# Patient Record
Sex: FEMALE | Race: WHITE | URBAN - NONMETROPOLITAN AREA
[De-identification: names, ages, dates, MRNs, and addresses within clinical notes are randomized per-mention and may not be internally consistent; named-entity substitution may affect disease eponyms.]

---

## 2017-05-03 ENCOUNTER — OFFICE VISIT (OUTPATIENT)
Dept: FAMILY MEDICINE CLINIC | Age: 32
End: 2017-05-03

## 2017-05-03 VITALS
HEART RATE: 117 BPM | HEIGHT: 65 IN | DIASTOLIC BLOOD PRESSURE: 98 MMHG | OXYGEN SATURATION: 99 % | SYSTOLIC BLOOD PRESSURE: 128 MMHG | WEIGHT: 191 LBS | BODY MASS INDEX: 31.82 KG/M2

## 2017-05-03 DIAGNOSIS — F41.8 DEPRESSION WITH ANXIETY: ICD-10-CM

## 2017-05-03 DIAGNOSIS — G43.809 OTHER TYPE OF MIGRAINE: Primary | ICD-10-CM

## 2017-05-03 DIAGNOSIS — H69.83 EUSTACHIAN TUBE DYSFUNCTION, BILATERAL: ICD-10-CM

## 2017-05-03 PROCEDURE — 99214 OFFICE O/P EST MOD 30 MIN: CPT | Performed by: NURSE PRACTITIONER

## 2017-05-03 PROCEDURE — 36415 COLL VENOUS BLD VENIPUNCTURE: CPT | Performed by: NURSE PRACTITIONER

## 2017-05-03 RX ORDER — FLUTICASONE PROPIONATE 50 MCG
2 SPRAY, SUSPENSION (ML) NASAL DAILY
Qty: 1 BOTTLE | Refills: 3 | Status: SHIPPED | OUTPATIENT
Start: 2017-05-03 | End: 2017-10-26 | Stop reason: ALTCHOICE

## 2017-05-03 RX ORDER — PROMETHAZINE HYDROCHLORIDE 25 MG/1
TABLET ORAL
Qty: 20 TABLET | Refills: 0 | Status: SHIPPED | OUTPATIENT
Start: 2017-05-03 | End: 2017-08-10 | Stop reason: SDUPTHER

## 2017-05-03 RX ORDER — DEXTROAMPHETAMINE SACCHARATE, AMPHETAMINE ASPARTATE, DEXTROAMPHETAMINE SULFATE AND AMPHETAMINE SULFATE 7.5; 7.5; 7.5; 7.5 MG/1; MG/1; MG/1; MG/1
30 TABLET ORAL DAILY
COMMUNITY

## 2017-05-03 ASSESSMENT — PATIENT HEALTH QUESTIONNAIRE - PHQ9
2. FEELING DOWN, DEPRESSED OR HOPELESS: 0
1. LITTLE INTEREST OR PLEASURE IN DOING THINGS: 0
SUM OF ALL RESPONSES TO PHQ9 QUESTIONS 1 & 2: 0
SUM OF ALL RESPONSES TO PHQ QUESTIONS 1-9: 0

## 2017-05-03 ASSESSMENT — ENCOUNTER SYMPTOMS
EYES NEGATIVE: 1
RESPIRATORY NEGATIVE: 1
GASTROINTESTINAL NEGATIVE: 1

## 2017-05-04 LAB
A/G RATIO: 2 (ref 1.1–2.2)
ALBUMIN SERPL-MCNC: 4.9 G/DL (ref 3.4–5)
ALP BLD-CCNC: 96 U/L (ref 40–129)
ALT SERPL-CCNC: 22 U/L (ref 10–40)
ANION GAP SERPL CALCULATED.3IONS-SCNC: 21 MMOL/L (ref 3–16)
AST SERPL-CCNC: 18 U/L (ref 15–37)
BASOPHILS ABSOLUTE: 0 K/UL (ref 0–0.2)
BASOPHILS RELATIVE PERCENT: 0.4 %
BILIRUB SERPL-MCNC: 0.5 MG/DL (ref 0–1)
BUN BLDV-MCNC: 5 MG/DL (ref 7–20)
CALCIUM SERPL-MCNC: 10 MG/DL (ref 8.3–10.6)
CHLORIDE BLD-SCNC: 99 MMOL/L (ref 99–110)
CO2: 22 MMOL/L (ref 21–32)
CREAT SERPL-MCNC: 0.8 MG/DL (ref 0.6–1.1)
EOSINOPHILS ABSOLUTE: 0.1 K/UL (ref 0–0.6)
EOSINOPHILS RELATIVE PERCENT: 1.1 %
GFR AFRICAN AMERICAN: >60
GFR NON-AFRICAN AMERICAN: >60
GLOBULIN: 2.5 G/DL
GLUCOSE BLD-MCNC: 99 MG/DL (ref 70–99)
HCT VFR BLD CALC: 46.9 % (ref 36–48)
HEMOGLOBIN: 15.3 G/DL (ref 12–16)
LYMPHOCYTES ABSOLUTE: 2 K/UL (ref 1–5.1)
LYMPHOCYTES RELATIVE PERCENT: 22.4 %
MCH RBC QN AUTO: 31.7 PG (ref 26–34)
MCHC RBC AUTO-ENTMCNC: 32.6 G/DL (ref 31–36)
MCV RBC AUTO: 97.1 FL (ref 80–100)
MONOCYTES ABSOLUTE: 0.7 K/UL (ref 0–1.3)
MONOCYTES RELATIVE PERCENT: 8 %
NEUTROPHILS ABSOLUTE: 6.1 K/UL (ref 1.7–7.7)
NEUTROPHILS RELATIVE PERCENT: 68.1 %
PDW BLD-RTO: 14.5 % (ref 12.4–15.4)
PLATELET # BLD: 354 K/UL (ref 135–450)
PMV BLD AUTO: 9.4 FL (ref 5–10.5)
POTASSIUM SERPL-SCNC: 4 MMOL/L (ref 3.5–5.1)
RBC # BLD: 4.83 M/UL (ref 4–5.2)
SODIUM BLD-SCNC: 142 MMOL/L (ref 136–145)
TOTAL PROTEIN: 7.4 G/DL (ref 6.4–8.2)
TSH SERPL DL<=0.05 MIU/L-ACNC: 1.76 UIU/ML (ref 0.27–4.2)
WBC # BLD: 9 K/UL (ref 4–11)

## 2017-05-22 ENCOUNTER — TELEPHONE (OUTPATIENT)
Dept: FAMILY MEDICINE CLINIC | Age: 32
End: 2017-05-22

## 2017-05-22 RX ORDER — FLUCONAZOLE 150 MG/1
150 TABLET ORAL ONCE
Qty: 1 TABLET | Refills: 0 | Status: SHIPPED | OUTPATIENT
Start: 2017-05-22 | End: 2017-05-22

## 2017-08-10 ENCOUNTER — TELEPHONE (OUTPATIENT)
Dept: FAMILY MEDICINE CLINIC | Age: 32
End: 2017-08-10

## 2017-08-10 RX ORDER — METHYLPREDNISOLONE 4 MG/1
TABLET ORAL
Qty: 1 KIT | Refills: 0 | Status: SHIPPED | OUTPATIENT
Start: 2017-08-10 | End: 2017-10-26 | Stop reason: ALTCHOICE

## 2017-08-10 RX ORDER — PROMETHAZINE HYDROCHLORIDE 25 MG/1
TABLET ORAL
Qty: 20 TABLET | Refills: 0 | Status: SHIPPED | OUTPATIENT
Start: 2017-08-10 | End: 2017-11-21 | Stop reason: SDUPTHER

## 2017-10-26 ENCOUNTER — OFFICE VISIT (OUTPATIENT)
Dept: FAMILY MEDICINE CLINIC | Age: 32
End: 2017-10-26

## 2017-10-26 VITALS
HEIGHT: 65 IN | DIASTOLIC BLOOD PRESSURE: 72 MMHG | WEIGHT: 206 LBS | OXYGEN SATURATION: 98 % | SYSTOLIC BLOOD PRESSURE: 124 MMHG | BODY MASS INDEX: 34.32 KG/M2 | HEART RATE: 125 BPM

## 2017-10-26 DIAGNOSIS — H69.83 DYSFUNCTION OF BOTH EUSTACHIAN TUBES: ICD-10-CM

## 2017-10-26 DIAGNOSIS — L30.9 DERMATITIS: ICD-10-CM

## 2017-10-26 DIAGNOSIS — M79.601 RIGHT ARM PAIN: Primary | ICD-10-CM

## 2017-10-26 PROCEDURE — 99213 OFFICE O/P EST LOW 20 MIN: CPT | Performed by: NURSE PRACTITIONER

## 2017-10-26 RX ORDER — RISPERIDONE 0.25 MG/1
TABLET, FILM COATED ORAL
Refills: 4 | COMMUNITY
Start: 2017-10-16 | End: 2017-10-26

## 2017-10-26 RX ORDER — FLUTICASONE PROPIONATE 50 MCG
2 SPRAY, SUSPENSION (ML) NASAL DAILY
Qty: 1 BOTTLE | Refills: 11 | Status: SHIPPED | OUTPATIENT
Start: 2017-10-26 | End: 2020-10-14

## 2017-10-26 RX ORDER — IVERMECTIN 3 MG/1
4 TABLET ORAL DAILY
Qty: 4 TABLET | Refills: 1 | Status: SHIPPED | OUTPATIENT
Start: 2017-10-26 | End: 2020-10-14

## 2017-10-26 ASSESSMENT — ENCOUNTER SYMPTOMS
COUGH: 0
RHINORRHEA: 1
NAIL CHANGES: 0
SORE THROAT: 0
GASTROINTESTINAL NEGATIVE: 1
EYE PAIN: 0
EYES NEGATIVE: 1
VOMITING: 0
SHORTNESS OF BREATH: 0
DIARRHEA: 0
RESPIRATORY NEGATIVE: 1
ABDOMINAL PAIN: 0

## 2017-10-26 NOTE — PROGRESS NOTES
known incident. There was no injury mechanism. Pain location: Right humerus but only notices it when she sleeps on it at night. She does not have any pain or discomfort with movement of her arm during the day. The quality of the pain is described as aching and stabbing. The pain does not radiate. The pain is moderate. The pain has been fluctuating since the incident. Pertinent negatives include no chest pain, muscle weakness, numbness or tingling. Exacerbated by: Sleeping on the right arm. She has tried ice for the symptoms. The treatment provided no relief. Review of Systems   Constitutional: Negative. Negative for fatigue and fever. HENT: Positive for ear pain and rhinorrhea. Negative for congestion, ear discharge, hearing loss and sore throat. Eyes: Negative. Negative for pain. Respiratory: Negative. Negative for cough and shortness of breath. Cardiovascular: Negative. Negative for chest pain. Gastrointestinal: Negative. Negative for abdominal pain, anorexia, diarrhea and vomiting. Genitourinary: Negative. Musculoskeletal: Negative. Negative for joint pain and neck pain. Skin: Positive for rash. Negative for nail changes. Neurological: Negative. Negative for tingling, numbness and headaches. Endo/Heme/Allergies: Negative. Psychiatric/Behavioral: Negative. All other systems reviewed and are negative.        Past Medical History:   Diagnosis Date    Anxiety     AR (allergic rhinitis)     Depression     Dysmenorrhea     Endometriosis     IBS (irritable bowel syndrome)     Migraine     OCD (obsessive compulsive disorder)     Proctalgia fugax     S/P LEEP of cervix 2000    Tobacco abuse     Urinary incontinence      Family History   Problem Relation Age of Onset    Anxiety Disorder      Depression      Cancer Mother      cervical    Cancer Father      leukemia     Past Surgical History:   Procedure Laterality Date    HYSTERECTOMY  7/10/12    Total Encounters:   10/26/17 206 lb (93.4 kg)   05/03/17 191 lb (86.6 kg)   12/10/15 184 lb 6.4 oz (83.6 kg)       Lab Review   No visits with results within 2 Month(s) from this visit. Latest known visit with results is:   Office Visit on 05/03/2017   Component Date Value    TSH 05/04/2017 1.76     WBC 05/04/2017 9.0     RBC 05/04/2017 4.83     Hemoglobin 05/04/2017 15.3     Hematocrit 05/04/2017 46.9     MCV 05/04/2017 97.1     MCH 05/04/2017 31.7     MCHC 05/04/2017 32.6     RDW 05/04/2017 14.5     Platelets 30/30/3520 354     MPV 05/04/2017 9.4     Neutrophils % 05/04/2017 68.1     Lymphocytes % 05/04/2017 22.4     Monocytes % 05/04/2017 8.0     Eosinophils % 05/04/2017 1.1     Basophils % 05/04/2017 0.4     Neutrophils # 05/04/2017 6.1     Lymphocytes # 05/04/2017 2.0     Monocytes # 05/04/2017 0.7     Eosinophils # 05/04/2017 0.1     Basophils # 05/04/2017 0.0     Sodium 05/04/2017 142     Potassium 05/04/2017 4.0     Chloride 05/04/2017 99     CO2 05/04/2017 22     Anion Gap 05/04/2017 21*    Glucose 05/04/2017 99     BUN 05/04/2017 5*    CREATININE 05/04/2017 0.8     GFR Non- 05/04/2017 >60     GFR  05/04/2017 >60     Calcium 05/04/2017 10.0     Total Protein 05/04/2017 7.4     Alb 05/04/2017 4.9     Albumin/Globulin Ratio 05/04/2017 2.0     Total Bilirubin 05/04/2017 0.5     Alkaline Phosphatase 05/04/2017 96     ALT 05/04/2017 22     AST 05/04/2017 18     Globulin 05/04/2017 2.5        No results found for this visit on 10/26/17. Assessment:       1. Right arm pain    2. Dysfunction of both eustachian tubes    3. Dermatitis               Plan:       Jeff Gonzalez was seen today for otalgia and rash. Diagnoses and all orders for this visit:    Right arm pain  Unknown injury and only having discomfort when she sleeps on the area at night. -     XR HUMERUS RIGHT (MIN 2 VIEWS); Future  Patient is convinced that she has a tumor in her arm.  If

## 2017-11-21 ENCOUNTER — TELEPHONE (OUTPATIENT)
Dept: FAMILY MEDICINE CLINIC | Age: 32
End: 2017-11-21

## 2017-11-21 DIAGNOSIS — B37.31 VAGINAL YEAST INFECTION: Primary | ICD-10-CM

## 2017-11-21 RX ORDER — FLUCONAZOLE 150 MG/1
150 TABLET ORAL ONCE
Qty: 1 TABLET | Refills: 0 | Status: SHIPPED | OUTPATIENT
Start: 2017-11-21 | End: 2017-11-21

## 2017-11-21 NOTE — TELEPHONE ENCOUNTER
Pt called asking if Diflucan could be called in for her today for a yeast infection. Pt said that the only symptom that she having is the itching that started yesterday. Pt has tried some otc cream and it does not seem to be helping. Pt uses [facility] in [place]. Pt said that if she does not answer the phone a detailed message can be left.

## 2017-11-22 RX ORDER — PROMETHAZINE HYDROCHLORIDE 25 MG/1
TABLET ORAL
Qty: 20 TABLET | Refills: 0 | Status: SHIPPED | OUTPATIENT
Start: 2017-11-22 | End: 2018-04-16 | Stop reason: SDUPTHER

## 2018-01-18 ENCOUNTER — TELEPHONE (OUTPATIENT)
Dept: GASTROENTEROLOGY | Age: 33
End: 2018-01-18

## 2018-01-18 PROBLEM — K50.119 CROHN'S COLITIS, UNSPECIFIED COMPLICATION (HCC): Status: ACTIVE | Noted: 2018-01-18

## 2018-01-22 PROBLEM — K58.9 IBS (IRRITABLE BOWEL SYNDROME): Status: ACTIVE | Noted: 2018-01-22

## 2018-01-22 PROBLEM — Z71.3 NUTRITIONAL COUNSELING: Status: ACTIVE | Noted: 2018-01-22

## 2018-02-12 ENCOUNTER — TELEPHONE (OUTPATIENT)
Dept: GASTROENTEROLOGY | Age: 33
End: 2018-02-12

## 2018-02-12 NOTE — TELEPHONE ENCOUNTER
Pt called to schedule ED F/U; Pt wishes to transfer from [facility] to here and stay under his care. Pt also needs intermittent FMLA paperwork filled out for Crohn's disease. Pt scheduled appt with Dr. Eddie Fairbanks here in our office 1/22/18 at 10:00 AM. Pt notified that she could discuss FMLA paperwork at that appt with Dr. Eddie Fairbanks. Verbal understanding expressed.

## 2018-02-22 ENCOUNTER — HOSPITAL ENCOUNTER (OUTPATIENT)
Dept: GENERAL RADIOLOGY | Age: 33
Discharge: HOME | End: 2018-02-22
Attending: INTERNAL MEDICINE | Admitting: INTERNAL MEDICINE

## 2018-02-22 ENCOUNTER — OFFICE VISIT (OUTPATIENT)
Dept: GASTROENTEROLOGY | Age: 33
End: 2018-02-22

## 2018-02-22 ENCOUNTER — TELEPHONE (OUTPATIENT)
Dept: GASTROENTEROLOGY | Age: 33
End: 2018-02-22

## 2018-02-22 VITALS
HEIGHT: 65 IN | HEART RATE: 117 BPM | WEIGHT: 260.4 LBS | OXYGEN SATURATION: 98 % | SYSTOLIC BLOOD PRESSURE: 132 MMHG | DIASTOLIC BLOOD PRESSURE: 68 MMHG | BODY MASS INDEX: 43.39 KG/M2

## 2018-02-22 DIAGNOSIS — K50.119 CROHN'S DISEASE OF LARGE INTESTINE WITH COMPLICATION (HCC): Primary | ICD-10-CM

## 2018-02-22 LAB
ALBUMIN SERPL-MCNC: 4.5 GM/DL (ref 3.4–5)
ALBUMIN SERPL-MCNC: 4.5 GM/DL (ref 3.4–5)
ALP BLD-CCNC: 53 IU/L (ref 40–129)
ALP BLD-CCNC: 53 IU/L (ref 40–129)
ALT SERPL-CCNC: 18 U/L (ref 10–40)
ALT SERPL-CCNC: 18 U/L (ref 10–40)
ANION GAP SERPL CALCULATED.3IONS-SCNC: 11 MMOL/L (ref 4–16)
AST SERPL-CCNC: 16 IU/L (ref 15–37)
AST SERPL-CCNC: 16 IU/L (ref 15–37)
BASOPHILS ABSOLUTE: 0 K/CU MM
BASOPHILS RELATIVE PERCENT: 0.4 % (ref 0–1)
BILIRUB SERPL-MCNC: 0.3 MG/DL (ref 0–1)
BILIRUB SERPL-MCNC: 0.3 MG/DL (ref 0–1)
BILIRUBIN DIRECT: 0.2 MG/DL (ref 0–0.3)
BILIRUBIN, INDIRECT: 0.1 MG/DL (ref 0–0.7)
BUN BLDV-MCNC: 15 MG/DL (ref 6–23)
CALCIUM SERPL-MCNC: 9.2 MG/DL (ref 8.3–10.6)
CHLORIDE BLD-SCNC: 102 MMOL/L (ref 99–110)
CO2: 27 MMOL/L (ref 21–32)
CREAT SERPL-MCNC: 0.6 MG/DL (ref 0.6–1.1)
DIFFERENTIAL TYPE: ABNORMAL
EOSINOPHILS ABSOLUTE: 0.1 K/CU MM
EOSINOPHILS RELATIVE PERCENT: 0.9 % (ref 0–3)
FERRITIN: 40 NG/ML (ref 15–150)
GFR AFRICAN AMERICAN: >60 ML/MIN/1.73M2
GFR NON-AFRICAN AMERICAN: >60 ML/MIN/1.73M2
GLUCOSE FASTING: 78 MG/DL (ref 70–99)
HBV SURFACE AB TITR SER: <3.5 {TITER}
HCT VFR BLD CALC: 38.3 % (ref 37–47)
HEMOGLOBIN: 12.5 GM/DL (ref 12.5–16)
HEPATITIS B SURFACE ANTIGEN: NON REACTIVE
HEPATITIS C ANTIBODY: NON REACTIVE
HIGH SENSITIVE C-REACTIVE PROTEIN: 3.7 MG/L
IGA: 433 MG/DL (ref 69–382)
IGG,SERUM: 1324 MG/DL (ref 723–1685)
IGM,SERUM: 101 MG/DL (ref 62–277)
IMMATURE NEUTROPHIL %: 0.4 % (ref 0–0.43)
IRON: 85 UG/DL (ref 37–145)
LYMPHOCYTES ABSOLUTE: 2.5 K/CU MM
LYMPHOCYTES RELATIVE PERCENT: 32.5 % (ref 24–44)
MCH RBC QN AUTO: 29.7 PG (ref 27–31)
MCHC RBC AUTO-ENTMCNC: 32.6 % (ref 32–36)
MCV RBC AUTO: 91 FL (ref 78–100)
MONOCYTES ABSOLUTE: 0.5 K/CU MM
MONOCYTES RELATIVE PERCENT: 7 % (ref 0–4)
NUCLEATED RBC %: 0 %
PCT TRANSFERRIN: 22 % (ref 10–44)
PDW BLD-RTO: 12.1 % (ref 11.7–14.9)
PLATELET # BLD: 272 K/CU MM (ref 140–440)
PMV BLD AUTO: 11.9 FL (ref 7.5–11.1)
POTASSIUM SERPL-SCNC: 4.1 MMOL/L (ref 3.5–5.1)
RBC # BLD: 4.21 M/CU MM (ref 4.2–5.4)
SEGMENTED NEUTROPHILS ABSOLUTE COUNT: 4.6 K/CU MM
SEGMENTED NEUTROPHILS RELATIVE PERCENT: 58.8 % (ref 36–66)
SODIUM BLD-SCNC: 140 MMOL/L (ref 135–145)
TOTAL IMMATURE NEUTOROPHIL: 0.03 K/CU MM
TOTAL IRON BINDING CAPACITY: 395 UG/DL (ref 250–450)
TOTAL NUCLEATED RBC: 0 K/CU MM
TOTAL PROTEIN: 7.9 GM/DL (ref 6.4–8.2)
TOTAL PROTEIN: 7.9 GM/DL (ref 6.4–8.2)
UNSATURATED IRON BINDING CAPACITY: 310 UG/DL (ref 110–370)
VITAMIN B-12: 314.4 PG/ML (ref 211–911)
VITAMIN D 25-HYDROXY: 16.25 NG/ML
WBC # BLD: 7.8 K/CU MM (ref 4–10.5)

## 2018-02-22 PROCEDURE — 99215 OFFICE O/P EST HI 40 MIN: CPT | Performed by: INTERNAL MEDICINE

## 2018-02-22 RX ORDER — INFLIXIMAB 100 MG/10ML
INJECTION, POWDER, LYOPHILIZED, FOR SOLUTION INTRAVENOUS
Qty: 24 EACH | Refills: 5 | Status: ON HOLD | OUTPATIENT
Start: 2018-02-22 | End: 2019-02-07

## 2018-02-22 RX ORDER — ALBUTEROL SULFATE 90 UG/1
2 AEROSOL, METERED RESPIRATORY (INHALATION)
COMMUNITY
End: 2018-03-06 | Stop reason: SDUPTHER

## 2018-02-22 NOTE — LETTER
[facility] [address] [facility]    Your procedure with Dr. Aleisha Cole has been scheduled at the     [facility] located at     [address] [place] [address], [facility].                        3/6/18                                           /      12:00pm_______________            Procedure Date                Arrival Time (Arrive 1 hour early)       1:00pm_____________  Procedure Time                               If an emergency arises and you are unable to keep your procedure appointment, you must call [facility] at [phone] and our office at [phone] within 24 hours to let us know. Not giving 24 hour notice or not showing for your procedure appointment may result in immediate dismissal from Dr. Piper Prom practice. COLONOSCOPY  MIRALAX AND DULCOLAX SPLIT BOWEL PREP    To prepare for this procedure, you will need to clean out your bowels or large intestines. Review all the information you are given as soon as you can so that you are prepared and know what to expect during and after your procedure. You may need to make changes to your diet or medications. Begin this bowel prep 1 day prior to your scheduled procedure. You will need an adult to drive you home after the procedure. Your  will need to check in with you at the facility so the staff are aware of who they are and needs to stay at the facility during the entire procedure. If you  does leave during the procedure, he or she needs to give the staff a phone number where they can be reached and stay within 30 minutes of the facility. If you are taking a cab, bus, or medical transportation service home after the procedure, an adult, other than the , needs to ride with you for your safety.  You should have an adult with you to help you and check on you after the procedure at home for at least 6 hours after ? Start to drink the prep medicine mixture. Drink one, 8-ounce cup of the mixture every 10 to 15 minutes until you finish half the mixture. It is better to drink each cup quickly rather than taking small sips  ? Drink half the mixture this evening. Place the other half of the mixture in the refrigerator. You will need to drink the rest of the mixture in the morning. ? Continue to drink other clear liquids through the evening  Morning of the procedure    ? Six hours before your procedure, drink the rest of the Miralax mixture as before. You may need to set your alarm to get up to finish the prep medication if you have an early appointment time  ? Drink two 8-ounce cups of clear liquids after you finish the prep medication  ? You can drink clear liquids up to 4 hours prior to the procedure  ? Take your medicines for blood pressure, heart issues, seizures or pain with a sip of water up to 2 hours before the procedure      If you are vomiting up your prep medicine, have not had a bowel movement, or your bowels are not clear, please call our office at [phone]. If you call after normal business hours, please leave us a detailed voicemail so we can call you as soon as we get back into the office.

## 2018-02-22 NOTE — TELEPHONE ENCOUNTER
I called scheduling and spoke to Matthias Romo. She faxed me the order form the mediport insertion. I have filled this out and placed it on Dr. Eloy Davila desk to sign so this can be faxed to same day surgery at [phone]. Dr. Saeed Vinson will need to order CBC, PT/PTT/INR, and Saline Lock for this procedure. This has been scheduled for 3/12/18 at 12:00 PM; patient needs to arrive at 10:00AM. NPO 6 hrs prior; [address] [place] [address] will call and give the patient instructions and do pre-registration.

## 2018-02-22 NOTE — TELEPHONE ENCOUNTER
Pt also has FMLA paperwork to be filled out for intermittent FMLA. Pt also ordered Breath Hydrogen Test - please keep track of status of this with [employer].

## 2018-02-22 NOTE — PROGRESS NOTES
Reason for Visit: She had concerns including Crohn's Disease (ED F/U for Crohn's disease). HPI:  A 77-year-old obese  female patient who has a past medical history of Crohn disease that involved terminal ileum and colon status post ileocecectomy and had become complicated by multiple episodes of Clostridium difficile infection and IBS had come to follow up her Crohn disease. She is on Remicade at 10 mg/kg every 6 weeks. The patient recently had been admitted to hospital because of Clostridium difficile infection and bacteremia. Right now the patient has already finished the course of Dificid for her C. Diff infection. Right now the patient's stool pattern had returned to her baseline. She moved her bowels anywhere between 6-8 times per day. Sometimes she moved her bowels twice per day. Stools are semi-formed and semi-loose. The patient had been taking cholestyramine as well. At baseline, patient moved bowels anywhere between 6-8 times per day. The patient also had baseline lower abdominal cramps intermittently. The severity of the abdominal cramps was around a 3 to 4/10. The cramps had not gotten worse. Today the patient denied abdominal pain or abdominal cramps. The patient denied melena. The patient had mild nausea without vomiting. The patient denied heartburn, regurgitation. The patient denied dysphagia, odynophagia. The patient denied abdominal bloating, abdominal gas sensations, abdominal girth increase. The patient denied musculoskeletal or joint pain, mouth ulcers, blurred vision, rashes or jaundice. The patient had undergone upper endoscopy on September 28, 2017, which showed a portal hypertensive gastropathy with normal esophagus and normal duodenum. The patient also had undergone a colonoscopy on May 15, 2017, which showed a normal anastomosis, normal colon and a normal new terminal ileum. The patient also had multiple CAT scans.  The most recent CAT scan on July 24, 2017, with IV and by mouth every 6 hours as needed for Pain .  ondansetron (ZOFRAN ODT) 4 MG disintegrating tablet Take 1 tablet by mouth every 8 hours as needed for Nausea or Vomiting 30 tablet 0    cholestyramine (QUESTRAN) 4 G packet Take 1 packet by mouth 3 times daily (with meals)      Carboxymeth-Glycerin-Polysorb (REFRESH OPTIVE ADVANCED) 0.5-1-0.5 % SOLN Place 1 drop into both eyes 3 times daily      Carboxymethylcellul-Glycerin (REFRESH OPTIVE) 1-0.9 % GEL Place 1 drop into both eyes nightly      Diphenoxylate-Atropine (LOMOTIL PO) Take 2.5 mg by mouth as needed       rizatriptan (MAXALT) 10 MG tablet Take 10 mg by mouth as needed for Migraine May repeat in 2 hours if needed      Cyanocobalamin (VITAMIN B-12 IJ) Inject as directed every 30 days 12/22/16 Pt states she take this on Saturdays, is due this month      InFLIXimab (REMICADE IV) Infuse intravenously Indications: States hasn't had remicadem IV infusion X 9 weeks 12/22/16 States last tx was 11/18/16 due on the 12/28/16 Every Six Weeks At [address] [facility]      albuterol sulfate HFA (PROVENTIL HFA) 108 (90 BASE) MCG/ACT inhaler Inhale 2 puffs into the lungs every 4 hours as needed for Wheezing or Shortness of Breath With spacer (and mask if indicated). Thanks. 1 Inhaler 1     No current facility-administered medications for this visit. Allergies: Allergies   Allergen Reactions    Morphine      \"Triggers My Migraines\"    Tramadol      \"Triggers My Migraines\"       Social History:    Social History     Social History    Marital status: Single     Spouse name: N/A    Number of children: 1    Years of education: N/A     Occupational History    Not on file.      Social History Main Topics    Smoking status: Never Smoker    Smokeless tobacco: Never Used    Alcohol use No    Drug use: No    Sexual activity: Yes     Partners: Male     Other Topics Concern    Not on file     Social History Narrative    Lives with gallops appreciated. Abdomen: Soft, non-tender, no rebound or guarding or peritoneal features, no masses, no hepatosplenomegaly. Extremities: Warm and dry, no clubbing, cyanosis, edema. Neuro: CN II-XII were intact grossly. Rectum: There was no fistular, fissure, external hemorrhoid, tenderness, abscess, erythema or discharge    Labs:  CBC  @LASTLABOSUSHORT(WBC,WBCCOUNT,WBCFETAL,HGB,HCT,PLATELET,MCV)@     Glucose   Date Value Ref Range Status   01/20/2018 105 (H) 70 - 99 MG/DL Final     CO2   Date Value Ref Range Status   01/20/2018 27 21 - 32 MMOL/L Final     BUN   Date Value Ref Range Status   01/20/2018 10 6 - 23 MG/DL Final     Lab Results   Component Value Date    ALT 19 01/19/2018    AST 19 01/19/2018     Lab Results   Component Value Date    AMYLASE 40 10/22/2013     Lab Results   Component Value Date    LIPASE 24 01/17/2018     Lab Results   Component Value Date    ESR 18 11/23/2013     No components found for: CREACTIVEPR  No results found for: BEAU No components found for: ANTISMA, ANTIMITO  No results found for: CEA  No components found for: Lucilla Kyle, OCCULTBLOODS, OCCBLD1, OCCBLD2, OCCBLD3, OCCULTBLOOD  Lab Results   Component Value Date    IRON 96 05/25/2012    FERRITIN 76 05/25/2012     No results found for: HAV    Assessment     Assessment and Plan:    Assessment and Plan;     A 76-year-old obese  female patient who had past medical history of Crohn disease that involved terminal ileum and colon status post ileocecectomy, multiple Clostridium difficile infections and IBS, had come to the office to follow up her Crohn disease. Currently, the patient has been on Remicade 10mg/kg every 6 weeks the patient had 6 bowel movements per day, stools loose and watery. The patient had 2 days of hematochezia. After that, the patient no longer had hematochezia. Prior to that, the patient did not have hematochezia either.  The patient also had chronic low abdominal cramps which were 4/10 antigen negative. Monitoring yearly CXR,  . Check Remicade level and Abs   5. IBD Diagnosis orders: Laboratory, Radiology and Procedures Laboratory    I had ordered blood tests   9. Procedures    colonoscopy  6. Nutrition- overweight. Patient does not require liquid food supplements. Nutrition evaluation not needed   7. Behavioral. Patient will require evaluation for depression   8. Social - Patient will not require work excuse   15. Primary care - Patient will: Influenza and Pneumococcal. Patient does follow-up with PCP for: PAP smear; Dermatology- total body exam ; GYN for: PAP smear;   9. Research- No   10. Patient education- We have discussed extensively about the infectious, carcinogenic and mutagenetic complications associated with medications needed to treat her the Inflammatory Bowel Disease. We have emphasized the need to comply with the monitoring regimen and report new symptoms in due time. In addition we have discussed the risk and benefits of the procedures and tests and offered medically appropriate alternatives. The patient has been provided with contact information for our IBD center personnel. In case of emergent admission the patient or her family will alert our [address]. 11. N/V had resolved. 12: Left eye pain raised a concern of episcleritis or iritis, which might be related to Crohn disease. She has followed with an ophthalmologist and is on Remicade which is supposed to work for both eye manifestations of Crohn disease and Crohn disease. RTC in : 6 months               Viktor Null MD PhD [facility]  [address] [facility] [address]  0ffice: [phone]  Fax: [phone]

## 2018-02-23 ENCOUNTER — TELEPHONE (OUTPATIENT)
Dept: GASTROENTEROLOGY | Age: 33
End: 2018-02-23

## 2018-02-23 DIAGNOSIS — K50.119 CROHN'S DISEASE OF LARGE INTESTINE WITH COMPLICATION (HCC): Primary | ICD-10-CM

## 2018-02-24 LAB
HAV AB SERPL IA-ACNC: NEGATIVE
HEPATITIS B CORE TOTAL ANTIBODY: NEGATIVE
HISTOPLASMA ANTIGEN URINE INTERP: NOT DETECTED
HISTOPLASMA ANTIGEN URINE: NOT DETECTED
TRANSGLUTAMINASE IGA: 1

## 2018-02-26 ENCOUNTER — TELEPHONE (OUTPATIENT)
Dept: GASTROENTEROLOGY | Age: 33
End: 2018-02-26

## 2018-02-26 DIAGNOSIS — K50.119 CROHN'S DISEASE OF LARGE INTESTINE WITH COMPLICATION (HCC): Primary | ICD-10-CM

## 2018-02-26 LAB
NIL (NEGATIVE) SPOT CONTROL: 0
PANEL A SPOT COUNT: 0
PANEL B SPOT COUNT: 0
POSITIVE CONTROL SPOT COUNT: >20
TB CELL IMMUNE MEASURE: NEGATIVE

## 2018-02-26 RX ORDER — SODIUM CHLORIDE, SODIUM LACTATE, POTASSIUM CHLORIDE, CALCIUM CHLORIDE 600; 310; 30; 20 MG/100ML; MG/100ML; MG/100ML; MG/100ML
250 INJECTION, SOLUTION INTRAVENOUS CONTINUOUS
Qty: 1000 ML | Refills: 1 | Status: SHIPPED | OUTPATIENT
Start: 2018-02-26 | End: 2018-03-06 | Stop reason: CLARIF

## 2018-02-26 NOTE — TELEPHONE ENCOUNTER
Received notice that [employer] received our order for the hydrogen breath test.

## 2018-02-26 NOTE — TELEPHONE ENCOUNTER
Patient called because Say called her to let her know that [facility] Bae[facility] sent rx for IV fluid and they do not provide that. I advised patient that the order is for her c-scope but it was not supposed to be sent to [place]. I advised patient to disregard. Patient expressed understanding and had no further questions.

## 2018-02-27 NOTE — TELEPHONE ENCOUNTER
[facility] does not allow infusions orders from outside providers. Will check on home infusion option next.

## 2018-02-27 NOTE — TELEPHONE ENCOUNTER
Please check (1) home infusion option (2) call Remicade rep to see whether they can give your home infusion info (3) call [address] [facility] to see whether they have outpatient infusion center     [facility]

## 2018-02-28 ENCOUNTER — TELEPHONE (OUTPATIENT)
Dept: GASTROENTEROLOGY | Age: 33
End: 2018-02-28

## 2018-03-01 NOTE — TELEPHONE ENCOUNTER
Called [facility] and was transferred to [facility] Phone Number: [phone]

## 2018-03-01 NOTE — TELEPHONE ENCOUNTER
Taylor Aden called back and stated [facility] might be the best option to try.      [facility] - [phone]

## 2018-03-01 NOTE — TELEPHONE ENCOUNTER
Direct line for Eriberto Fragoso with [facility] is (order fax: [phone]) phone number: [phone]  Fax demographics, insurance card, and Remicade order. Eriberto Fragoso from [facility]  will call us back after he checks coverage with the patient's insurance.

## 2018-03-02 ENCOUNTER — TELEPHONE (OUTPATIENT)
Dept: GASTROENTEROLOGY | Age: 33
End: 2018-03-02

## 2018-03-05 ENCOUNTER — TELEPHONE (OUTPATIENT)
Dept: GASTROENTEROLOGY | Age: 33
End: 2018-03-05

## 2018-03-05 NOTE — TELEPHONE ENCOUNTER
Spoke with Mor Lopez Ragland 3/5/2018@ 8:67EU. No Pre  Certification Required for CPT Codes 56747 and 83461.

## 2018-03-05 NOTE — ANESTHESIA PRE-OP
Department of Anesthesiology  Preprocedure Note       Name:  Jean Marie Rivera   Age:  27 y.o.  :  1985                                          MRN:  [id number]         Date:  3/5/2018      Surgeon:    Procedure:    Medications prior to admission:   Prior to Admission medications    Medication Sig Start Date End Date Taking? Authorizing Provider   lactated ringers infusion Infuse 250 mL/hr intravenously continuous 18   Odalis Rocha MD   albuterol sulfate  (90 Base) MCG/ACT inhaler Inhale 2 puffs into the lungs    Historical Provider, MD   inFLIXimab (REMICADE) 100 MG injection 10 mg/kg IV  every 6 weeks 18   Odalis Rocha MD   omeprazole (PRILOSEC) 40 MG delayed release capsule Take 40 mg by mouth 2 times daily    Historical Provider, MD   vitamin D (ERGOCALCIFEROL) 85743 units capsule Take 1 capsule by mouth twice a week 18   Rommel Choi MD   HYDROcodone-acetaminophen [facility]) 5-325 MG per tablet Take 1 tablet by mouth every 6 hours as needed for Pain . Historical Provider, MD   albuterol sulfate HFA (PROVENTIL HFA) 108 (90 BASE) MCG/ACT inhaler Inhale 2 puffs into the lungs every 4 hours as needed for Wheezing or Shortness of Breath With spacer (and mask if indicated). Thanks.  16  Jeremias Casas DO   ondansetron (ZOFRAN ODT) 4 MG disintegrating tablet Take 1 tablet by mouth every 8 hours as needed for Nausea or Vomiting 16   Lisa Quezada MD   cholestyramine [facility]) 4 G packet Take 1 packet by mouth 3 times daily (with meals)    Historical Provider, MD   Carboxymeth-Glycerin-Polysorb (REFRESH OPTIVE ADVANCED) 0.5-1-0.5 % SOLN Place 1 drop into both eyes 3 times daily    Historical Provider, MD Vasquesmethylcellul-Glycerin (REFRESH OPTIVE) 1-0.9 % GEL Place 1 drop into both eyes nightly    Historical Provider, MD   Diphenoxylate-Atropine (LOMOTIL PO) Take 2.5 mg by mouth as needed     Historical Provider, MD   rizatriptan (MAXALT) 10 MG tablet APPENDECTOMY  2011    Done During Colon Resection For Crohn's  Disease    BREAST SURGERY Left 2000    \"Infected Lymph Node Removed\"    CHOLECYSTECTOMY  09/20/2016    COLECTOMY  8-11    Crohn's Disease , Dr. Bishnu Callahan, Appendectomy Also Done    COLONOSCOPY  09/06/2016    Polyps Removed In Past    ENDOSCOPY, COLON, DIAGNOSTIC  Last Done 2014    LAPAROSCOPY  2013    Dr Alayna Hernandez, Removed Adhesions    OTHER SURGICAL HISTORY  06/2011    Mediport Insertion Left Chest Area/revision done 6/2016    WISDOM TOOTH EXTRACTION      All [place] Teeth Extracted In Past       Social History:    Social History   Substance Use Topics    Smoking status: Never Smoker    Smokeless tobacco: Never Used    Alcohol use No                                Counseling given: Not Answered      Vital Signs (Current): There were no vitals filed for this visit. BP Readings from Last 3 Encounters:   02/22/18 132/68   01/22/18 124/62   09/19/17 114/75       NPO Status:                                                                                 BMI:   Wt Readings from Last 3 Encounters:   02/22/18 260 lb 6.4 oz (118.1 kg)   01/22/18 250 lb (113.4 kg)   09/19/17 255 lb (115.7 kg)     There is no height or weight on file to calculate BMI. Anesthesia Evaluation  Patient summary reviewed  Airway:         Dental:          Pulmonary:   (+) asthma:                            Cardiovascular:             Beta Blocker:  Not on Beta Blocker         Neuro/Psych:   (+) headaches: migraine headaches, psychiatric history:            GI/Hepatic/Renal:   (+) GERD:, liver disease:, bowel prep, morbid obesity          Endo/Other:    (+) blood dyscrasia: anemia:., .                 Abdominal:           Vascular:                                        Anesthesia Plan      MAC     ASA 2     ( Pre Anesthesia Assessment complete.  Chart reviewed on 3/5/2018)                            Katie Wilson CRNA   3/5/2018

## 2018-03-06 ENCOUNTER — HOSPITAL ENCOUNTER (OUTPATIENT)
Dept: SURGERY | Age: 33
Discharge: HOME | End: 2018-03-06
Attending: INTERNAL MEDICINE | Admitting: INTERNAL MEDICINE

## 2018-03-06 VITALS
BODY MASS INDEX: 42.85 KG/M2 | RESPIRATION RATE: 16 BRPM | OXYGEN SATURATION: 100 % | TEMPERATURE: 97.3 F | SYSTOLIC BLOOD PRESSURE: 119 MMHG | HEIGHT: 65 IN | WEIGHT: 257.2 LBS | HEART RATE: 78 BPM | DIASTOLIC BLOOD PRESSURE: 71 MMHG

## 2018-03-06 LAB — PREGNANCY TEST URINE, POC: NEGATIVE

## 2018-03-06 PROCEDURE — 45380 COLONOSCOPY AND BIOPSY: CPT | Performed by: INTERNAL MEDICINE

## 2018-03-06 RX ORDER — SODIUM CHLORIDE 9 MG/ML
INJECTION, SOLUTION INTRAVENOUS CONTINUOUS
Status: DISCONTINUED | OUTPATIENT
Start: 2018-03-06 | End: 2018-03-07 | Stop reason: HOSPADM

## 2018-03-06 RX ORDER — IPRATROPIUM BROMIDE AND ALBUTEROL SULFATE 2.5; .5 MG/3ML; MG/3ML
1 SOLUTION RESPIRATORY (INHALATION)
Status: DISCONTINUED | OUTPATIENT
Start: 2018-03-06 | End: 2018-03-07 | Stop reason: HOSPADM

## 2018-03-06 RX ADMIN — IPRATROPIUM BROMIDE AND ALBUTEROL SULFATE 1 AMPULE: 2.5; .5 SOLUTION RESPIRATORY (INHALATION) at 12:35

## 2018-03-06 RX ADMIN — SODIUM CHLORIDE: 9 INJECTION, SOLUTION INTRAVENOUS at 12:51

## 2018-03-06 ASSESSMENT — PAIN SCALES - GENERAL
PAINLEVEL_OUTOF10: 0
PAINLEVEL_OUTOF10: 0

## 2018-03-06 ASSESSMENT — ENCOUNTER SYMPTOMS: SHORTNESS OF BREATH: 1

## 2018-03-06 ASSESSMENT — PAIN - FUNCTIONAL ASSESSMENT: PAIN_FUNCTIONAL_ASSESSMENT: 0-10

## 2018-03-06 NOTE — PROGRESS NOTES
BRIEF COLONOSCOPY REPORT:    Impression:    1) normal Ti, anastomosis, colon, biopsied,   2) chromoendoscopy was performed    Suggest:   1)repeat it in 1 year     The complete operative report (including photos) is available in the following locations:   1) soft chart now   2) report will also be scanned and can then be found by going to \"chart review\" then \"notes\" then \"op report\" or by going to \"chart review\" then \"media\" then \"op report\". For review of photos, may need to go to page 2.

## 2018-03-06 NOTE — PROGRESS NOTES
26 vss, family at bedside, call light in reach, pt drowsy  1350 more alert. Head of bed up. Soda provided  1402 dr Malathi Wright provided update at bedside  1410 pt dressing  [id number] [phone] discharge instructions reviewed.  Verbalized understanding  1416 ambulated to bathroom  [phone] [id number] discharged to car

## 2018-03-06 NOTE — ANESTHESIA POST-OP
Anesthesia Post-op Note    Patient: Lenard Friedman  MRN: [id number]  YOB: 1985  Date of evaluation: 3/6/2018  Time:  1:41 PM     Procedure(s) Performed: colonoscopy Bx's    Last Vitals: /79   Pulse 92   Temp 98.3 °F (36.8 °C) (Temporal)   Resp 18   Ht 5' 5\" (1.651 m)   Wt 257 lb 3.2 oz (116.7 kg)   SpO2 96%   BMI 42.80 kg/m²     Ezekiel Phase I:  10    Ezekiel Phase II:  10    Anesthesia Post Evaluation    Final anesthesia type: general and TIVA  Patient location during evaluation: procedure area  Patient participation: complete - patient participated  Level of consciousness: awake and alert  Pain score: 0  Airway patency: patent  Nausea & Vomiting: no nausea and no vomiting  Complications: no  Cardiovascular status: hemodynamically stable  Respiratory status: acceptable, nonlabored ventilation, room air and spontaneous ventilation  Hydration status: euvolemic        Pamela Sexton CRNA  1:41 PM

## 2018-03-07 ENCOUNTER — TELEPHONE (OUTPATIENT)
Dept: GASTROENTEROLOGY | Age: 33
End: 2018-03-07

## 2018-03-07 NOTE — TELEPHONE ENCOUNTER
Called [facility] and spoke to [employer]. They have submitted the pre-cert for the home infusions and are awaiting a response. They will notify us when they receive a response.

## 2018-03-08 ENCOUNTER — TELEPHONE (OUTPATIENT)
Dept: GASTROENTEROLOGY | Age: 33
End: 2018-03-08

## 2018-03-19 NOTE — TELEPHONE ENCOUNTER
Heike from [facility] called and LM on VM after hours stating they received authorization for home infusions and that they will be reaching out to the patient to get her scheduled. Heike can be reached at [phone] for further details or questions.

## 2018-03-20 ENCOUNTER — TELEPHONE (OUTPATIENT)
Dept: GASTROENTEROLOGY | Age: 33
End: 2018-03-20

## 2018-04-16 ENCOUNTER — TELEPHONE (OUTPATIENT)
Dept: FAMILY MEDICINE CLINIC | Age: 33
End: 2018-04-16

## 2018-04-16 RX ORDER — PROMETHAZINE HYDROCHLORIDE 25 MG/1
TABLET ORAL
Qty: 20 TABLET | Refills: 0 | Status: SHIPPED | OUTPATIENT
Start: 2018-04-16 | End: 2020-10-14

## 2018-04-16 RX ORDER — FLUCONAZOLE 150 MG/1
150 TABLET ORAL ONCE
Qty: 1 TABLET | Refills: 0 | Status: SHIPPED | OUTPATIENT
Start: 2018-04-16 | End: 2018-04-16

## 2018-04-19 PROBLEM — T80.212A PORT OR RESERVOIR INFECTION: Status: ACTIVE | Noted: 2018-04-19

## 2018-04-20 PROBLEM — A04.72 C. DIFFICILE COLITIS: Status: ACTIVE | Noted: 2018-04-20

## 2018-04-20 PROBLEM — K59.1 FUNCTIONAL DIARRHEA: Status: ACTIVE | Noted: 2018-04-20

## 2018-04-20 PROBLEM — R11.2 NON-INTRACTABLE VOMITING WITH NAUSEA: Status: ACTIVE | Noted: 2018-04-20

## 2018-04-20 PROBLEM — K50.819 CROHN'S DISEASE OF SMALL AND LARGE INTESTINES WITH COMPLICATION (HCC): Status: ACTIVE | Noted: 2018-04-20

## 2018-04-20 PROBLEM — R22.1 NECK SWELLING: Status: ACTIVE | Noted: 2018-04-20

## 2018-04-20 PROBLEM — D72.829 LEUKOCYTOSIS: Status: ACTIVE | Noted: 2018-04-20

## 2018-04-26 ENCOUNTER — TELEPHONE (OUTPATIENT)
Dept: GASTROENTEROLOGY | Age: 33
End: 2018-04-26

## 2018-04-27 ENCOUNTER — TELEPHONE (OUTPATIENT)
Dept: GASTROENTEROLOGY | Age: 33
End: 2018-04-27

## 2018-05-03 ENCOUNTER — OFFICE VISIT (OUTPATIENT)
Dept: SURGERY | Age: 33
End: 2018-05-03

## 2018-05-03 VITALS
SYSTOLIC BLOOD PRESSURE: 118 MMHG | WEIGHT: 259.92 LBS | BODY MASS INDEX: 43.31 KG/M2 | HEIGHT: 65 IN | DIASTOLIC BLOOD PRESSURE: 78 MMHG

## 2018-05-03 DIAGNOSIS — T80.219D INFECTION OF VENOUS ACCESS PORT, SUBSEQUENT ENCOUNTER: Primary | ICD-10-CM

## 2018-05-03 PROCEDURE — 99024 POSTOP FOLLOW-UP VISIT: CPT | Performed by: PHYSICIAN ASSISTANT

## 2018-05-08 ENCOUNTER — TELEPHONE (OUTPATIENT)
Dept: GASTROENTEROLOGY | Age: 33
End: 2018-05-08

## 2018-05-08 DIAGNOSIS — K50.119 CROHN'S DISEASE OF LARGE INTESTINE WITH COMPLICATION (HCC): Primary | ICD-10-CM

## 2018-05-08 RX ORDER — PROMETHAZINE HYDROCHLORIDE 25 MG/1
25 TABLET ORAL EVERY 8 HOURS PRN
Qty: 30 TABLET | Refills: 0 | Status: SHIPPED | OUTPATIENT
Start: 2018-05-08 | End: 2018-05-13

## 2018-05-14 ENCOUNTER — TELEPHONE (OUTPATIENT)
Dept: GASTROENTEROLOGY | Age: 33
End: 2018-05-14

## 2018-05-18 ENCOUNTER — TELEPHONE (OUTPATIENT)
Dept: GASTROENTEROLOGY | Age: 33
End: 2018-05-18

## 2018-07-17 ENCOUNTER — TELEPHONE (OUTPATIENT)
Dept: FAMILY MEDICINE CLINIC | Age: 33
End: 2018-07-17

## 2018-07-17 RX ORDER — RIZATRIPTAN BENZOATE 10 MG/1
10 TABLET ORAL
Qty: 9 TABLET | Refills: 0 | Status: SHIPPED | OUTPATIENT
Start: 2018-07-17 | End: 2020-10-14

## 2018-07-17 NOTE — TELEPHONE ENCOUNTER
Patient has not been seen in over 1 year for chronic conditions. Patient needs non acute appointment set up  Once appointment is set up may send in prescription for maxalt 10 mg at headache onset. May repeat in 2 hours if needed.  Not to exceed 2 tabs in 24 hours #9 NRF

## 2018-07-17 NOTE — TELEPHONE ENCOUNTER
Patient would like to have something called in for her migraines, she cannot take Imatrex so she needs something else. She was taking phenergan but it isn't helping any longer. She uses [facility] [place].

## 2018-08-20 ENCOUNTER — TELEPHONE (OUTPATIENT)
Dept: GASTROENTEROLOGY | Age: 33
End: 2018-08-20

## 2018-08-20 NOTE — TELEPHONE ENCOUNTER
Patient called to reschedule her appt that she missed with  this morning. Appt has been rescheduled to first available, 10/1/18 at 900am. Patient expressed understanding. Patient just found out last week that she is pregnant. She is scheduled for Remicade infusion on Saturday 8/25/18. Dr. Kimmie Cheung is it okay for patient to have infusion while pregnant?

## 2018-08-20 NOTE — TELEPHONE ENCOUNTER
Patient notified that it is okay to continue with infusions. Patient expressed understanding and had no further questions.

## 2018-10-01 ENCOUNTER — OFFICE VISIT (OUTPATIENT)
Dept: GASTROENTEROLOGY | Age: 33
End: 2018-10-01
Payer: COMMERCIAL

## 2018-10-01 VITALS
DIASTOLIC BLOOD PRESSURE: 74 MMHG | OXYGEN SATURATION: 98 % | SYSTOLIC BLOOD PRESSURE: 108 MMHG | BODY MASS INDEX: 45.45 KG/M2 | WEIGHT: 272.8 LBS | HEART RATE: 103 BPM | HEIGHT: 65 IN

## 2018-10-01 DIAGNOSIS — K50.119 CROHN'S DISEASE OF LARGE INTESTINE WITH COMPLICATION (HCC): Primary | ICD-10-CM

## 2018-10-01 PROCEDURE — 99214 OFFICE O/P EST MOD 30 MIN: CPT | Performed by: INTERNAL MEDICINE

## 2018-10-01 RX ORDER — PNV,CALCIUM 72/IRON/FOLIC ACID 27 MG-1 MG
TABLET ORAL
Refills: 11 | COMMUNITY
Start: 2018-09-19 | End: 2019-08-02

## 2018-10-01 RX ORDER — PROMETHAZINE HYDROCHLORIDE 25 MG/1
TABLET ORAL
Refills: 2 | Status: ON HOLD | COMMUNITY
Start: 2018-09-19 | End: 2019-04-04 | Stop reason: HOSPADM

## 2018-10-01 NOTE — PROGRESS NOTES
Reason for Visit: She had concerns including Crohn's Disease (6 mo F/U). HPI: A 26year old obese  female patient Who has a past medical history of Crohn disease that involved the terminal ileum and colon status post ileocecectomy, Acid reflux, IBS, and [place], fatty liver, pancreatitis, Recurrent C. Diff colitis, had come to my office to follow-up her Crohn disease. Her Crohn disease has been in clinical remission and the endoscopy remission on Remicade 10 mg/kg every 6 weeks. She moved her bowels once per day. Stools are formed. She denied abdominal pain and abdominal cramps. She had Mild nauseate which had been well controlled by Phenegan. She denied emesis. She denied acid reflux. She did have a normal bone density test last year. Her vitamin D level has been lower, for which she has been on Vit D supplements. Currently, she was in her 12 week patency. She had been following with her regular ObGyn doctor and a high risk ObGyn doctor. The patient denied heartburn, regurgitation,  dysphagia, odynophagia, vomiting, abdominal bloating, abdominal gassy sensations, abdominal distention, abdominal girth increase, hematochezia, hematemesis, hematemesis, melena, weight loss, weight gain, rashes, jaundice, mental status change, itchiness on the skin, muscle skeleton joint pain, mouth ulcers, and blurred vision.            PMH:    Past Medical History:   Diagnosis Date    Acid reflux     Anemia     Anxiety     Asthma     NO PULMONOLOGIST AT THIS TIME    Crohn's disease [facility]) Dx 2010    Remicade Infusions Every Six Weeks, Sees Dr. Livia Gonzalez In hospitals    Depression     Fall 2012    \"Passed Out And Lio Stout On My Javon Kate"    Fatty liver     Hx of chest pain     per old chart pt in ER 8/2014 with c/o chest pain near site of chest port    Hyperlipidemia     Lower back pain     \"Sometimes\"    Migraine Last Migraine 4-11-16    Multiple pulmonary nodules 6/2013 Review of Systems:  Constitutional: No weight loss, no fevers. Eyes: No problems with vision. ENT: No nose or sinus problems, no oral problems, no throat problems or hoarseness. Cardiovascular: No chest pain, no leg pain with walking, no palpitations, no ankle swelling. Respiratory: No shortness of breath, no persistent cough, no wheezing. Endocrine: No increased thirst, no increased urination. Gastrointestinal: No heartburn, no dysphagia, no abdominal pain, no loss of appetite, no nausea or vomiting, no diarrhea, no constipation, no melena, no hematochezia, no sceleral icterus or jaundice. Skin: No rashes. Musculoskeletal: No trouble walking or standing, no joint pain, no muscle pain. Allergy/Immune System: No allergies, no frequent infections. Neurological: No memory difficulties, no temporary blindness, no difficulty speaking, no headaches, no numbness. Psychiatric: No depression, no suicidal ideation, no auditory hallucinations. Hematological/Lymphatic: No lymphadenopathy, no frequent nose bleeds, no easy bruising. Genitourinary: No penile/vaginal discharge, no pain with urination, no trouble starting urinary stream, no hematuria. Physical Examination  Vital Signs: /74 (Site: Left Upper Arm, Position: Sitting, Cuff Size: Large Adult)   Pulse 103   Ht 5' 5\" (1.651 m)   Wt 272 lb 12.8 oz (123.7 kg)   LMP 07/10/2018 (Exact Date)   SpO2 98%   Breastfeeding? No   BMI 45.40 kg/m²  Body mass index is 45.4 kg/m². General: The patient is a 26 y.o. female in No acute distress. EYE: EOMI, Gross visual field was normal. Pupils reactive, The conjunctive was normal, with no erythema. ENT: no lymphadenopathy, oropharynx is without erythema, edema, or exudates, and moist mucus membranes. The nasal mucosa, septum and turbinates were normal without inflammation or edema.   Neck: There was no mass on palpitation, tracheal position was in the middle of the neck and there was no enlarged Patient education- We have discussed extensively about the infectious, carcinogenic and mutagenetic complications associated with medications needed to treat her the Inflammatory Bowel Disease. We have emphasized the need to comply with the monitoring regimen and report new symptoms in due time. In addition we have discussed the risk and benefits of the procedures and tests and offered medically appropriate alternatives. The patient has been provided with contact information for our IBD center personnel. In case of emergent admission the patient or her family will alert our [address]. 11. N/V had resolved. 12: Left eye pain raised a concern of episcleritis or iritis, which might be related to Crohn disease. She has followed with an ophthalmologist and is on Remicade which is supposed to work for both eye manifestations of Crohn disease and Crohn disease. RTC in : 6 months             Bryan Morris MD PhD [facility]  [address], [address] [facility] [address]  0ffice: [phone]  Fax: [phone]

## 2018-11-21 ENCOUNTER — HOSPITAL ENCOUNTER (EMERGENCY)
Age: 33
Discharge: HOME | End: 2018-11-21
Payer: COMMERCIAL

## 2018-11-21 VITALS
HEIGHT: 65 IN | WEIGHT: 270 LBS | SYSTOLIC BLOOD PRESSURE: 121 MMHG | RESPIRATION RATE: 17 BRPM | TEMPERATURE: 98.7 F | DIASTOLIC BLOOD PRESSURE: 81 MMHG | BODY MASS INDEX: 44.98 KG/M2 | OXYGEN SATURATION: 99 % | HEART RATE: 107 BPM

## 2018-11-21 DIAGNOSIS — M79.644 PAIN OF RIGHT THUMB: Primary | ICD-10-CM

## 2018-11-21 PROCEDURE — 99283 EMERGENCY DEPT VISIT LOW MDM: CPT

## 2018-11-21 ASSESSMENT — PAIN DESCRIPTION - ORIENTATION: ORIENTATION: RIGHT

## 2018-11-21 ASSESSMENT — PAIN SCALES - GENERAL: PAINLEVEL_OUTOF10: 2

## 2018-11-21 ASSESSMENT — PAIN DESCRIPTION - LOCATION: LOCATION: HAND

## 2018-11-21 ASSESSMENT — PAIN DESCRIPTION - PAIN TYPE: TYPE: ACUTE PAIN

## 2018-11-21 NOTE — ED PROVIDER NOTES
of breath on exertion     Teeth missing     Upper And Lower    Wears glasses      Past Surgical History:   Procedure Laterality Date    ADENOIDECTOMY  1995    APPENDECTOMY  2011    Done During Colon Resection For Crohn's  Disease    BREAST SURGERY Left 2000    \"Infected Lymph Node Removed\"    CHOLECYSTECTOMY  09/20/2016    COLECTOMY  8-11    Crohn's Disease , Dr. Jt Raya, Appendectomy Also Done    COLONOSCOPY  09/06/2016    Polyps Removed In Past    COLONOSCOPY  05/2017    Hospitalized for c-diff    COLONOSCOPY  03/06/2018    normal, multiple biopsy, repeat 1 year    ENDOSCOPY, COLON, DIAGNOSTIC  Last Done 2014    LAPAROSCOPY  2013    Dr Radha Bush, Removed Adhesions    OTHER SURGICAL HISTORY  06/2011    Mediport Insertion Left Chest Area/revision done 6/2016- \"removed at [facility] 11/2017 after I got an infection    TUNNELED VENOUS PORT PLACEMENT Right 03/12/2018    smart port- [facility]-Dr Au Notice    WISDOM TOOTH EXTRACTION      All Four [place] Teeth Extracted In Past       CURRENT MEDICATIONS    Current Outpatient Rx   Medication Sig Dispense Refill    promethazine (PHENERGAN) 25 MG tablet TK 1 T PO Q 4 TO 6 H PRN  2    Prenatal Vit-Fe Fumarate-FA (PREPLUS) 27-1 MG TABS TK 1 T PO QD  11    ondansetron (ZOFRAN ODT) 4 MG disintegrating tablet Take 1 tablet by mouth every 6 hours 10 tablet 0    dicyclomine (BENTYL) 10 MG capsule Take 1 capsule by mouth 3 times daily As needed for abdominal pain 15 capsule 3    inFLIXimab (REMICADE) 100 MG injection 10 mg/kg IV  every 6 weeks 24 each 5    omeprazole (PRILOSEC) 40 MG delayed release capsule Take 40 mg by mouth 2 times daily      vitamin D (ERGOCALCIFEROL) 39220 units capsule Take 1 capsule by mouth twice a week (Patient taking differently: Take 50,000 Units by mouth twice a week Take two tabs) 14 capsule 0    HYDROcodone-acetaminophen (NORCO) 5-325 MG per tablet Take 1 tablet by mouth every 6 hours as needed for Pain .       albuterol sulfate HFA (PROVENTIL HFA) Quervain's as well. Recommend these symptomatic treatment and follow-up with PCP, return to ED with any new or worsening symptoms. Patient comfortable with this workup and plan. Clinical  IMPRESSION    1. Pain of right thumb          Provided a Velcro wrist splint. For comfort. Pain medication provided. Followup with orthopedist-information provided today. Diagnosis and plan discussed in detail with patient who understands and agrees. Patient agrees to return emergency department if symptoms worsen or any new symptoms develop. Comment: Please note this report has been produced using speech recognition software and may contain errors related to that system including errors in grammar, punctuation, and spelling, as well as words and phrases that may be inappropriate. If there are any questions or concerns please feel free to contact the dictating provider for clarification.         Kelly Gray PA-C  11/21/18 [id number]

## 2018-11-26 ENCOUNTER — TELEPHONE (OUTPATIENT)
Dept: GASTROENTEROLOGY | Age: 33
End: 2018-11-26

## 2018-11-30 ENCOUNTER — TELEPHONE (OUTPATIENT)
Dept: GASTROENTEROLOGY | Age: 33
End: 2018-11-30

## 2018-11-30 NOTE — TELEPHONE ENCOUNTER
Dr. Clifton Trujillo called and we spoke about this paperwork. He is asking that we fill it out based on chart notes and he will sign it. He is at the hospital currently and is unable to get to the office before we close to sign the paperwork. The earliest he can sign this is next week. I called the patient and she stated she requested an extension from the HR dept so next week will be fine. Call this patient when paperwork is ready for .

## 2018-12-06 ENCOUNTER — TELEPHONE (OUTPATIENT)
Dept: GASTROENTEROLOGY | Age: 33
End: 2018-12-06

## 2018-12-19 ENCOUNTER — HOSPITAL ENCOUNTER (OUTPATIENT)
Age: 33
Discharge: HOME | End: 2018-12-19
Attending: OBSTETRICS & GYNECOLOGY | Admitting: OBSTETRICS & GYNECOLOGY
Payer: COMMERCIAL

## 2018-12-19 ENCOUNTER — APPOINTMENT (OUTPATIENT)
Dept: GENERAL RADIOLOGY | Age: 33
End: 2018-12-19
Payer: COMMERCIAL

## 2018-12-19 VITALS
RESPIRATION RATE: 17 BRPM | HEIGHT: 65 IN | BODY MASS INDEX: 45.48 KG/M2 | WEIGHT: 273 LBS | OXYGEN SATURATION: 98 % | DIASTOLIC BLOOD PRESSURE: 76 MMHG | TEMPERATURE: 97.6 F | SYSTOLIC BLOOD PRESSURE: 134 MMHG | HEART RATE: 98 BPM

## 2018-12-19 PROBLEM — O26.90 PREGNANCY RELATED CONDITION: Status: ACTIVE | Noted: 2018-12-19

## 2018-12-19 LAB
AMPHETAMINES: NEGATIVE
BACTERIA: ABNORMAL /HPF
BARBITURATE SCREEN URINE: NEGATIVE
BENZODIAZEPINE SCREEN, URINE: NEGATIVE
BILIRUBIN URINE: NEGATIVE MG/DL
BLOOD, URINE: ABNORMAL
CANNABINOID SCREEN URINE: NEGATIVE
CLARITY: ABNORMAL
COCAINE METABOLITE: NEGATIVE
COLOR: YELLOW
GLUCOSE, URINE: NEGATIVE MG/DL
HCT VFR BLD CALC: 31.8 % (ref 37–47)
HEMOGLOBIN: 10.1 GM/DL (ref 12.5–16)
KETONES, URINE: ABNORMAL MG/DL
LEUKOCYTE ESTERASE, URINE: ABNORMAL
MCH RBC QN AUTO: 29.4 PG (ref 27–31)
MCHC RBC AUTO-ENTMCNC: 31.8 % (ref 32–36)
MCV RBC AUTO: 92.4 FL (ref 78–100)
MUCUS: ABNORMAL HPF
NITRITE URINE, QUANTITATIVE: NEGATIVE
OPIATES, URINE: NEGATIVE
OXYCODONE: NEGATIVE
PDW BLD-RTO: 12.3 % (ref 11.7–14.9)
PH, URINE: 5 (ref 5–8)
PHENCYCLIDINE, URINE: NEGATIVE
PLATELET # BLD: 219 K/CU MM (ref 140–440)
PMV BLD AUTO: 11.4 FL (ref 7.5–11.1)
PROTEIN UA: 30 MG/DL
RBC # BLD: 3.44 M/CU MM (ref 4.2–5.4)
RBC URINE: 10 /HPF (ref 0–6)
SPECIFIC GRAVITY UA: 1.03 (ref 1–1.03)
SQUAMOUS EPITHELIAL: 42 /HPF
TRICHOMONAS: ABNORMAL /HPF
URIC ACID CRYSTALS: ABNORMAL /HPF
UROBILINOGEN, URINE: NORMAL MG/DL (ref 0.2–1)
WBC # BLD: 14 K/CU MM (ref 4–10.5)
WBC UA: 4 /HPF (ref 0–5)

## 2018-12-19 PROCEDURE — 71046 X-RAY EXAM CHEST 2 VIEWS: CPT

## 2018-12-19 PROCEDURE — 80307 DRUG TEST PRSMV CHEM ANLYZR: CPT

## 2018-12-19 PROCEDURE — 85027 COMPLETE CBC AUTOMATED: CPT

## 2018-12-19 PROCEDURE — 99211 OFF/OP EST MAY X REQ PHY/QHP: CPT

## 2018-12-19 PROCEDURE — 81001 URINALYSIS AUTO W/SCOPE: CPT

## 2018-12-19 ASSESSMENT — PAIN DESCRIPTION - FREQUENCY
FREQUENCY: CONTINUOUS
FREQUENCY: INTERMITTENT

## 2018-12-19 ASSESSMENT — PAIN DESCRIPTION - DESCRIPTORS
DESCRIPTORS: ACHING
DESCRIPTORS: SHARP

## 2018-12-19 ASSESSMENT — PAIN DESCRIPTION - ONSET: ONSET: GRADUAL

## 2018-12-19 ASSESSMENT — PAIN DESCRIPTION - ORIENTATION: ORIENTATION: LOWER

## 2018-12-19 ASSESSMENT — PAIN DESCRIPTION - LOCATION
LOCATION: ABDOMEN
LOCATION: BACK

## 2018-12-19 ASSESSMENT — PAIN DESCRIPTION - PAIN TYPE
TYPE: ACUTE PAIN
TYPE: ACUTE PAIN

## 2018-12-19 ASSESSMENT — PAIN DESCRIPTION - PROGRESSION
CLINICAL_PROGRESSION: NOT CHANGED
CLINICAL_PROGRESSION: NOT CHANGED

## 2018-12-19 ASSESSMENT — PAIN SCALES - GENERAL: PAINLEVEL_OUTOF10: 3

## 2018-12-19 NOTE — PROGRESS NOTES
Pt arrives by squad with c/o SOB, HA, dizziness, swelling, and sharp abd pain. Pt taken to LT02, instruced to change into gown, provide CCUA, FHM and TOCO applied and VS taken. Pt reports positive fetal movement, denies any bleeding or leaking of fluid, and abd is soft and nontender to palpation. States the swelling and abd pain began last night, and HA and SOB began about 1300, 2 hours prior to arrival.  Dr. Dyana Scheuermann on unit and updated on pts arrival, at bedside to assess pt. Pt also states that in middle of night, her heartburn was so bad that it awakened her and she threw up and had a coughing fit for an hour. Orders received, POC reviewed, all questions answered, will continue to monitor.

## 2018-12-19 NOTE — PROGRESS NOTES
Department of Obstetrics and Gynecology  Labor and Delivery  TRIAGE NOTE      SUBJECTIVE:    Chief Complaint   Patient presents with    Shortness of Breath     since 1300 12/19    Swelling     since 12/18    Headache     since 1300 12/19    Abdominal Pain     sharp pain in lower abd coming and going     Patient arrives by squad from work with complaints of SOB at work today. She had sharp lower abdominal pain last night. It was crampy, and severe heartburn. In the night she awoke with vomit in her mouth and was coughing uncontrollably for one hour. Her perception was that she had breathed vomitus into her lungs. Today she began to have a severe headache and SOB at work. OBJECTIVE    Vitals:  /71   Pulse 103   Temp 97.8 °F (36.6 °C) (Oral)   Resp 22   Ht 5' 5\" (1.651 m)   Wt 273 lb (123.8 kg)   LMP 07/10/2018 (Exact Date)   SpO2 98%   BMI 45.43 kg/m²     CONSTITUTIONAL:  awake, alert, cooperative, no apparent distress, and appears stated age  ABDOMEN:  No scars, normal bowel sounds, soft, gravid, non-tender, no masses palpated, no hepatosplenomegally      Cervix:  Is not checked as she is having no contraction on the monitor and denies crampy abdominal pain now. LUNGS: clear to ausculation bilaterally    Fetal heart rate:        Baseline FHR :    140 bpm                  Contraction frequency:  0 minutes    DATA:  CBC:   Lab Results   Component Value Date    WBC 8.0 08/01/2018    RBC 4.42 08/01/2018    HGB 13.3 08/01/2018    HCT 39.8 08/01/2018    MCV 90.0 08/01/2018    RDW 12.2 08/01/2018     08/01/2018     U/A:  No components found for: Jazmyn Janki, USPGRAV, UPH, UPROTEIN, UGLUCOSE, UKETONE, UBILI, UBLOOD, UNITRITE, UUROBIL, [place], USQEPI, [place], [facility], [place], [facility], [place]    ASSESSMENT:   Will check CBC, UA and CXR shielded. Will monitor and reassess when these are back.         PLAN:

## 2018-12-20 NOTE — FLOWSHEET NOTE
Telephone update to . Informed of pt's vital signs and chest x-ray results. Order received to discharge home.

## 2019-01-15 ENCOUNTER — TELEPHONE (OUTPATIENT)
Dept: GASTROENTEROLOGY | Age: 34
End: 2019-01-15

## 2019-01-15 DIAGNOSIS — K50.119 CROHN'S DISEASE OF LARGE INTESTINE WITH COMPLICATION (HCC): Primary | ICD-10-CM

## 2019-01-23 ENCOUNTER — HOSPITAL ENCOUNTER (OUTPATIENT)
Age: 34
End: 2019-01-23
Attending: OBSTETRICS & GYNECOLOGY | Admitting: OBSTETRICS & GYNECOLOGY
Payer: COMMERCIAL

## 2019-01-23 ENCOUNTER — HOSPITAL ENCOUNTER (OUTPATIENT)
Age: 34
Discharge: HOME | End: 2019-01-23
Attending: OBSTETRICS & GYNECOLOGY | Admitting: OBSTETRICS & GYNECOLOGY
Payer: COMMERCIAL

## 2019-01-23 VITALS
WEIGHT: 278 LBS | BODY MASS INDEX: 46.32 KG/M2 | HEART RATE: 112 BPM | HEIGHT: 65 IN | SYSTOLIC BLOOD PRESSURE: 128 MMHG | DIASTOLIC BLOOD PRESSURE: 60 MMHG | RESPIRATION RATE: 18 BRPM | TEMPERATURE: 97.2 F

## 2019-01-23 DIAGNOSIS — K44.9 HIATAL HERNIA: Primary | ICD-10-CM

## 2019-01-23 PROBLEM — Z32.02 PREGNANCY EXAMINATION OR TEST, NEGATIVE RESULT: Status: ACTIVE | Noted: 2019-01-23

## 2019-01-23 LAB
ALBUMIN SERPL-MCNC: 3.4 GM/DL (ref 3.4–5)
ALP BLD-CCNC: 57 IU/L (ref 40–128)
ALT SERPL-CCNC: 5 U/L (ref 10–40)
ANION GAP SERPL CALCULATED.3IONS-SCNC: 14 MMOL/L (ref 4–16)
AST SERPL-CCNC: 11 IU/L (ref 15–37)
BACTERIA: ABNORMAL /HPF
BANDED NEUTROPHILS ABSOLUTE COUNT: 0.62 K/CU MM
BANDED NEUTROPHILS RELATIVE PERCENT: 4 % (ref 5–11)
BASOPHILS ABSOLUTE: 0.2 K/CU MM
BASOPHILS RELATIVE PERCENT: 1 % (ref 0–1)
BILIRUB SERPL-MCNC: 0.2 MG/DL (ref 0–1)
BILIRUBIN URINE: NEGATIVE MG/DL
BLOOD, URINE: ABNORMAL
BUN BLDV-MCNC: 9 MG/DL (ref 6–23)
CALCIUM SERPL-MCNC: 8.6 MG/DL (ref 8.3–10.6)
CHLORIDE BLD-SCNC: 100 MMOL/L (ref 99–110)
CLARITY: ABNORMAL
CO2: 22 MMOL/L (ref 21–32)
COLOR: YELLOW
CREAT SERPL-MCNC: 0.6 MG/DL (ref 0.6–1.1)
DIFFERENTIAL TYPE: ABNORMAL
GFR AFRICAN AMERICAN: >60 ML/MIN/1.73M2
GFR NON-AFRICAN AMERICAN: >60 ML/MIN/1.73M2
GLUCOSE BLD-MCNC: 73 MG/DL (ref 70–99)
GLUCOSE, URINE: NEGATIVE MG/DL
HCT VFR BLD CALC: 30.9 % (ref 37–47)
HEMOGLOBIN: 9.4 GM/DL (ref 12.5–16)
KETONES, URINE: NEGATIVE MG/DL
LEUKOCYTE ESTERASE, URINE: ABNORMAL
LYMPHOCYTES ABSOLUTE: 1.9 K/CU MM
LYMPHOCYTES RELATIVE PERCENT: 12 % (ref 24–44)
MACROCYTES: ABNORMAL
MCH RBC QN AUTO: 27.7 PG (ref 27–31)
MCHC RBC AUTO-ENTMCNC: 30.4 % (ref 32–36)
MCV RBC AUTO: 91.2 FL (ref 78–100)
METAMYELOCYTES ABSOLUTE COUNT: 0.31 K/CU MM
METAMYELOCYTES PERCENT: 2 %
MONOCYTES ABSOLUTE: 0.3 K/CU MM
MONOCYTES RELATIVE PERCENT: 2 % (ref 0–4)
MUCUS: ABNORMAL HPF
NITRITE URINE, QUANTITATIVE: NEGATIVE
PDW BLD-RTO: 12.3 % (ref 11.7–14.9)
PH, URINE: 5 (ref 5–8)
PLATELET # BLD: 223 K/CU MM (ref 140–440)
PMV BLD AUTO: 11.3 FL (ref 7.5–11.1)
POTASSIUM SERPL-SCNC: 4.3 MMOL/L (ref 3.5–5.1)
PROTEIN UA: 30 MG/DL
RBC # BLD: 3.39 M/CU MM (ref 4.2–5.4)
RBC URINE: 6 /HPF (ref 0–6)
SEGMENTED NEUTROPHILS ABSOLUTE COUNT: 12.3 K/CU MM
SEGMENTED NEUTROPHILS RELATIVE PERCENT: 79 % (ref 36–66)
SODIUM BLD-SCNC: 136 MMOL/L (ref 135–145)
SPECIFIC GRAVITY UA: 1.02 (ref 1–1.03)
SQUAMOUS EPITHELIAL: 71 /HPF
TOTAL PROTEIN: 6.6 GM/DL (ref 6.4–8.2)
TRICHOMONAS: ABNORMAL /HPF
UROBILINOGEN, URINE: NORMAL MG/DL (ref 0.2–1)
WBC # BLD: 15.6 K/CU MM (ref 4–10.5)
WBC UA: 7 /HPF (ref 0–5)

## 2019-01-23 PROCEDURE — 36415 COLL VENOUS BLD VENIPUNCTURE: CPT

## 2019-01-23 PROCEDURE — 85027 COMPLETE CBC AUTOMATED: CPT

## 2019-01-23 PROCEDURE — 85007 BL SMEAR W/DIFF WBC COUNT: CPT

## 2019-01-23 PROCEDURE — 99211 OFF/OP EST MAY X REQ PHY/QHP: CPT

## 2019-01-23 PROCEDURE — 81001 URINALYSIS AUTO W/SCOPE: CPT

## 2019-01-23 PROCEDURE — 80053 COMPREHEN METABOLIC PANEL: CPT

## 2019-01-23 RX ORDER — HYDROCODONE BITARTRATE AND ACETAMINOPHEN 5; 325 MG/1; MG/1
1 TABLET ORAL EVERY 6 HOURS PRN
Qty: 10 TABLET | Refills: 0 | Status: SHIPPED | OUTPATIENT
Start: 2019-01-23 | End: 2019-01-26

## 2019-01-24 ENCOUNTER — TELEPHONE (OUTPATIENT)
Dept: GASTROENTEROLOGY | Age: 34
End: 2019-01-24

## 2019-01-24 DIAGNOSIS — K50.119 CROHN'S DISEASE OF LARGE INTESTINE WITH COMPLICATION (HCC): Primary | ICD-10-CM

## 2019-01-24 RX ORDER — OMEPRAZOLE 40 MG/1
40 CAPSULE, DELAYED RELEASE ORAL 2 TIMES DAILY
Qty: 90 CAPSULE | Refills: 3 | Status: ON HOLD | OUTPATIENT
Start: 2019-01-24 | End: 2019-04-04 | Stop reason: HOSPADM

## 2019-01-28 ENCOUNTER — TELEPHONE (OUTPATIENT)
Dept: GASTROENTEROLOGY | Age: 34
End: 2019-01-28

## 2019-01-28 ENCOUNTER — HOSPITAL ENCOUNTER (OUTPATIENT)
Age: 34
Discharge: HOME | End: 2019-01-28
Payer: COMMERCIAL

## 2019-01-28 DIAGNOSIS — K50.119 CROHN'S DISEASE OF LARGE INTESTINE WITH COMPLICATION (HCC): Primary | ICD-10-CM

## 2019-01-28 DIAGNOSIS — K50.119 CROHN'S DISEASE OF LARGE INTESTINE WITH COMPLICATION (HCC): ICD-10-CM

## 2019-01-28 LAB
ALBUMIN SERPL-MCNC: 3.2 GM/DL (ref 3.4–5)
ALBUMIN SERPL-MCNC: 3.2 GM/DL (ref 3.4–5)
ALP BLD-CCNC: 63 IU/L (ref 40–128)
ALP BLD-CCNC: 63 IU/L (ref 40–129)
ALT SERPL-CCNC: 6 U/L (ref 10–40)
ALT SERPL-CCNC: 6 U/L (ref 10–40)
AMYLASE: 55 U/L (ref 25–115)
ANION GAP SERPL CALCULATED.3IONS-SCNC: 13 MMOL/L (ref 4–16)
AST SERPL-CCNC: 13 IU/L (ref 15–37)
AST SERPL-CCNC: 13 IU/L (ref 15–37)
BILIRUB SERPL-MCNC: 0.2 MG/DL (ref 0–1)
BILIRUB SERPL-MCNC: 0.2 MG/DL (ref 0–1)
BILIRUBIN DIRECT: 0.2 MG/DL (ref 0–0.3)
BILIRUBIN, INDIRECT: 0 MG/DL (ref 0–0.7)
BUN BLDV-MCNC: 8 MG/DL (ref 6–23)
CALCIUM SERPL-MCNC: 8.8 MG/DL (ref 8.3–10.6)
CHLORIDE BLD-SCNC: 104 MMOL/L (ref 99–110)
CO2: 21 MMOL/L (ref 21–32)
CREAT SERPL-MCNC: 0.6 MG/DL (ref 0.6–1.1)
GFR AFRICAN AMERICAN: >60 ML/MIN/1.73M2
GFR NON-AFRICAN AMERICAN: >60 ML/MIN/1.73M2
GLUCOSE BLD-MCNC: 99 MG/DL (ref 70–99)
HCT VFR BLD CALC: 28.5 % (ref 37–47)
HEMOGLOBIN: 9.1 GM/DL (ref 12.5–16)
HIGH SENSITIVE C-REACTIVE PROTEIN: 4.1 MG/L
LIPASE: 24 IU/L (ref 13–60)
MCH RBC QN AUTO: 28.6 PG (ref 27–31)
MCHC RBC AUTO-ENTMCNC: 31.9 % (ref 32–36)
MCV RBC AUTO: 89.6 FL (ref 78–100)
PDW BLD-RTO: 12.5 % (ref 11.7–14.9)
PLATELET # BLD: 225 K/CU MM (ref 140–440)
PMV BLD AUTO: 11.4 FL (ref 7.5–11.1)
POTASSIUM SERPL-SCNC: 4.2 MMOL/L (ref 3.5–5.1)
RBC # BLD: 3.18 M/CU MM (ref 4.2–5.4)
SODIUM BLD-SCNC: 138 MMOL/L (ref 135–145)
TOTAL PROTEIN: 6.3 GM/DL (ref 6.4–8.2)
TOTAL PROTEIN: 6.3 GM/DL (ref 6.4–8.2)
WBC # BLD: 12.6 K/CU MM (ref 4–10.5)

## 2019-01-28 PROCEDURE — 82150 ASSAY OF AMYLASE: CPT

## 2019-01-28 PROCEDURE — 36415 COLL VENOUS BLD VENIPUNCTURE: CPT

## 2019-01-28 PROCEDURE — 85027 COMPLETE CBC AUTOMATED: CPT

## 2019-01-28 PROCEDURE — 80053 COMPREHEN METABOLIC PANEL: CPT

## 2019-01-28 PROCEDURE — 82248 BILIRUBIN DIRECT: CPT

## 2019-01-28 PROCEDURE — 86141 C-REACTIVE PROTEIN HS: CPT

## 2019-01-28 PROCEDURE — 83690 ASSAY OF LIPASE: CPT

## 2019-01-29 ENCOUNTER — OFFICE VISIT (OUTPATIENT)
Dept: GASTROENTEROLOGY | Age: 34
End: 2019-01-29
Payer: COMMERCIAL

## 2019-01-29 ENCOUNTER — HOSPITAL ENCOUNTER (OUTPATIENT)
Dept: MRI IMAGING | Age: 34
Discharge: HOME | End: 2019-01-29
Payer: COMMERCIAL

## 2019-01-29 VITALS
DIASTOLIC BLOOD PRESSURE: 72 MMHG | BODY MASS INDEX: 48.82 KG/M2 | WEIGHT: 293 LBS | HEIGHT: 65 IN | OXYGEN SATURATION: 97 % | SYSTOLIC BLOOD PRESSURE: 120 MMHG | HEART RATE: 111 BPM

## 2019-01-29 DIAGNOSIS — R10.9 ABDOMINAL PAIN AFFECTING PREGNANCY: ICD-10-CM

## 2019-01-29 DIAGNOSIS — O26.899 ABDOMINAL PAIN AFFECTING PREGNANCY: Primary | ICD-10-CM

## 2019-01-29 DIAGNOSIS — K76.0 FATTY LIVER: ICD-10-CM

## 2019-01-29 DIAGNOSIS — O26.899 ABDOMINAL PAIN AFFECTING PREGNANCY: ICD-10-CM

## 2019-01-29 DIAGNOSIS — R10.9 ABDOMINAL PAIN AFFECTING PREGNANCY: Primary | ICD-10-CM

## 2019-01-29 PROCEDURE — G8417 CALC BMI ABV UP PARAM F/U: HCPCS | Performed by: INTERNAL MEDICINE

## 2019-01-29 PROCEDURE — 74181 MRI ABDOMEN W/O CONTRAST: CPT

## 2019-01-29 PROCEDURE — 1036F TOBACCO NON-USER: CPT | Performed by: INTERNAL MEDICINE

## 2019-01-29 PROCEDURE — G8427 DOCREV CUR MEDS BY ELIG CLIN: HCPCS | Performed by: INTERNAL MEDICINE

## 2019-01-29 PROCEDURE — G8484 FLU IMMUNIZE NO ADMIN: HCPCS | Performed by: INTERNAL MEDICINE

## 2019-01-29 PROCEDURE — 99214 OFFICE O/P EST MOD 30 MIN: CPT | Performed by: INTERNAL MEDICINE

## 2019-01-30 ENCOUNTER — TELEPHONE (OUTPATIENT)
Dept: GASTROENTEROLOGY | Age: 34
End: 2019-01-30

## 2019-01-31 ENCOUNTER — TELEPHONE (OUTPATIENT)
Dept: GASTROENTEROLOGY | Age: 34
End: 2019-01-31

## 2019-02-07 ENCOUNTER — HOSPITAL ENCOUNTER (INPATIENT)
Age: 34
LOS: 1 days | Discharge: HOME | DRG: 833 | End: 2019-02-08
Attending: OBSTETRICS & GYNECOLOGY | Admitting: OBSTETRICS & GYNECOLOGY
Payer: COMMERCIAL

## 2019-02-07 PROBLEM — O47.03 PRETERM UTERINE CONTRACTIONS IN THIRD TRIMESTER, ANTEPARTUM: Status: ACTIVE | Noted: 2019-02-07

## 2019-02-07 PROBLEM — O26.90 PREGNANCY RELATED CONDITION: Status: RESOLVED | Noted: 2018-12-19 | Resolved: 2019-02-07

## 2019-02-07 LAB
AMPHETAMINES: NEGATIVE
BACTERIA: ABNORMAL /HPF
BARBITURATE SCREEN URINE: NEGATIVE
BENZODIAZEPINE SCREEN, URINE: NEGATIVE
BILIRUBIN URINE: NEGATIVE MG/DL
BLOOD, URINE: ABNORMAL
CANNABINOID SCREEN URINE: NEGATIVE
CLARITY: ABNORMAL
COCAINE METABOLITE: NEGATIVE
COLOR: YELLOW
FETAL FIBRONECTIN: NEGATIVE
GLUCOSE, URINE: NEGATIVE MG/DL
HCT VFR BLD CALC: 30.6 % (ref 37–47)
HEMOGLOBIN: 9.4 GM/DL (ref 12.5–16)
KETONES, URINE: ABNORMAL MG/DL
LEUKOCYTE ESTERASE, URINE: ABNORMAL
MCH RBC QN AUTO: 27.6 PG (ref 27–31)
MCHC RBC AUTO-ENTMCNC: 30.7 % (ref 32–36)
MCV RBC AUTO: 90 FL (ref 78–100)
MUCUS: ABNORMAL HPF
NITRITE URINE, QUANTITATIVE: NEGATIVE
OPIATES, URINE: NEGATIVE
OXYCODONE: NEGATIVE
PDW BLD-RTO: 13.8 % (ref 11.7–14.9)
PH, URINE: 5 (ref 5–8)
PHENCYCLIDINE, URINE: NEGATIVE
PLATELET # BLD: 195 K/CU MM (ref 140–440)
PMV BLD AUTO: 12.2 FL (ref 7.5–11.1)
PROTEIN UA: NEGATIVE MG/DL
RBC # BLD: 3.4 M/CU MM (ref 4.2–5.4)
RBC URINE: 3 /HPF (ref 0–6)
SPECIFIC GRAVITY UA: 1.02 (ref 1–1.03)
SQUAMOUS EPITHELIAL: 31 /HPF
TRICHOMONAS: ABNORMAL /HPF
UROBILINOGEN, URINE: NORMAL MG/DL (ref 0.2–1)
WBC # BLD: 17.3 K/CU MM (ref 4–10.5)
WBC UA: 2 /HPF (ref 0–5)

## 2019-02-07 PROCEDURE — 82731 ASSAY OF FETAL FIBRONECTIN: CPT

## 2019-02-07 PROCEDURE — 87086 URINE CULTURE/COLONY COUNT: CPT

## 2019-02-07 PROCEDURE — 80307 DRUG TEST PRSMV CHEM ANLYZR: CPT

## 2019-02-07 PROCEDURE — 86900 BLOOD TYPING SEROLOGIC ABO: CPT

## 2019-02-07 PROCEDURE — 2580000003 HC RX 258: Performed by: OBSTETRICS & GYNECOLOGY

## 2019-02-07 PROCEDURE — 85027 COMPLETE CBC AUTOMATED: CPT

## 2019-02-07 PROCEDURE — 86901 BLOOD TYPING SEROLOGIC RH(D): CPT

## 2019-02-07 PROCEDURE — 1220000000 HC SEMI PRIVATE OB R&B

## 2019-02-07 PROCEDURE — 81001 URINALYSIS AUTO W/SCOPE: CPT

## 2019-02-07 PROCEDURE — 86850 RBC ANTIBODY SCREEN: CPT

## 2019-02-07 PROCEDURE — 6360000002 HC RX W HCPCS

## 2019-02-07 PROCEDURE — 6360000002 HC RX W HCPCS: Performed by: OBSTETRICS & GYNECOLOGY

## 2019-02-07 PROCEDURE — 6370000000 HC RX 637 (ALT 250 FOR IP): Performed by: OBSTETRICS & GYNECOLOGY

## 2019-02-07 PROCEDURE — 36415 COLL VENOUS BLD VENIPUNCTURE: CPT

## 2019-02-07 RX ORDER — TERBUTALINE SULFATE 1 MG/ML
0.25 INJECTION, SOLUTION SUBCUTANEOUS ONCE
Status: COMPLETED | OUTPATIENT
Start: 2019-02-07 | End: 2019-02-07

## 2019-02-07 RX ORDER — ONDANSETRON 2 MG/ML
4 INJECTION INTRAMUSCULAR; INTRAVENOUS EVERY 6 HOURS PRN
Status: DISCONTINUED | OUTPATIENT
Start: 2019-02-07 | End: 2019-02-08 | Stop reason: HOSPADM

## 2019-02-07 RX ORDER — BETAMETHASONE SODIUM PHOSPHATE AND BETAMETHASONE ACETATE 3; 3 MG/ML; MG/ML
12 INJECTION, SUSPENSION INTRA-ARTICULAR; INTRALESIONAL; INTRAMUSCULAR; SOFT TISSUE ONCE
Status: COMPLETED | OUTPATIENT
Start: 2019-02-07 | End: 2019-02-07

## 2019-02-07 RX ORDER — TERBUTALINE SULFATE 1 MG/ML
INJECTION, SOLUTION SUBCUTANEOUS
Status: COMPLETED
Start: 2019-02-07 | End: 2019-02-07

## 2019-02-07 RX ORDER — ACETAMINOPHEN 325 MG/1
650 TABLET ORAL EVERY 4 HOURS PRN
Status: DISCONTINUED | OUTPATIENT
Start: 2019-02-07 | End: 2019-02-08 | Stop reason: HOSPADM

## 2019-02-07 RX ORDER — BETAMETHASONE SODIUM PHOSPHATE AND BETAMETHASONE ACETATE 3; 3 MG/ML; MG/ML
12 INJECTION, SUSPENSION INTRA-ARTICULAR; INTRALESIONAL; INTRAMUSCULAR; SOFT TISSUE ONCE
Status: COMPLETED | OUTPATIENT
Start: 2019-02-08 | End: 2019-02-08

## 2019-02-07 RX ORDER — MAGNESIUM SULFATE 4 G/50ML
4 INJECTION INTRAVENOUS ONCE
Status: DISCONTINUED | OUTPATIENT
Start: 2019-02-07 | End: 2019-02-07

## 2019-02-07 RX ORDER — FERROUS SULFATE 325(65) MG
325 TABLET ORAL 2 TIMES DAILY
COMMUNITY
End: 2020-09-28

## 2019-02-07 RX ORDER — SODIUM CHLORIDE 0.9 % (FLUSH) 0.9 %
10 SYRINGE (ML) INJECTION EVERY 12 HOURS SCHEDULED
Status: DISCONTINUED | OUTPATIENT
Start: 2019-02-07 | End: 2019-02-08 | Stop reason: HOSPADM

## 2019-02-07 RX ORDER — SODIUM CHLORIDE 0.9 % (FLUSH) 0.9 %
10 SYRINGE (ML) INJECTION PRN
Status: DISCONTINUED | OUTPATIENT
Start: 2019-02-07 | End: 2019-02-08 | Stop reason: HOSPADM

## 2019-02-07 RX ORDER — DOCUSATE SODIUM 100 MG/1
100 CAPSULE, LIQUID FILLED ORAL 2 TIMES DAILY
Status: DISCONTINUED | OUTPATIENT
Start: 2019-02-07 | End: 2019-02-08 | Stop reason: HOSPADM

## 2019-02-07 RX ORDER — SODIUM CHLORIDE, SODIUM LACTATE, POTASSIUM CHLORIDE, CALCIUM CHLORIDE 600; 310; 30; 20 MG/100ML; MG/100ML; MG/100ML; MG/100ML
INJECTION, SOLUTION INTRAVENOUS CONTINUOUS
Status: DISCONTINUED | OUTPATIENT
Start: 2019-02-07 | End: 2019-02-08 | Stop reason: HOSPADM

## 2019-02-07 RX ORDER — MAGNESIUM SULFATE 4 G/50ML
4 INJECTION INTRAVENOUS ONCE
Status: COMPLETED | OUTPATIENT
Start: 2019-02-07 | End: 2019-02-07

## 2019-02-07 RX ADMIN — DOCUSATE SODIUM 100 MG: 100 CAPSULE, LIQUID FILLED ORAL at 20:57

## 2019-02-07 RX ADMIN — AMPICILLIN SODIUM 2 G: 2 INJECTION, POWDER, FOR SOLUTION INTRAMUSCULAR; INTRAVENOUS at 17:15

## 2019-02-07 RX ADMIN — MAGNESIUM SULFATE HEPTAHYDRATE 1 G/HR: 40 INJECTION, SOLUTION INTRAVENOUS at 18:00

## 2019-02-07 RX ADMIN — TERBUTALINE SULFATE 0.25 MG: 1 INJECTION SUBCUTANEOUS at 12:54

## 2019-02-07 RX ADMIN — TERBUTALINE SULFATE 0.25 MG: 1 INJECTION, SOLUTION SUBCUTANEOUS at 12:54

## 2019-02-07 RX ADMIN — AMPICILLIN SODIUM 1 G: 1 INJECTION, POWDER, FOR SOLUTION INTRAMUSCULAR; INTRAVENOUS at 20:56

## 2019-02-07 RX ADMIN — SODIUM CHLORIDE, POTASSIUM CHLORIDE, SODIUM LACTATE AND CALCIUM CHLORIDE: 600; 310; 30; 20 INJECTION, SOLUTION INTRAVENOUS at 17:15

## 2019-02-07 RX ADMIN — MAGNESIUM SULFATE HEPTAHYDRATE 4 G: 80 INJECTION, SOLUTION INTRAVENOUS at 17:21

## 2019-02-07 RX ADMIN — BETAMETHASONE ACETATE AND BETAMETHASONE SODIUM PHOSPHATE 12 MG: 3; 3 INJECTION, SUSPENSION INTRA-ARTICULAR; INTRALESIONAL; INTRAMUSCULAR; SOFT TISSUE at 13:16

## 2019-02-07 ASSESSMENT — PAIN - FUNCTIONAL ASSESSMENT
PAIN_FUNCTIONAL_ASSESSMENT: ACTIVITIES ARE NOT PREVENTED
PAIN_FUNCTIONAL_ASSESSMENT: 0-10

## 2019-02-07 ASSESSMENT — PAIN DESCRIPTION - DESCRIPTORS
DESCRIPTORS: CRAMPING
DESCRIPTORS: CRAMPING;PRESSURE
DESCRIPTORS: PRESSURE;CRAMPING
DESCRIPTORS: CRAMPING;PRESSURE

## 2019-02-07 ASSESSMENT — PAIN SCALES - GENERAL
PAINLEVEL_OUTOF10: 0
PAINLEVEL_OUTOF10: 0

## 2019-02-07 NOTE — PROGRESS NOTES
Patient states she feels fetal movement. Denies bleeding. States she had a golf ball size amount of fluid in underwear last night but no more fluid since then. Denies abdominal tenderness. States that the pain and pressure started yesterday and has increasingly gotten worse. She talked to the office nurse and they informed her to come into the hospital to be evaluated.

## 2019-02-07 NOTE — FLOWSHEET NOTE
This 26 Y.) [id number] [facility] 4/16/19 came to L & D via ambulatory for services of Dr. Geremias Syed. CC: pressure in pelvis and lower abdomen for a few weeks gradually getting worse. Much worse since 0030. Has history of Crohn's disease, also has history of blood clots in neck from her medication port in April and was told she had MRSA. Infection control notified. States the MRSA was only on port tip and not in her blood so she is OK for no isolation. Urine obtained and sent. Oriented to room. Call light in reach. EFM applied.

## 2019-02-07 NOTE — PLAN OF CARE
Problem: Life Cycle:  Goal: Risk for  labor will decrease  Risk for  labor will decrease  Outcome: Ongoing

## 2019-02-07 NOTE — H&P
Department of Obstetrics and Gynecology   Obstetrics History and Physical        CHIEF COMPLAINT:   Chief Complaint   Patient presents with    Laboring     pelvic pain and pressure for 2 weeks, worse in the last few days. Much worse since last night at [address]:      The patient is a 26 y.o. female at 27w4d.   OB History      Para Term  AB Living    3 1 1   1 1    SAB TAB Ectopic Molar Multiple Live Births    1 0       1      Patient presents with a chief complaint as above and is being admitted for  contractions    Estimated Due Date: Estimated Date of Delivery: 19    PRENATAL CARE:    Complicated by: chron's disease on Remicaid, asthma, depression     PAST OB HISTORY  OB History      Para Term  AB Living    3 1 1   1 1    SAB TAB Ectopic Molar Multiple Live Births    1 0       1          Past Medical History:        Diagnosis Date    Acid reflux     Anemia     Anxiety     Asthma     NO PULMONOLOGIST AT THIS TIME    Blood clot due to device, implant, or graft 2018    had blood clots in neck due to med port    Crohn's disease ([address] Dx 2010    Remicade Infusions Every Six Weeks, Sees Dr. Rissa Porras At [address] [place] [address] Dr    Depression     Fall     \"Passed Out And [place] On My Tailbone\"    Fatty liver     Hyperlipidemia     Lower back pain     \"Sometimes\"    MRSA (methicillin resistant staph aureus) culture positive 2018    Multiple pulmonary nodules 2013    Subcentimeter; needs repeat imaging in 3-6 months    Pancreatitis     Shortness of breath on exertion     Teeth missing     Upper And Lower    Wears glasses      Past Surgical History:        Procedure Laterality Date    ABDOMEN SURGERY      bowel resection     ADENOIDECTOMY  1995    APPENDECTOMY      Done During Colon Resection For Crohn's  Disease    BREAST SURGERY Left     \"Infected Lymph Node Removed\"    CHOLECYSTECTOMY  2016  COLECTOMY  8-11    Crohn's Disease , Dr. Shea Cabello, Appendectomy Also Done    COLONOSCOPY  09/06/2016    Polyps Removed In Past    COLONOSCOPY  05/2017    Hospitalized for c-diff    COLONOSCOPY  03/06/2018    normal, multiple biopsy, repeat 1 year    ENDOSCOPY, COLON, DIAGNOSTIC  Last Done 2014    LAPAROSCOPY  2013    Dr Scottie Gastelum, Removed Adhesions    OTHER SURGICAL HISTORY  06/2011    Mediport Insertion Left Chest Area/revision done 6/2016- \"removed at [facility] 11/2017 after I got an infection    TUNNELED VENOUS PORT PLACEMENT Right 03/12/2018    smart port- [facility]-Dr Eldridge Drivers    WISDOM TOOTH EXTRACTION      All Four [place] Teeth Extracted In Past     Allergies:  Morphine and Tramadol  Social History:    Social History     Social History    Marital status: Single     Spouse name: N/A    Number of children: 1    Years of education: N/A     Occupational History    Not on file.      Social History Main Topics    Smoking status: Never Smoker    Smokeless tobacco: Never Used    Alcohol use No    Drug use: No    Sexual activity: Yes     Partners: Male     Other Topics Concern    Not on file     Social History Narrative    Lives with parents, has a daughter     Family History:   Family History   Problem Relation Age of Onset    Migraines Mother     Heart Disease Father     Diabetes Father     High Cholesterol Father     Depression Maternal Aunt     Depression Maternal Uncle     Depression Paternal Aunt     Coronary Art Dis Paternal Aunt     Depression Paternal Uncle     Coronary Art Dis Paternal Uncle     Depression Maternal Grandmother     Depression Maternal Grandfather     Depression Paternal Grandmother     Coronary Art Dis Paternal Grandmother     Depression Paternal Grandfather     Coronary Art Dis Paternal Grandfather      Medications Prior to Admission:  Prescriptions Prior to Admission: ferrous sulfate 325 (65 Fe) MG tablet, Take 325 mg by mouth 2 times daily  omeprazole (PRILOSEC) 40 MG delayed release capsule, Take 1 capsule by mouth 2 times daily  promethazine (PHENERGAN) 25 MG tablet, TK 1 T PO Q 4 TO 6 H PRN  Prenatal Vit-Fe Fumarate-FA (PREPLUS) 27-1 MG TABS, TK 1 T PO QD  Carboxymeth-Glycerin-Polysorb (REFRESH OPTIVE ADVANCED) 0.5-1-0.5 % SOLN, Place 1 drop into both eyes 3 times daily  [DISCONTINUED] inFLIXimab (REMICADE) 100 MG injection, 10 mg/kg IV  every 6 weeks  [DISCONTINUED] Carboxymethylcellul-Glycerin (REFRESH OPTIVE) 1-0.9 % GEL, Place 1 drop into both eyes nightly  InFLIXimab (REMICADE IV), Infuse intravenously States last tx was 18 due on the 3/16/18 Every Six Weeks At [address] [facility]:    CONSTITUTIONAL:  negative  RESPIRATORY:  negative  CARDIOVASCULAR:  negative  GASTROINTESTINAL:  negative  ALLERGIC/IMMUNOLOGIC:  negative  NEUROLOGICAL:  negative  BEHAVIOR/PSYCH:  negative    PHYSICAL EXAM:  Blood pressure 118/79, pulse 125, temperature 98 °F (36.7 °C), temperature source Oral, resp. rate 22, last menstrual period 07/10/2018, SpO2 100 %, not currently breastfeeding. General appearance:  awake, alert, cooperative, no apparent distress, and appears stated age  Neurologic:  Awake, alert, oriented to name, place and time. Lungs:  No increased work of breathing, good air exchange  Abdomen:  Soft, non tender, gravid, consistent with her gestational age,   Fetal heart rate:    Baseline Heart Rate:  140        Accelerations:  present       Long Term Variability:  moderate       Decelerations:  absent       Pelvis:  Adequate pelvis  Cervix: FT/th      Contraction frequency:  4 minutes    Membranes:  Intact    ASSESSMENT AND PLAN:    27 yo  @ 30w2d  1.  contractions - ffn neg and cervix ft/th, however pt is yrn regularly and is still feeling them s/p hydration and brethine. S/p first dose steroids. Will admitt for 24 hr magnesium and second dose steroids.      Ravinder Ibarra

## 2019-02-07 NOTE — FLOWSHEET NOTE
Report to Dr. Chetna Batres in department. Strip viewed remotely. States if contractions slow and not cervical change, patient may go.

## 2019-02-07 NOTE — FLOWSHEET NOTE
Contractions irregular, patient OOB to [facility]. States she does not feel as much pressure when she stands now but still feels sharp pain with her contractions. SVE unchanged. Report to Dr. Zhanna Yee. States to continue to monitor for now and will re-check her later. Report to Jenny Choudhury RN-care assumed.

## 2019-02-07 NOTE — PLAN OF CARE
Problem: Physical Regulation:  Goal: Complications related to the disease process, condition or treatment will be avoided or minimized  Complications related to the disease process, condition or treatment will be avoided or minimized  Outcome: Ongoing

## 2019-02-08 VITALS
WEIGHT: 293 LBS | HEIGHT: 65 IN | BODY MASS INDEX: 48.82 KG/M2 | RESPIRATION RATE: 14 BRPM | DIASTOLIC BLOOD PRESSURE: 59 MMHG | OXYGEN SATURATION: 98 % | TEMPERATURE: 98 F | SYSTOLIC BLOOD PRESSURE: 119 MMHG | HEART RATE: 99 BPM

## 2019-02-08 LAB
CULTURE: NORMAL
Lab: NORMAL
REPORT STATUS: NORMAL
SPECIMEN: NORMAL

## 2019-02-08 PROCEDURE — 6370000000 HC RX 637 (ALT 250 FOR IP): Performed by: OBSTETRICS & GYNECOLOGY

## 2019-02-08 PROCEDURE — 2580000003 HC RX 258: Performed by: OBSTETRICS & GYNECOLOGY

## 2019-02-08 PROCEDURE — 6360000002 HC RX W HCPCS: Performed by: OBSTETRICS & GYNECOLOGY

## 2019-02-08 PROCEDURE — 96366 THER/PROPH/DIAG IV INF ADDON: CPT

## 2019-02-08 PROCEDURE — 59025 FETAL NON-STRESS TEST: CPT

## 2019-02-08 RX ADMIN — DOCUSATE SODIUM 100 MG: 100 CAPSULE, LIQUID FILLED ORAL at 09:10

## 2019-02-08 RX ADMIN — AMPICILLIN SODIUM 1 G: 1 INJECTION, POWDER, FOR SOLUTION INTRAMUSCULAR; INTRAVENOUS at 00:19

## 2019-02-08 RX ADMIN — AMPICILLIN SODIUM 1 G: 1 INJECTION, POWDER, FOR SOLUTION INTRAMUSCULAR; INTRAVENOUS at 04:08

## 2019-02-08 RX ADMIN — AMPICILLIN SODIUM 1 G: 1 INJECTION, POWDER, FOR SOLUTION INTRAMUSCULAR; INTRAVENOUS at 09:08

## 2019-02-08 RX ADMIN — SODIUM CHLORIDE, POTASSIUM CHLORIDE, SODIUM LACTATE AND CALCIUM CHLORIDE: 600; 310; 30; 20 INJECTION, SOLUTION INTRAVENOUS at 06:09

## 2019-02-08 RX ADMIN — BETAMETHASONE ACETATE AND BETAMETHASONE SODIUM PHOSPHATE 12 MG: 3; 3 INJECTION, SUSPENSION INTRA-ARTICULAR; INTRALESIONAL; INTRAMUSCULAR; SOFT TISSUE at 12:56

## 2019-02-08 RX ADMIN — MAGNESIUM SULFATE HEPTAHYDRATE 1 G/HR: 40 INJECTION, SOLUTION INTRAVENOUS at 03:17

## 2019-02-08 ASSESSMENT — PAIN DESCRIPTION - ORIENTATION: ORIENTATION: LOWER

## 2019-02-08 ASSESSMENT — PAIN SCALES - GENERAL
PAINLEVEL_OUTOF10: 0
PAINLEVEL_OUTOF10: 2
PAINLEVEL_OUTOF10: 0

## 2019-02-08 ASSESSMENT — PAIN DESCRIPTION - PAIN TYPE: TYPE: ACUTE PAIN

## 2019-02-08 ASSESSMENT — PAIN - FUNCTIONAL ASSESSMENT: PAIN_FUNCTIONAL_ASSESSMENT: ACTIVITIES ARE NOT PREVENTED

## 2019-02-08 ASSESSMENT — PAIN DESCRIPTION - FREQUENCY: FREQUENCY: CONTINUOUS

## 2019-02-08 ASSESSMENT — PAIN DESCRIPTION - DESCRIPTORS: DESCRIPTORS: PRESSURE

## 2019-02-08 ASSESSMENT — PAIN DESCRIPTION - LOCATION: LOCATION: PELVIS

## 2019-02-08 NOTE — FLOWSHEET NOTE
Dr. Brad Chavez in to see pt. New orders to decrease mag sulfate at 0900 if asymptomatic and may d/c mag when pt receives 2nd dose of sterroids at 1315.

## 2019-02-08 NOTE — FLOWSHEET NOTE
Dr. Scott Boyer at desk, updated on pt status & baby is very difficult to trace when pt lying on side. Advised the pattern seen on toco is the pt's breathing pattern while sound asleep. RN stood at bedside and confirmed the undulations correspond with pt's breathing.

## 2019-02-08 NOTE — FLOWSHEET NOTE
Dr. Mckeon in to see patient discharge instructions given, to call when done eating so can be discharged.

## 2019-02-08 NOTE — PLAN OF CARE
Problem: Life Cycle:  Goal: Risk for  labor will decrease  Risk for  labor will decrease   Outcome: Ongoing      Problem: Physical Regulation:  Goal: Complications related to the disease process, condition or treatment will be avoided or minimized  Complications related to the disease process, condition or treatment will be avoided or minimized   Outcome: Ongoing

## 2019-02-08 NOTE — DISCHARGE SUMMARY
Obstetric Discharge [facility]    Patient Name:    Juan William, [address] Record Number:   [id number]    Attending:     Mina Clements    Date of Admission:    2019    Date of Discharge:      2019    Admitting Diagnosis  IUP 30 weeks, Persistent  contractions  OB History      Para Term  AB Living    3 1 1   1 1    SAB TAB Ectopic Molar Multiple Live Births    1 0       1          Reasons for Admission on 2019 11:27 AM  Pregnancy related condition [O26.90]   uterine contractions in third trimester, antepartum [O47.03]  No comment available  Onset of Labor  Observation/Evaluation (Obstetric Complications): magnesium and steroid administration      Discharge Diagnosis       Discharge Labs:    Discharge Meds:       Medication List      ASK your doctor about these medications    ferrous sulfate 325 (65 Fe) MG tablet     omeprazole 40 MG delayed release capsule  Commonly known as:  PRILOSEC  Take 1 capsule by mouth 2 times daily     PREPLUS 27-1 MG Tabs     promethazine 25 MG tablet  Commonly known as:  PHENERGAN     REFRESH OPTIVE ADVANCED 0.5-1-0.5 % Soln  Generic drug:  Carboxymeth-Glycerin-Polysorb     REMICADE IV            Discharge Information  Current Discharge Medication List      CONTINUE these medications which have NOT CHANGED    Details   ferrous sulfate 325 (65 Fe) MG tablet Take 325 mg by mouth 2 times daily      omeprazole (PRILOSEC) 40 MG delayed release capsule Take 1 capsule by mouth 2 times daily  Qty: 90 capsule, Refills: 3      promethazine (PHENERGAN) 25 MG tablet TK 1 T PO Q 4 TO 6 H PRN  Refills: 2      Prenatal Vit-Fe Fumarate-FA (PREPLUS) 27-1 MG TABS TK 1 T PO QD  Refills: 11      Carboxymeth-Glycerin-Polysorb (REFRESH OPTIVE ADVANCED) 0.5-1-0.5 % SOLN Place 1 drop into both eyes 3 times daily      InFLIXimab (REMICADE IV) Infuse intravenously States last tx was 18 due on the 3/16/18 Every Six Weeks At [address] [facility] [address] [facility]         STOP taking these medications       Carboxymethylcellul-Glycerin (REFRESH OPTIVE) 1-0.9 % GEL Comments:   Reason for Stopping:               No discharge procedures on file. Discharge to: Home  Follow up in 1 weeks at office.

## 2019-02-08 NOTE — FLOWSHEET NOTE
In to talk with pt POC discussed pt stats feeling good just every now and then feels like has UC advised on POC, fresh water given denies any further c/o or needs at this time. This RN number given to call as needed.

## 2019-02-08 NOTE — PROGRESS NOTES
SUBJECTIVE:    Chief Complaint   Patient presents with    Laboring     pelvic pain and pressure for 2 weeks, worse in the last few days. Much worse since last night at 0030     S/P 2 doses of steroids. Off magnesium now since 1pm. Feeling fine, no contractions now. Discussed causes of  contractions including dehydration and uti. Discussed how last 3 urine tests have all been contaminated. Culture pending from yesterday's collection     OBJECTIVE    Vitals:  /60   Pulse 102   Temp 97.5 °F (36.4 °C) (Temporal)   Resp 18   Ht 5' 5\" (1.651 m)   Wt 295 lb (133.8 kg)   LMP 07/10/2018 (Exact Date)   SpO2 97%   BMI 49.09 kg/m²     CONSTITUTIONAL:  awake, alert, cooperative, no apparent distress, and appears stated age  ABDOMEN:  No scars, normal bowel sounds, soft, non-distended, non-tender, no masses palpated, no hepatosplenomegally       Membranes:    Intact    Fetal heart rate:        Baseline FHR :    Cat 1 tracing at 130 bpm              Fetal Accelerations:  present        Fetal Decelerations:  absent        Fetal Variability:   moderate    Contraction frequency:  0 minutes    DATA:    ASSESSMENT:    26y.o.  year old  at 30w3d  with 2019, here with  contractions s/p mag and steroids        PLAN:  Plan eval for ctx today.  May discharge tonight or in am

## 2019-02-11 ENCOUNTER — TELEPHONE (OUTPATIENT)
Dept: GASTROENTEROLOGY | Age: 34
End: 2019-02-11

## 2019-02-11 DIAGNOSIS — K50.119 CROHN'S DISEASE OF LARGE INTESTINE WITH COMPLICATION (HCC): Primary | ICD-10-CM

## 2019-02-11 RX ORDER — INFLIXIMAB 100 MG/10ML
INJECTION, POWDER, LYOPHILIZED, FOR SOLUTION INTRAVENOUS
Qty: 24 EACH | Refills: 5 | Status: ON HOLD | OUTPATIENT
Start: 2019-02-11 | End: 2019-03-14 | Stop reason: CLARIF

## 2019-02-12 ENCOUNTER — HOSPITAL ENCOUNTER (OUTPATIENT)
Age: 34
LOS: 1 days | Discharge: HOME | End: 2019-02-12
Attending: OBSTETRICS & GYNECOLOGY | Admitting: OBSTETRICS & GYNECOLOGY
Payer: COMMERCIAL

## 2019-02-12 VITALS
HEIGHT: 65 IN | BODY MASS INDEX: 48.82 KG/M2 | TEMPERATURE: 98 F | SYSTOLIC BLOOD PRESSURE: 128 MMHG | OXYGEN SATURATION: 96 % | HEART RATE: 96 BPM | WEIGHT: 293 LBS | RESPIRATION RATE: 17 BRPM | DIASTOLIC BLOOD PRESSURE: 67 MMHG

## 2019-02-12 LAB
BACTERIA: ABNORMAL /HPF
BILIRUBIN URINE: NEGATIVE MG/DL
BLOOD, URINE: ABNORMAL
CLARITY: ABNORMAL
COLOR: YELLOW
GLUCOSE, URINE: NEGATIVE MG/DL
KETONES, URINE: NEGATIVE MG/DL
LEUKOCYTE ESTERASE, URINE: ABNORMAL
MUCUS: ABNORMAL HPF
NITRITE URINE, QUANTITATIVE: NEGATIVE
PH, URINE: 6 (ref 5–8)
PROTEIN UA: NEGATIVE MG/DL
RBC URINE: 19 /HPF (ref 0–6)
SPECIFIC GRAVITY UA: 1.02 (ref 1–1.03)
SQUAMOUS EPITHELIAL: 30 /HPF
TRICHOMONAS: ABNORMAL /HPF
UROBILINOGEN, URINE: NORMAL MG/DL (ref 0.2–1)
WBC UA: 1 /HPF (ref 0–5)
YEAST: ABNORMAL /HPF

## 2019-02-12 PROCEDURE — 99211 OFF/OP EST MAY X REQ PHY/QHP: CPT

## 2019-02-12 PROCEDURE — 81001 URINALYSIS AUTO W/SCOPE: CPT

## 2019-02-12 RX ORDER — ACETAMINOPHEN 500 MG
500 TABLET ORAL EVERY 6 HOURS PRN
Status: ON HOLD | COMMUNITY
End: 2019-04-04 | Stop reason: HOSPADM

## 2019-02-12 ASSESSMENT — PAIN DESCRIPTION - ORIENTATION
ORIENTATION: LOWER
ORIENTATION: LOWER

## 2019-02-12 ASSESSMENT — PAIN SCALES - GENERAL
PAINLEVEL_OUTOF10: 2
PAINLEVEL_OUTOF10: 2

## 2019-02-12 ASSESSMENT — PAIN - FUNCTIONAL ASSESSMENT
PAIN_FUNCTIONAL_ASSESSMENT: ACTIVITIES ARE NOT PREVENTED
PAIN_FUNCTIONAL_ASSESSMENT: ACTIVITIES ARE NOT PREVENTED

## 2019-02-12 ASSESSMENT — PAIN DESCRIPTION - DESCRIPTORS
DESCRIPTORS: PRESSURE

## 2019-02-12 ASSESSMENT — PAIN DESCRIPTION - LOCATION
LOCATION: PELVIS
LOCATION: PELVIS

## 2019-02-12 ASSESSMENT — PAIN DESCRIPTION - FREQUENCY
FREQUENCY: INTERMITTENT
FREQUENCY: INTERMITTENT

## 2019-02-12 ASSESSMENT — PAIN DESCRIPTION - PROGRESSION
CLINICAL_PROGRESSION: NOT CHANGED
CLINICAL_PROGRESSION: GRADUALLY IMPROVING

## 2019-02-12 ASSESSMENT — PAIN DESCRIPTION - ONSET
ONSET: ON-GOING
ONSET: ON-GOING

## 2019-02-12 ASSESSMENT — PAIN DESCRIPTION - PAIN TYPE
TYPE: ACUTE PAIN
TYPE: ACUTE PAIN

## 2019-02-21 ENCOUNTER — TELEPHONE (OUTPATIENT)
Dept: GASTROENTEROLOGY | Age: 34
End: 2019-02-21

## 2019-02-26 ENCOUNTER — HOSPITAL ENCOUNTER (OUTPATIENT)
Age: 34
Discharge: HOME | End: 2019-02-26
Attending: OBSTETRICS & GYNECOLOGY | Admitting: OBSTETRICS & GYNECOLOGY
Payer: COMMERCIAL

## 2019-02-26 VITALS
HEART RATE: 103 BPM | TEMPERATURE: 97.2 F | HEIGHT: 65 IN | RESPIRATION RATE: 18 BRPM | SYSTOLIC BLOOD PRESSURE: 120 MMHG | WEIGHT: 293 LBS | DIASTOLIC BLOOD PRESSURE: 86 MMHG | BODY MASS INDEX: 48.82 KG/M2

## 2019-02-26 PROBLEM — O26.90 PREGNANCY RELATED CONDITION: Status: ACTIVE | Noted: 2019-02-26

## 2019-02-26 LAB — FETAL FIBRONECTIN: NEGATIVE

## 2019-02-26 PROCEDURE — 59025 FETAL NON-STRESS TEST: CPT

## 2019-02-26 PROCEDURE — 82731 ASSAY OF FETAL FIBRONECTIN: CPT

## 2019-03-08 ENCOUNTER — HOSPITAL ENCOUNTER (OUTPATIENT)
Age: 34
Discharge: HOME | End: 2019-03-08
Attending: OBSTETRICS & GYNECOLOGY | Admitting: OBSTETRICS & GYNECOLOGY
Payer: COMMERCIAL

## 2019-03-08 VITALS
DIASTOLIC BLOOD PRESSURE: 82 MMHG | BODY MASS INDEX: 48.82 KG/M2 | WEIGHT: 293 LBS | HEIGHT: 65 IN | TEMPERATURE: 97 F | SYSTOLIC BLOOD PRESSURE: 120 MMHG | HEART RATE: 116 BPM | RESPIRATION RATE: 18 BRPM

## 2019-03-08 PROBLEM — Z34.93 ENCOUNTER FOR PREGNANCY RELATED EXAMINATION, THIRD TRIMESTER: Status: ACTIVE | Noted: 2019-03-08

## 2019-03-08 LAB
BACTERIA: ABNORMAL /HPF
BILIRUBIN URINE: NEGATIVE MG/DL
BLOOD, URINE: ABNORMAL
CLARITY: ABNORMAL
COLOR: YELLOW
GLUCOSE, URINE: NEGATIVE MG/DL
KETONES, URINE: NEGATIVE MG/DL
LEUKOCYTE ESTERASE, URINE: ABNORMAL
MUCUS: ABNORMAL HPF
NITRITE URINE, QUANTITATIVE: NEGATIVE
PH, URINE: 5 (ref 5–8)
PROTEIN UA: NEGATIVE MG/DL
RBC URINE: 4 /HPF (ref 0–6)
SPECIFIC GRAVITY UA: 1.02 (ref 1–1.03)
SQUAMOUS EPITHELIAL: 31 /HPF
TRICHOMONAS: ABNORMAL /HPF
UROBILINOGEN, URINE: NORMAL MG/DL (ref 0.2–1)
WBC UA: 1 /HPF (ref 0–5)

## 2019-03-08 PROCEDURE — 99211 OFF/OP EST MAY X REQ PHY/QHP: CPT

## 2019-03-08 PROCEDURE — 81001 URINALYSIS AUTO W/SCOPE: CPT

## 2019-03-08 PROCEDURE — 87086 URINE CULTURE/COLONY COUNT: CPT

## 2019-03-08 RX ORDER — CLINDAMYCIN HYDROCHLORIDE 150 MG/1
150 CAPSULE ORAL 3 TIMES DAILY
Status: ON HOLD | COMMUNITY
End: 2019-04-01 | Stop reason: ALTCHOICE

## 2019-03-08 ASSESSMENT — PAIN DESCRIPTION - DESCRIPTORS: DESCRIPTORS: ACHING

## 2019-03-09 LAB
CULTURE: NORMAL
Lab: NORMAL
REPORT STATUS: NORMAL
SPECIMEN: NORMAL

## 2019-03-14 ENCOUNTER — HOSPITAL ENCOUNTER (OUTPATIENT)
Age: 34
Discharge: HOME | End: 2019-03-14
Attending: OBSTETRICS & GYNECOLOGY | Admitting: OBSTETRICS & GYNECOLOGY
Payer: COMMERCIAL

## 2019-03-14 VITALS
HEIGHT: 65 IN | BODY MASS INDEX: 48.82 KG/M2 | DIASTOLIC BLOOD PRESSURE: 85 MMHG | TEMPERATURE: 98.3 F | HEART RATE: 96 BPM | SYSTOLIC BLOOD PRESSURE: 136 MMHG | WEIGHT: 293 LBS | RESPIRATION RATE: 18 BRPM

## 2019-03-14 PROBLEM — Z32.01 PREGNANCY EXAMINATION OR TEST, POSITIVE RESULT: Status: ACTIVE | Noted: 2019-03-14

## 2019-03-14 LAB
BACTERIA: ABNORMAL /HPF
BACTERIA: NEGATIVE /HPF
BILIRUBIN URINE: ABNORMAL MG/DL
BILIRUBIN URINE: NEGATIVE MG/DL
BLOOD, URINE: ABNORMAL
BLOOD, URINE: ABNORMAL
CLARITY: ABNORMAL
CLARITY: CLEAR
COLOR: ABNORMAL
COLOR: YELLOW
GLUCOSE BLD-MCNC: 93 MG/DL (ref 70–99)
GLUCOSE, URINE: NEGATIVE MG/DL
GLUCOSE, URINE: NEGATIVE MG/DL
KETONES, URINE: NEGATIVE MG/DL
KETONES, URINE: NEGATIVE MG/DL
LEUKOCYTE ESTERASE, URINE: ABNORMAL
LEUKOCYTE ESTERASE, URINE: NEGATIVE
MUCUS: ABNORMAL HPF
MUCUS: ABNORMAL HPF
NITRITE URINE, QUANTITATIVE: NEGATIVE
NITRITE URINE, QUANTITATIVE: NEGATIVE
PH, URINE: 5 (ref 5–8)
PH, URINE: 6 (ref 5–8)
PROTEIN UA: 30 MG/DL
PROTEIN UA: NEGATIVE MG/DL
RBC URINE: 1 /HPF (ref 0–6)
RBC URINE: 4 /HPF (ref 0–6)
SPECIFIC GRAVITY UA: 1.01 (ref 1–1.03)
SPECIFIC GRAVITY UA: 1.02 (ref 1–1.03)
SQUAMOUS EPITHELIAL: 6 /HPF
SQUAMOUS EPITHELIAL: 70 /HPF
TRANSITIONAL EPITHELIAL: <1 /HPF
TRICHOMONAS: ABNORMAL /HPF
UROBILINOGEN, URINE: <2 MG/DL (ref 0.2–1)
UROBILINOGEN, URINE: NORMAL MG/DL (ref 0.2–1)
WBC UA: 1 /HPF (ref 0–5)
WBC UA: 10 /HPF (ref 0–5)
YEAST: ABNORMAL /HPF

## 2019-03-14 PROCEDURE — 96361 HYDRATE IV INFUSION ADD-ON: CPT

## 2019-03-14 PROCEDURE — 99211 OFF/OP EST MAY X REQ PHY/QHP: CPT

## 2019-03-14 PROCEDURE — 6360000002 HC RX W HCPCS: Performed by: OBSTETRICS & GYNECOLOGY

## 2019-03-14 PROCEDURE — 96365 THER/PROPH/DIAG IV INF INIT: CPT

## 2019-03-14 PROCEDURE — 2580000003 HC RX 258: Performed by: OBSTETRICS & GYNECOLOGY

## 2019-03-14 PROCEDURE — 82962 GLUCOSE BLOOD TEST: CPT

## 2019-03-14 PROCEDURE — 81001 URINALYSIS AUTO W/SCOPE: CPT

## 2019-03-14 RX ORDER — SODIUM CHLORIDE 450 MG/100ML
INJECTION, SOLUTION INTRAVENOUS CONTINUOUS
Status: DISCONTINUED | OUTPATIENT
Start: 2019-03-14 | End: 2019-03-14 | Stop reason: HOSPADM

## 2019-03-14 RX ORDER — ONDANSETRON 2 MG/ML
4 INJECTION INTRAMUSCULAR; INTRAVENOUS ONCE
Status: COMPLETED | OUTPATIENT
Start: 2019-03-14 | End: 2019-03-14

## 2019-03-14 RX ORDER — LOPERAMIDE HYDROCHLORIDE 2 MG/1
2 CAPSULE ORAL 4 TIMES DAILY PRN
COMMUNITY

## 2019-03-14 RX ADMIN — SODIUM CHLORIDE 1000 ML: 4.5 INJECTION, SOLUTION INTRAVENOUS at 09:41

## 2019-03-14 RX ADMIN — SODIUM CHLORIDE: 4.5 INJECTION, SOLUTION INTRAVENOUS at 10:40

## 2019-03-14 RX ADMIN — ONDANSETRON 4 MG: 2 INJECTION INTRAMUSCULAR; INTRAVENOUS at 09:44

## 2019-03-15 ENCOUNTER — TELEPHONE (OUTPATIENT)
Dept: GASTROENTEROLOGY | Age: 34
End: 2019-03-15

## 2019-04-01 ENCOUNTER — ANESTHESIA EVENT (OUTPATIENT)
Dept: LABOR AND DELIVERY | Age: 34
End: 2019-04-01
Payer: COMMERCIAL

## 2019-04-01 ENCOUNTER — HOSPITAL ENCOUNTER (INPATIENT)
Age: 34
LOS: 3 days | Discharge: HOME | End: 2019-04-04
Attending: OBSTETRICS & GYNECOLOGY | Admitting: OBSTETRICS & GYNECOLOGY
Payer: COMMERCIAL

## 2019-04-01 ENCOUNTER — ANESTHESIA (OUTPATIENT)
Dept: LABOR AND DELIVERY | Age: 34
End: 2019-04-01
Payer: COMMERCIAL

## 2019-04-01 VITALS — DIASTOLIC BLOOD PRESSURE: 51 MMHG | OXYGEN SATURATION: 100 % | SYSTOLIC BLOOD PRESSURE: 119 MMHG

## 2019-04-01 DIAGNOSIS — Z98.891 STATUS POST CESAREAN DELIVERY: Primary | ICD-10-CM

## 2019-04-01 PROBLEM — I82.90 ACUTE DEEP VEIN THROMBOSIS (DVT) OF NON-EXTREMITY VEIN: Status: ACTIVE | Noted: 2018-04-25

## 2019-04-01 PROBLEM — E66.01 MORBID OBESITY (HCC): Status: ACTIVE | Noted: 2017-09-26

## 2019-04-01 PROBLEM — T80.212A PORT OR RESERVOIR INFECTION: Status: RESOLVED | Noted: 2018-04-19 | Resolved: 2019-04-01

## 2019-04-01 PROBLEM — O26.90 PREGNANCY RELATED CONDITION: Status: ACTIVE | Noted: 2019-04-01

## 2019-04-01 PROBLEM — N17.9 AKI (ACUTE KIDNEY INJURY) (HCC): Status: ACTIVE | Noted: 2018-04-25

## 2019-04-01 PROBLEM — O14.93 PREECLAMPSIA, THIRD TRIMESTER: Status: ACTIVE | Noted: 2019-04-01

## 2019-04-01 PROBLEM — Z32.02 PREGNANCY EXAMINATION OR TEST, NEGATIVE RESULT: Status: RESOLVED | Noted: 2019-01-23 | Resolved: 2019-04-01

## 2019-04-01 PROBLEM — N17.9 AKI (ACUTE KIDNEY INJURY) (HCC): Status: RESOLVED | Noted: 2018-04-25 | Resolved: 2019-04-01

## 2019-04-01 PROBLEM — O26.90 PREGNANCY RELATED CONDITION: Status: RESOLVED | Noted: 2019-02-26 | Resolved: 2019-04-01

## 2019-04-01 PROBLEM — O47.03 PRETERM UTERINE CONTRACTIONS IN THIRD TRIMESTER, ANTEPARTUM: Status: RESOLVED | Noted: 2019-02-07 | Resolved: 2019-04-01

## 2019-04-01 PROBLEM — Z71.3 NUTRITIONAL COUNSELING: Status: RESOLVED | Noted: 2018-01-22 | Resolved: 2019-04-01

## 2019-04-01 PROBLEM — Z32.01 PREGNANCY EXAMINATION OR TEST, POSITIVE RESULT: Status: RESOLVED | Noted: 2019-03-14 | Resolved: 2019-04-01

## 2019-04-01 PROBLEM — Z34.93 ENCOUNTER FOR PREGNANCY RELATED EXAMINATION, THIRD TRIMESTER: Status: RESOLVED | Noted: 2019-03-08 | Resolved: 2019-04-01

## 2019-04-01 PROBLEM — K59.1 FUNCTIONAL DIARRHEA: Status: RESOLVED | Noted: 2018-04-20 | Resolved: 2019-04-01

## 2019-04-01 LAB
ABO/RH: NORMAL
ALBUMIN SERPL-MCNC: 3.2 GM/DL (ref 3.4–5)
ALP BLD-CCNC: 120 IU/L (ref 40–128)
ALT SERPL-CCNC: 5 U/L (ref 10–40)
AMPHETAMINES: NEGATIVE
ANION GAP SERPL CALCULATED.3IONS-SCNC: 13 MMOL/L (ref 4–16)
ANTIBODY SCREEN: NEGATIVE
AST SERPL-CCNC: 14 IU/L (ref 15–37)
BACTERIA: ABNORMAL /HPF
BARBITURATE SCREEN URINE: NEGATIVE
BENZODIAZEPINE SCREEN, URINE: NEGATIVE
BILIRUB SERPL-MCNC: 0.2 MG/DL (ref 0–1)
BILIRUBIN URINE: NEGATIVE MG/DL
BLOOD, URINE: ABNORMAL
BUN BLDV-MCNC: 13 MG/DL (ref 6–23)
CALCIUM SERPL-MCNC: 8.7 MG/DL (ref 8.3–10.6)
CANNABINOID SCREEN URINE: NEGATIVE
CHLORIDE BLD-SCNC: 101 MMOL/L (ref 99–110)
CLARITY: ABNORMAL
CO2: 22 MMOL/L (ref 21–32)
COCAINE METABOLITE: NEGATIVE
COLOR: YELLOW
CREAT SERPL-MCNC: 0.6 MG/DL (ref 0.6–1.1)
GFR AFRICAN AMERICAN: >60 ML/MIN/1.73M2
GFR NON-AFRICAN AMERICAN: >60 ML/MIN/1.73M2
GLUCOSE BLD-MCNC: 75 MG/DL (ref 70–99)
GLUCOSE BLD-MCNC: 84 MG/DL (ref 70–99)
GLUCOSE, URINE: NEGATIVE MG/DL
HCT VFR BLD CALC: 31.3 % (ref 37–47)
HEMOGLOBIN: 9.6 GM/DL (ref 12.5–16)
HYALINE CASTS: 0 /LPF
KETONES, URINE: ABNORMAL MG/DL
LEUKOCYTE ESTERASE, URINE: NEGATIVE
MCH RBC QN AUTO: 26.1 PG (ref 27–31)
MCHC RBC AUTO-ENTMCNC: 30.7 % (ref 32–36)
MCV RBC AUTO: 85.1 FL (ref 78–100)
MUCUS: ABNORMAL HPF
NITRITE URINE, QUANTITATIVE: NEGATIVE
OPIATES, URINE: NEGATIVE
OXYCODONE: NORMAL
PDW BLD-RTO: 14.7 % (ref 11.7–14.9)
PH, URINE: 5 (ref 5–8)
PHENCYCLIDINE, URINE: NEGATIVE
PLATELET # BLD: 210 K/CU MM (ref 140–440)
PMV BLD AUTO: 12 FL (ref 7.5–11.1)
POTASSIUM SERPL-SCNC: 4 MMOL/L (ref 3.5–5.1)
PROTEIN UA: 100 MG/DL
RBC # BLD: 3.68 M/CU MM (ref 4.2–5.4)
RBC URINE: 6 /HPF (ref 0–6)
SODIUM BLD-SCNC: 136 MMOL/L (ref 135–145)
SPECIFIC GRAVITY UA: 1.02 (ref 1–1.03)
SQUAMOUS EPITHELIAL: 34 /HPF
TOTAL PROTEIN: 6.1 GM/DL (ref 6.4–8.2)
TRICHOMONAS: ABNORMAL /HPF
URIC ACID: 7 MG/DL (ref 2.6–6)
UROBILINOGEN, URINE: 1 MG/DL (ref 0.2–1)
WBC # BLD: 12 K/CU MM (ref 4–10.5)
WBC UA: 2 /HPF (ref 0–5)

## 2019-04-01 PROCEDURE — 6370000000 HC RX 637 (ALT 250 FOR IP): Performed by: OBSTETRICS & GYNECOLOGY

## 2019-04-01 PROCEDURE — 86901 BLOOD TYPING SEROLOGIC RH(D): CPT

## 2019-04-01 PROCEDURE — 2580000003 HC RX 258: Performed by: NURSE ANESTHETIST, CERTIFIED REGISTERED

## 2019-04-01 PROCEDURE — 86850 RBC ANTIBODY SCREEN: CPT

## 2019-04-01 PROCEDURE — 94761 N-INVAS EAR/PLS OXIMETRY MLT: CPT

## 2019-04-01 PROCEDURE — 86900 BLOOD TYPING SEROLOGIC ABO: CPT

## 2019-04-01 PROCEDURE — 80053 COMPREHEN METABOLIC PANEL: CPT

## 2019-04-01 PROCEDURE — 84550 ASSAY OF BLOOD/URIC ACID: CPT

## 2019-04-01 PROCEDURE — 3700000001 HC ADD 15 MINUTES (ANESTHESIA): Performed by: OBSTETRICS & GYNECOLOGY

## 2019-04-01 PROCEDURE — 6360000002 HC RX W HCPCS: Performed by: OBSTETRICS & GYNECOLOGY

## 2019-04-01 PROCEDURE — 1220000000 HC SEMI PRIVATE OB R&B

## 2019-04-01 PROCEDURE — 7100000000 HC PACU RECOVERY - FIRST 15 MIN: Performed by: OBSTETRICS & GYNECOLOGY

## 2019-04-01 PROCEDURE — 6360000002 HC RX W HCPCS

## 2019-04-01 PROCEDURE — 2580000003 HC RX 258: Performed by: OBSTETRICS & GYNECOLOGY

## 2019-04-01 PROCEDURE — 59025 FETAL NON-STRESS TEST: CPT

## 2019-04-01 PROCEDURE — 80307 DRUG TEST PRSMV CHEM ANLYZR: CPT

## 2019-04-01 PROCEDURE — 81001 URINALYSIS AUTO W/SCOPE: CPT

## 2019-04-01 PROCEDURE — 7100000001 HC PACU RECOVERY - ADDTL 15 MIN: Performed by: OBSTETRICS & GYNECOLOGY

## 2019-04-01 PROCEDURE — 3700000000 HC ANESTHESIA ATTENDED CARE: Performed by: OBSTETRICS & GYNECOLOGY

## 2019-04-01 PROCEDURE — 6360000002 HC RX W HCPCS: Performed by: NURSE ANESTHETIST, CERTIFIED REGISTERED

## 2019-04-01 PROCEDURE — 6360000002 HC RX W HCPCS: Performed by: ANESTHESIOLOGY

## 2019-04-01 PROCEDURE — 4A1HXCZ MONITORING OF PRODUCTS OF CONCEPTION, CARDIAC RATE, EXTERNAL APPROACH: ICD-10-PCS | Performed by: OBSTETRICS & GYNECOLOGY

## 2019-04-01 PROCEDURE — 3609079900 HC CESAREAN SECTION: Performed by: OBSTETRICS & GYNECOLOGY

## 2019-04-01 PROCEDURE — 82962 GLUCOSE BLOOD TEST: CPT

## 2019-04-01 PROCEDURE — 85027 COMPLETE CBC AUTOMATED: CPT

## 2019-04-01 RX ORDER — ONDANSETRON 2 MG/ML
4 INJECTION INTRAMUSCULAR; INTRAVENOUS
Status: DISCONTINUED | OUTPATIENT
Start: 2019-04-01 | End: 2019-04-01 | Stop reason: HOSPADM

## 2019-04-01 RX ORDER — TRISODIUM CITRATE DIHYDRATE AND CITRIC ACID MONOHYDRATE 500; 334 MG/5ML; MG/5ML
30 SOLUTION ORAL ONCE
Status: COMPLETED | OUTPATIENT
Start: 2019-04-01 | End: 2019-04-01

## 2019-04-01 RX ORDER — HYDROCODONE BITARTRATE AND ACETAMINOPHEN 5; 325 MG/1; MG/1
1 TABLET ORAL ONCE
Status: COMPLETED | OUTPATIENT
Start: 2019-04-01 | End: 2019-04-01

## 2019-04-01 RX ORDER — LANOLIN 100 %
OINTMENT (GRAM) TOPICAL
Status: DISCONTINUED | OUTPATIENT
Start: 2019-04-01 | End: 2019-04-04 | Stop reason: HOSPADM

## 2019-04-01 RX ORDER — HYDROMORPHONE HCL 110MG/55ML
0.5 PATIENT CONTROLLED ANALGESIA SYRINGE INTRAVENOUS
Status: DISCONTINUED | OUTPATIENT
Start: 2019-04-01 | End: 2019-04-04 | Stop reason: HOSPADM

## 2019-04-01 RX ORDER — SODIUM CHLORIDE 0.9 % (FLUSH) 0.9 %
10 SYRINGE (ML) INJECTION EVERY 12 HOURS SCHEDULED
Status: DISCONTINUED | OUTPATIENT
Start: 2019-04-02 | End: 2019-04-04 | Stop reason: HOSPADM

## 2019-04-01 RX ORDER — SODIUM CHLORIDE, SODIUM LACTATE, POTASSIUM CHLORIDE, AND CALCIUM CHLORIDE .6; .31; .03; .02 G/100ML; G/100ML; G/100ML; G/100ML
1000 INJECTION, SOLUTION INTRAVENOUS ONCE
Status: DISCONTINUED | OUTPATIENT
Start: 2019-04-01 | End: 2019-04-01

## 2019-04-01 RX ORDER — FERROUS SULFATE 325(65) MG
325 TABLET ORAL 2 TIMES DAILY WITH MEALS
Status: DISCONTINUED | OUTPATIENT
Start: 2019-04-02 | End: 2019-04-04 | Stop reason: HOSPADM

## 2019-04-01 RX ORDER — OXYCODONE HYDROCHLORIDE AND ACETAMINOPHEN 5; 325 MG/1; MG/1
2 TABLET ORAL EVERY 4 HOURS PRN
Status: DISCONTINUED | OUTPATIENT
Start: 2019-04-01 | End: 2019-04-04 | Stop reason: HOSPADM

## 2019-04-01 RX ORDER — DIPHENHYDRAMINE HYDROCHLORIDE 50 MG/ML
25 INJECTION INTRAMUSCULAR; INTRAVENOUS EVERY 6 HOURS PRN
Status: DISCONTINUED | OUTPATIENT
Start: 2019-04-01 | End: 2019-04-04 | Stop reason: HOSPADM

## 2019-04-01 RX ORDER — KETOROLAC TROMETHAMINE 30 MG/ML
INJECTION, SOLUTION INTRAMUSCULAR; INTRAVENOUS PRN
Status: DISCONTINUED | OUTPATIENT
Start: 2019-04-01 | End: 2019-04-01 | Stop reason: SDUPTHER

## 2019-04-01 RX ORDER — PROMETHAZINE HYDROCHLORIDE 25 MG/ML
6.25 INJECTION, SOLUTION INTRAMUSCULAR; INTRAVENOUS
Status: DISCONTINUED | OUTPATIENT
Start: 2019-04-01 | End: 2019-04-01 | Stop reason: HOSPADM

## 2019-04-01 RX ORDER — ACETAMINOPHEN 500 MG
500 TABLET ORAL EVERY 6 HOURS PRN
Status: DISCONTINUED | OUTPATIENT
Start: 2019-04-01 | End: 2019-04-04 | Stop reason: HOSPADM

## 2019-04-01 RX ORDER — 0.9 % SODIUM CHLORIDE 0.9 %
1000 INTRAVENOUS SOLUTION INTRAVENOUS ONCE
Status: COMPLETED | OUTPATIENT
Start: 2019-04-01 | End: 2019-04-01

## 2019-04-01 RX ORDER — DIPHENHYDRAMINE HYDROCHLORIDE 50 MG/ML
12.5 INJECTION INTRAMUSCULAR; INTRAVENOUS
Status: DISCONTINUED | OUTPATIENT
Start: 2019-04-01 | End: 2019-04-01 | Stop reason: HOSPADM

## 2019-04-01 RX ORDER — HYDROMORPHONE HCL 110MG/55ML
0.5 PATIENT CONTROLLED ANALGESIA SYRINGE INTRAVENOUS EVERY 5 MIN PRN
Status: DISCONTINUED | OUTPATIENT
Start: 2019-04-01 | End: 2019-04-01 | Stop reason: HOSPADM

## 2019-04-01 RX ORDER — BISACODYL 10 MG
10 SUPPOSITORY, RECTAL RECTAL DAILY PRN
Status: DISCONTINUED | OUTPATIENT
Start: 2019-04-01 | End: 2019-04-04 | Stop reason: HOSPADM

## 2019-04-01 RX ORDER — OXYCODONE HYDROCHLORIDE AND ACETAMINOPHEN 5; 325 MG/1; MG/1
1 TABLET ORAL EVERY 4 HOURS PRN
Status: DISCONTINUED | OUTPATIENT
Start: 2019-04-01 | End: 2019-04-04 | Stop reason: HOSPADM

## 2019-04-01 RX ORDER — IBUPROFEN 800 MG/1
800 TABLET ORAL EVERY 8 HOURS
Status: DISCONTINUED | OUTPATIENT
Start: 2019-04-04 | End: 2019-04-02

## 2019-04-01 RX ORDER — HYDROMORPHONE HCL 110MG/55ML
0.25 PATIENT CONTROLLED ANALGESIA SYRINGE INTRAVENOUS EVERY 5 MIN PRN
Status: DISCONTINUED | OUTPATIENT
Start: 2019-04-01 | End: 2019-04-01 | Stop reason: HOSPADM

## 2019-04-01 RX ORDER — ALBUTEROL SULFATE 90 UG/1
2 AEROSOL, METERED RESPIRATORY (INHALATION) EVERY 6 HOURS PRN
Status: DISCONTINUED | OUTPATIENT
Start: 2019-04-01 | End: 2019-04-04 | Stop reason: HOSPADM

## 2019-04-01 RX ORDER — DOCUSATE SODIUM 100 MG/1
100 CAPSULE, LIQUID FILLED ORAL 2 TIMES DAILY
Status: DISCONTINUED | OUTPATIENT
Start: 2019-04-02 | End: 2019-04-04 | Stop reason: HOSPADM

## 2019-04-01 RX ORDER — ONDANSETRON 2 MG/ML
4 INJECTION INTRAMUSCULAR; INTRAVENOUS ONCE
Status: COMPLETED | OUTPATIENT
Start: 2019-04-01 | End: 2019-04-01

## 2019-04-01 RX ORDER — SODIUM CHLORIDE, SODIUM LACTATE, POTASSIUM CHLORIDE, CALCIUM CHLORIDE 600; 310; 30; 20 MG/100ML; MG/100ML; MG/100ML; MG/100ML
INJECTION, SOLUTION INTRAVENOUS CONTINUOUS PRN
Status: DISCONTINUED | OUTPATIENT
Start: 2019-04-01 | End: 2019-04-01 | Stop reason: SDUPTHER

## 2019-04-01 RX ORDER — HYDROMORPHONE HCL 110MG/55ML
PATIENT CONTROLLED ANALGESIA SYRINGE INTRAVENOUS
Status: COMPLETED
Start: 2019-04-01 | End: 2019-04-01

## 2019-04-01 RX ORDER — MEPERIDINE HYDROCHLORIDE 25 MG/ML
12.5 INJECTION INTRAMUSCULAR; INTRAVENOUS; SUBCUTANEOUS EVERY 5 MIN PRN
Status: DISCONTINUED | OUTPATIENT
Start: 2019-04-01 | End: 2019-04-01 | Stop reason: HOSPADM

## 2019-04-01 RX ORDER — ACETAMINOPHEN 325 MG/1
650 TABLET ORAL EVERY 4 HOURS PRN
Status: DISCONTINUED | OUTPATIENT
Start: 2019-04-01 | End: 2019-04-02

## 2019-04-01 RX ORDER — SODIUM CHLORIDE 0.9 % (FLUSH) 0.9 %
10 SYRINGE (ML) INJECTION 2 TIMES DAILY
Status: DISCONTINUED | OUTPATIENT
Start: 2019-04-01 | End: 2019-04-02

## 2019-04-01 RX ORDER — KETOROLAC TROMETHAMINE 30 MG/ML
30 INJECTION, SOLUTION INTRAMUSCULAR; INTRAVENOUS EVERY 6 HOURS
Status: DISCONTINUED | OUTPATIENT
Start: 2019-04-02 | End: 2019-04-02

## 2019-04-01 RX ORDER — ALBUTEROL SULFATE 90 UG/1
2 AEROSOL, METERED RESPIRATORY (INHALATION) EVERY 6 HOURS PRN
COMMUNITY

## 2019-04-01 RX ORDER — SODIUM CHLORIDE 0.9 % (FLUSH) 0.9 %
10 SYRINGE (ML) INJECTION PRN
Status: DISCONTINUED | OUTPATIENT
Start: 2019-04-01 | End: 2019-04-04 | Stop reason: HOSPADM

## 2019-04-01 RX ORDER — SIMETHICONE 80 MG
80 TABLET,CHEWABLE ORAL EVERY 6 HOURS PRN
Status: DISCONTINUED | OUTPATIENT
Start: 2019-04-01 | End: 2019-04-04 | Stop reason: HOSPADM

## 2019-04-01 RX ORDER — ONDANSETRON 4 MG/1
8 TABLET, ORALLY DISINTEGRATING ORAL EVERY 8 HOURS PRN
Status: DISCONTINUED | OUTPATIENT
Start: 2019-04-01 | End: 2019-04-04 | Stop reason: HOSPADM

## 2019-04-01 RX ADMIN — PHENYLEPHRINE HYDROCHLORIDE 100 MCG: 10 INJECTION INTRAVENOUS at 19:50

## 2019-04-01 RX ADMIN — Medication 500 ML: at 20:47

## 2019-04-01 RX ADMIN — Medication 999 ML/HR: at 20:12

## 2019-04-01 RX ADMIN — PHENYLEPHRINE HYDROCHLORIDE 100 MCG: 10 INJECTION INTRAVENOUS at 20:09

## 2019-04-01 RX ADMIN — HYDROMORPHONE HYDROCHLORIDE 0.5 MG: 2 INJECTION, SOLUTION INTRAMUSCULAR; INTRAVENOUS; SUBCUTANEOUS at 23:23

## 2019-04-01 RX ADMIN — SODIUM CITRATE AND CITRIC ACID MONOHYDRATE 30 ML: 500; 334 SOLUTION ORAL at 19:27

## 2019-04-01 RX ADMIN — DEXTROSE MONOHYDRATE 3 G: 50 INJECTION, SOLUTION INTRAVENOUS at 19:31

## 2019-04-01 RX ADMIN — PHENYLEPHRINE HYDROCHLORIDE 100 MCG: 10 INJECTION INTRAVENOUS at 19:54

## 2019-04-01 RX ADMIN — HYDROMORPHONE HYDROCHLORIDE 0.5 MG: 2 INJECTION, SOLUTION INTRAMUSCULAR; INTRAVENOUS; SUBCUTANEOUS at 22:28

## 2019-04-01 RX ADMIN — HYDROMORPHONE HYDROCHLORIDE 0.25 MG: 2 INJECTION INTRAMUSCULAR; INTRAVENOUS; SUBCUTANEOUS at 21:55

## 2019-04-01 RX ADMIN — SODIUM CHLORIDE, POTASSIUM CHLORIDE, SODIUM LACTATE AND CALCIUM CHLORIDE: 600; 310; 30; 20 INJECTION, SOLUTION INTRAVENOUS at 19:37

## 2019-04-01 RX ADMIN — KETOROLAC TROMETHAMINE 30 MG: 30 INJECTION, SOLUTION INTRAMUSCULAR; INTRAVENOUS at 20:22

## 2019-04-01 RX ADMIN — Medication 500 ML: at 22:22

## 2019-04-01 RX ADMIN — SODIUM CHLORIDE, POTASSIUM CHLORIDE, SODIUM LACTATE AND CALCIUM CHLORIDE: 600; 310; 30; 20 INJECTION, SOLUTION INTRAVENOUS at 20:22

## 2019-04-01 RX ADMIN — PHENYLEPHRINE HYDROCHLORIDE 100 MCG: 10 INJECTION INTRAVENOUS at 20:27

## 2019-04-01 RX ADMIN — SODIUM CHLORIDE 1000 ML: 9 INJECTION, SOLUTION INTRAVENOUS at 18:49

## 2019-04-01 RX ADMIN — ONDANSETRON 4 MG: 2 INJECTION INTRAMUSCULAR; INTRAVENOUS at 19:28

## 2019-04-01 RX ADMIN — Medication 125 ML/HR: at 20:33

## 2019-04-01 RX ADMIN — PHENYLEPHRINE HYDROCHLORIDE 100 MCG: 10 INJECTION INTRAVENOUS at 19:52

## 2019-04-01 RX ADMIN — HYDROCODONE BITARTRATE AND ACETAMINOPHEN 1 TABLET: 5; 325 TABLET ORAL at 13:32

## 2019-04-01 RX ADMIN — SODIUM CHLORIDE, PRESERVATIVE FREE 10 ML: 5 INJECTION INTRAVENOUS at 13:34

## 2019-04-01 RX ADMIN — PHENYLEPHRINE HYDROCHLORIDE 100 MCG: 10 INJECTION INTRAVENOUS at 20:12

## 2019-04-01 ASSESSMENT — PULMONARY FUNCTION TESTS
PIF_VALUE: 0
PIF_VALUE: 1
PIF_VALUE: 0

## 2019-04-01 ASSESSMENT — PAIN SCALES - GENERAL
PAINLEVEL_OUTOF10: 5
PAINLEVEL_OUTOF10: 6
PAINLEVEL_OUTOF10: 2
PAINLEVEL_OUTOF10: 4
PAINLEVEL_OUTOF10: 6
PAINLEVEL_OUTOF10: 8

## 2019-04-01 ASSESSMENT — ENCOUNTER SYMPTOMS: SHORTNESS OF BREATH: 1

## 2019-04-01 ASSESSMENT — PAIN DESCRIPTION - DESCRIPTORS
DESCRIPTORS: ACHING;CRAMPING

## 2019-04-01 ASSESSMENT — PAIN DESCRIPTION - LOCATION
LOCATION: ABDOMEN;BACK
LOCATION: ABDOMEN;BACK

## 2019-04-01 ASSESSMENT — PAIN DESCRIPTION - FREQUENCY
FREQUENCY: INTERMITTENT
FREQUENCY: INTERMITTENT

## 2019-04-01 ASSESSMENT — PAIN DESCRIPTION - PROGRESSION
CLINICAL_PROGRESSION: GRADUALLY IMPROVING
CLINICAL_PROGRESSION: GRADUALLY IMPROVING

## 2019-04-01 ASSESSMENT — PAIN DESCRIPTION - ONSET
ONSET: ON-GOING
ONSET: ON-GOING

## 2019-04-01 ASSESSMENT — PAIN DESCRIPTION - ORIENTATION
ORIENTATION: LOWER
ORIENTATION: LOWER

## 2019-04-01 ASSESSMENT — PAIN DESCRIPTION - PAIN TYPE
TYPE: ACUTE PAIN
TYPE: ACUTE PAIN

## 2019-04-01 NOTE — FLOWSHEET NOTE
Arrives ambulatory to LT02 for BP evaluation. Dr.Jackson called in PTA and gave orders. Dr. Beny Grace called in and gave report pt in office, BP of 152/100, EFW 4500g, GDM, pt weighs #321. Due to EFW plan of delivery is c/s. Pt instructed to obtain urine specimen and change to gown.   Viktor Tidwell

## 2019-04-01 NOTE — H&P
OB ADMISSION NOTE     CHIEF COMPLAINT:  This is a 26y.o.  year old  at 37w6d  with 2019,  presents from office for preeclampsia eval.  BP there was 150's over 100's. . Pregnancy complicated by: chrons, morbid obesity, GDM, macrosomia, polyhydramnios. See office H&P. REVIEW OF SYSTEMS: negative other than listed above.     PMH:   Past Medical History:   Diagnosis Date    Acid reflux     Anemia     Anxiety     Asthma     NO PULMONOLOGIST AT THIS TIME    Blood clot due to device, implant, or graft 2018    had blood clots in neck due to med port    Crohn's disease ([facility] [address] Dx     Remicade Infusions Every Six Weeks, Sees Dr. Marisa Santos At [facility] [place], [facility] 60 Depression     Fall     \"Passed Out And Thornton On My Tailbone\"    Fatty liver     Gestational diabetes     Hyperlipidemia     Lower back pain     \"Sometimes\"    MRSA (methicillin resistant staph aureus) culture positive 2018    Multiple pulmonary nodules 2013    Subcentimeter; needs repeat imaging in 3-6 months    Pancreatitis     Shortness of breath on exertion     Teeth missing     Upper And Lower    Wears glasses      PSH:   Past Surgical History:   Procedure Laterality Date    ABDOMEN SURGERY      bowel resection     ADENOIDECTOMY      APPENDECTOMY      Done During Colon Resection For Crohn's  Disease    BREAST SURGERY Left     \"Infected Lymph Node Removed\"    CHOLECYSTECTOMY  2016    COLECTOMY  8-    Crohn's Disease , Dr. Claude Kulkarni, Appendectomy Also Done    COLONOSCOPY  2016    Polyps Removed In Past    COLONOSCOPY  2017    Hospitalized for c-diff    COLONOSCOPY  2018    normal, multiple biopsy, repeat 1 year    DILATION AND CURETTAGE OF UTERUS      ENDOSCOPY, COLON, DIAGNOSTIC  Last Done     LAPAROSCOPY      Dr Shy Acevedo, Removed Adhesions    OTHER SURGICAL HISTORY  2011    Mediport Insertion Left Chest Area/revision done 2016- \"removed at [facility] 2017 after I got an infection    TUNNELED VENOUS PORT PLACEMENT Right 2018    smart port- [facility]-Dr Janusz Sims    WISDOM TOOTH EXTRACTION      All Four [place] Teeth Extracted In Past      Social History:   Social History     Socioeconomic History    Marital status: Single     Spouse name: Not on file    Number of children: 1    Years of education: Not on file    Highest education level: Not on file   Occupational History    Not on file   Social Needs    Financial resource strain: Not on file    Food insecurity:     Worry: Not on file     Inability: Not on file    Transportation needs:     Medical: Not on file     Non-medical: Not on file   Tobacco Use    Smoking status: Never Smoker    Smokeless tobacco: Never Used   Substance and Sexual Activity    Alcohol use: No    Drug use: No    Sexual activity: Yes     Partners: Male   Lifestyle    Physical activity:     Days per week: Not on file     Minutes per session: Not on file    Stress: Not on file   Relationships    Social connections:     Talks on phone: Not on file     Gets together: Not on file     Attends Islam service: Not on file     Active member of club or organization: Not on file     Attends meetings of clubs or organizations: Not on file     Relationship status: Not on file    Intimate partner violence:     Fear of current or ex partner: Not on file     Emotionally abused: Not on file     Physically abused: Not on file     Forced sexual activity: Not on file   Other Topics Concern    Not on file   Social History Narrative    Lives with parents, has a daughter      OB History:   OB History        3    Para   1    Term   1            AB   1    Living   1       SAB   0    TAB   1    Ectopic        Molar        Multiple        Live Births   1               Family History   Family History   Problem Relation Age of Onset    Migraines Mother     Heart Disease Father     Diabetes Father     High Cholesterol Father     Depression Maternal Aunt     Depression Maternal Uncle     Depression Paternal Aunt     Coronary Art Dis Paternal Aunt     Depression Paternal Uncle     Coronary Art Dis Paternal Uncle     Depression Maternal Grandmother     Depression Maternal Grandfather     Depression Paternal Grandmother     Coronary Art Dis Paternal Grandmother     Depression Paternal Grandfather     Coronary Art Dis Paternal Grandfather         PHYSICAL EXAM:  Fetal heart rate:       Baseline Heart Rate:  Cat 1 tracing. Contraction frequency: 5 minutes    Membranes:  Intact    Cervix:         Dilation:  1 cm per RN on admission             Labs:   CBC:   Lab Results   Component Value Date    WBC 12.0 2019    RBC 3.68 2019    HGB 9.6 2019    HCT 31.3 2019    MCV 85.1 2019    MCH 26.1 2019    MCHC 30.7 2019    RDW 14.7 2019     2019    MPV 12.0 2019       ASSESSMENT:  1. IUP 37w6d    2. PREECLAMPSIA - with macrosomia; plan PCS. R/B/I discussed with patient. Wishes to proceed. Also desires BPS, but didn't sign consent for medicaid, so we signed it today, and will plan L/S BTL in 6 weeks.    Patient Active Problem List    Diagnosis Date Noted    Pregnancy examination or test, positive result 2019    Encounter for pregnancy related examination, third trimester 2019    Pregnancy related condition 2019     uterine contractions in third trimester, antepartum 2019    Pregnancy examination or test, negative result 2019    C. difficile colitis 2018    [facility]     Functional diarrhea 2018    Neck swelling 2018    Leukocytosis 2018    Non-intractable vomiting with nausea 2018    Infection of venous access port    [facility] or reservoir infection 2018    IBS (irritable bowel syndrome) 2018   

## 2019-04-01 NOTE — PROGRESS NOTES
Dr. Rudolph Bustillo at bedside. Discussed  with patient. Agreed to proceed with . Consent signed at this time. Answered all questions.

## 2019-04-01 NOTE — ANESTHESIA PRE PROCEDURE
at 04/01/19 1334    acetaminophen (TYLENOL) tablet 500 mg  500 mg Oral Q6H PRN James Martinez MD        albuterol sulfate  (90 Base) MCG/ACT inhaler 2 puff  2 puff Inhalation Q6H PRN James Martinez MD        ceFAZolin (ANCEF) 3 g in dextrose 5 % 100 mL IVPB  3 g Intravenous Once James Martinez MD        ondansetron [facility]) injection 4 mg  4 mg Intravenous Once James Martinez MD        citric acid-sodium citrate (BICITRA) solution 30 mL  30 mL Oral Once James Martinez MD        0.9 % sodium chloride bolus  1,000 mL Intravenous Once James Martinez MD 1,000 mL/hr at 04/01/19 1849 1,000 mL at 04/01/19 1849       Allergies: Allergies   Allergen Reactions    Morphine      Other reaction(s): Headache  Triggers migraine.    \"Triggers My Migraines\"    Tramadol      Other reaction(s): Headache  States triggers migraine headache  \"Triggers My Migraines\"       Problem List:    Patient Active Problem List   Diagnosis Code    Crohn's disease ([address] K50.90    Anxiety F41.9    Multiple lung nodules R91.8    Crohn's colitis, unspecified complication ([address] L31.542    Exacerbation of Crohn's disease with complication ([address] E94.636    C. difficile colitis A04.72    IBS (irritable bowel syndrome) K58.9    C. difficile colitis A04.72    Crohn's disease of small and large intestines with complication ([address] G50.300    Neck swelling R22.1    Leukocytosis D72.829    Non-intractable vomiting with nausea R11.2    Infection of venous access port T80.219A    Acute deep vein thrombosis (DVT) of non-extremity vein I82.90    Morbid obesity ([address] E66.01    Preeclampsia, third trimester O14.93    Pregnancy related condition O26.90       Past Medical History:        Diagnosis Date    Acid reflux     Acute deep vein thrombosis (DVT) of non-extremity vein 4/25/2018    Anemia     Anxiety     Asthma     NO PULMONOLOGIST AT THIS TIME    Blood clot due to device, implant, or graft 04/2018    had blood clots in neck due to med port    Crohn's disease [facility]) Dx 2010    Remicade Infusions Every Six Weeks, Sees Dr. [place] Munoz At [facility] In [place], [address] Depression     Fall 2012    \"Passed Out And Abhilash Hines On My Tailbone\"    Fatty liver     Gestational diabetes     Hyperlipidemia     Lower back pain     \"Sometimes\"    MRSA (methicillin resistant staph aureus) culture positive 04/2018    Multiple pulmonary nodules 6/2013    Subcentimeter; needs repeat imaging in 3-6 months    Pancreatitis     Shortness of breath on exertion     Teeth missing     Upper And Lower    Wears glasses        Past Surgical History:        Procedure Laterality Date    ABDOMEN SURGERY  2011    bowel resection     ADENOIDECTOMY  1995    APPENDECTOMY  2011    Done During Colon Resection For Crohn's  Disease    BREAST SURGERY Left 2000    \"Infected Lymph Node Removed\"    CHOLECYSTECTOMY  09/20/2016    COLECTOMY  8-11    Crohn's Disease , Dr. Arely Marin, Appendectomy Also Done    COLONOSCOPY  09/06/2016    Polyps Removed In Past    COLONOSCOPY  05/2017    Hospitalized for c-diff    COLONOSCOPY  03/06/2018    normal, multiple biopsy, repeat 1 year    DILATION AND CURETTAGE OF UTERUS  2010    ENDOSCOPY, COLON, DIAGNOSTIC  Last Done 2014    LAPAROSCOPY  2013    Dr Conchita Campos, Removed Adhesions    OTHER SURGICAL HISTORY  06/2011    Mediport Insertion Left Chest Area/revision done 6/2016- \"removed at [address] [place] [address] 11/2017 after I got an infection    TUNNELED VENOUS PORT PLACEMENT Right 03/12/2018    smart port- [facility]-Dr Enedina Welch    WISDOM TOOTH EXTRACTION      All Four [place] Teeth Extracted In Past       Social History:    Social History     Tobacco Use    Smoking status: Never Smoker    Smokeless tobacco: Never Used   Substance Use Topics    Alcohol use:  No                                Counseling given: Not Answered      Vital Signs (Current):   Vitals:    04/01/19 [facility]    Anesthesia Evaluation    Airway: Mallampati: II  TM distance: >3 FB   Neck ROM: full  Mouth opening: > = 3 FB Dental:          Pulmonary:   (+) shortness of breath:  asthma:                            Cardiovascular:    (+) hypertension:, WILBURN:,          Beta Blocker:  Not on Beta Blocker         Neuro/Psych:   (+) psychiatric history:depression/anxiety             GI/Hepatic/Renal:   (+) GERD:, liver disease:,           Endo/Other:    (+) Diabetes, . ROS comment: GDM Abdominal:           Vascular:                                        Anesthesia Plan      spinal     ASA 3           MIPS: Postoperative opioids intended and Prophylactic antiemetics administered. Anesthetic plan and risks discussed with patient. Use of blood products discussed with whom consented to blood products.                    SHAQUILLE Gaming - CRNA   4/1/2019

## 2019-04-01 NOTE — FLOWSHEET NOTE
EFM applied. Pt reports normal fetal movement. Denies vaginal bleeding/abnormal discharge. OB hx reviewed and assessment complete. Pt states swelling has been like this for 2 months, maybe a little more in her legs. 3-4+ pitting edema noted. Generalized edema has not changed. States has to sleep with HOB elevated at night, wakes up suddenly feeling as if she can't breathe. States feels she may have sleep apnea. Noted when pt walked up to desk appeared slightly SOB. No SOB noted laying down with HOB elevated. Labs drawn per Froilan Kapoor RN and sent. POC reviewed and understanding voiced.   Mary Myers

## 2019-04-02 LAB
HCT VFR BLD CALC: 25.9 % (ref 37–47)
HEMOGLOBIN: ABNORMAL GM/DL (ref 12.5–16)
MCH RBC QN AUTO: 25.4 PG (ref 27–31)
MCHC RBC AUTO-ENTMCNC: 29.3 % (ref 32–36)
MCV RBC AUTO: 86.6 FL (ref 78–100)
PDW BLD-RTO: 14.8 % (ref 11.7–14.9)
PLATELET # BLD: 183 K/CU MM (ref 140–440)
PMV BLD AUTO: 12.1 FL (ref 7.5–11.1)
RBC # BLD: 2.99 M/CU MM (ref 4.2–5.4)
WBC # BLD: 13.1 K/CU MM (ref 4–10.5)

## 2019-04-02 PROCEDURE — 1220000000 HC SEMI PRIVATE OB R&B

## 2019-04-02 PROCEDURE — 6360000002 HC RX W HCPCS: Performed by: OBSTETRICS & GYNECOLOGY

## 2019-04-02 PROCEDURE — 2580000003 HC RX 258

## 2019-04-02 PROCEDURE — 6370000000 HC RX 637 (ALT 250 FOR IP): Performed by: OBSTETRICS & GYNECOLOGY

## 2019-04-02 PROCEDURE — 36415 COLL VENOUS BLD VENIPUNCTURE: CPT

## 2019-04-02 PROCEDURE — 85027 COMPLETE CBC AUTOMATED: CPT

## 2019-04-02 RX ORDER — SODIUM CHLORIDE, SODIUM LACTATE, POTASSIUM CHLORIDE, CALCIUM CHLORIDE 600; 310; 30; 20 MG/100ML; MG/100ML; MG/100ML; MG/100ML
INJECTION, SOLUTION INTRAVENOUS CONTINUOUS
Status: DISCONTINUED | OUTPATIENT
Start: 2019-04-02 | End: 2019-04-04 | Stop reason: HOSPADM

## 2019-04-02 RX ORDER — IBUPROFEN 400 MG/1
TABLET ORAL
Status: COMPLETED
Start: 2019-04-02 | End: 2019-04-04

## 2019-04-02 RX ORDER — IBUPROFEN 800 MG/1
800 TABLET ORAL EVERY 8 HOURS
Status: DISCONTINUED | OUTPATIENT
Start: 2019-04-02 | End: 2019-04-04 | Stop reason: HOSPADM

## 2019-04-02 RX ORDER — SODIUM CHLORIDE, SODIUM LACTATE, POTASSIUM CHLORIDE, CALCIUM CHLORIDE 600; 310; 30; 20 MG/100ML; MG/100ML; MG/100ML; MG/100ML
INJECTION, SOLUTION INTRAVENOUS
Status: COMPLETED
Start: 2019-04-02 | End: 2019-04-02

## 2019-04-02 RX ADMIN — DOCUSATE SODIUM 100 MG: 100 CAPSULE, LIQUID FILLED ORAL at 21:16

## 2019-04-02 RX ADMIN — HYDROMORPHONE HYDROCHLORIDE 0.5 MG: 2 INJECTION, SOLUTION INTRAMUSCULAR; INTRAVENOUS; SUBCUTANEOUS at 02:43

## 2019-04-02 RX ADMIN — SODIUM CHLORIDE, POTASSIUM CHLORIDE, SODIUM LACTATE AND CALCIUM CHLORIDE: 600; 310; 30; 20 INJECTION, SOLUTION INTRAVENOUS at 02:42

## 2019-04-02 RX ADMIN — OXYCODONE AND ACETAMINOPHEN 2 TABLET: 5; 325 TABLET ORAL at 09:52

## 2019-04-02 RX ADMIN — IBUPROFEN 800 MG: 400 TABLET ORAL at 23:41

## 2019-04-02 RX ADMIN — OXYCODONE AND ACETAMINOPHEN 2 TABLET: 5; 325 TABLET ORAL at 13:17

## 2019-04-02 RX ADMIN — HYDROMORPHONE HYDROCHLORIDE 0.5 MG: 2 INJECTION, SOLUTION INTRAMUSCULAR; INTRAVENOUS; SUBCUTANEOUS at 06:25

## 2019-04-02 RX ADMIN — SODIUM CHLORIDE, SODIUM LACTATE, POTASSIUM CHLORIDE, CALCIUM CHLORIDE: 600; 310; 30; 20 INJECTION, SOLUTION INTRAVENOUS at 02:42

## 2019-04-02 RX ADMIN — SIMETHICONE CHEW TAB 80 MG 80 MG: 80 TABLET ORAL at 09:51

## 2019-04-02 RX ADMIN — OXYCODONE AND ACETAMINOPHEN 2 TABLET: 5; 325 TABLET ORAL at 18:42

## 2019-04-02 RX ADMIN — ENOXAPARIN SODIUM 80 MG: 80 INJECTION SUBCUTANEOUS at 09:51

## 2019-04-02 RX ADMIN — FERROUS SULFATE TAB 325 MG (65 MG ELEMENTAL FE) 325 MG: 325 (65 FE) TAB at 18:42

## 2019-04-02 RX ADMIN — ONDANSETRON 8 MG: 4 TABLET, ORALLY DISINTEGRATING ORAL at 03:16

## 2019-04-02 RX ADMIN — OXYCODONE AND ACETAMINOPHEN 2 TABLET: 5; 325 TABLET ORAL at 23:39

## 2019-04-02 RX ADMIN — KETOROLAC TROMETHAMINE 30 MG: 30 INJECTION, SOLUTION INTRAMUSCULAR; INTRAVENOUS at 04:41

## 2019-04-02 RX ADMIN — IBUPROFEN 800 MG: 400 TABLET ORAL at 13:17

## 2019-04-02 RX ADMIN — DOCUSATE SODIUM 100 MG: 100 CAPSULE, LIQUID FILLED ORAL at 09:52

## 2019-04-02 RX ADMIN — FERROUS SULFATE TAB 325 MG (65 MG ELEMENTAL FE) 325 MG: 325 (65 FE) TAB at 09:51

## 2019-04-02 ASSESSMENT — PAIN DESCRIPTION - DESCRIPTORS
DESCRIPTORS: SORE;BURNING
DESCRIPTORS: SORE;BURNING
DESCRIPTORS: BURNING

## 2019-04-02 ASSESSMENT — PAIN DESCRIPTION - LOCATION
LOCATION: ABDOMEN
LOCATION: INCISION
LOCATION: ABDOMEN

## 2019-04-02 ASSESSMENT — PAIN - FUNCTIONAL ASSESSMENT
PAIN_FUNCTIONAL_ASSESSMENT: ACTIVITIES ARE NOT PREVENTED

## 2019-04-02 ASSESSMENT — PAIN SCALES - GENERAL
PAINLEVEL_OUTOF10: 7
PAINLEVEL_OUTOF10: 0
PAINLEVEL_OUTOF10: 6
PAINLEVEL_OUTOF10: 4
PAINLEVEL_OUTOF10: 7
PAINLEVEL_OUTOF10: 8
PAINLEVEL_OUTOF10: 2
PAINLEVEL_OUTOF10: 7
PAINLEVEL_OUTOF10: 0
PAINLEVEL_OUTOF10: 7
PAINLEVEL_OUTOF10: 7
PAINLEVEL_OUTOF10: 3

## 2019-04-02 ASSESSMENT — PAIN DESCRIPTION - PAIN TYPE
TYPE: ACUTE PAIN;SURGICAL PAIN
TYPE: SURGICAL PAIN
TYPE: ACUTE PAIN;SURGICAL PAIN

## 2019-04-02 ASSESSMENT — PAIN DESCRIPTION - PROGRESSION
CLINICAL_PROGRESSION: GRADUALLY IMPROVING
CLINICAL_PROGRESSION: NOT CHANGED
CLINICAL_PROGRESSION: GRADUALLY WORSENING

## 2019-04-02 ASSESSMENT — PAIN DESCRIPTION - ONSET
ONSET: ON-GOING

## 2019-04-02 ASSESSMENT — PAIN DESCRIPTION - FREQUENCY
FREQUENCY: CONTINUOUS

## 2019-04-02 ASSESSMENT — PAIN DESCRIPTION - ORIENTATION
ORIENTATION: LOWER
ORIENTATION: LOWER;LEFT
ORIENTATION: LOWER

## 2019-04-02 NOTE — ANESTHESIA POSTPROCEDURE EVALUATION
Department of Anesthesiology  Postprocedure Note    Patient: Anabell Rendon  MRN: [id number]  YOB: 1985  Date of evaluation: 2019  Time:  9:38 PM     Procedure Summary     Date:  19 Room / Location:  [facility]    Anesthesia Start:   Anesthesia Stop:      Procedure:   SECTION (N/A Abdomen) Diagnosis:  (GDM, mild preeclampsia, macrosomia)    Surgeon:  Al Montaño MD Responsible Provider:  Keith Wyatt MD    Anesthesia Type:  spinal ASA Status:  3          Anesthesia Type: spinal    Ezekiel Phase I: Ezekiel Score: 9    Ezekiel Phase II:      Last vitals: Reviewed and per EMR flowsheets.        Anesthesia Post Evaluation    Patient location during evaluation: bedside  Patient participation: complete - patient participated  Level of consciousness: awake and alert  Pain score: 2  Airway patency: patent  Nausea & Vomiting: no nausea and no vomiting  Complications: no  Cardiovascular status: hemodynamically stable  Respiratory status: acceptable  Hydration status: euvolemic

## 2019-04-02 NOTE — ANESTHESIA PROCEDURE NOTES
Spinal Block    Patient location during procedure: OR  Start time: 4/1/2019 7:40 PM  End time: 4/1/2019 7:45 PM  Reason for block: primary anesthetic  Staffing  Anesthesiologist: Mariama Valdes MD  Resident/CRNA: SHAQUILLE Hanson - CRNA  Performed: resident/CRNA   Preanesthetic Checklist  Completed: patient identified, site marked, surgical consent, pre-op evaluation, timeout performed, IV checked, risks and benefits discussed, monitors and equipment checked, anesthesia consent given, oxygen available and patient being monitored  Spinal Block  Patient position: sitting  Prep: ChloraPrep  Patient monitoring: continuous pulse ox and frequent blood pressure checks  Approach: midline  Location: L3/L4  Provider prep: mask and sterile gloves  Local infiltration: lidocaine  Agent: bupivacaine  Dose: 1.6  Dose: 1.6  Needle  Needle type: Pencan   Needle gauge: 25 G  Needle length: 3.5 in  Assessment  Sensory level: T6  Swirl obtained: Yes  CSF: clear  Attempts: 1  Hemodynamics: stable

## 2019-04-02 NOTE — L&D DELIVERY NOTE
Mother's Information    Labor Events    Rupture type: Intact     Mother Delivery Information    Episiotomy:  None  Lacerations:  None  Surgical or Additional Est. Blood Loss (mL):  600 (View Only):  Edit in Flowsheets   Combined Est. Blood Loss (mL):  600        Blair Baby Pending  [facility] [[id number]]    Labor Events    Cervical ripening date/time:     Rupture date/time:     Labor complications:  None          Anesthesia    Method:  Spinal     Assisted Delivery Details    Forceps attempted?:  No  Vacuum extractor attempted?:  No     Document Additional Attempt       Document Additional Attempt             Shoulder Dystocia    Shoulder dystocia present?:  No  Add Second Maneuver  Add Third Maneuver  Add Fourth Maneuver  Add Fifth Maneuver  Add Sixth Maneuver  Add Seventh Maneuver  Add Eighth Maneuver  Add Ninth Maneuver     [place] Presentation    Presentation:  Vertex  Position:  Left  _:  Occiput  _:  Anterior      Information     Changing the 's delivery date/time could affect patient care.:     Delivery date/time:   19    Delivery type:  , Low Transverse    Details:   Trial of labor?:  No    categorization:  Primary    priority:  Scheduled   Indications for :  Macrosomia   Skin Incision Type:  Pfannenstiel   Uterine Incision:  Low Transverse         Delivery Providers    Delivering clinician:  Katya Cruz MD   Provider Role    MD Alvaro Zelaya, RN       Cord    Vessels:  3 Vessels  Complications:  None  Delayed cord clamping?:  No  Cord blood disposition:  Lab  Gases sent?:  No  Stem cell collection (by provider):   No     Placenta    Removal:  Manual Removal  Appearance:  Intact  Disposition:  Discarded      Measurements       Delivery Information    Episiotomy:  None  Perineal lacerations:  None    Surgical or additional est. blood loss (mL):  600 (View Only):  Edit in Flowsheets   Combined est. blood loss (mL):  600     Other Procedures    Procedures:  None        PATIENT:    Allyson Pinto    PREOPERATIVE DIAGNOSES:  1.   37w6d        2. Macrosomia, polyhydramnios        3. Mild preeclampsia     POSTOPERATIVE DIAGNOSES:  Same      PROCEDURE:     Primary low transverse  section. SURGEON:     Asuncion Church M.D. ASST:     Elmo Rascon MD      ESTIMATED BLOOD LOSS:   600    FLUID REPLACEMENT  750    URINE OUTPUT   763      COMPLICATIONS:     None. ANESTHESIA:    Spinal.         FINDINGS:   Live male         Infant Apgars 8 and 9 at 1 and 5 min respectively. Weight: 9 lbs 13 oz. Normal tubes and ovaries bilaterally. DETAILS OF PROCEDURE:   The patient was taken to the operative room and anesthesia was given without any complications. She was then placed in the supine position slightly tilted to the left. Abdomen was prepped and draped in the usual manner. Anesthesia level was checked and was found to be adequate. The abdomen was entered and carried down sharply to the fascia. The fascia was entered and  from the underlying rectus abdominis muscles which were  in the midline exposing the parietal peritoneum. The peritoneum was opened sharply in a longitudinal fashion. A bladder retractor was placed in position and the visceral peritoneum overlying the lower uterine segment was opened transversely and a bladder flap was developed in a sharp manner. The lower uterine segment was opened in a low transverse fashion. The amniotic cavity was entered and  amniotic fluid was suctioned out. The baby was then delivered from the Cephalic position, with assistance of one pull of vacuum. No pop offs. Cord was clamped and cut and the baby was handed over to the nursery nurse. Cord blood was saved. The placenta was then removed manually and the endometrial cavity was swept clean by a wet lap.  The edges of the uterine incision were grasped by Jai Briseno clamps and the incision itself was closed using a continuous locked suture of 0 vicryl. A second layer assured excellent hemostasis. Tubes and ovaries were inspected and appeared to be normal. The gutters were cleared of any blood clots and debris. The operative field appeared to be hemostatic. Peritoneum was then closed with running stitch of 3.0 vicryl, fascia closed with 0 vicryl, subcutaneous tissues re-approximated with interrupted 3.0 vicryl, and skin closed with 3.0 monocryl, steri strips and a dressing. The patient was then transferred to the recovery room in good stable condition. All sponge needle and instrument counts found to be correct. The patient was given antibiotics prior to the procedure for wound prophylaxis.     Pastor Batres

## 2019-04-02 NOTE — PROGRESS NOTES
Subjective:     Postpartum Day 1:  Delivery    The patient feels well. The patient denies emotional concerns. Pain is well controlled with current medications. The baby iswell. Urinary output is adequate. The patient is ambulating well. The patient is tolerating a normal diet. Flatus has been passed. Objective:    VITALS:  /67   Pulse 90   Temp 98 °F (36.7 °C) (Oral)   Resp 17   Ht 5' 5\" (1.651 m)   Wt (!) 322 lb (146.1 kg)   LMP 07/10/2018 (Exact Date)   SpO2 98%   Breastfeeding? Unknown   BMI 53.58 kg/m²     Vitals:    19 1404   BP: 129/67   Pulse: 90   Resp: 17   Temp: 98 °F (36.7 °C)   SpO2: 98%         General:    alert, appears stated age and cooperative       Lochia:  appropriate   Uterine Fundus:   firm   Incision:  healing well, no significant drainage, no dehiscence, no significant erythema   DVT Evaluation:  No evidence of DVT seen on physical exam.     CBC   Lab Results   Component Value Date    WBC 13.1 (H) 2019    HGB (L) 2019     7.6  HGB CALLED TO ANN VILLAR) IN LD [id number] @ 13:47 PM.  ANA ARMSTRONG MT  RESULTS READ BACK      HCT 25.9 (L) 2019    MCV 86.6 2019     2019        Assessment:     Status post  section. Doing well postoperatively. Postoperative course complicated by acute blood loss anemia, asymptomatic     Plan:     Continue current care.

## 2019-04-02 NOTE — FLOWSHEET NOTE
Up out of bed and ambulated to bathroom. Voided then into shower. Moves well with no dizziness or nausea.

## 2019-04-03 LAB
BASOPHILS ABSOLUTE: 0 K/CU MM
BASOPHILS RELATIVE PERCENT: 0.2 % (ref 0–1)
DIFFERENTIAL TYPE: ABNORMAL
EOSINOPHILS ABSOLUTE: 0.1 K/CU MM
EOSINOPHILS RELATIVE PERCENT: 0.9 % (ref 0–3)
HCT VFR BLD CALC: 25.1 % (ref 37–47)
HEMOGLOBIN: 7.3 GM/DL (ref 12.5–16)
IMMATURE NEUTROPHIL %: 1 % (ref 0–0.43)
LYMPHOCYTES ABSOLUTE: 2 K/CU MM
LYMPHOCYTES RELATIVE PERCENT: 14 % (ref 24–44)
MCH RBC QN AUTO: 25.5 PG (ref 27–31)
MCHC RBC AUTO-ENTMCNC: 29.1 % (ref 32–36)
MCV RBC AUTO: 87.8 FL (ref 78–100)
MONOCYTES ABSOLUTE: 0.8 K/CU MM
MONOCYTES RELATIVE PERCENT: 5.4 % (ref 0–4)
NUCLEATED RBC %: 0 %
PDW BLD-RTO: 15.1 % (ref 11.7–14.9)
PLATELET # BLD: 188 K/CU MM (ref 140–440)
PMV BLD AUTO: 12 FL (ref 7.5–11.1)
RBC # BLD: 2.86 M/CU MM (ref 4.2–5.4)
SEGMENTED NEUTROPHILS ABSOLUTE COUNT: 11.4 K/CU MM
SEGMENTED NEUTROPHILS RELATIVE PERCENT: 78.5 % (ref 36–66)
TOTAL IMMATURE NEUTOROPHIL: 0.15 K/CU MM
TOTAL NUCLEATED RBC: 0 K/CU MM
WBC # BLD: 14.5 K/CU MM (ref 4–10.5)

## 2019-04-03 PROCEDURE — 85025 COMPLETE CBC W/AUTO DIFF WBC: CPT

## 2019-04-03 PROCEDURE — 1220000000 HC SEMI PRIVATE OB R&B

## 2019-04-03 PROCEDURE — 6360000002 HC RX W HCPCS: Performed by: OBSTETRICS & GYNECOLOGY

## 2019-04-03 PROCEDURE — 6370000000 HC RX 637 (ALT 250 FOR IP): Performed by: OBSTETRICS & GYNECOLOGY

## 2019-04-03 RX ADMIN — OXYCODONE AND ACETAMINOPHEN 2 TABLET: 5; 325 TABLET ORAL at 06:28

## 2019-04-03 RX ADMIN — IBUPROFEN 800 MG: 400 TABLET ORAL at 17:25

## 2019-04-03 RX ADMIN — OXYCODONE AND ACETAMINOPHEN 2 TABLET: 5; 325 TABLET ORAL at 23:45

## 2019-04-03 RX ADMIN — OXYCODONE AND ACETAMINOPHEN 2 TABLET: 5; 325 TABLET ORAL at 14:29

## 2019-04-03 RX ADMIN — FERROUS SULFATE TAB 325 MG (65 MG ELEMENTAL FE) 325 MG: 325 (65 FE) TAB at 18:18

## 2019-04-03 RX ADMIN — SIMETHICONE CHEW TAB 80 MG 80 MG: 80 TABLET ORAL at 10:15

## 2019-04-03 RX ADMIN — MAGNESIUM HYDROXIDE 30 ML: 400 SUSPENSION ORAL at 10:15

## 2019-04-03 RX ADMIN — DOCUSATE SODIUM 100 MG: 100 CAPSULE, LIQUID FILLED ORAL at 10:16

## 2019-04-03 RX ADMIN — OXYCODONE AND ACETAMINOPHEN 2 TABLET: 5; 325 TABLET ORAL at 10:16

## 2019-04-03 RX ADMIN — ONDANSETRON 8 MG: 4 TABLET, ORALLY DISINTEGRATING ORAL at 13:53

## 2019-04-03 RX ADMIN — SIMETHICONE CHEW TAB 80 MG 80 MG: 80 TABLET ORAL at 01:48

## 2019-04-03 RX ADMIN — OXYCODONE AND ACETAMINOPHEN 2 TABLET: 5; 325 TABLET ORAL at 19:17

## 2019-04-03 RX ADMIN — IBUPROFEN 800 MG: 400 TABLET ORAL at 08:17

## 2019-04-03 RX ADMIN — HYDROMORPHONE HYDROCHLORIDE 0.5 MG: 2 INJECTION, SOLUTION INTRAMUSCULAR; INTRAVENOUS; SUBCUTANEOUS at 03:03

## 2019-04-03 RX ADMIN — FERROUS SULFATE TAB 325 MG (65 MG ELEMENTAL FE) 325 MG: 325 (65 FE) TAB at 10:16

## 2019-04-03 RX ADMIN — ENOXAPARIN SODIUM 80 MG: 80 INJECTION SUBCUTANEOUS at 10:15

## 2019-04-03 RX ADMIN — DOCUSATE SODIUM 100 MG: 100 CAPSULE, LIQUID FILLED ORAL at 20:13

## 2019-04-03 ASSESSMENT — PAIN DESCRIPTION - ORIENTATION
ORIENTATION: LOWER

## 2019-04-03 ASSESSMENT — PAIN - FUNCTIONAL ASSESSMENT
PAIN_FUNCTIONAL_ASSESSMENT: ACTIVITIES ARE NOT PREVENTED

## 2019-04-03 ASSESSMENT — PAIN DESCRIPTION - FREQUENCY
FREQUENCY: CONTINUOUS
FREQUENCY: CONTINUOUS
FREQUENCY: INTERMITTENT

## 2019-04-03 ASSESSMENT — PAIN SCALES - GENERAL
PAINLEVEL_OUTOF10: 8
PAINLEVEL_OUTOF10: 0
PAINLEVEL_OUTOF10: 7
PAINLEVEL_OUTOF10: 7
PAINLEVEL_OUTOF10: 2
PAINLEVEL_OUTOF10: 3
PAINLEVEL_OUTOF10: 7

## 2019-04-03 ASSESSMENT — PAIN DESCRIPTION - ONSET
ONSET: ON-GOING
ONSET: GRADUAL
ONSET: ON-GOING

## 2019-04-03 ASSESSMENT — PAIN DESCRIPTION - LOCATION
LOCATION: INCISION
LOCATION: ABDOMEN
LOCATION: ABDOMEN

## 2019-04-03 ASSESSMENT — PAIN DESCRIPTION - PROGRESSION
CLINICAL_PROGRESSION: GRADUALLY IMPROVING
CLINICAL_PROGRESSION: GRADUALLY WORSENING
CLINICAL_PROGRESSION: NOT CHANGED

## 2019-04-03 ASSESSMENT — PAIN DESCRIPTION - DESCRIPTORS
DESCRIPTORS: SHARP
DESCRIPTORS: SORE
DESCRIPTORS: SHARP
DESCRIPTORS: BURNING

## 2019-04-03 ASSESSMENT — PAIN DESCRIPTION - PAIN TYPE
TYPE: SURGICAL PAIN

## 2019-04-03 NOTE — PROGRESS NOTES
Subjective:     Postpartum Day 2:  Delivery    The patient feels well. The patient denies emotional concerns. Pain is well controlled with current medications. The baby iswell. Urinary output is adequate. The patient is ambulating well. The patient is tolerating a normal diet. Flatus has been passed. Objective:    VITALS:  BP (!) 146/67   Pulse 100   Temp 97.8 °F (36.6 °C) (Oral)   Resp 16   Ht 5' 5\" (1.651 m)   Wt (!) 322 lb (146.1 kg)   LMP 07/10/2018 (Exact Date)   SpO2 100%   Breastfeeding? Unknown   BMI 53.58 kg/m²     Vitals:    19 1258   BP: (!) 146/67   Pulse: 100   Resp: 16   Temp: 97.8 °F (36.6 °C)   SpO2: 100%         General:    alert, appears stated age and cooperative       Lochia:  appropriate   Uterine Fundus:   firm   Incision:  healing well, no significant drainage, no dehiscence, no significant erythema   DVT Evaluation:  No evidence of DVT seen on physical exam.     CBC   Lab Results   Component Value Date    WBC 14.5 (H) 2019    HGB 7.3 (L) 2019    HCT 25.1 (L) 2019    MCV 87.8 2019     2019        Assessment:     Status post  section. Doing well postoperatively. Plan:     Continue current care.

## 2019-04-04 VITALS
HEART RATE: 103 BPM | RESPIRATION RATE: 20 BRPM | TEMPERATURE: 97.7 F | DIASTOLIC BLOOD PRESSURE: 74 MMHG | SYSTOLIC BLOOD PRESSURE: 139 MMHG | OXYGEN SATURATION: 100 % | BODY MASS INDEX: 48.82 KG/M2 | WEIGHT: 293 LBS | HEIGHT: 65 IN

## 2019-04-04 PROBLEM — Z98.891 STATUS POST CESAREAN DELIVERY: Status: ACTIVE | Noted: 2019-04-04

## 2019-04-04 PROCEDURE — 6370000000 HC RX 637 (ALT 250 FOR IP)

## 2019-04-04 PROCEDURE — 6370000000 HC RX 637 (ALT 250 FOR IP): Performed by: OBSTETRICS & GYNECOLOGY

## 2019-04-04 PROCEDURE — 6360000002 HC RX W HCPCS: Performed by: OBSTETRICS & GYNECOLOGY

## 2019-04-04 RX ORDER — LIDOCAINE HYDROCHLORIDE 10 MG/ML
1 INJECTION, SOLUTION EPIDURAL; INFILTRATION; INTRACAUDAL; PERINEURAL ONCE
Status: DISCONTINUED | OUTPATIENT
Start: 2019-04-04 | End: 2019-04-04

## 2019-04-04 RX ORDER — FUROSEMIDE 40 MG/1
40 TABLET ORAL ONCE
Status: COMPLETED | OUTPATIENT
Start: 2019-04-04 | End: 2019-04-04

## 2019-04-04 RX ORDER — IBUPROFEN 800 MG/1
800 TABLET ORAL EVERY 8 HOURS
Qty: 120 TABLET | Refills: 0 | Status: SHIPPED | OUTPATIENT
Start: 2019-04-04 | End: 2020-09-21

## 2019-04-04 RX ORDER — OXYCODONE HYDROCHLORIDE AND ACETAMINOPHEN 5; 325 MG/1; MG/1
1 TABLET ORAL EVERY 6 HOURS PRN
Qty: 28 TABLET | Refills: 0 | Status: SHIPPED | OUTPATIENT
Start: 2019-04-04 | End: 2019-04-11

## 2019-04-04 RX ADMIN — DOCUSATE SODIUM 100 MG: 100 CAPSULE, LIQUID FILLED ORAL at 09:57

## 2019-04-04 RX ADMIN — OXYCODONE AND ACETAMINOPHEN 2 TABLET: 5; 325 TABLET ORAL at 10:01

## 2019-04-04 RX ADMIN — FERROUS SULFATE TAB 325 MG (65 MG ELEMENTAL FE) 325 MG: 325 (65 FE) TAB at 10:06

## 2019-04-04 RX ADMIN — OXYCODONE AND ACETAMINOPHEN 2 TABLET: 5; 325 TABLET ORAL at 04:27

## 2019-04-04 RX ADMIN — FUROSEMIDE 40 MG: 40 TABLET ORAL at 11:15

## 2019-04-04 RX ADMIN — MAGNESIUM HYDROXIDE 30 ML: 400 SUSPENSION ORAL at 09:57

## 2019-04-04 RX ADMIN — IBUPROFEN 800 MG: 400 TABLET ORAL at 10:01

## 2019-04-04 RX ADMIN — SIMETHICONE CHEW TAB 80 MG 80 MG: 80 TABLET ORAL at 09:57

## 2019-04-04 RX ADMIN — IBUPROFEN 800 MG: 800 TABLET, FILM COATED ORAL at 02:29

## 2019-04-04 RX ADMIN — IBUPROFEN 800 MG: 400 TABLET ORAL at 02:29

## 2019-04-04 RX ADMIN — ENOXAPARIN SODIUM 80 MG: 80 INJECTION SUBCUTANEOUS at 09:00

## 2019-04-04 ASSESSMENT — PAIN DESCRIPTION - ONSET
ONSET: GRADUAL
ONSET: GRADUAL

## 2019-04-04 ASSESSMENT — PAIN DESCRIPTION - FREQUENCY
FREQUENCY: INTERMITTENT
FREQUENCY: INTERMITTENT

## 2019-04-04 ASSESSMENT — PAIN DESCRIPTION - PROGRESSION
CLINICAL_PROGRESSION: GRADUALLY WORSENING
CLINICAL_PROGRESSION: GRADUALLY IMPROVING

## 2019-04-04 ASSESSMENT — PAIN DESCRIPTION - ORIENTATION
ORIENTATION: LOWER
ORIENTATION: LOWER

## 2019-04-04 ASSESSMENT — PAIN DESCRIPTION - DESCRIPTORS
DESCRIPTORS: BURNING
DESCRIPTORS: BURNING;DISCOMFORT

## 2019-04-04 ASSESSMENT — PAIN SCALES - GENERAL
PAINLEVEL_OUTOF10: 2
PAINLEVEL_OUTOF10: 7
PAINLEVEL_OUTOF10: 7

## 2019-04-04 ASSESSMENT — PAIN DESCRIPTION - PAIN TYPE
TYPE: SURGICAL PAIN
TYPE: SURGICAL PAIN

## 2019-04-04 ASSESSMENT — PAIN DESCRIPTION - LOCATION
LOCATION: INCISION
LOCATION: INCISION

## 2019-04-04 NOTE — DISCHARGE SUMMARY
Obstetrical Discharge Form    Gestational Age:  41w10d    Antepartum complications: chrons, morbid obesity, GDM, macrosomia, polyhydramnios, macrosomia, severe preeclampsia    \  Date of Delivery:   19      Type of Delivery:    for macrosomia    Delivered By:     Dr Zimmerman Forward:       Information for the patient's :  Scooby Valadez [[id number]]        Anesthesia:    Spinal    Intrapartum complications: None    Feeding method:   bottle     Postpartum complications: preeclampsia/eclampsia    Discharge Date:   19    Condition of discharge:  good    Plan:   Follow up    in 1 week(s) for BP check

## 2019-04-04 NOTE — PLAN OF CARE
Problem: Fluid Volume - Imbalance:  Goal: Absence of postpartum hemorrhage signs and symptoms  Description  Absence of postpartum hemorrhage signs and symptoms  Outcome: Ongoing  Goal: Absence of imbalanced fluid volume signs and symptoms  Description  Absence of imbalanced fluid volume signs and symptoms  Outcome: Ongoing     Problem: Infection - Surgical Site:  Goal: Will show no infection signs and symptoms  Description  Will show no infection signs and symptoms  Outcome: Ongoing     Problem: Mood - Altered:  Goal: Mood stable  Description  Mood stable  Outcome: Ongoing     Problem: Urinary Retention:  Goal: Urinary elimination within specified parameters  Description  Urinary elimination within specified parameters  Outcome: Ongoing     Problem: Venous Thromboembolism:  Goal: Absence of signs or symptoms of impaired coagulation  Description  Absence of signs or symptoms of impaired coagulation  Outcome: Ongoing

## 2019-04-04 NOTE — FLOWSHEET NOTE
AM assessment complete. Bilateral breath sounds clear on auscultation. Encouraged to deep breath and cough every two hours and demonstration given. Pt  denies being smoker. Abdomen soft, fundus firm with steri-strips intact. Incision intact, with Pt's habitus ABD pad placed in fold of incision against steri-srtips. Some edema below incision and both ankles and feet on Pt edematous ~ Dr Deirdre Hernadez notified of Pt's ankles and feet. Ambulating well in room. Father of baby supportive. Dr Deirdre Hernadez shown edema of Pt's ankles, feet and at lower incision site.

## 2019-04-05 NOTE — FLOWSHEET NOTE
Baby taken to mother after Circ. ID verified and correct with mother and baby bands. Circ shown to parents and Circ instructions given. Circ gauze clean and dry with no drainage. Circ pink. Baby pink in mother's arms.

## 2019-04-23 ENCOUNTER — TELEPHONE (OUTPATIENT)
Dept: GASTROENTEROLOGY | Age: 34
End: 2019-04-23

## 2019-04-23 NOTE — TELEPHONE ENCOUNTER
I called her and told her that she needs to check with her OBGYN doc. As long as there was no complication of C section and no infection, she should start Remicade ASAP.     luciano

## 2019-05-20 ENCOUNTER — HOSPITAL ENCOUNTER (OUTPATIENT)
Age: 34
Discharge: HOME | End: 2019-05-20
Payer: COMMERCIAL

## 2019-07-11 ENCOUNTER — TELEPHONE (OUTPATIENT)
Dept: FAMILY MEDICINE CLINIC | Age: 34
End: 2019-07-11

## 2019-07-30 ENCOUNTER — HOSPITAL ENCOUNTER (EMERGENCY)
Age: 34
Discharge: HOME | End: 2019-07-30
Payer: COMMERCIAL

## 2019-07-30 ENCOUNTER — APPOINTMENT (OUTPATIENT)
Dept: CT IMAGING | Age: 34
End: 2019-07-30
Payer: COMMERCIAL

## 2019-07-30 VITALS
HEIGHT: 65 IN | OXYGEN SATURATION: 99 % | DIASTOLIC BLOOD PRESSURE: 77 MMHG | RESPIRATION RATE: 16 BRPM | SYSTOLIC BLOOD PRESSURE: 120 MMHG | BODY MASS INDEX: 46.65 KG/M2 | WEIGHT: 280 LBS | HEART RATE: 81 BPM | TEMPERATURE: 98.6 F

## 2019-07-30 DIAGNOSIS — Z87.19 HX OF CROHN'S DISEASE: ICD-10-CM

## 2019-07-30 DIAGNOSIS — K62.5 RECTAL BLEEDING: ICD-10-CM

## 2019-07-30 DIAGNOSIS — R10.30 LOWER ABDOMINAL PAIN: Primary | ICD-10-CM

## 2019-07-30 LAB
ALBUMIN SERPL-MCNC: 4 GM/DL (ref 3.4–5)
ALP BLD-CCNC: 65 IU/L (ref 40–129)
ALT SERPL-CCNC: 45 U/L (ref 10–40)
ANION GAP SERPL CALCULATED.3IONS-SCNC: 12 MMOL/L (ref 4–16)
AST SERPL-CCNC: 43 IU/L (ref 15–37)
BACTERIA: NEGATIVE /HPF
BASOPHILS ABSOLUTE: 0 K/CU MM
BASOPHILS RELATIVE PERCENT: 0.5 % (ref 0–1)
BILIRUB SERPL-MCNC: 0.1 MG/DL (ref 0–1)
BILIRUBIN URINE: NEGATIVE MG/DL
BLOOD, URINE: ABNORMAL
BUN BLDV-MCNC: 16 MG/DL (ref 6–23)
CALCIUM SERPL-MCNC: 9.3 MG/DL (ref 8.3–10.6)
CHLORIDE BLD-SCNC: 102 MMOL/L (ref 99–110)
CLARITY: CLEAR
CO2: 26 MMOL/L (ref 21–32)
COLOR: YELLOW
CREAT SERPL-MCNC: 0.7 MG/DL (ref 0.6–1.1)
DIFFERENTIAL TYPE: ABNORMAL
EOSINOPHILS ABSOLUTE: 0.2 K/CU MM
EOSINOPHILS RELATIVE PERCENT: 2.1 % (ref 0–3)
GFR AFRICAN AMERICAN: >60 ML/MIN/1.73M2
GFR NON-AFRICAN AMERICAN: >60 ML/MIN/1.73M2
GLUCOSE BLD-MCNC: 95 MG/DL (ref 70–99)
GLUCOSE, URINE: NEGATIVE MG/DL
HCG QUALITATIVE: NEGATIVE
HCT VFR BLD CALC: 38.3 % (ref 37–47)
HEMOGLOBIN: 11.9 GM/DL (ref 12.5–16)
IMMATURE NEUTROPHIL %: 0.2 % (ref 0–0.43)
KETONES, URINE: NEGATIVE MG/DL
LACTATE: 1.4 MMOL/L (ref 0.4–2)
LEUKOCYTE ESTERASE, URINE: NEGATIVE
LIPASE: 23 IU/L (ref 13–60)
LYMPHOCYTES ABSOLUTE: 3 K/CU MM
LYMPHOCYTES RELATIVE PERCENT: 37.2 % (ref 24–44)
MCH RBC QN AUTO: 26.7 PG (ref 27–31)
MCHC RBC AUTO-ENTMCNC: 31.1 % (ref 32–36)
MCV RBC AUTO: 86.1 FL (ref 78–100)
MONOCYTES ABSOLUTE: 0.7 K/CU MM
MONOCYTES RELATIVE PERCENT: 8.1 % (ref 0–4)
MUCUS: ABNORMAL HPF
NITRITE URINE, QUANTITATIVE: NEGATIVE
NUCLEATED RBC %: 0 %
PDW BLD-RTO: 15.6 % (ref 11.7–14.9)
PH, URINE: 5 (ref 5–8)
PLATELET # BLD: 281 K/CU MM (ref 140–440)
PMV BLD AUTO: 11.1 FL (ref 7.5–11.1)
POTASSIUM SERPL-SCNC: 4.2 MMOL/L (ref 3.5–5.1)
PROTEIN UA: NEGATIVE MG/DL
RBC # BLD: 4.45 M/CU MM (ref 4.2–5.4)
RBC URINE: 3 /HPF (ref 0–6)
REASON FOR REJECTION: NORMAL
REASON FOR REJECTION: NORMAL
REJECTED TEST: NORMAL
SEGMENTED NEUTROPHILS ABSOLUTE COUNT: 4.2 K/CU MM
SEGMENTED NEUTROPHILS RELATIVE PERCENT: 51.9 % (ref 36–66)
SODIUM BLD-SCNC: 140 MMOL/L (ref 135–145)
SPECIFIC GRAVITY UA: 1.02 (ref 1–1.03)
SQUAMOUS EPITHELIAL: 1 /HPF
TOTAL IMMATURE NEUTOROPHIL: 0.02 K/CU MM
TOTAL NUCLEATED RBC: 0 K/CU MM
TOTAL PROTEIN: 7.5 GM/DL (ref 6.4–8.2)
TRICHOMONAS: ABNORMAL /HPF
UROBILINOGEN, URINE: NORMAL MG/DL (ref 0.2–1)
WBC # BLD: 8 K/CU MM (ref 4–10.5)
WBC UA: 1 /HPF (ref 0–5)

## 2019-07-30 PROCEDURE — 96374 THER/PROPH/DIAG INJ IV PUSH: CPT

## 2019-07-30 PROCEDURE — 80053 COMPREHEN METABOLIC PANEL: CPT

## 2019-07-30 PROCEDURE — 2580000003 HC RX 258: Performed by: PHYSICIAN ASSISTANT

## 2019-07-30 PROCEDURE — 74177 CT ABD & PELVIS W/CONTRAST: CPT

## 2019-07-30 PROCEDURE — 6360000004 HC RX CONTRAST MEDICATION: Performed by: PHYSICIAN ASSISTANT

## 2019-07-30 PROCEDURE — 96376 TX/PRO/DX INJ SAME DRUG ADON: CPT

## 2019-07-30 PROCEDURE — 96375 TX/PRO/DX INJ NEW DRUG ADDON: CPT

## 2019-07-30 PROCEDURE — 96361 HYDRATE IV INFUSION ADD-ON: CPT

## 2019-07-30 PROCEDURE — 99284 EMERGENCY DEPT VISIT MOD MDM: CPT

## 2019-07-30 PROCEDURE — 83605 ASSAY OF LACTIC ACID: CPT

## 2019-07-30 PROCEDURE — 81001 URINALYSIS AUTO W/SCOPE: CPT

## 2019-07-30 PROCEDURE — 85025 COMPLETE CBC W/AUTO DIFF WBC: CPT

## 2019-07-30 PROCEDURE — 6360000002 HC RX W HCPCS: Performed by: PHYSICIAN ASSISTANT

## 2019-07-30 PROCEDURE — 84703 CHORIONIC GONADOTROPIN ASSAY: CPT

## 2019-07-30 PROCEDURE — 83690 ASSAY OF LIPASE: CPT

## 2019-07-30 RX ORDER — FENTANYL CITRATE 50 UG/ML
50 INJECTION, SOLUTION INTRAMUSCULAR; INTRAVENOUS ONCE
Status: COMPLETED | OUTPATIENT
Start: 2019-07-30 | End: 2019-07-30

## 2019-07-30 RX ORDER — HYDROCODONE BITARTRATE AND ACETAMINOPHEN 5; 325 MG/1; MG/1
1 TABLET ORAL EVERY 6 HOURS PRN
Qty: 10 TABLET | Refills: 0 | Status: SHIPPED | OUTPATIENT
Start: 2019-07-30 | End: 2019-08-02

## 2019-07-30 RX ORDER — NAPROXEN 500 MG/1
500 TABLET ORAL 2 TIMES DAILY
Qty: 60 TABLET | Refills: 0 | Status: SHIPPED | OUTPATIENT
Start: 2019-07-30 | End: 2019-08-02

## 2019-07-30 RX ORDER — 0.9 % SODIUM CHLORIDE 0.9 %
1000 INTRAVENOUS SOLUTION INTRAVENOUS ONCE
Status: COMPLETED | OUTPATIENT
Start: 2019-07-30 | End: 2019-07-30

## 2019-07-30 RX ORDER — ONDANSETRON 2 MG/ML
4 INJECTION INTRAMUSCULAR; INTRAVENOUS ONCE
Status: COMPLETED | OUTPATIENT
Start: 2019-07-30 | End: 2019-07-30

## 2019-07-30 RX ORDER — SODIUM CHLORIDE 0.9 % (FLUSH) 0.9 %
10 SYRINGE (ML) INJECTION 2 TIMES DAILY
Status: DISCONTINUED | OUTPATIENT
Start: 2019-07-30 | End: 2019-07-31 | Stop reason: HOSPADM

## 2019-07-30 RX ORDER — ONDANSETRON 4 MG/1
4 TABLET, ORALLY DISINTEGRATING ORAL EVERY 6 HOURS
Qty: 10 TABLET | Refills: 1 | Status: SHIPPED | OUTPATIENT
Start: 2019-07-30

## 2019-07-30 RX ADMIN — FENTANYL CITRATE 50 MCG: 50 INJECTION, SOLUTION INTRAMUSCULAR; INTRAVENOUS at 20:09

## 2019-07-30 RX ADMIN — ONDANSETRON 4 MG: 2 INJECTION INTRAMUSCULAR; INTRAVENOUS at 20:09

## 2019-07-30 RX ADMIN — IOPAMIDOL 80 ML: 755 INJECTION, SOLUTION INTRAVENOUS at 21:17

## 2019-07-30 RX ADMIN — Medication 10 ML: at 21:17

## 2019-07-30 RX ADMIN — FENTANYL CITRATE 50 MCG: 50 INJECTION, SOLUTION INTRAMUSCULAR; INTRAVENOUS at 22:30

## 2019-07-30 RX ADMIN — SODIUM CHLORIDE 1000 ML: 9 INJECTION, SOLUTION INTRAVENOUS at 20:09

## 2019-07-30 ASSESSMENT — PAIN SCALES - GENERAL
PAINLEVEL_OUTOF10: 5
PAINLEVEL_OUTOF10: 6
PAINLEVEL_OUTOF10: 5

## 2019-07-30 ASSESSMENT — PAIN DESCRIPTION - LOCATION: LOCATION: ABDOMEN

## 2019-07-30 ASSESSMENT — PAIN DESCRIPTION - PAIN TYPE: TYPE: ACUTE PAIN

## 2019-07-30 NOTE — ED PROVIDER NOTES
Social History Narrative    Lives with parents, has a daughter     Family History   Problem Relation Age of Onset    Migraines Mother     Heart Disease Father     Diabetes Father     High Cholesterol Father     Depression Maternal Aunt     Depression Maternal Uncle     Depression Paternal Aunt     Coronary Art Dis Paternal Aunt     Depression Paternal Uncle     Coronary Art Dis Paternal Uncle     Depression Maternal Grandmother     Depression Maternal Grandfather     Depression Paternal Grandmother     Coronary Art Dis Paternal Grandmother     Depression Paternal Grandfather     Coronary Art Dis Paternal Grandfather        PHYSICAL EXAM    VITAL SIGNS: BP (!) 146/80   Pulse 101   Temp 98.6 °F (37 °C) (Oral)   Resp 16   Ht 5' 5\" (1.651 m)   Wt 280 lb (127 kg)   LMP 07/21/2019   SpO2 100%   BMI 46.59 kg/m²   Constitutional:  Well developed, well nourished. No distress  Eyes:  Sclera nonicteric, conjunctiva moist  HENT:  Atraumatic. PERRL. EOMI.  moist mucus membranes. Neck/Lymphatics: supple, no JVD, no swollen nodes  Respiratory:  No retractions, no accessory muscle use, normal breath sounds   Cardiovascular:   normal rate on my exam, normal rhythm, no murmurs    GI:     No gross discoloration.       -no Vernon's sign (periumbilical ecchymosis)       -no Grey-Ledesma's sign (flank ecchymosis)  (necrosis/hemmorrhage may cause subcutaneous blood leakage)    Bowel sounds present, no audible bruits. Soft,  No distention, no guarding, no rigidity,   + Mild generalized nonfocal, non-peritoneal abdominal tenderness, no rebound, no palpable pulsatile masses,   No McBurney's point tenderness   Negative Rovsing sign    Negative Bains's sign. Back:   No CVA tenderness to percussion.   Musculoskeletal:  No edema, no deformity  Vascular: DP pulses 2+ equal bilaterally  Integument: No rash, dry skin  Neurologic:  Alert & oriented, normal speech  Psychiatric: Cooperative, pleasant affect is normal in size. No evidence of acute pancreatitis. No evidence of adrenal nodules. Lobular appearance of the kidneys appears reasonably similar to previous examination. Small hypodense right renal lesions are not completely characterized on the single phase examination but likely represents renal cyst statistically. No evidence of renal, or ureteral calculus. No evidence of hydronephrosis. GI/Bowel: No evidence of mechanical bowel obstruction. Colonic diverticulosis. No CT evidence of acute diverticulitis. Appendix is not identified. No secondary CT evidence of acute appendicitis. Small bowel loops are unremarkable, without evidence of mucosal hyperenhancement, or bowel wall thickening. No focal mesenteric abnormalities identified. Pelvis: Urinary bladder is not fully distended, therefore not evaluated. T1 ligation clips are noted bilaterally. Uterus is present. Peritoneum/Retroperitoneum: No evidence of significant retroperitoneal lymphadenopathy by CT size criteria. No evidence of intra-abdominal free fluid. No evidence of intra-abdominal free air. No evidence of intra-adominal fluid collection. Bones/Soft Tissues: No evidence of sacroiliitis. No acute osseous abnormalities within abdomen and pelvis. No acute findings within abdomen and pelvis. Hepatic steatosis. Status post cholecystectomy. ED COURSE & MEDICAL DECISION MAKING       Vital signs and nursing notes reviewed during ED course. Patient seen independently. Attending available throughout ED stay for consultation. All pertinent Lab data and radiographic results reviewed with patient at bedside. The patient and/or the family were informed of the results of any tests/labs/imaging, the treatment plan, and time was allotted to answer questions. Differential diagnosis: Abdominal Aortic Aneurysm, Ischemic Bowel, Bowel Obstruction, Acute Cholecystitis, Acute Appendicitis, other    Clinical  IMPRESSION    1.  Lower abdominal pain    2. Rectal bleeding    3. Hx of Crohn's disease        Patient presents as above with concerns for flare of her Crohn's disease. She has a non-peritoneal abdomen on exam and only mild generalized tenderness. She has history of multiple abdominal surgeries and history of Crohn's disease therefore I did obtain CT imaging. Fluids and medications provided here in the ED. Patient did decline rectal exam.  Patient with work-up as above. No significant changes in lab work and no findings on imaging. Patient feeling better and tolerating p.o. on recheck. I did discuss her presentation findings with her GI doctor, Dr. Kailee Hollis who does not recommend any steroids at this point in time. Recommends follow-up in the office. Patient advised to call the office in the morning to schedule follow-up. Educated on dietary management and conservative management of symptoms. Symptomatic medications provided. I discussed the unclear etiology of patient's symptoms today and the need for PMD followup in 12-24 hours or return to emergency department in 12-24 hours for repeat evaluation, immediately return to ED if symptoms worsen or any new symptoms develop. Comment: Please note this report has been produced using speech recognition software and may contain errors related to that system including errors in grammar, punctuation, and spelling, as well as words and phrases that may be inappropriate. If there are any questions or concerns please feel free to contact the dictating provider for clarification.         Juanita Pham PA-C  07/31/19 4746

## 2019-08-05 PROBLEM — D04.61: Status: ACTIVE | Noted: 2019-08-05

## 2019-08-07 ENCOUNTER — TELEPHONE (OUTPATIENT)
Dept: GASTROENTEROLOGY | Age: 34
End: 2019-08-07

## 2019-08-07 DIAGNOSIS — K50.119 CROHN'S DISEASE OF LARGE INTESTINE WITH COMPLICATION (HCC): Primary | ICD-10-CM

## 2019-09-05 PROBLEM — Z09 STATUS POST EXCISION OF SKIN LESION, FOLLOW-UP EXAM: Status: ACTIVE | Noted: 2019-09-05

## 2019-10-05 PROBLEM — Z09 STATUS POST EXCISION OF SKIN LESION, FOLLOW-UP EXAM: Status: RESOLVED | Noted: 2019-09-05 | Resolved: 2019-10-05

## 2019-12-03 ENCOUNTER — ANESTHESIA EVENT (OUTPATIENT)
Dept: ENDOSCOPY | Age: 34
End: 2019-12-03
Payer: COMMERCIAL

## 2019-12-04 ENCOUNTER — HOSPITAL ENCOUNTER (OUTPATIENT)
Age: 34
Setting detail: OUTPATIENT SURGERY
Discharge: HOME | End: 2019-12-04
Attending: INTERNAL MEDICINE | Admitting: INTERNAL MEDICINE
Payer: COMMERCIAL

## 2019-12-04 ENCOUNTER — ANESTHESIA (OUTPATIENT)
Dept: ENDOSCOPY | Age: 34
End: 2019-12-04
Payer: COMMERCIAL

## 2019-12-04 VITALS
BODY MASS INDEX: 48.82 KG/M2 | SYSTOLIC BLOOD PRESSURE: 131 MMHG | DIASTOLIC BLOOD PRESSURE: 57 MMHG | HEART RATE: 72 BPM | HEIGHT: 65 IN | RESPIRATION RATE: 16 BRPM | OXYGEN SATURATION: 97 % | TEMPERATURE: 97 F | WEIGHT: 293 LBS

## 2019-12-04 VITALS — SYSTOLIC BLOOD PRESSURE: 139 MMHG | DIASTOLIC BLOOD PRESSURE: 67 MMHG | OXYGEN SATURATION: 99 %

## 2019-12-04 LAB
PREGNANCY TEST URINE, POC: NEGATIVE
PREGNANCY TEST URINE, POC: NEGATIVE
PREGNANCY TEST URINE, POC: NORMAL
PREGNANCY TEST URINE, POC: NORMAL

## 2019-12-04 PROCEDURE — 6360000002 HC RX W HCPCS: Performed by: NURSE ANESTHETIST, CERTIFIED REGISTERED

## 2019-12-04 PROCEDURE — 2580000003 HC RX 258: Performed by: ANESTHESIOLOGY

## 2019-12-04 PROCEDURE — 2500000003 HC RX 250 WO HCPCS: Performed by: NURSE ANESTHETIST, CERTIFIED REGISTERED

## 2019-12-04 PROCEDURE — 3700000000 HC ANESTHESIA ATTENDED CARE: Performed by: INTERNAL MEDICINE

## 2019-12-04 PROCEDURE — 81025 URINE PREGNANCY TEST: CPT

## 2019-12-04 PROCEDURE — 3700000001 HC ADD 15 MINUTES (ANESTHESIA): Performed by: INTERNAL MEDICINE

## 2019-12-04 PROCEDURE — 2709999900 HC NON-CHARGEABLE SUPPLY: Performed by: INTERNAL MEDICINE

## 2019-12-04 PROCEDURE — 3609010600 HC COLONOSCOPY POLYPECTOMY SNARE/COLD BIOPSY: Performed by: INTERNAL MEDICINE

## 2019-12-04 PROCEDURE — 88305 TISSUE EXAM BY PATHOLOGIST: CPT

## 2019-12-04 PROCEDURE — 7100000010 HC PHASE II RECOVERY - FIRST 15 MIN: Performed by: INTERNAL MEDICINE

## 2019-12-04 PROCEDURE — 7100000011 HC PHASE II RECOVERY - ADDTL 15 MIN: Performed by: INTERNAL MEDICINE

## 2019-12-04 RX ORDER — SODIUM CHLORIDE, SODIUM LACTATE, POTASSIUM CHLORIDE, CALCIUM CHLORIDE 600; 310; 30; 20 MG/100ML; MG/100ML; MG/100ML; MG/100ML
INJECTION, SOLUTION INTRAVENOUS CONTINUOUS
Status: DISCONTINUED | OUTPATIENT
Start: 2019-12-04 | End: 2019-12-04 | Stop reason: HOSPADM

## 2019-12-04 RX ORDER — LIDOCAINE HYDROCHLORIDE 20 MG/ML
INJECTION, SOLUTION EPIDURAL; INFILTRATION; INTRACAUDAL; PERINEURAL PRN
Status: DISCONTINUED | OUTPATIENT
Start: 2019-12-04 | End: 2019-12-04 | Stop reason: SDUPTHER

## 2019-12-04 RX ORDER — PROPOFOL 10 MG/ML
INJECTION, EMULSION INTRAVENOUS PRN
Status: DISCONTINUED | OUTPATIENT
Start: 2019-12-04 | End: 2019-12-04 | Stop reason: SDUPTHER

## 2019-12-04 RX ADMIN — SODIUM CHLORIDE, POTASSIUM CHLORIDE, SODIUM LACTATE AND CALCIUM CHLORIDE: 600; 310; 30; 20 INJECTION, SOLUTION INTRAVENOUS at 09:46

## 2019-12-04 RX ADMIN — LIDOCAINE HYDROCHLORIDE 200 MG: 20 INJECTION, SOLUTION EPIDURAL; INFILTRATION; INTRACAUDAL; PERINEURAL at 10:46

## 2019-12-04 RX ADMIN — PROPOFOL 100 MG: 10 INJECTION, EMULSION INTRAVENOUS at 10:46

## 2019-12-04 RX ADMIN — PROPOFOL 50 MG: 10 INJECTION, EMULSION INTRAVENOUS at 10:49

## 2019-12-04 ASSESSMENT — PAIN DESCRIPTION - LOCATION: LOCATION: ABDOMEN

## 2019-12-04 ASSESSMENT — PAIN DESCRIPTION - ORIENTATION: ORIENTATION: LOWER

## 2019-12-04 ASSESSMENT — PAIN DESCRIPTION - DESCRIPTORS: DESCRIPTORS: PRESSURE

## 2019-12-04 ASSESSMENT — PAIN - FUNCTIONAL ASSESSMENT: PAIN_FUNCTIONAL_ASSESSMENT: 0-10

## 2019-12-04 ASSESSMENT — PAIN SCALES - GENERAL
PAINLEVEL_OUTOF10: 0
PAINLEVEL_OUTOF10: 1

## 2020-03-06 ENCOUNTER — HOSPITAL ENCOUNTER (EMERGENCY)
Age: 35
Discharge: HOME | End: 2020-03-06
Attending: EMERGENCY MEDICINE
Payer: COMMERCIAL

## 2020-03-06 ENCOUNTER — APPOINTMENT (OUTPATIENT)
Dept: CT IMAGING | Age: 35
End: 2020-03-06
Payer: COMMERCIAL

## 2020-03-06 VITALS
SYSTOLIC BLOOD PRESSURE: 123 MMHG | OXYGEN SATURATION: 97 % | RESPIRATION RATE: 16 BRPM | HEART RATE: 87 BPM | BODY MASS INDEX: 48.82 KG/M2 | TEMPERATURE: 97.7 F | HEIGHT: 65 IN | WEIGHT: 293 LBS | DIASTOLIC BLOOD PRESSURE: 75 MMHG

## 2020-03-06 LAB
ALBUMIN SERPL-MCNC: 3.8 GM/DL (ref 3.4–5)
ALP BLD-CCNC: 63 IU/L (ref 40–129)
ALT SERPL-CCNC: 36 U/L (ref 10–40)
ANION GAP SERPL CALCULATED.3IONS-SCNC: 11 MMOL/L (ref 4–16)
AST SERPL-CCNC: 28 IU/L (ref 15–37)
BACTERIA: ABNORMAL /HPF
BASOPHILS ABSOLUTE: 0 K/CU MM
BASOPHILS RELATIVE PERCENT: 0.6 % (ref 0–1)
BILIRUB SERPL-MCNC: 0.2 MG/DL (ref 0–1)
BILIRUBIN URINE: NEGATIVE MG/DL
BLOOD, URINE: ABNORMAL
BUN BLDV-MCNC: 18 MG/DL (ref 6–23)
CALCIUM SERPL-MCNC: 8.8 MG/DL (ref 8.3–10.6)
CHLORIDE BLD-SCNC: 103 MMOL/L (ref 99–110)
CLARITY: CLEAR
CO2: 22 MMOL/L (ref 21–32)
COLOR: YELLOW
CREAT SERPL-MCNC: 0.6 MG/DL (ref 0.6–1.1)
DIFFERENTIAL TYPE: ABNORMAL
EOSINOPHILS ABSOLUTE: 0.1 K/CU MM
EOSINOPHILS RELATIVE PERCENT: 2 % (ref 0–3)
GFR AFRICAN AMERICAN: >60 ML/MIN/1.73M2
GFR NON-AFRICAN AMERICAN: >60 ML/MIN/1.73M2
GLUCOSE BLD-MCNC: 119 MG/DL (ref 70–99)
GLUCOSE, URINE: NEGATIVE MG/DL
GONADOTROPIN, CHORIONIC (HCG) QUANT: NORMAL UIU/ML
HCT VFR BLD CALC: 42.4 % (ref 37–47)
HEMOGLOBIN: 13.2 GM/DL (ref 12.5–16)
IMMATURE NEUTROPHIL %: 0.3 % (ref 0–0.43)
INTERPRETATION: NORMAL
KETONES, URINE: NEGATIVE MG/DL
LEUKOCYTE ESTERASE, URINE: NEGATIVE
LIPASE: 25 IU/L (ref 13–60)
LYMPHOCYTES ABSOLUTE: 2.4 K/CU MM
LYMPHOCYTES RELATIVE PERCENT: 33.9 % (ref 24–44)
MCH RBC QN AUTO: 28.8 PG (ref 27–31)
MCHC RBC AUTO-ENTMCNC: 31.1 % (ref 32–36)
MCV RBC AUTO: 92.4 FL (ref 78–100)
MONOCYTES ABSOLUTE: 0.7 K/CU MM
MONOCYTES RELATIVE PERCENT: 9.9 % (ref 0–4)
MUCUS: ABNORMAL HPF
NITRITE URINE, QUANTITATIVE: NEGATIVE
NUCLEATED RBC %: 0 %
PDW BLD-RTO: 12.7 % (ref 11.7–14.9)
PH, URINE: 5 (ref 5–8)
PLATELET # BLD: 221 K/CU MM (ref 140–440)
PMV BLD AUTO: 12 FL (ref 7.5–11.1)
POTASSIUM SERPL-SCNC: 4.3 MMOL/L (ref 3.5–5.1)
PREGNANCY, URINE: NEGATIVE
PROTEIN UA: NEGATIVE MG/DL
RBC # BLD: 4.59 M/CU MM (ref 4.2–5.4)
RBC URINE: 5 /HPF (ref 0–6)
SEGMENTED NEUTROPHILS ABSOLUTE COUNT: 3.8 K/CU MM
SEGMENTED NEUTROPHILS RELATIVE PERCENT: 53.3 % (ref 36–66)
SODIUM BLD-SCNC: 136 MMOL/L (ref 135–145)
SPECIFIC GRAVITY UA: 1.02 (ref 1–1.03)
SPECIFIC GRAVITY, URINE: 1.02 (ref 1–1.03)
SQUAMOUS EPITHELIAL: 2 /HPF
TOTAL IMMATURE NEUTOROPHIL: 0.02 K/CU MM
TOTAL NUCLEATED RBC: 0 K/CU MM
TOTAL PROTEIN: 7.8 GM/DL (ref 6.4–8.2)
TRICHOMONAS: ABNORMAL /HPF
UROBILINOGEN, URINE: NORMAL MG/DL (ref 0.2–1)
WBC # BLD: 7.1 K/CU MM (ref 4–10.5)
WBC UA: 1 /HPF (ref 0–5)
YEAST: ABNORMAL /HPF

## 2020-03-06 PROCEDURE — 6360000002 HC RX W HCPCS: Performed by: EMERGENCY MEDICINE

## 2020-03-06 PROCEDURE — 96375 TX/PRO/DX INJ NEW DRUG ADDON: CPT

## 2020-03-06 PROCEDURE — 80053 COMPREHEN METABOLIC PANEL: CPT

## 2020-03-06 PROCEDURE — 83690 ASSAY OF LIPASE: CPT

## 2020-03-06 PROCEDURE — 99284 EMERGENCY DEPT VISIT MOD MDM: CPT

## 2020-03-06 PROCEDURE — 81001 URINALYSIS AUTO W/SCOPE: CPT

## 2020-03-06 PROCEDURE — 2580000003 HC RX 258: Performed by: EMERGENCY MEDICINE

## 2020-03-06 PROCEDURE — 96374 THER/PROPH/DIAG INJ IV PUSH: CPT

## 2020-03-06 PROCEDURE — 36415 COLL VENOUS BLD VENIPUNCTURE: CPT

## 2020-03-06 PROCEDURE — 6360000004 HC RX CONTRAST MEDICATION: Performed by: EMERGENCY MEDICINE

## 2020-03-06 PROCEDURE — 6370000000 HC RX 637 (ALT 250 FOR IP): Performed by: EMERGENCY MEDICINE

## 2020-03-06 PROCEDURE — 85025 COMPLETE CBC W/AUTO DIFF WBC: CPT

## 2020-03-06 PROCEDURE — 81025 URINE PREGNANCY TEST: CPT

## 2020-03-06 PROCEDURE — 84702 CHORIONIC GONADOTROPIN TEST: CPT

## 2020-03-06 PROCEDURE — 74177 CT ABD & PELVIS W/CONTRAST: CPT

## 2020-03-06 RX ORDER — ONDANSETRON 2 MG/ML
4 INJECTION INTRAMUSCULAR; INTRAVENOUS ONCE
Status: COMPLETED | OUTPATIENT
Start: 2020-03-06 | End: 2020-03-06

## 2020-03-06 RX ORDER — OXYCODONE HYDROCHLORIDE AND ACETAMINOPHEN 5; 325 MG/1; MG/1
1 TABLET ORAL EVERY 6 HOURS PRN
Qty: 12 TABLET | Refills: 0 | Status: SHIPPED | OUTPATIENT
Start: 2020-03-06 | End: 2020-03-09

## 2020-03-06 RX ORDER — 0.9 % SODIUM CHLORIDE 0.9 %
1000 INTRAVENOUS SOLUTION INTRAVENOUS ONCE
Status: COMPLETED | OUTPATIENT
Start: 2020-03-06 | End: 2020-03-06

## 2020-03-06 RX ORDER — OXYCODONE HYDROCHLORIDE AND ACETAMINOPHEN 5; 325 MG/1; MG/1
1 TABLET ORAL ONCE
Status: COMPLETED | OUTPATIENT
Start: 2020-03-06 | End: 2020-03-06

## 2020-03-06 RX ORDER — PROMETHAZINE HYDROCHLORIDE 25 MG/1
25 TABLET ORAL ONCE
Status: COMPLETED | OUTPATIENT
Start: 2020-03-06 | End: 2020-03-06

## 2020-03-06 RX ORDER — FENTANYL CITRATE 50 UG/ML
50 INJECTION, SOLUTION INTRAMUSCULAR; INTRAVENOUS ONCE
Status: COMPLETED | OUTPATIENT
Start: 2020-03-06 | End: 2020-03-06

## 2020-03-06 RX ORDER — PROMETHAZINE HYDROCHLORIDE 25 MG/1
25 TABLET ORAL 4 TIMES DAILY PRN
Qty: 20 TABLET | Refills: 0 | Status: SHIPPED | OUTPATIENT
Start: 2020-03-06 | End: 2020-03-13

## 2020-03-06 RX ADMIN — IOPAMIDOL 80 ML: 755 INJECTION, SOLUTION INTRAVENOUS at 11:39

## 2020-03-06 RX ADMIN — OXYCODONE HYDROCHLORIDE AND ACETAMINOPHEN 1 TABLET: 5; 325 TABLET ORAL at 12:41

## 2020-03-06 RX ADMIN — ONDANSETRON 4 MG: 2 INJECTION INTRAMUSCULAR; INTRAVENOUS at 10:15

## 2020-03-06 RX ADMIN — FENTANYL CITRATE 50 MCG: 50 INJECTION INTRAMUSCULAR; INTRAVENOUS at 10:15

## 2020-03-06 RX ADMIN — PROMETHAZINE HYDROCHLORIDE 25 MG: 25 TABLET ORAL at 13:44

## 2020-03-06 RX ADMIN — SODIUM CHLORIDE 1000 ML: 9 INJECTION, SOLUTION INTRAVENOUS at 10:15

## 2020-03-06 ASSESSMENT — PAIN DESCRIPTION - PAIN TYPE
TYPE: ACUTE PAIN
TYPE: ACUTE PAIN

## 2020-03-06 ASSESSMENT — PAIN SCALES - GENERAL
PAINLEVEL_OUTOF10: 2
PAINLEVEL_OUTOF10: 5

## 2020-03-06 ASSESSMENT — PAIN DESCRIPTION - LOCATION
LOCATION: ABDOMEN
LOCATION: ABDOMEN

## 2020-03-06 ASSESSMENT — PAIN DESCRIPTION - DESCRIPTORS: DESCRIPTORS: CONSTANT

## 2020-03-06 NOTE — ED PROVIDER NOTES
numbness and headaches. Psychiatric/Behavioral: Negative for confusion. Except as noted above the remainder of the review of systems was reviewed and negative. PAST MEDICAL HISTORY     Past Medical History:   Diagnosis Date    Acid reflux     Acute deep vein thrombosis (DVT) of non-extremity vein 4/25/2018    Anemia     Anxiety     Asthma     NO PULMONOLOGIST AT THIS TIME    Blood clot due to device, implant, or graft 04/2018    had blood clots in neck due to med port    Bowen's disease     Crohn's disease ([facility] [address] Dx 2010    Remicade Infusions Every Six Weeks, Sees Dr. Kathleen Mccurdy At [facility], [address] Depression     Fall 2012    \"Passed Out And Ova Ni On My Tailbone\"    Fatty liver     Gestational diabetes     Hyperlipidemia     Lower back pain     \"Sometimes\"    MRSA (methicillin resistant staph aureus) culture positive 04/2018    Multiple pulmonary nodules 6/2013    Subcentimeter; needs repeat imaging in 3-6 months    Pancreatitis     Shortness of breath on exertion     Teeth missing     Upper And Lower    Wears glasses        Prior to Admission medications    Medication Sig Start Date End Date Taking?  Authorizing Provider   promethazine (PHENERGAN) 25 MG tablet Take 1 tablet by mouth 4 times daily as needed for Nausea 3/6/20 3/13/20 Yes Lianna Schuler MD   ondansetron (ZOFRAN ODT) 4 MG disintegrating tablet Take 1 tablet by mouth every 6 hours 7/30/19   Leena Calzada PA-C   ibuprofen (ADVIL;MOTRIN) 800 MG tablet Take 1 tablet by mouth every 8 hours 4/4/19   Amanda Whittington MD   albuterol sulfate  (90 Base) MCG/ACT inhaler Inhale 2 puffs into the lungs every 6 hours as needed for Wheezing    Historical Provider, MD   loperamide (IMODIUM) 2 MG capsule Take 2 mg by mouth 4 times daily as needed for Diarrhea    Historical Provider, MD   ferrous sulfate 325 (65 Fe) MG tablet Take 325 mg by mouth 2 times daily    Historical Provider, MD 2013    Dr Kuldeep Bucio, Removed Adhesions    OTHER SURGICAL HISTORY  06/2011    Mediport Insertion Left Chest Area/revision done 6/2016- \"removed at [facility] 11/2017 after I got an infection    TUNNELED VENOUS PORT PLACEMENT Right 03/12/2018    smart port- [facility]-Dr Judy Zarate    WISDOM TOOTH EXTRACTION      All [place] Teeth Extracted In Past         CURRENT MEDICATIONS       Discharge Medication List as of 3/6/2020  2:05 PM      CONTINUE these medications which have NOT CHANGED    Details   ondansetron (ZOFRAN ODT) 4 MG disintegrating tablet Take 1 tablet by mouth every 6 hours, Disp-10 tablet, R-1Print      ibuprofen (ADVIL;MOTRIN) 800 MG tablet Take 1 tablet by mouth every 8 hours, Disp-120 tablet, R-0Print      albuterol sulfate  (90 Base) MCG/ACT inhaler Inhale 2 puffs into the lungs every 6 hours as needed for WheezingHistorical Med      loperamide (IMODIUM) 2 MG capsule Take 2 mg by mouth 4 times daily as needed for DiarrheaHistorical Med      ferrous sulfate 325 (65 Fe) MG tablet Take 325 mg by mouth 2 times dailyHistorical Med      Carboxymeth-Glycerin-Polysorb (REFRESH OPTIVE ADVANCED) 0.5-1-0.5 % SOLN Place 1 drop into both eyes 3 times daily      InFLIXimab (REMICADE IV) Infuse intravenously States last tx was 2/9/18 due on the 3/16/18 Every Six Weeks At [address] , [address]     Morphine and Tramadol    FAMILY HISTORY       Family History   Problem Relation Age of Onset    Migraines Mother     Heart Disease Father     Diabetes Father     High Cholesterol Father     Depression Maternal Aunt     Depression Maternal Uncle     Depression Paternal Aunt     Coronary Art Dis Paternal Aunt     Depression Paternal Uncle     Coronary Art Dis Paternal Uncle     Depression Maternal Grandmother     Depression Maternal Grandfather     Depression Paternal Grandmother     Coronary Art Dis Paternal Grandmother     Depression Paternal Grandfather moist.      Pharynx: No oropharyngeal exudate or posterior oropharyngeal erythema. Eyes:      General:         Right eye: No discharge. Left eye: No discharge. Extraocular Movements: Extraocular movements intact. Pupils: Pupils are equal, round, and reactive to light. Neck:      Musculoskeletal: Neck supple. No muscular tenderness. Cardiovascular:      Rate and Rhythm: Normal rate. Heart sounds: No friction rub. No gallop. Pulmonary:      Comments: Lungs CTAB  No retractions, no increased work of breathing  Abdominal:      Palpations: Abdomen is soft. Tenderness: There is abdominal tenderness. There is no guarding. Comments: Abdomen overall soft, non-peritoneal  Mild diffuse tenderness   Musculoskeletal:         General: No signs of injury. Right lower leg: No edema. Left lower leg: No edema. Lymphadenopathy:      Cervical: No cervical adenopathy. Skin:     General: Skin is warm. Capillary Refill: Capillary refill takes less than 2 seconds. Findings: No erythema, lesion or rash. Neurological:      General: No focal deficit present. Mental Status: She is alert and oriented to person, place, and time.          DIAGNOSTIC RESULTS     Labs Reviewed   COMPREHENSIVE METABOLIC PANEL - Abnormal; Notable for the following components:       Result Value    Glucose 119 (*)     All other components within normal limits   CBC WITH AUTO DIFFERENTIAL - Abnormal; Notable for the following components:    MCHC 31.1 (*)     MPV 12.0 (*)     Monocytes % 9.9 (*)     All other components within normal limits   URINALYSIS - Abnormal; Notable for the following components:    Blood, Urine MODERATE (*)     Bacteria, UA RARE (*)     Mucus, UA RARE (*)     All other components within normal limits   PREGNANCY, URINE   LIPASE   HCG, QUANTITATIVE, PREGNANCY            RADIOLOGY:     Non-plain film images such as CT, Ultrasound and MRI are read by the radiologist. Kody Reyez radiographic images are visualized and preliminarily interpreted by the emergency physician. Interpretation per the Radiologist below, if available at the time of this note:    CT ABDOMEN PELVIS W IV CONTRAST Additional Contrast? None   Final Result   No acute abnormality in abdomen or pelvis. No acute bowel abnormality. No   specific findings of acute inflammatory bowel disease. Hepatic steatosis. Status post cholecystectomy. ED BEDSIDE ULTRASOUND:   Performed by ED Physician Viola Wang MD       LABS:  Labs Reviewed   COMPREHENSIVE METABOLIC PANEL - Abnormal; Notable for the following components:       Result Value    Glucose 119 (*)     All other components within normal limits   CBC WITH AUTO DIFFERENTIAL - Abnormal; Notable for the following components:    MCHC 31.1 (*)     MPV 12.0 (*)     Monocytes % 9.9 (*)     All other components within normal limits   URINALYSIS - Abnormal; Notable for the following components:    Blood, Urine MODERATE (*)     Bacteria, UA RARE (*)     Mucus, UA RARE (*)     All other components within normal limits   PREGNANCY, URINE   LIPASE   HCG, QUANTITATIVE, PREGNANCY       All other labs were within normal range or not returned as of this dictation. EMERGENCY DEPARTMENT COURSE and DIFFERENTIAL DIAGNOSIS/MDM:   Vitals:    Vitals:    03/06/20 0844 03/06/20 1110 03/06/20 1330   BP: (!) 131/101 123/62 123/75   Pulse: 104 87    Resp: 17 16    Temp: 97.7 °F (36.5 °C)     TempSrc: Oral     SpO2: 95%  97%   Weight: (!) 305 lb (138.3 kg)     Height: 5' 5\" (1.651 m)             MDM  Number of Diagnoses or Management Options  Lower abdominal pain:   Diagnosis management comments: 38-year-old female with a history of Crohn's presents with lower abdominal pain. Denies any bloody diarrhea. She is having some nausea but no vomiting. She is on Remicade. She states that the symptoms do feel somewhat similar to her Crohn's exacerbation.   Vitals are

## 2020-03-06 NOTE — LETTER
[facility] Emergency Department  [address] [id number]  Phone: [phone]  Fax: [phone]             March 6, 2020    Patient: Laura Jiang   YOB: 1985   Date of Visit: 3/6/2020       To Whom It May Concern:    Wendy Barboza was seen and treated in our emergency department on 3/6/2020.        Sincerely,             Signature:__________________________________

## 2020-03-13 ASSESSMENT — ENCOUNTER SYMPTOMS
SORE THROAT: 0
BACK PAIN: 0
RHINORRHEA: 0
ABDOMINAL PAIN: 1
COUGH: 0
SHORTNESS OF BREATH: 0
EYE DISCHARGE: 0
NAUSEA: 1
EYE PAIN: 0

## 2020-03-25 PROBLEM — A04.72 C. DIFFICILE COLITIS: Status: RESOLVED | Noted: 2020-03-25 | Resolved: 2020-03-24

## 2020-04-20 ENCOUNTER — HOSPITAL ENCOUNTER (EMERGENCY)
Age: 35
Discharge: HOME | End: 2020-04-20
Payer: COMMERCIAL

## 2020-04-20 VITALS
SYSTOLIC BLOOD PRESSURE: 132 MMHG | TEMPERATURE: 97.8 F | DIASTOLIC BLOOD PRESSURE: 79 MMHG | RESPIRATION RATE: 20 BRPM | OXYGEN SATURATION: 96 % | HEART RATE: 98 BPM

## 2020-04-20 PROCEDURE — 99282 EMERGENCY DEPT VISIT SF MDM: CPT

## 2020-04-20 PROCEDURE — 6360000002 HC RX W HCPCS: Performed by: PHYSICIAN ASSISTANT

## 2020-04-20 PROCEDURE — 2500000003 HC RX 250 WO HCPCS: Performed by: PHYSICIAN ASSISTANT

## 2020-04-20 PROCEDURE — 90471 IMMUNIZATION ADMIN: CPT | Performed by: PHYSICIAN ASSISTANT

## 2020-04-20 PROCEDURE — 90715 TDAP VACCINE 7 YRS/> IM: CPT | Performed by: PHYSICIAN ASSISTANT

## 2020-04-20 PROCEDURE — 4500000028 HC INTERMEDIATE PROCEDURE

## 2020-04-20 RX ORDER — LIDOCAINE HYDROCHLORIDE 20 MG/ML
5 INJECTION, SOLUTION EPIDURAL; INFILTRATION; INTRACAUDAL; PERINEURAL ONCE
Status: COMPLETED | OUTPATIENT
Start: 2020-04-20 | End: 2020-04-20

## 2020-04-20 RX ORDER — BUPIVACAINE HYDROCHLORIDE 5 MG/ML
10 INJECTION, SOLUTION EPIDURAL; INTRACAUDAL ONCE
Status: COMPLETED | OUTPATIENT
Start: 2020-04-20 | End: 2020-04-20

## 2020-04-20 RX ADMIN — TETANUS TOXOID, REDUCED DIPHTHERIA TOXOID AND ACELLULAR PERTUSSIS VACCINE, ADSORBED 0.5 ML: 5; 2.5; 8; 8; 2.5 SUSPENSION INTRAMUSCULAR at 12:55

## 2020-04-20 RX ADMIN — LIDOCAINE HYDROCHLORIDE 5 ML: 20 INJECTION, SOLUTION EPIDURAL; INFILTRATION; INTRACAUDAL; PERINEURAL at 12:55

## 2020-04-20 RX ADMIN — BUPIVACAINE HYDROCHLORIDE 50 MG: 5 INJECTION, SOLUTION EPIDURAL; INTRACAUDAL at 12:54

## 2020-04-20 ASSESSMENT — PAIN DESCRIPTION - ORIENTATION: ORIENTATION: RIGHT

## 2020-04-20 ASSESSMENT — PAIN SCALES - GENERAL
PAINLEVEL_OUTOF10: 8
PAINLEVEL_OUTOF10: 8

## 2020-04-20 ASSESSMENT — PAIN DESCRIPTION - PAIN TYPE: TYPE: ACUTE PAIN

## 2020-04-20 NOTE — ED PROVIDER NOTES
EMERGENCY DEPARTMENT ENCOUNTER        PCP: Alex Talavera MD    [address] [facility]    Chief Complaint   Patient presents with    Laceration     right thumb       This patient was not evaluated by the attending physician. I have independently evaluated this patient. HPI    Nigel Roldan is a 37 y.o. female who presents with right thumb laceration. Context is patient accidentally sliced the tip of her right thumb while using a mandolin. Injury occurred just prior to arrival. Patient has associated symptom of bleeding. There is is nailbed involvement. Patient denies distal numbness, tingling, weakness, functional/motor deficit. Patient denies foreign body sensation. Patient denies tetanus status being up to date. REVIEW OF SYSTEMS    General: Denies symptoms preceding injury. Denies syncope. Skin: + Laceration. See HPI. Musculoskeletal:  No distal numbness, tingling. No obvious tendon or motor deficits. Denies any other musculoskeletal injuries or skin trauma.     All other review of systems are negative  See HPI and nursing notes for additional information     PAST MEDICAL & SURGICAL HISTORY    Past Medical History:   Diagnosis Date    Acid reflux     Acute deep vein thrombosis (DVT) of non-extremity vein 4/25/2018    Anemia     Anxiety     Asthma     NO PULMONOLOGIST AT THIS TIME    Blood clot due to device, implant, or graft 04/2018    had blood clots in neck due to med port    Bowen's disease     Crohn's disease ([facility] [address] Dx 2010    Remicade Infusions Every Six Weeks, Sees Dr. María Luong, [address] Depression     Fall 2012    \"Passed Out And Lacey Ratliff On My Tailbone\"    Fatty liver     Gestational diabetes     Hyperlipidemia     Lower back pain     \"Sometimes\"    MRSA (methicillin resistant staph aureus) culture positive 04/2018    Multiple pulmonary nodules 6/2013    Subcentimeter; needs repeat imaging in 3-6 months    Pancreatitis     Shortness of breath on exertion     Teeth missing     Upper And Lower    Wears glasses      Past Surgical History:   Procedure Laterality Date    ABDOMEN SURGERY      bowel resection     ADENOIDECTOMY      APPENDECTOMY      Done During Colon Resection For Crohn's  Disease    BREAST SURGERY Left     \"Infected Lymph Node Removed\"     SECTION N/A 2019     SECTION performed by Quinn [facility] at [facility]    CHOLECYSTECTOMY  2016    COLECTOMY  8-11    Crohn's Disease , Dr. Mayra Pearson, Appendectomy Also Done    COLONOSCOPY  2016    Polyps Removed In Past    COLONOSCOPY  2017    Hospitalized for c-diff    COLONOSCOPY  2018    normal, multiple biopsy, repeat 1 year    COLONOSCOPY N/A 2019    COLONOSCOPY POLYPECTOMY SNARE/COLD BIOPSY performed by Pascual Burleson MD at [place] [address]      ENDOSCOPY, COLON, DIAGNOSTIC  Last Done     LAPAROSCOPY      Dr Daniela Ohara, Removed Adhesions    OTHER SURGICAL HISTORY  2011    Mediport Insertion Left Chest Area/revision done 2016- \"removed at [facility] 2017 after I got an infection    TUNNELED VENOUS PORT PLACEMENT Right 2018    smart port- [facility]-Dr Vance Knee    WISDOM TOOTH EXTRACTION      All Four [place] Teeth Extracted In Past       CURRENT MEDICATIONS    Current Outpatient Rx   Medication Sig Dispense Refill    ondansetron (ZOFRAN ODT) 4 MG disintegrating tablet Take 1 tablet by mouth every 6 hours 10 tablet 1    ibuprofen (ADVIL;MOTRIN) 800 MG tablet Take 1 tablet by mouth every 8 hours 120 tablet 0    albuterol sulfate  (90 Base) MCG/ACT inhaler Inhale 2 puffs into the lungs every 6 hours as needed for Wheezing      loperamide (IMODIUM) 2 MG capsule Take 2 mg by mouth 4 times daily as needed for Diarrhea      ferrous sulfate 325 (65 Fe) MG tablet Take 325 mg by mouth 2 times daily      Carboxymeth-Glycerin-Polysorb (REFRESH OPTIVE ADVANCED) 0.5-1-0.5 % SOLN Place 1 drop into both eyes 3 times daily      InFLIXimab (REMICADE IV) Infuse intravenously States last tx was 2/9/18 due on the 3/16/18 Every Six Weeks At [address]/Segundo Rosas , [place]         ALLERGIES    Allergies   Allergen Reactions    Morphine      Other reaction(s): Headache  Triggers migraine.    \"Triggers My Migraines\"    Tramadol      Other reaction(s): Headache  States triggers migraine headache  \"Triggers My Migraines\"       SOCIAL & FAMILY HISTORY    Social History     Socioeconomic History    Marital status: Single     Spouse name: None    Number of children: 1    Years of education: None    Highest education level: None   Occupational History    None   Social Needs    Financial resource strain: None    Food insecurity     Worry: None     Inability: None    Transportation needs     Medical: None     Non-medical: None   Tobacco Use    Smoking status: Never Smoker    Smokeless tobacco: Never Used   Substance and Sexual Activity    Alcohol use: No    Drug use: No    Sexual activity: Yes     Partners: Male   Lifestyle    Physical activity     Days per week: None     Minutes per session: None    Stress: None   Relationships    Social connections     Talks on phone: None     Gets together: None     Attends Anabaptism service: None     Active member of club or organization: None     Attends meetings of clubs or organizations: None     Relationship status: None    Intimate partner violence     Fear of current or ex partner: None     Emotionally abused: None     Physically abused: None     Forced sexual activity: None   Other Topics Concern    None   Social History Narrative    Lives with parents, has a daughter     Family History   Problem Relation Age of Onset    Migraines Mother     Heart Disease Father     Diabetes Father     High Cholesterol Father     Depression Maternal Aunt     Depression Maternal Uncle     Depression Paternal Aunt     Coronary Art Dis intact after the splint placement. ED COURSE & MEDICAL DECISION MAKING       Vital signs and nursing notes reviewed during ED course. I have independently evaluated this patient. Supervising MD present in the Emergency Department, available for consultation, throughout entirety of  patient care. Patient presents today for avulsion of tissue and partial avulsion of nail of right thumb which was repaired while in ED today. Digital block of right thumb performed today for pain control with resolution of pain. Tetanus status was updated while in the ED today. I discussed possibility of infection, retained foreign body, tendon injury, nerve injury. I discussed wound care instructions today with patient and/or family. Patient and/or family agrees to having a wound check in 2 days as needed (which we discussed today). We also discussed scars and ways to minimize scarring including but not limited to protection of scar from sunlight for one year and massaging of scar tissue. Nonocclusive dressing applied over Fibracol and splint applied of her right thumb to protect the wound. Right thumb distal neurovascular intact. Patient is nontoxic-appearing. Vital signs stable. Initial tachycardia resolved when patient calmed down. Patient comfortable discharge at this time. All pertinent Lab data and radiographic results reviewed with patient at bedside. The patient and/or the family were informed of the results of any tests/labs/imaging, the treatment plan, and time was allotted to answer questions. Disposition, diagnosis, and plan discussed at bedside with patient and/or the family today who understands and agrees.  Return to emergency department precautions were discussed in detail with patient and/or family who understands and agrees to return for new or worsening symptoms including but not limited to numbness, weakness, tingling, fever, chills, redness around wound, streaking redness proximal or distal to wound, purulent drainage from wound, nausea, vomiting, joint redness, joint swelling. Clinical  IMPRESSION    1. Avulsion of skin of finger, initial encounter    2. Nail avulsion, finger, initial encounter         Disposition referral (if applicable):  Simran Purvis MD  [address] [place] [address]       For wound re-check in 2 days, As needed    [facility] Emergency Department  [facility] [id number] [phone]  Go to   Return to ED if symptoms worsen or new symptoms      Disposition medications (if applicable):  Discharge Medication List as of 4/20/2020  1:01 PM          Comment: Please note this report has been produced using speech recognition software and may contain errors related to that system including errors in grammar, punctuation, and spelling, as well as words and phrases that may be inappropriate. If there are any questions or concerns please feel free to contact the dictating provider for clarification.           Lisandro Cannon PA-C  04/20/20 7815

## 2020-04-20 NOTE — ED NOTES
Verbal and written discharge instructions provided with education. Patient verbalized understanding and denies further questions or concerns.      García Porras RN  04/20/20 1889

## 2020-04-21 ENCOUNTER — CARE COORDINATION (OUTPATIENT)
Dept: CARE COORDINATION | Age: 35
End: 2020-04-21

## 2020-04-21 NOTE — CARE COORDINATION
*Wear shoulder immobilizer as directed.   *Take medications as prescribed.  Ibuprofen and/or tylenol for pain.  Oxycodone for severe pain not relieved by ibuprofen.   *Follow-up with orthopedics in the next 2-3 days.  *Return if you develop worsening pain, numbness, weakness or become worse in any way.    Home  Back  SP    Dislocation: Shoulder (Reduced)    Dislocation of the shoulder joint occurs when a strong force tears the ligaments holding the joint together. This allows the bones to move apart and become stuck out of place. Once the joint is aligned again, it will take about six weeks for the ligaments to heal. Since this injury may weaken the ligaments, you are at risk of another dislocation with less force. Therefore, care should be taken to avoid a similar injury in the future.  Shoulder dislocation is treated with a shoulder immobilizer (special type of arm sling). This keeps your arm close to your body to prevent a recurrent dislocation while the ligaments heal. After a few weeks, an exercise program may be started. This will gradually restore range of motion and strength at the shoulder and decrease the risk of another dislocation.  Home Care:    Until your next doctor visit, wear your shoulder immobilizer at all times . Do not take it off at night to sleep. It is possible to dislocate your arm again in your sleep. You may take it off to bathe or dress, but do not move your arm away from your body. Keep your arm in the same position that the sling was holding it in, until you reapply the sling again. During your next visit, ask your doctor how long you should wear the sling.    Apply an ice pack (ice cubes in a plastic bag, wrapped in a towel) over the injured area for 20 minutes every 1-2 hours the first day. Continue with ice packs 3-4 times a day for the next two days, then as needed for the relief of pain and swelling.    You may use acetaminophen (Tylenol) or ibuprofen (Motrin, Advil) to control  Call to check on pt status after recent ER visit for thumb laceration. LM with ACM contact information & requested a call back. Ashley Rushing RN  Ambulatory Care Manager  [phone] office/cell  [phone] fax  [email or web address] pain, unless another pain medicine was prescribed. [NOTE: If you have chronic liver or kidney disease or ever had a stomach ulcer or GI bleeding, talk with your doctor before using these medicines.]    No sports or P.E. until cleared by your doctor.  Follow Up  with your doctor within one week or as advised by our staff. Shoulder immobilizers and slings should not be worn continuously for more than a few weeks or you may lose some range-of-motion at the shoulder joint. If you have had repeated dislocations of the same shoulder, that means there has been permanent ligament damage. Ask the orthopedic doctor about surgery to prevent another dislocation.  Get Prompt Medical Attention  if any of the following occur:    Another dislocation of your shoulder    Increasing swelling or pain in the shoulder or arm    Fingers become cold, blue, numb or tingly    [id number] [facility], [address] [place] [address], [place] [address]. All rights reserved. This information is not intended as a substitute for professional medical care. Always follow your healthcare professional's instructions.      Opioid Medication Information    You have been given a prescription for an opioid (narcotic) pain medicine and/or have received a pain medicine while here in the Emergency Department. These medicines can make you drowsy or impaired. You must not drive, operate dangerous equipment, or engage in any other dangerous activities while taking these medications. If you drive while taking these medications, you could be arrested for DUI, or driving under the influence. Do not drink any alcohol while you are taking these medications.   Opioid pain medications can cause addiction. If you have a history of chemical dependency of any type, you are at a higher risk of becoming addicted to pain medications.  Only take these prescribed medications to treat your pain when all other options have been tried. Take it for as short a time and as few doses as  possible. Store your pain pills in a secure place, as they are frequently stolen and provide a dangerous opportunity for children or visitors in your house to start abusing these powerful medications. We will not replace any lost or stolen medicine.  As soon as your pain is better, you should flush all your remaining medication.   Many prescription pain medications contain Tylenol  (acetaminophen), including Vicodin , Tylenol #3 , Norco , Lortab , and Percocet .  You should not take any extra pills of Tylenol  if you are using these prescription medications or you can get very sick.  Do not ever take more than 4000 mg of acetaminophen in any 24 hour period.  All opioids tend to cause constipation. Drink plenty of water and eat foods that have a lot of fiber, such as fruits, vegetables, prune juice, apple juice and high fiber cereal.  Take a laxative if you don t move your bowels at least every other day. Miralax , Milk of Magnesia, Colace , or Senna  can be used to keep you regular.

## 2020-04-22 ENCOUNTER — CARE COORDINATION (OUTPATIENT)
Dept: CARE COORDINATION | Age: 35
End: 2020-04-22

## 2020-04-22 NOTE — CARE COORDINATION
Call to check on pt status after recent ER visit for thumb laceration. LM with ACM contact information. No further outreach is planned, ACM signing off. Juan Francisco Brandon RN  Ambulatory Care Manager  [phone] office/cell  [phone] fax  [email or web address]

## 2020-06-19 ENCOUNTER — HOSPITAL ENCOUNTER (EMERGENCY)
Age: 35
Discharge: HOME | End: 2020-06-19
Attending: EMERGENCY MEDICINE
Payer: COMMERCIAL

## 2020-06-19 ENCOUNTER — APPOINTMENT (OUTPATIENT)
Dept: GENERAL RADIOLOGY | Age: 35
End: 2020-06-19
Payer: COMMERCIAL

## 2020-06-19 ENCOUNTER — APPOINTMENT (OUTPATIENT)
Dept: ULTRASOUND IMAGING | Age: 35
End: 2020-06-19
Payer: COMMERCIAL

## 2020-06-19 VITALS
SYSTOLIC BLOOD PRESSURE: 137 MMHG | HEART RATE: 88 BPM | HEIGHT: 65 IN | BODY MASS INDEX: 48.82 KG/M2 | OXYGEN SATURATION: 98 % | TEMPERATURE: 97.7 F | RESPIRATION RATE: 15 BRPM | WEIGHT: 293 LBS | DIASTOLIC BLOOD PRESSURE: 95 MMHG

## 2020-06-19 PROCEDURE — 73562 X-RAY EXAM OF KNEE 3: CPT

## 2020-06-19 PROCEDURE — 99284 EMERGENCY DEPT VISIT MOD MDM: CPT

## 2020-06-19 PROCEDURE — 93971 EXTREMITY STUDY: CPT

## 2020-06-19 RX ORDER — ACETAMINOPHEN 325 MG/1
650 TABLET ORAL EVERY 6 HOURS PRN
Qty: 20 TABLET | Refills: 0 | Status: SHIPPED | OUTPATIENT
Start: 2020-06-19

## 2020-06-19 RX ORDER — OMEPRAZOLE 10 MG/1
10 CAPSULE, DELAYED RELEASE ORAL DAILY
COMMUNITY
End: 2021-08-31

## 2020-06-19 RX ORDER — NAPROXEN 500 MG/1
500 TABLET ORAL 2 TIMES DAILY WITH MEALS
Qty: 180 TABLET | Refills: 1 | Status: SHIPPED | OUTPATIENT
Start: 2020-06-19 | End: 2020-09-21

## 2020-06-19 RX ORDER — CYCLOBENZAPRINE HCL 10 MG
10 TABLET ORAL NIGHTLY PRN
Qty: 30 TABLET | Refills: 0 | Status: SHIPPED | OUTPATIENT
Start: 2020-06-19 | End: 2020-06-29

## 2020-06-19 ASSESSMENT — ENCOUNTER SYMPTOMS
STRIDOR: 0
RECTAL PAIN: 0
COLOR CHANGE: 0
RESPIRATORY NEGATIVE: 1
BLOOD IN STOOL: 0
NAUSEA: 0
BACK PAIN: 0
EYE PAIN: 0
APNEA: 0
SHORTNESS OF BREATH: 0
EYE DISCHARGE: 0
VOICE CHANGE: 0
SINUS PRESSURE: 0
GASTROINTESTINAL NEGATIVE: 1
EYE REDNESS: 0
TROUBLE SWALLOWING: 0
WHEEZING: 0
CONSTIPATION: 0
PHOTOPHOBIA: 0
DIARRHEA: 0
COUGH: 0
ABDOMINAL PAIN: 0
VOMITING: 0
RHINORRHEA: 0
EYE ITCHING: 0
EYES NEGATIVE: 1
CHOKING: 0
CHEST TIGHTNESS: 0
FACIAL SWELLING: 0
SINUS PAIN: 0

## 2020-06-19 ASSESSMENT — PAIN DESCRIPTION - PROGRESSION: CLINICAL_PROGRESSION: GRADUALLY WORSENING

## 2020-06-19 ASSESSMENT — PAIN DESCRIPTION - FREQUENCY: FREQUENCY: CONTINUOUS

## 2020-06-19 ASSESSMENT — PAIN DESCRIPTION - DESCRIPTORS: DESCRIPTORS: SHARP

## 2020-06-19 ASSESSMENT — PAIN DESCRIPTION - ONSET: ONSET: PROGRESSIVE

## 2020-06-19 ASSESSMENT — PAIN DESCRIPTION - LOCATION: LOCATION: KNEE

## 2020-06-19 ASSESSMENT — PAIN DESCRIPTION - ORIENTATION: ORIENTATION: LEFT

## 2020-06-19 ASSESSMENT — PAIN DESCRIPTION - PAIN TYPE: TYPE: ACUTE PAIN

## 2020-06-19 NOTE — ED PROVIDER NOTES
Negative. Negative for apnea, cough, choking, chest tightness, shortness of breath, wheezing and stridor. Cardiovascular: Negative. Negative for chest pain, palpitations and leg swelling. Gastrointestinal: Negative. Negative for abdominal pain, blood in stool, constipation, diarrhea, nausea, rectal pain and vomiting. Endocrine: Negative for polyuria. Genitourinary: Negative. Negative for dysuria, flank pain, frequency, hematuria and urgency. Musculoskeletal: Positive for arthralgias and joint swelling. Negative for back pain, gait problem, myalgias, neck pain, neck stiffness and stiffness. Skin: Negative. Negative for color change, itching, pallor, rash and wound. Neurological: Negative. Negative for dizziness, speech difficulty, light-headedness, numbness and headaches. Psychiatric/Behavioral: Negative. Negative for agitation, confusion, self-injury, sleep disturbance and suicidal ideas. The patient is not nervous/anxious. All other systems reviewed and are negative. Except as noted above the remainder of the review of systems was reviewed and negative.        PAST MEDICAL HISTORY     Past Medical History:   Diagnosis Date    Acid reflux     Acute deep vein thrombosis (DVT) of non-extremity vein 4/25/2018    Anemia     Anxiety     Asthma     NO PULMONOLOGIST AT THIS TIME    Blood clot due to device, implant, or graft 04/2018    had blood clots in neck due to med port    Bowen's disease     Crohn's disease ([facility] [address] Dx 2010    Remicade Infusions Every Six Weeks, Sees Dr. Delisa Alonzo At [facility] [address] [place], [place] Depression     Fall 2012    \"Passed Out And Che Mercury On My Tailbone\"    Fatty liver     Gestational diabetes     Hyperlipidemia     Lower back pain     \"Sometimes\"    MRSA (methicillin resistant staph aureus) culture positive 04/2018    Multiple pulmonary nodules 6/2013    Subcentimeter; needs repeat imaging in 3-6 months    Pancreatitis     Shortness of with complication ([address]    Neck swelling    Leukocytosis    Non-intractable vomiting with nausea    Infection of venous access port    Acute deep vein thrombosis (DVT) of non-extremity vein    Morbid obesity (HCC)    Preeclampsia, third trimester    Pregnancy related condition    Status post  delivery    Bowen's disease of right shoulder         SURGICAL HISTORY       Past Surgical History:   Procedure Laterality Date    ABDOMEN SURGERY      bowel resection     ADENOIDECTOMY      APPENDECTOMY      Done During Colon Resection For Crohn's  Disease    BREAST SURGERY Left     \"Infected Lymph Node Removed\"     SECTION N/A 2019     SECTION performed by Gianluca Landry MD at [facility] L&D [address]  2016    COLECTOMY  8-11    Crohn's Disease , Dr. Marleni Serna, Appendectomy Also Done    COLONOSCOPY  2016    Polyps Removed In Past    COLONOSCOPY  2017    Hospitalized for c-diff    COLONOSCOPY  2018    normal, multiple biopsy, repeat 1 year    COLONOSCOPY N/A 2019    COLONOSCOPY POLYPECTOMY SNARE/COLD BIOPSY performed by Taye Valdivia MD at [place] [address]      ENDOSCOPY, COLON, DIAGNOSTIC  Last Done     LAPAROSCOPY      Dr Jose Castillo, Removed Adhesions    OTHER SURGICAL HISTORY  2011    Mediport Insertion Left Chest Area/revision done 2016- \"removed at [facility] 2017 after I got an infection    TUNNELED VENOUS PORT PLACEMENT Right 2018    smart port- [facility]-Dr Herrera Code    WISDOM TOOTH EXTRACTION      All Four [place] Teeth Extracted In Past         CURRENT MEDICATIONS       Previous Medications    ALBUTEROL SULFATE  (90 BASE) MCG/ACT INHALER    Inhale 2 puffs into the lungs every 6 hours as needed for Wheezing    CARBOXYMETH-GLYCERIN-POLYSORB (REFRESH OPTIVE ADVANCED) 0.5-1-0.5 % SOLN    Place 1 drop into both eyes 3 times daily    FERROUS SULFATE 325 (65 FE) MG TABLET    Take on phone: None     Gets together: None     Attends [employer]ist service: None     Active member of club or organization: None     Attends meetings of clubs or organizations: None     Relationship status: None    Intimate partner violence     Fear of current or ex partner: None     Emotionally abused: None     Physically abused: None     Forced sexual activity: None   Other Topics Concern    None   Social History Narrative    Lives with parents, has a daughter       SCREENINGS               PHYSICAL EXAM    (up to 7 for level 4, 8 or more for level 5)     ED Triage Vitals [06/19/20 1850]   BP Temp Temp Source Pulse Resp SpO2 Height Weight   (!) 137/95 97.7 °F (36.5 °C) Tympanic -- 15 98 % 5' 5\" (1.651 m) (!) 311 lb (141.1 kg)       Physical Exam  Vitals signs and nursing note reviewed. Constitutional:       General: She is not in acute distress. Appearance: She is well-developed. She is not diaphoretic. HENT:      Head: Normocephalic and atraumatic. Right Ear: External ear normal.      Left Ear: External ear normal.      Nose: Nose normal.      Mouth/Throat:      Pharynx: No oropharyngeal exudate. Eyes:      General: No scleral icterus. Right eye: No discharge. Left eye: No discharge. Pupils: Pupils are equal, round, and reactive to light. Neck:      Musculoskeletal: Normal range of motion. Thyroid: No thyromegaly. Vascular: No JVD. Trachea: No tracheal deviation. Cardiovascular:      Rate and Rhythm: Normal rate and regular rhythm. Heart sounds: Normal heart sounds. No murmur. No friction rub. No gallop. Pulmonary:      Effort: Pulmonary effort is normal. No respiratory distress. Breath sounds: Normal breath sounds. No stridor. No wheezing or rales. Chest:      Chest wall: No tenderness. Abdominal:      General: Bowel sounds are normal. There is no distension. Palpations: Abdomen is soft. There is no mass. Tenderness:  There is no Edilberto Ruiz DO  06/19/20 2140

## 2020-07-23 ENCOUNTER — OFFICE VISIT (OUTPATIENT)
Dept: ORTHOPEDIC SURGERY | Age: 35
End: 2020-07-23
Payer: COMMERCIAL

## 2020-07-23 VITALS
WEIGHT: 293 LBS | HEIGHT: 65 IN | OXYGEN SATURATION: 99 % | BODY MASS INDEX: 48.82 KG/M2 | HEART RATE: 97 BPM | RESPIRATION RATE: 18 BRPM

## 2020-07-23 PROCEDURE — 1036F TOBACCO NON-USER: CPT | Performed by: PHYSICIAN ASSISTANT

## 2020-07-23 PROCEDURE — G8427 DOCREV CUR MEDS BY ELIG CLIN: HCPCS | Performed by: PHYSICIAN ASSISTANT

## 2020-07-23 PROCEDURE — G8417 CALC BMI ABV UP PARAM F/U: HCPCS | Performed by: PHYSICIAN ASSISTANT

## 2020-07-23 PROCEDURE — 99202 OFFICE O/P NEW SF 15 MIN: CPT | Performed by: PHYSICIAN ASSISTANT

## 2020-07-23 NOTE — PATIENT INSTRUCTIONS
Work on ROM and strengthening of knees/legs   May take OTC Ibuprofen or Aleve as needed for pain  Rest, ice, and elevate as needed  MRI ordered  Contact office once MRI is completed to schedule virtual visit to discuss results

## 2020-07-30 ASSESSMENT — ENCOUNTER SYMPTOMS
EYES NEGATIVE: 1
GASTROINTESTINAL NEGATIVE: 1
RESPIRATORY NEGATIVE: 1

## 2020-08-08 ENCOUNTER — HOSPITAL ENCOUNTER (OUTPATIENT)
Dept: MRI IMAGING | Age: 35
Discharge: HOME | End: 2020-08-08
Payer: COMMERCIAL

## 2020-08-08 PROCEDURE — 73721 MRI JNT OF LWR EXTRE W/O DYE: CPT

## 2020-08-11 ENCOUNTER — TELEPHONE (OUTPATIENT)
Dept: ORTHOPEDIC SURGERY | Age: 35
End: 2020-08-11

## 2020-08-11 NOTE — TELEPHONE ENCOUNTER
Spoke with patient regarding recent MRI results and patient wishes to proceed with steroid injection. Patient had a virtual visit scheduled for Thursday that has been changed to office visit for patient to have steroid injection given and discuss further questions regarding MRI results      ----- Message from Rosie Aguilera PA-C sent at 8/10/2020 11:26 PM EDT -----  MRI negative for meniscal tear or ligament rupture. It does appear like she has some early arthritic change within the knee and would do well with a steroid injection into the knee.

## 2020-08-19 ENCOUNTER — OFFICE VISIT (OUTPATIENT)
Dept: ORTHOPEDIC SURGERY | Age: 35
End: 2020-08-19
Payer: COMMERCIAL

## 2020-08-19 VITALS — OXYGEN SATURATION: 98 % | RESPIRATION RATE: 16 BRPM | HEART RATE: 72 BPM

## 2020-08-19 PROBLEM — M17.11 PATELLOFEMORAL ARTHRITIS OF RIGHT KNEE: Status: ACTIVE | Noted: 2020-08-19

## 2020-08-19 PROBLEM — M70.51 INFRAPATELLAR BURSITIS OF RIGHT KNEE: Status: ACTIVE | Noted: 2020-08-19

## 2020-08-19 PROCEDURE — 99213 OFFICE O/P EST LOW 20 MIN: CPT | Performed by: PHYSICIAN ASSISTANT

## 2020-08-19 PROCEDURE — G8427 DOCREV CUR MEDS BY ELIG CLIN: HCPCS | Performed by: PHYSICIAN ASSISTANT

## 2020-08-19 PROCEDURE — G8417 CALC BMI ABV UP PARAM F/U: HCPCS | Performed by: PHYSICIAN ASSISTANT

## 2020-08-19 PROCEDURE — 1036F TOBACCO NON-USER: CPT | Performed by: PHYSICIAN ASSISTANT

## 2020-08-19 PROCEDURE — 20610 DRAIN/INJ JOINT/BURSA W/O US: CPT | Performed by: PHYSICIAN ASSISTANT

## 2020-08-19 NOTE — PROGRESS NOTES
Review of Systems   Constitutional: Negative. HENT: Negative. Eyes: Negative. Respiratory: Negative. Cardiovascular: Negative. Gastrointestinal: Negative. Genitourinary: Negative. Musculoskeletal: Positive for arthralgias and myalgias. Skin: Negative. Neurological: Negative. Psychiatric/Behavioral: Negative. HPI:  Dorothy Mitchell is a 37 y.o. female that returns to go over MRI results on her right knee. She states that she continues to have knee pain but it is really only painful when she bears weight, goes to get up out of a chair or goes down steps. She does have pain when she tries to kneel on the knee as there is fluid over the anterior aspect of her knee. She rates her pain today when ambulating at an 8/10. She feels like the swelling over the front of her knee is getting worse.         Past Medical History:   Diagnosis Date    Acid reflux     Acute deep vein thrombosis (DVT) of non-extremity vein 4/25/2018    Anemia     Anxiety     Asthma     NO PULMONOLOGIST AT THIS TIME    Blood clot due to device, implant, or graft 04/2018    had blood clots in neck due to med port    Bowen's disease     Crohn's disease ([facility] [address] Dx 2010    Remicade Infusions Every Six Weeks, Sees Dr. Mark Anthony Garay In [place], [address] Depression     Fall 2012    \"Passed Out And Arch Allyson On My Tailbone\"    Fatty liver     Gestational diabetes     Hyperlipidemia     Lower back pain     \"Sometimes\"    MRSA (methicillin resistant staph aureus) culture positive 04/2018    Multiple pulmonary nodules 6/2013    Subcentimeter; needs repeat imaging in 3-6 months    Pancreatitis     Patellofemoral arthritis of right knee 8/19/2020    Shortness of breath on exertion     Teeth missing     Upper And Lower    Wears glasses        Past Surgical History:   Procedure Laterality Date    ABDOMEN SURGERY  2011    bowel resection    [address]  2011    Done Inability: None    Transportation needs     Medical: None     Non-medical: None   Tobacco Use    Smoking status: Never Smoker    Smokeless tobacco: Never Used   Substance and Sexual Activity    Alcohol use: No    Drug use: No    Sexual activity: Yes     Partners: Male   Lifestyle    Physical activity     Days per week: None     Minutes per session: None    Stress: None   Relationships    Social connections     Talks on phone: None     Gets together: None     Attends [employer] service: None     Active member of club or organization: None     Attends meetings of clubs or organizations: None     Relationship status: None    Intimate partner violence     Fear of current or ex partner: None     Emotionally abused: None     Physically abused: None     Forced sexual activity: None   Other Topics Concern    None   Social History Narrative    Lives with parents, has a daughter       Current Outpatient Medications   Medication Sig Dispense Refill    omeprazole (PRILOSEC) 10 MG delayed release capsule Take 10 mg by mouth daily      naproxen (NAPROSYN) 500 MG tablet Take 1 tablet by mouth 2 times daily (with meals) 180 tablet 1    acetaminophen (AMINOFEN) 325 MG tablet Take 2 tablets by mouth every 6 hours as needed for Pain 20 tablet 0    ondansetron (ZOFRAN ODT) 4 MG disintegrating tablet Take 1 tablet by mouth every 6 hours 10 tablet 1    ibuprofen (ADVIL;MOTRIN) 800 MG tablet Take 1 tablet by mouth every 8 hours 120 tablet 0    albuterol sulfate  (90 Base) MCG/ACT inhaler Inhale 2 puffs into the lungs every 6 hours as needed for Wheezing      loperamide (IMODIUM) 2 MG capsule Take 2 mg by mouth 4 times daily as needed for Diarrhea      ferrous sulfate 325 (65 Fe) MG tablet Take 325 mg by mouth 2 times daily      Carboxymeth-Glycerin-Polysorb (REFRESH OPTIVE ADVANCED) 0.5-1-0.5 % SOLN Place 1 drop into both eyes 3 times daily      InFLIXimab (REMICADE IV) Infuse intravenously States last tx was 2/9/18 due on the 3/16/18 Every Six Weeks At [address] [place] [address],[address]       No current facility-administered medications for this visit. Allergies   Allergen Reactions    Morphine      Other reaction(s): Headache  Triggers migraine. \"Triggers My Migraines\"    Tramadol      Other reaction(s): Headache  States triggers migraine headache  \"Triggers My Migraines\"       Review of Systems:  See above      Physical Exam:   Pulse 72   Resp 16   SpO2 98%        Gait is Normal. The patient can bear weight on the injured extremity. Gen/Psych: Examinationreveals a pleasant individual in no acute distress. The patient is oriented to time, place and person. The patient's mood and affect are appropriate.     Lymph: The lymphatic examination bilaterally reveals allareas to be without enlargement or induration.      Skin intact without lymphadenopathy, discoloration, or abnormal temperature.      Vascular: There is intact, symmetric circulation in both lowerextremities. right leg/knee exam:  Leg alignment:     neutral  Quadriceps/hamstring atrophy:   no  Knee effusion:    moderate infrapatellar bursal effusion   Knee erythema:   no  ROM:     0-115°  Mild crepitus with flexion and extension of the knee. There is + L2-S1 motor and sensory function in bilateral lower extremities      Imaging studies:  FINDINGS:    MENISCI: Medial and lateral menisci are normal in bulk, contour, and signal    intensity, without discrete tear identified.         CRUCIATE LIGAMENTS: Anterior and posterior cruciate ligaments are intact.         EXTENSOR MECHANISM: Quadriceps and patellar tendons are intact.     Medial/lateral patellofemoral retinacula are intact.         LATERAL COLLATERAL LIGAMENT COMPLEX: The popliteus tendon, biceps femoris    tendon, fibular collateral ligament and iliotibial band are intact.         MEDIAL COLLATERAL LIGAMENT COMPLEX: The superficial and deep components of    the medial collateral ligament are intact.         KNEE JOINT: Multifocal partial thickness chondromalacia within the medial,    lateral, and patellofemoral compartments.  Small patellofemoral joint    effusion.  TT TG interval is borderline elevated measuring 1.9 cm.  Subtle    lateral patellar translation.  Popliteal cyst without dehiscence or rupture.         BONE MARROW: No discrete marrow signal abnormality.              Impression    No meniscal, cruciate, or collateral ligamentous tear.         Borderline elevated TT TG interval of 1.9 cm.  Small patellofemoral joint    effusion.         Popliteal cyst.         No evidence for prepatellar soft tissue fluid collection. Impression:  right knee patellofemoral arthritis      Plan:  Natural history and expected course discussed. Questions answered. I discussed with the patient the MRI findings and the fact that there did not appear to be a fluid collection in her infrapatellar bursa but if she wanted another opinion she could talk to the surgeon. Patient Instructions     Continue weight bear as tolerated  Continue range of motion and exercises as instruction  Ice and elevate as needed  Tylenol or Motrin for pain  Injection given today in the office   Rest for 24-48 hours        Right knee injection procedure note    Pre-procedure diagnosis:  Right knee DJD    Post-procedure diagnosis:  same    Procedure: The planned procedure/risks/benefits/alternatives were discussed with the patient. Risks include, but are not limited to, increased pain, drug reaction, infection, bleeding, lack of improvement, neurovascular injury, and increased blood sugar levels. The patient understood and all of their questions were answered. The right knee was prepped with alcohol, then a 22 gauge needle was advanced into the inferior-lateral joint without difficulty. The joint was then injected with 3 ml 1% lidocaine, 2ml of Kenalog (40 mg/ml).  The injection site was cleansed with isopropyl alcohol and a band-aid was placed. Complications:  None, the patient tolerated the procedure well. Instructions: The patient was advised to rest the knee and decrease activity for the next 24 to 48 hours. May use prescription or OTC pain relievers as needed for any post-injection pain as well as ice.

## 2020-08-19 NOTE — PATIENT INSTRUCTIONS
Continue weight bear as tolerated  Continue range of motion and exercises as instruction  Ice and elevate as needed  Tylenol or Motrin for pain  Injection given today in the office   Rest for 24-48 hours

## 2020-08-26 ASSESSMENT — ENCOUNTER SYMPTOMS
GASTROINTESTINAL NEGATIVE: 1
EYES NEGATIVE: 1
RESPIRATORY NEGATIVE: 1

## 2020-09-21 ENCOUNTER — OFFICE VISIT (OUTPATIENT)
Dept: ORTHOPEDIC SURGERY | Age: 35
End: 2020-09-21
Payer: COMMERCIAL

## 2020-09-21 VITALS — RESPIRATION RATE: 20 BRPM | HEIGHT: 65 IN | WEIGHT: 293 LBS | BODY MASS INDEX: 48.82 KG/M2

## 2020-09-21 PROCEDURE — 99203 OFFICE O/P NEW LOW 30 MIN: CPT | Performed by: ORTHOPAEDIC SURGERY

## 2020-09-21 PROCEDURE — G8427 DOCREV CUR MEDS BY ELIG CLIN: HCPCS | Performed by: ORTHOPAEDIC SURGERY

## 2020-09-21 PROCEDURE — 1036F TOBACCO NON-USER: CPT | Performed by: ORTHOPAEDIC SURGERY

## 2020-09-21 PROCEDURE — G8417 CALC BMI ABV UP PARAM F/U: HCPCS | Performed by: ORTHOPAEDIC SURGERY

## 2020-09-21 RX ORDER — RIZATRIPTAN BENZOATE 10 MG/1
TABLET ORAL
COMMUNITY
Start: 2020-09-08

## 2020-09-21 RX ORDER — HYDROXYZINE HYDROCHLORIDE 25 MG/1
TABLET, FILM COATED ORAL
COMMUNITY
Start: 2020-09-08 | End: 2021-04-12

## 2020-09-21 NOTE — PROGRESS NOTES
Subjective:      Patient ID: Jeremias Senior is a 38 y.o. female. Patient here for a new patient follow up per HECTOR Calle on her right knee pain. She states onset has been since this past June with NKI or known cause but the pain and anterior swelling is still present. She was treated with a steroid injection on 8/19/2020 which provided minimal relief and an MRI was ordered, see below. She reports no prior surgeries or known injuries to this right knee. She has an anterior patellar mass that is painful with pressure. She has been working on weight loss with a nutritionist. She takes tylenol PRN. No new issues today. Provider needs to conduct a hands on physical today due to patients injury/diagnosis    MRI RIGHT KNEE 8/8/2020  Impression   No meniscal, cruciate, or collateral ligamentous tear.       Borderline elevated TT TG interval of 1.9 cm.  Small patellofemoral joint   effusion.       Popliteal cyst.       No evidence for prepatellar soft tissue fluid collection.         She comes in today for her first visit with me in regards to her right knee pain and swelling. She states that over the past several months she has had an enlarging mass along the front of her right knee. She states that this is painful when she tries to kneel on her knee or squat down or rise from a seated position. She states that this is no longer enlarging but she continues to have a dull and aching pain along the front of her right knee. Patient denies any new injury to the involved extremity/ joint, denies numbness or tingling in the involved extremity and denies fever or chills. She has been working on weight loss and is meeting with a nutritionist.          Review of Systems   Constitutional: Negative for activity change, chills and fever. Respiratory: Negative for chest tightness. Cardiovascular: Negative for chest pain. Musculoskeletal: Positive for arthralgias and joint swelling.  Negative for back pain, gait problem and myalgias. Skin: Negative for color change, pallor, rash and wound. Neurological: Negative for weakness and numbness. Past Medical History:   Diagnosis Date    Acid reflux     Acute deep vein thrombosis (DVT) of non-extremity vein 4/25/2018    Anemia     Anxiety     Asthma     NO PULMONOLOGIST AT THIS TIME    Blood clot due to device, implant, or graft 04/2018    had blood clots in neck due to med port    Bowen's disease     Crohn's disease ([facility] [address] Dx 2010    Remicade Infusions Every Six Weeks, Sees Dr. Britton Duane, [address] Depression     Fall 2012    \"Passed Out And Karla Alba On My Tailbone\"    Fatty liver     Gestational diabetes     Hyperlipidemia     Lower back pain     \"Sometimes\"    MRSA (methicillin resistant staph aureus) culture positive 04/2018    Multiple pulmonary nodules 6/2013    Subcentimeter; needs repeat imaging in 3-6 months    Pancreatitis     Patellofemoral arthritis of right knee 8/19/2020    Shortness of breath on exertion     Teeth missing     Upper And Lower    Wears glasses        Objective:   Physical Exam  Constitutional:       Appearance: She is well-developed. HENT:      Head: Normocephalic. Eyes:      Pupils: Pupils are equal, round, and reactive to light. Neck:      Musculoskeletal: Normal range of motion. Pulmonary:      Effort: Pulmonary effort is normal.   Musculoskeletal: Normal range of motion. General: Swelling and tenderness present. No deformity. Right hip: Normal.      Left hip: Normal.      Right knee: She exhibits swelling and bony tenderness. She exhibits normal range of motion, no effusion, no ecchymosis, no deformity, no laceration, no erythema, normal alignment, no LCL laxity, normal patellar mobility and no MCL laxity. Tenderness found. Patellar tendon tenderness noted. No medial joint line, no lateral joint line, no MCL and no LCL tenderness noted.       Left knee: Normal. She despite conservative treatment I recommend surgical treatment. I discussed with her today performing excision of her right knee prepatellar lipoma. I explained risks, benefits, possible complications of the procedure and answered all questions for the patient. I explained postoperative rehabilitation protocol and expectations with the patient today. The patient understands and consents to the procedure. Patient will follow up with their primary care physician prior to surgical treatment for preoperative clearance. We will schedule surgery at soonest convenience. Continue weight-bearing as tolerated. Continue range of motion exercises as instructed. Ice and elevate as needed. Tylenol or Motrin for pain. Follow up in 2 weeks postop.         [address], DO

## 2020-09-21 NOTE — PATIENT INSTRUCTIONS
Continue working on weight loss with nutritionist   Right knee lipoma excision discussed   Will proceed with surgery   No meds list provided   Consent to be signed day of surgery   Obtain any clearances as needed, as discussed   Screened for COVID in office   Follow up for any pre-op testing/surgery as discussed     Call office with any questions ([facility] surgery scheduler) [phone]

## 2020-09-22 ASSESSMENT — ENCOUNTER SYMPTOMS
BACK PAIN: 0
CHEST TIGHTNESS: 0
COLOR CHANGE: 0

## 2020-09-23 RX ORDER — HYDROCODONE BITARTRATE AND ACETAMINOPHEN 5; 325 MG/1; MG/1
1 TABLET ORAL EVERY 6 HOURS PRN
Qty: 10 TABLET | Refills: 0 | Status: SHIPPED | OUTPATIENT
Start: 2020-09-23 | End: 2020-09-26

## 2020-09-23 RX ORDER — CEPHALEXIN 250 MG/1
250 CAPSULE ORAL 4 TIMES DAILY
Qty: 4 CAPSULE | Refills: 0 | Status: SHIPPED | OUTPATIENT
Start: 2020-09-23 | End: 2020-09-24

## 2020-09-25 ENCOUNTER — HOSPITAL ENCOUNTER (OUTPATIENT)
Age: 35
Setting detail: SPECIMEN
Discharge: HOME | End: 2020-09-25
Payer: COMMERCIAL

## 2020-09-25 PROCEDURE — U0002 COVID-19 LAB TEST NON-CDC: HCPCS

## 2020-09-26 LAB
SARS-COV-2: NOT DETECTED
SOURCE: NORMAL

## 2020-09-28 ENCOUNTER — ANESTHESIA EVENT (OUTPATIENT)
Dept: OPERATING ROOM | Age: 35
End: 2020-09-28
Payer: COMMERCIAL

## 2020-09-28 NOTE — ANESTHESIA PRE PROCEDURE
Department of Anesthesiology  Preprocedure Note       Name:  Gerald Muller   Age:  38 y.o.  :  1985                                          MRN:  [id number]         Date:  2020      Surgeon: Jaswinder Hoffmann):  Bebeto Zavaleta DO    Procedure: EXCISION OF MASS ON RIGHT KNEE (Right )    Medications prior to admission:   Prior to Admission medications    Medication Sig Start Date End Date Taking?  Authorizing Provider   sertraline (ZOLOFT) 25 MG tablet take 1 tablet by mouth once daily 20   Historical Provider, MD   hydrOXYzine (ATARAX) 25 MG tablet take 1 tablet by mouth four times a day if needed MAY TAKE 2 TABLETS TO SLEEP 20   Historical Provider, MD   rizatriptan (MAXALT) 10 MG tablet  20   Historical Provider, MD   omeprazole (PRILOSEC) 10 MG delayed release capsule Take 10 mg by mouth daily    Historical Provider, MD   acetaminophen (AMINOFEN) 325 MG tablet Take 2 tablets by mouth every 6 hours as needed for Pain 20   Taco Grimm DO   ondansetron (ZOFRAN ODT) 4 MG disintegrating tablet Take 1 tablet by mouth every 6 hours 19   Augusto Calzada PA-C   albuterol sulfate  (90 Base) MCG/ACT inhaler Inhale 2 puffs into the lungs every 6 hours as needed for Wheezing    Historical Provider, MD   loperamide (IMODIUM) 2 MG capsule Take 2 mg by mouth 4 times daily as needed for Diarrhea    Historical Provider, MD   ferrous sulfate 325 (65 Fe) MG tablet Take 325 mg by mouth 2 times daily    Historical Provider, MD   Carboxymeth-Glycerin-Polysorb (REFRESH OPTIVE ADVANCED) 0.5-1-0.5 % SOLN Place 1 drop into both eyes 3 times daily    Historical Provider, MD   InFLIXimab (REMICADE IV) Infuse intravenously States last tx was 18 due on the 3/16/18 Every Six Weeks At [address]    Historical Provider, MD       Current medications:    Current Outpatient Medications   Medication Sig Dispense Refill    sertraline (ZOLOFT) 25 MG tablet take 1 tablet by mouth once daily      hydrOXYzine (ATARAX) 25 MG tablet take 1 tablet by mouth four times a day if needed MAY TAKE 2 TABLETS TO SLEEP      rizatriptan (MAXALT) 10 MG tablet       omeprazole (PRILOSEC) 10 MG delayed release capsule Take 10 mg by mouth daily      acetaminophen (AMINOFEN) 325 MG tablet Take 2 tablets by mouth every 6 hours as needed for Pain 20 tablet 0    ondansetron (ZOFRAN ODT) 4 MG disintegrating tablet Take 1 tablet by mouth every 6 hours 10 tablet 1    albuterol sulfate  (90 Base) MCG/ACT inhaler Inhale 2 puffs into the lungs every 6 hours as needed for Wheezing      loperamide (IMODIUM) 2 MG capsule Take 2 mg by mouth 4 times daily as needed for Diarrhea      ferrous sulfate 325 (65 Fe) MG tablet Take 325 mg by mouth 2 times daily      Carboxymeth-Glycerin-Polysorb (REFRESH OPTIVE ADVANCED) 0.5-1-0.5 % SOLN Place 1 drop into both eyes 3 times daily      InFLIXimab (REMICADE IV) Infuse intravenously States last tx was 2/9/18 due on the 3/16/18 Every Six Weeks At [address] [place] [address],[address]       No current facility-administered medications for this visit. Allergies: Allergies   Allergen Reactions    Morphine      Other reaction(s): Headache  Triggers migraine.    \"Triggers My Migraines\"  Other reaction(s): Headache    Tramadol      Other reaction(s): Headache  States triggers migraine headache  \"Triggers My Migraines\"  Other reaction(s): Headache       Problem List:    Patient Active Problem List   Diagnosis Code    [facility] ([facility] K50.90    Anxiety F41.9    Multiple lung nodules R91.8    Crohn's colitis, unspecified complication ([facility] [address] X71.832    Exacerbation of Crohn's disease with complication ([facility] R20.096    IBS (irritable bowel syndrome) K58.9    C. difficile colitis A04.72    Crohn's disease of small and large intestines with complication ([facility] [address] O19.560    Neck swelling R22.1    Leukocytosis D72.829    Non-intractable 03/06/2020    BILITOT 0.2 03/06/2020    ALKPHOS 63 03/06/2020    AST 28 03/06/2020    ALT 36 03/06/2020       POC Tests: No results for input(s): POCGLU, POCNA, POCK, POCCL, POCBUN, POCHEMO, POCHCT in the last 72 hours. Coags:   Lab Results   Component Value Date    PROTIME 12.7 04/20/2018    PROTIME 10.4 02/05/2012    INR 1.10 04/20/2018    APTT 85.7 04/25/2018       HCG (If Applicable):   Lab Results   Component Value Date    PREGTESTUR NEGATIVE 03/06/2020        ABGs: No results found for: PHART, PO2ART, RYI6FNO, EFD8LZN, BEART, O2WBAPDJ     Type & Screen (If Applicable):  No results found for: LABABO, [address]    Anesthesia Evaluation  Patient summary reviewed and Nursing notes reviewed no history of anesthetic complications:   Airway: Mallampati: II  TM distance: >3 FB   Neck ROM: full  Mouth opening: > = 3 FB Dental:          Pulmonary:   (+) shortness of breath:  asthma:                           ROS comment: Multiple lung nodules    Cardiovascular:  Exercise tolerance: good (>4 METS),   (+) hypertension:, hyperlipidemia         Beta Blocker:  Not on Beta Blocker      ROS comment: echo 4/2018:    Summary   This is a limited echocardiogram. Definity given during procedure.   Left ventricular systolic function is normal.   Ejection fraction is visually estimated at >55%.   Trace mitral and tricuspid regurgitation present.   No LV thrombus. No vegetations.   No pericardial or pleural effusions.   Essentially normal echocardiogram.     Neuro/Psych:   (+) psychiatric history:depression/anxiety             GI/Hepatic/Renal:   (+) GERD:, liver disease:, bowel prep, morbid obesity         ROS comment: Crohn's disease  Fatty liver. Endo/Other:    (+) Diabetes, blood dyscrasia: anemia, arthritis: OA., .                 Abdominal:           Vascular:   + DVT, . Anesthesia Plan      general and TIVA     ASA 3     (Chart review only )  Induction: intravenous.     MIPS: Postoperative opioids intended and Prophylactic antiemetics administered. Plan discussed with CRNA. SHAQUILLE King - KEVIN   9/28/2020         Pre Anesthesia Assessment complete.  Chart reviewed on 9/28/2020

## 2020-10-01 ENCOUNTER — ANESTHESIA (OUTPATIENT)
Dept: OPERATING ROOM | Age: 35
End: 2020-10-01
Payer: COMMERCIAL

## 2020-10-01 ENCOUNTER — HOSPITAL ENCOUNTER (OUTPATIENT)
Age: 35
Setting detail: OUTPATIENT SURGERY
Discharge: HOME | End: 2020-10-01
Attending: ORTHOPAEDIC SURGERY | Admitting: ORTHOPAEDIC SURGERY
Payer: COMMERCIAL

## 2020-10-01 VITALS
OXYGEN SATURATION: 98 % | SYSTOLIC BLOOD PRESSURE: 149 MMHG | DIASTOLIC BLOOD PRESSURE: 89 MMHG | HEIGHT: 65 IN | HEART RATE: 98 BPM | WEIGHT: 293 LBS | RESPIRATION RATE: 16 BRPM | TEMPERATURE: 98.1 F | BODY MASS INDEX: 48.82 KG/M2

## 2020-10-01 VITALS
OXYGEN SATURATION: 100 % | SYSTOLIC BLOOD PRESSURE: 139 MMHG | DIASTOLIC BLOOD PRESSURE: 102 MMHG | TEMPERATURE: 98.6 F | RESPIRATION RATE: 10 BRPM

## 2020-10-01 LAB — PREGNANCY TEST URINE, POC: NEGATIVE

## 2020-10-01 PROCEDURE — 3700000001 HC ADD 15 MINUTES (ANESTHESIA): Performed by: ORTHOPAEDIC SURGERY

## 2020-10-01 PROCEDURE — 6360000002 HC RX W HCPCS: Performed by: ANESTHESIOLOGY

## 2020-10-01 PROCEDURE — 7100000000 HC PACU RECOVERY - FIRST 15 MIN: Performed by: ORTHOPAEDIC SURGERY

## 2020-10-01 PROCEDURE — 2500000003 HC RX 250 WO HCPCS: Performed by: NURSE ANESTHETIST, CERTIFIED REGISTERED

## 2020-10-01 PROCEDURE — 2580000003 HC RX 258: Performed by: ORTHOPAEDIC SURGERY

## 2020-10-01 PROCEDURE — 3700000000 HC ANESTHESIA ATTENDED CARE: Performed by: ORTHOPAEDIC SURGERY

## 2020-10-01 PROCEDURE — 2500000003 HC RX 250 WO HCPCS: Performed by: ORTHOPAEDIC SURGERY

## 2020-10-01 PROCEDURE — 80048 BASIC METABOLIC PNL TOTAL CA: CPT

## 2020-10-01 PROCEDURE — 7100000011 HC PHASE II RECOVERY - ADDTL 15 MIN: Performed by: ORTHOPAEDIC SURGERY

## 2020-10-01 PROCEDURE — 88304 TISSUE EXAM BY PATHOLOGIST: CPT

## 2020-10-01 PROCEDURE — 27328 EXC THIGH/KNEE TUM DEEP <5CM: CPT | Performed by: ORTHOPAEDIC SURGERY

## 2020-10-01 PROCEDURE — 6370000000 HC RX 637 (ALT 250 FOR IP): Performed by: ANESTHESIOLOGY

## 2020-10-01 PROCEDURE — 81025 URINE PREGNANCY TEST: CPT

## 2020-10-01 PROCEDURE — 3600000013 HC SURGERY LEVEL 3 ADDTL 15MIN: Performed by: ORTHOPAEDIC SURGERY

## 2020-10-01 PROCEDURE — 6360000002 HC RX W HCPCS: Performed by: NURSE ANESTHETIST, CERTIFIED REGISTERED

## 2020-10-01 PROCEDURE — 6370000000 HC RX 637 (ALT 250 FOR IP): Performed by: ORTHOPAEDIC SURGERY

## 2020-10-01 PROCEDURE — 6360000002 HC RX W HCPCS: Performed by: ORTHOPAEDIC SURGERY

## 2020-10-01 PROCEDURE — 7100000001 HC PACU RECOVERY - ADDTL 15 MIN: Performed by: ORTHOPAEDIC SURGERY

## 2020-10-01 PROCEDURE — 2709999900 HC NON-CHARGEABLE SUPPLY: Performed by: ORTHOPAEDIC SURGERY

## 2020-10-01 PROCEDURE — 7100000010 HC PHASE II RECOVERY - FIRST 15 MIN: Performed by: ORTHOPAEDIC SURGERY

## 2020-10-01 PROCEDURE — 3600000003 HC SURGERY LEVEL 3 BASE: Performed by: ORTHOPAEDIC SURGERY

## 2020-10-01 RX ORDER — ONDANSETRON 2 MG/ML
4 INJECTION INTRAMUSCULAR; INTRAVENOUS
Status: DISCONTINUED | OUTPATIENT
Start: 2020-10-01 | End: 2020-10-01 | Stop reason: HOSPADM

## 2020-10-01 RX ORDER — SUCCINYLCHOLINE/SOD CL,ISO/PF 100 MG/5ML
SYRINGE (ML) INTRAVENOUS PRN
Status: DISCONTINUED | OUTPATIENT
Start: 2020-10-01 | End: 2020-10-01 | Stop reason: SDUPTHER

## 2020-10-01 RX ORDER — OXYCODONE HYDROCHLORIDE 5 MG/1
10 TABLET ORAL EVERY 4 HOURS PRN
Status: DISCONTINUED | OUTPATIENT
Start: 2020-10-01 | End: 2020-10-01 | Stop reason: HOSPADM

## 2020-10-01 RX ORDER — SODIUM CHLORIDE 0.9 % (FLUSH) 0.9 %
10 SYRINGE (ML) INJECTION PRN
Status: DISCONTINUED | OUTPATIENT
Start: 2020-10-01 | End: 2020-10-01 | Stop reason: HOSPADM

## 2020-10-01 RX ORDER — SODIUM CHLORIDE 0.9 % (FLUSH) 0.9 %
10 SYRINGE (ML) INJECTION EVERY 12 HOURS SCHEDULED
Status: DISCONTINUED | OUTPATIENT
Start: 2020-10-01 | End: 2020-10-01 | Stop reason: HOSPADM

## 2020-10-01 RX ORDER — HYDROCODONE BITARTRATE AND ACETAMINOPHEN 5; 325 MG/1; MG/1
1 TABLET ORAL ONCE
Status: COMPLETED | OUTPATIENT
Start: 2020-10-01 | End: 2020-10-01

## 2020-10-01 RX ORDER — SODIUM CHLORIDE, SODIUM LACTATE, POTASSIUM CHLORIDE, CALCIUM CHLORIDE 600; 310; 30; 20 MG/100ML; MG/100ML; MG/100ML; MG/100ML
INJECTION, SOLUTION INTRAVENOUS CONTINUOUS
Status: DISCONTINUED | OUTPATIENT
Start: 2020-10-01 | End: 2020-10-01 | Stop reason: HOSPADM

## 2020-10-01 RX ORDER — HYDROMORPHONE HCL 110MG/55ML
0.5 PATIENT CONTROLLED ANALGESIA SYRINGE INTRAVENOUS EVERY 5 MIN PRN
Status: DISCONTINUED | OUTPATIENT
Start: 2020-10-01 | End: 2020-10-01 | Stop reason: HOSPADM

## 2020-10-01 RX ORDER — PROMETHAZINE HYDROCHLORIDE 25 MG/ML
6.25 INJECTION, SOLUTION INTRAMUSCULAR; INTRAVENOUS
Status: DISCONTINUED | OUTPATIENT
Start: 2020-10-01 | End: 2020-10-01 | Stop reason: HOSPADM

## 2020-10-01 RX ORDER — MEPERIDINE HYDROCHLORIDE 25 MG/ML
12.5 INJECTION INTRAMUSCULAR; INTRAVENOUS; SUBCUTANEOUS EVERY 5 MIN PRN
Status: DISCONTINUED | OUTPATIENT
Start: 2020-10-01 | End: 2020-10-01 | Stop reason: HOSPADM

## 2020-10-01 RX ORDER — FENTANYL CITRATE 50 UG/ML
50 INJECTION, SOLUTION INTRAMUSCULAR; INTRAVENOUS EVERY 5 MIN PRN
Status: DISCONTINUED | OUTPATIENT
Start: 2020-10-01 | End: 2020-10-01 | Stop reason: HOSPADM

## 2020-10-01 RX ORDER — DEXAMETHASONE SODIUM PHOSPHATE 10 MG/ML
INJECTION, SOLUTION INTRAMUSCULAR; INTRAVENOUS PRN
Status: DISCONTINUED | OUTPATIENT
Start: 2020-10-01 | End: 2020-10-01 | Stop reason: SDUPTHER

## 2020-10-01 RX ORDER — PROPOFOL 10 MG/ML
INJECTION, EMULSION INTRAVENOUS PRN
Status: DISCONTINUED | OUTPATIENT
Start: 2020-10-01 | End: 2020-10-01 | Stop reason: SDUPTHER

## 2020-10-01 RX ORDER — LIDOCAINE HYDROCHLORIDE 10 MG/ML
INJECTION, SOLUTION INFILTRATION; PERINEURAL
Status: COMPLETED | OUTPATIENT
Start: 2020-10-01 | End: 2020-10-01

## 2020-10-01 RX ORDER — DIPHENHYDRAMINE HYDROCHLORIDE 50 MG/ML
12.5 INJECTION INTRAMUSCULAR; INTRAVENOUS
Status: DISCONTINUED | OUTPATIENT
Start: 2020-10-01 | End: 2020-10-01 | Stop reason: HOSPADM

## 2020-10-01 RX ORDER — 0.9 % SODIUM CHLORIDE 0.9 %
500 INTRAVENOUS SOLUTION INTRAVENOUS
Status: DISCONTINUED | OUTPATIENT
Start: 2020-10-01 | End: 2020-10-01 | Stop reason: HOSPADM

## 2020-10-01 RX ORDER — ACETAMINOPHEN 500 MG
1000 TABLET ORAL ONCE
Status: COMPLETED | OUTPATIENT
Start: 2020-10-01 | End: 2020-10-01

## 2020-10-01 RX ORDER — LIDOCAINE HYDROCHLORIDE 20 MG/ML
INJECTION, SOLUTION INTRAVENOUS PRN
Status: DISCONTINUED | OUTPATIENT
Start: 2020-10-01 | End: 2020-10-01 | Stop reason: SDUPTHER

## 2020-10-01 RX ORDER — ROCURONIUM BROMIDE 10 MG/ML
INJECTION, SOLUTION INTRAVENOUS PRN
Status: DISCONTINUED | OUTPATIENT
Start: 2020-10-01 | End: 2020-10-01 | Stop reason: SDUPTHER

## 2020-10-01 RX ORDER — LABETALOL HYDROCHLORIDE 5 MG/ML
5 INJECTION, SOLUTION INTRAVENOUS EVERY 10 MIN PRN
Status: DISCONTINUED | OUTPATIENT
Start: 2020-10-01 | End: 2020-10-01 | Stop reason: HOSPADM

## 2020-10-01 RX ORDER — ONDANSETRON 2 MG/ML
INJECTION INTRAMUSCULAR; INTRAVENOUS PRN
Status: DISCONTINUED | OUTPATIENT
Start: 2020-10-01 | End: 2020-10-01 | Stop reason: SDUPTHER

## 2020-10-01 RX ORDER — FENTANYL CITRATE 50 UG/ML
50 INJECTION, SOLUTION INTRAMUSCULAR; INTRAVENOUS EVERY 5 MIN PRN
Status: COMPLETED | OUTPATIENT
Start: 2020-10-01 | End: 2020-10-01

## 2020-10-01 RX ORDER — FENTANYL CITRATE 50 UG/ML
INJECTION, SOLUTION INTRAMUSCULAR; INTRAVENOUS PRN
Status: DISCONTINUED | OUTPATIENT
Start: 2020-10-01 | End: 2020-10-01 | Stop reason: SDUPTHER

## 2020-10-01 RX ORDER — OXYCODONE HYDROCHLORIDE 5 MG/1
5 TABLET ORAL EVERY 4 HOURS PRN
Status: DISCONTINUED | OUTPATIENT
Start: 2020-10-01 | End: 2020-10-01 | Stop reason: HOSPADM

## 2020-10-01 RX ADMIN — LIDOCAINE HYDROCHLORIDE 100 MG: 20 INJECTION, SOLUTION INTRAVENOUS at 08:26

## 2020-10-01 RX ADMIN — PROPOFOL 150 MG: 10 INJECTION, EMULSION INTRAVENOUS at 08:26

## 2020-10-01 RX ADMIN — SODIUM CHLORIDE, POTASSIUM CHLORIDE, SODIUM LACTATE AND CALCIUM CHLORIDE: 600; 310; 30; 20 INJECTION, SOLUTION INTRAVENOUS at 09:42

## 2020-10-01 RX ADMIN — CEFAZOLIN SODIUM 3 G: 1 INJECTION, POWDER, FOR SOLUTION INTRAMUSCULAR; INTRAVENOUS at 08:29

## 2020-10-01 RX ADMIN — SODIUM CHLORIDE, POTASSIUM CHLORIDE, SODIUM LACTATE AND CALCIUM CHLORIDE: 600; 310; 30; 20 INJECTION, SOLUTION INTRAVENOUS at 08:22

## 2020-10-01 RX ADMIN — FENTANYL CITRATE 50 MCG: 50 INJECTION INTRAMUSCULAR; INTRAVENOUS at 09:23

## 2020-10-01 RX ADMIN — Medication 100 MG: at 08:27

## 2020-10-01 RX ADMIN — FENTANYL CITRATE 50 MCG: 50 INJECTION INTRAMUSCULAR; INTRAVENOUS at 09:30

## 2020-10-01 RX ADMIN — FENTANYL CITRATE 100 MCG: 50 INJECTION INTRAMUSCULAR; INTRAVENOUS at 08:26

## 2020-10-01 RX ADMIN — SUGAMMADEX 200 MG: 100 INJECTION, SOLUTION INTRAVENOUS at 08:58

## 2020-10-01 RX ADMIN — FENTANYL CITRATE 50 MCG: 50 INJECTION INTRAMUSCULAR; INTRAVENOUS at 09:37

## 2020-10-01 RX ADMIN — ACETAMINOPHEN 1000 MG: 500 TABLET ORAL at 06:43

## 2020-10-01 RX ADMIN — FENTANYL CITRATE 50 MCG: 50 INJECTION INTRAMUSCULAR; INTRAVENOUS at 09:14

## 2020-10-01 RX ADMIN — DEXAMETHASONE SODIUM PHOSPHATE 4 MG: 10 INJECTION, SOLUTION INTRAMUSCULAR; INTRAVENOUS at 08:50

## 2020-10-01 RX ADMIN — HYDROCODONE BITARTRATE AND ACETAMINOPHEN 1 TABLET: 5; 325 TABLET ORAL at 10:32

## 2020-10-01 RX ADMIN — ONDANSETRON 4 MG: 2 INJECTION INTRAMUSCULAR; INTRAVENOUS at 08:30

## 2020-10-01 RX ADMIN — ROCURONIUM BROMIDE 40 MG: 10 INJECTION INTRAVENOUS at 08:30

## 2020-10-01 ASSESSMENT — PULMONARY FUNCTION TESTS
PIF_VALUE: 21
PIF_VALUE: 28
PIF_VALUE: 29
PIF_VALUE: 20
PIF_VALUE: 28
PIF_VALUE: 29
PIF_VALUE: 28
PIF_VALUE: 28
PIF_VALUE: 29
PIF_VALUE: 3
PIF_VALUE: 29
PIF_VALUE: 29
PIF_VALUE: 28
PIF_VALUE: 28
PIF_VALUE: 29
PIF_VALUE: 28
PIF_VALUE: 29
PIF_VALUE: 1
PIF_VALUE: 29
PIF_VALUE: 29
PIF_VALUE: 1
PIF_VALUE: 28
PIF_VALUE: 31
PIF_VALUE: 2
PIF_VALUE: 1
PIF_VALUE: 29
PIF_VALUE: 1
PIF_VALUE: 29
PIF_VALUE: 29
PIF_VALUE: 25
PIF_VALUE: 35
PIF_VALUE: 30
PIF_VALUE: 11
PIF_VALUE: 21
PIF_VALUE: 29

## 2020-10-01 ASSESSMENT — PAIN DESCRIPTION - ORIENTATION
ORIENTATION: RIGHT

## 2020-10-01 ASSESSMENT — PAIN SCALES - GENERAL
PAINLEVEL_OUTOF10: 5
PAINLEVEL_OUTOF10: 7
PAINLEVEL_OUTOF10: 7
PAINLEVEL_OUTOF10: 5
PAINLEVEL_OUTOF10: 7
PAINLEVEL_OUTOF10: 0
PAINLEVEL_OUTOF10: 4
PAINLEVEL_OUTOF10: 7
PAINLEVEL_OUTOF10: 2

## 2020-10-01 ASSESSMENT — PAIN DESCRIPTION - LOCATION
LOCATION: KNEE

## 2020-10-01 ASSESSMENT — PAIN DESCRIPTION - PAIN TYPE
TYPE: SURGICAL PAIN

## 2020-10-01 ASSESSMENT — PAIN DESCRIPTION - DESCRIPTORS
DESCRIPTORS: BURNING

## 2020-10-01 ASSESSMENT — PAIN DESCRIPTION - FREQUENCY
FREQUENCY: CONTINUOUS

## 2020-10-01 ASSESSMENT — ENCOUNTER SYMPTOMS: SHORTNESS OF BREATH: 1

## 2020-10-01 ASSESSMENT — PAIN - FUNCTIONAL ASSESSMENT: PAIN_FUNCTIONAL_ASSESSMENT: 0-10

## 2020-10-01 NOTE — OP NOTE
tissue to the level of the bursa. I then continued dissection medially around the soft tissue mass. I did find that there is to distinct color differences to the fat globules in this location. I then grasped hold of the yellowish colored lipoma which was not encapsulated with the use of an Allis clamp. I then used scissor dissection to dissect around the lipoma in its entirety. Once this was removed it was then sent for final pathologic specimen. I then used a rondure to complete debridement of any lipoma appearing fat tissue. The wound was then irrigated with sterile saline using bulb irrigation. I then closed subcutaneous tissue using 2-0 Vicryl  suture followed by skin closure with staples. Tourniquet was then  deflated for a total of 15 minutes and hemostasis was maintained. I  then applied a sterile soft dressing to the right lower extremity. The  patient was then awakened from anesthesia and transported to PACU in  stable condition. She appeared to have tolerated the procedure well. PROGNOSIS:  At this point, she will be discharged to home with a short  course of oral antibiotics as well as pain medication. She can have  immediate range of motion with the right knee and can be weightbearing as tolerated on the right lower extremity. I will see her back in the office in 2 weeks for suture removal and  will continue to monitor her progress in the outpatient setting for  resolution of her symptoms.            Dottie Castillo, DO

## 2020-10-01 NOTE — PROGRESS NOTES
7312: Arrived to PACU from OR. Monitors applied, alarms on. Report obtained from [facility] and JESSICA Terrazas CRNA.  7705: Medicated for right knee discomfort. Ice chips given for throat irritation. Reassurance given. 0930: Turned and repositioned in bed, warm blankets on. Medicated for right knee discomfort. Wiggles toes and states sensation is normal in both feet. Right leg remains elevated on pillow, ice continues. 0945: Dozing  0958: Transported to hospitals. Mother at bedside.

## 2020-10-01 NOTE — ANESTHESIA POSTPROCEDURE EVALUATION
Department of Anesthesiology  Postprocedure Note    Patient: Sunita Buchanan  MRN: [id number]  YOB: 1985  Date of evaluation: 10/1/2020  Time:  9:08 AM     Procedure Summary     Date:  10/01/20 Room / Location:  [place] [address] / [facility]    Anesthesia Start:  2261 Anesthesia Stop:  7711    Procedure:  EXCISION OF MASS ON RIGHT KNEE (Right Knee) Diagnosis:  (LIPOMA OF RIGHT LOWER EXTREMITY)    Surgeon:  Marc Holland DO Responsible Provider:  SHAQUILLE Ybarra CRNA    Anesthesia Type:  general, TIVA ASA Status:  3          Anesthesia Type: general, TIVA    Ezekiel Phase I:      Ezekiel Phase II:      Last vitals: Reviewed and per EMR flowsheets.        Anesthesia Post Evaluation    Patient location during evaluation: PACU  Patient participation: complete - patient participated  Level of consciousness: awake and alert  Airway patency: patent  Nausea & Vomiting: no vomiting and no nausea  Complications: no  Cardiovascular status: blood pressure returned to baseline and hemodynamically stable  Respiratory status: acceptable, spontaneous ventilation, nonlabored ventilation and nasal cannula  Hydration status: stable

## 2020-10-01 NOTE — PROGRESS NOTES
1 Dr. Reynolds Fell on SDS and report given on pt c/o burning pain right knee. Order received to give Norco 5/325mg po now. [facility] as ordered.  Pt given more marina crackers per request.

## 2020-10-01 NOTE — BRIEF OP NOTE
Brief Postoperative Note      Patient: Tammy Barajas  YOB: 1985  MRN: [id number]    Date of Procedure: 10/1/2020    Pre-Op Diagnosis: LIPOMA OF RIGHT LOWER EXTREMITY    Post-Op Diagnosis: Same       Procedure(s):  EXCISION OF MASS ON RIGHT KNEE    Surgeon(s):  Livia Carter DO    Assistant:  * No surgical staff found *    Anesthesia: General    Estimated Blood Loss (mL): Minimal    Complications: None    Specimens:   ID Type Source Tests Collected by Time Destination   A : right knee mass Specimen Joint, Knee SURGICAL PATHOLOGY Livia Carter DO 10/1/2020 4234        Implants:  * No implants in log *      Drains: * No LDAs found *    Findings: R knee lipoma    Electronically signed by Melissa Rosales DO on 10/1/2020 at 9:10 AM

## 2020-10-01 NOTE — PROGRESS NOTES
1110 In to check on pt. Pt pain better rates pain 2 on 0-10 scale. Pt ready to go home. Mother went to car. 1120 Pt up to restroom with assist and walker. Pt tolerated well and back to bed. Pt ready to get dressed to go home. Call light in reach. Ice packs refilled for pt. Pt given Crutches. Toes right foot warm, mobile, and cap refill less than 3 seconds. 1135 Pt discharged to home per wheelchair to mothers private vehicle.

## 2020-10-01 NOTE — PROGRESS NOTES
1005 Pt receievd from PACU and report received from [address]. Toes right foot warm,cap refill less than 3 seconds and mobile. Pt given hot tea, water and marina crackers. Mother at bedside. Call light in reach.

## 2020-10-01 NOTE — H&P
Subjective:      Patient ID: Zeinab Hernandez is a 38 y.o. female.     Patient here for a new patient follow up per HECTOR Perkins on her right knee pain. She states onset has been since this past June with NKI or known cause but the pain and anterior swelling is still present. She was treated with a steroid injection on 8/19/2020 which provided minimal relief and an MRI was ordered, see below. She reports no prior surgeries or known injuries to this right knee. She has an anterior patellar mass that is painful with pressure. She has been working on weight loss with a nutritionist. She takes tylenol PRN. No new issues today.     Provider needs to conduct a hands on physical today due to patients injury/diagnosis     MRI RIGHT KNEE 8/8/2020  Impression   No meniscal, cruciate, or collateral ligamentous tear.       Borderline elevated TT TG interval of 1.9 cm.  Small patellofemoral joint   effusion.       Popliteal cyst.       No evidence for prepatellar soft tissue fluid collection.          She comes in today for her first visit with me in regards to her right knee pain and swelling. She states that over the past several months she has had an enlarging mass along the front of her right knee. She states that this is painful when she tries to kneel on her knee or squat down or rise from a seated position. She states that this is no longer enlarging but she continues to have a dull and aching pain along the front of her right knee. Patient denies any new injury to the involved extremity/ joint, denies numbness or tingling in the involved extremity and denies fever or chills.     She has been working on weight loss and is meeting with a nutritionist.              Review of Systems   Constitutional: Negative for activity change, chills and fever. Respiratory: Negative for chest tightness. Cardiovascular: Negative for chest pain. Musculoskeletal: Positive for arthralgias and joint swelling.  Negative for back pain, gait problem and myalgias. Skin: Negative for color change, pallor, rash and wound. Neurological: Negative for weakness and numbness.         Past Medical History        Past Medical History:   Diagnosis Date    Acid reflux      Acute deep vein thrombosis (DVT) of non-extremity vein 4/25/2018    Anemia      Anxiety      Asthma       NO PULMONOLOGIST AT THIS TIME    Blood clot due to device, implant, or graft 04/2018     had blood clots in neck due to med port    Bowen's disease      Crohn's disease ([facility] [address] Dx 2010     Remicade Infusions Every Six Weeks, Sees Dr. Jenifer Danielle, [facility]    Depression      Fall 2012     \"Passed Out And [place] On My Tailbone\"    Fatty liver      Gestational diabetes      Hyperlipidemia      Lower back pain       \"Sometimes\"    MRSA (methicillin resistant staph aureus) culture positive 04/2018    Multiple pulmonary nodules 6/2013     Subcentimeter; needs repeat imaging in 3-6 months    Pancreatitis      Patellofemoral arthritis of right knee 8/19/2020    Shortness of breath on exertion      Teeth missing       Upper And Lower    Wears glasses          No current facility-administered medications on file prior to encounter.       Current Outpatient Medications on File Prior to Encounter   Medication Sig Dispense Refill    sertraline (ZOLOFT) 25 MG tablet take 1 tablet by mouth once daily      hydrOXYzine (ATARAX) 25 MG tablet take 1 tablet by mouth four times a day if needed MAY TAKE 2 TABLETS TO SLEEP      omeprazole (PRILOSEC) 10 MG delayed release capsule Take 10 mg by mouth daily      acetaminophen (AMINOFEN) 325 MG tablet Take 2 tablets by mouth every 6 hours as needed for Pain 20 tablet 0    Carboxymeth-Glycerin-Polysorb (REFRESH OPTIVE ADVANCED) 0.5-1-0.5 % SOLN Place 1 drop into both eyes 3 times daily      rizatriptan (MAXALT) 10 MG tablet       ondansetron (ZOFRAN ODT) 4 MG disintegrating tablet Take 1 tablet by mouth every 6 hours 10 tablet 1    albuterol sulfate  (90 Base) MCG/ACT inhaler Inhale 2 puffs into the lungs every 6 hours as needed for Wheezing      loperamide (IMODIUM) 2 MG capsule Take 2 mg by mouth 4 times daily as needed for Diarrhea      InFLIXimab (REMICADE IV) Infuse intravenously States last tx was 18 due on the 3/16/18 Every Six Weeks At [address] [facility]       Allergies   Allergen Reactions    Morphine      Other reaction(s): Headache  Triggers migraine.    \"Triggers My Migraines\"  Other reaction(s): Headache    Tramadol      Other reaction(s): Headache  States triggers migraine headache  \"Triggers My Migraines\"  Other reaction(s): Headache     Past Surgical History:   Procedure Laterality Date    ABDOMEN SURGERY      bowel resection     ADENOIDECTOMY      APPENDECTOMY      Done During Colon Resection For Crohn's  Disease    BREAST SURGERY Left     \"Infected Lymph Node Removed\"     SECTION N/A 2019     SECTION performed by Margarita Lazo MD at [address] [place] [address] L&D [address] [place] [address] [address]  2016    COLECTOMY  8-11    Crohn's Disease , Dr. Donnell Weeks, Appendectomy Also Done    COLONOSCOPY  2016    Polyps Removed In Past    COLONOSCOPY  2017    Hospitalized for c-diff    COLONOSCOPY  2018    normal, multiple biopsy, repeat 1 year    COLONOSCOPY N/A 2019    COLONOSCOPY POLYPECTOMY SNARE/COLD BIOPSY performed by Fany Parker MD at [place] [address]      ENDOSCOPY, COLON, DIAGNOSTIC  Last Done     LAPAROSCOPY      Dr Debra Holguin, Removed Adhesions    OTHER SURGICAL HISTORY  2011    Mediport Insertion Left Chest Area/revision done 2016- \"removed at [facility] 2017 after I got an infection    SKIN BIOPSY      \"skin cancer removed right shoulder\"2019    TUBAL LIGATION  2019    TUNNELED VENOUS PORT PLACEMENT Right 2018    smart port- [facility]-Dr Jayant AMBROSIO EXTRACTION      All Four Fate Teeth Extracted In Past     Social History     Tobacco Use    Smoking status: Never Smoker    Smokeless tobacco: Never Used   Substance Use Topics    Alcohol use: No    Drug use: No     Family History   Problem Relation Age of Onset    Migraines Mother     Heart Disease Father     Diabetes Father     High Cholesterol Father     Depression Maternal Aunt     Depression Maternal Uncle     Depression Paternal Aunt     Coronary Art Dis Paternal Aunt     Depression Paternal Uncle     Coronary Art Dis Paternal Uncle     Depression Maternal Grandmother     Depression Maternal Grandfather     Depression Paternal Grandmother     Coronary Art Dis Paternal Grandmother     Depression Paternal Grandfather     Coronary Art Dis Paternal Grandfather           Objective:   Physical Exam  Constitutional:       Appearance: She is well-developed. HENT:      Head: Normocephalic. Eyes:      Pupils: Pupils are equal, round, and reactive to light. Neck:      Musculoskeletal: Normal range of motion. Pulmonary:      Effort: Pulmonary effort is normal.   Musculoskeletal: Normal range of motion. General: Swelling and tenderness present. No deformity. Right hip: Normal.      Left hip: Normal.      Right knee: She exhibits swelling and bony tenderness. She exhibits normal range of motion, no effusion, no ecchymosis, no deformity, no laceration, no erythema, normal alignment, no LCL laxity, normal patellar mobility and no MCL laxity. Tenderness found. Patellar tendon tenderness noted. No medial joint line, no lateral joint line, no MCL and no LCL tenderness noted. Left knee: Normal. She exhibits normal range of motion, no swelling, no effusion, no ecchymosis, no deformity, no laceration, no erythema, normal alignment, no LCL laxity, normal patellar mobility, no bony tenderness and no MCL laxity. No tenderness found.  No medial joint line, no lateral joint line, no MCL, no LCL and no patellar tendon tenderness noted. Skin:     General: Skin is warm and dry. Capillary Refill: Capillary refill takes less than 2 seconds. Coloration: Skin is not pale. Findings: No erythema or rash. Neurological:      Mental Status: She is alert and oriented to person, place, and time. Right knee-Skin intact with no erythema, ecchymosis or lacerations present. 0-140  There is approximately a 3 cm x 4 cm, soft mass along the anterior aspect of the right knee consistent with a possible lipoma, no obvious fluid collection.     MRI of the right knee from August 8, 2020 reviewed by me today in the office demonstrates a mild knee effusion, there is moderate to severe degenerative changes of the patellofemoral joint with joint space narrowing and lateral tracking of the patella, intact medial lateral meniscus, ACL PCL intact, there is an area of soft tissue swelling along the anterior aspect of the right knee with no fluid collection     BP (!) 157/87   Pulse 104   Temp 98.1 °F (36.7 °C) (Temporal)   Resp 16   Ht 5' 5\" (1.651 m)   Wt (!) 323 lb (146.5 kg)   LMP 09/21/2020   SpO2 95%   Breastfeeding No   BMI 53.75 kg/m²     Assessment:   Right knee prepatellar lipoma  Right knee DJD, moderate                Plan:   I discussed with her today her x-ray and MRI findings. I explained to her that she does have some moderate arthritis already at her young age and I recommend that she continue working with a nutritionist for weight loss. I explained to her that at some point she may be a candidate for knee replacement surgery but at her current weight she would not be a candidate. If she needs to in the future we could get her a referral to bariatric treatment. In regards to her right knee pain I explained that she likely has a lipoma in the front of her right knee. Given her persistent symptoms despite conservative treatment I recommend surgical treatment.   I discussed with her today performing excision of her right knee prepatellar lipoma. I explained risks, benefits, possible complications of the procedure and answered all questions for the patient. I explained postoperative rehabilitation protocol and expectations with the patient today. The patient understands and consents to the procedure. Patient will follow up with their primary care physician prior to surgical treatment for preoperative clearance. We will schedule surgery at soonest convenience. Continue weight-bearing as tolerated. Continue range of motion exercises as instructed. Ice and elevate as needed. Tylenol or Motrin for pain. Follow up in 2 weeks postop. The patient was counseled at length about the risks of yrn Covid-19 during their perioperative period and any recovery window from their procedure. The patient was made aware that yrn Covid-19  may worsen their prognosis for recovering from their procedure  and lend to a higher morbidity and/or mortality risk. All material risks, benefits, and reasonable alternatives including postponing the procedure were discussed. The patient does wish to proceed with the procedure at this time.       Pt seen and examined, No change in H+P.       DERICK HUSSEIN DO

## 2020-10-15 ENCOUNTER — OFFICE VISIT (OUTPATIENT)
Dept: ORTHOPEDIC SURGERY | Age: 35
End: 2020-10-15
Payer: COMMERCIAL

## 2020-10-15 VITALS — HEIGHT: 65 IN | HEART RATE: 118 BPM | WEIGHT: 293 LBS | BODY MASS INDEX: 48.82 KG/M2 | OXYGEN SATURATION: 96 %

## 2020-10-15 PROCEDURE — 20610 DRAIN/INJ JOINT/BURSA W/O US: CPT | Performed by: PHYSICIAN ASSISTANT

## 2020-10-15 PROCEDURE — 99024 POSTOP FOLLOW-UP VISIT: CPT | Performed by: PHYSICIAN ASSISTANT

## 2020-10-15 NOTE — PATIENT INSTRUCTIONS
Staples removed  Weightbearing and activities as tolerated  Monitor incision for signs of infection, call office for any signs of infection  Follow up with Dr. Dieter Benito in 4 weeks

## 2020-10-15 NOTE — PROGRESS NOTES
Date of surgery: 10/1/2020  Surgeon:Gabo    History:  Ms. Marci Hugo is here in follow up regarding her right knee mass excision. She states that she is not having any significant pain however she does notice swelling adjacent to the incision which she has a little concerned about because that is why she initially had the surgery. She states that a steroid injection that she had in her knee actually has helped her go up and down the stairs better without any significant pain. She received a steroid injection prior to her surgery. Physical:  Vitals:    10/15/20 1122   Pulse: 118   SpO2: 96%   Weight: (!) 329 lb (149.2 kg)   Height: 5' 5\" (1.651 m)     Right knee incision is clean dry and intact with staples in place. There is a moderate sized effusion adjacent to the incision along the medial aspect of the knee. I suspect that this effusion represents a small to moderate sized seroma. There is no erythema surrounding this area but there is moderate ecchymosis. Range of motion of the right knee 0-115 degrees. Impression: Status post right knee mass excision, postoperative seroma    Plan:   I explained to the patient that the area in question around her incision may go down on its own however given the fact that she underwent this procedure to get rid of the swelling around the knee I explained to her that I could try to aspirate this area and she would like me to go ahead today and try to aspirate it. Patient Instructions   Staples removed  Weightbearing and activities as tolerated  Monitor incision for signs of infection, call office for any signs of infection  Follow up with Dr. Isabel Parra in 4 weeks      Procedure note: Aspiration of seroma  Indication for procedure: Postoperative seroma of the knee  Details of procedure:  Risks and benefits of the procedure were explained to the patient. She agreed to proceed with the aspiration of the right knee seroma.   The right knee was prepped over the inferior medial aspect of the knee with alcohol and Betadine. The knee was then anesthetized with ethyl chloride spray and the seroma was entered without going into the right knee joint with an 18-gauge needle and a 60 cc syringe. The seroma was then aspirated obtaining 25 mL of sanguinous fluid from the area in question which decompressed the area nicely. Patient tolerated the procedure well and there was no significant blood loss. The aspiration site was then cleansed with alcohol and an adhesive bandage was placed on the aspiration site as well as a compressive wrap.

## 2020-11-03 ENCOUNTER — HOSPITAL ENCOUNTER (OUTPATIENT)
Age: 35
Discharge: HOME | End: 2020-11-03
Payer: COMMERCIAL

## 2020-11-03 ENCOUNTER — HOSPITAL ENCOUNTER (OUTPATIENT)
Dept: GENERAL RADIOLOGY | Age: 35
Discharge: HOME | End: 2020-11-03
Payer: COMMERCIAL

## 2020-11-03 PROCEDURE — 71046 X-RAY EXAM CHEST 2 VIEWS: CPT

## 2021-02-12 ENCOUNTER — HOSPITAL ENCOUNTER (OUTPATIENT)
Dept: SLEEP CENTER | Age: 36
Discharge: HOME | End: 2021-02-12
Payer: COMMERCIAL

## 2021-02-12 VITALS — HEIGHT: 65 IN | WEIGHT: 293 LBS | BODY MASS INDEX: 48.82 KG/M2

## 2021-02-12 DIAGNOSIS — G47.33 OBSTRUCTIVE SLEEP APNEA: ICD-10-CM

## 2021-02-12 PROCEDURE — 95810 POLYSOM 6/> YRS 4/> PARAM: CPT

## 2021-02-12 ASSESSMENT — SLEEP AND FATIGUE QUESTIONNAIRES
HOW LIKELY ARE YOU TO NOD OFF OR FALL ASLEEP IN A CAR, WHILE STOPPED FOR A FEW MINUTES IN TRAFFIC: 1
HOW LIKELY ARE YOU TO NOD OFF OR FALL ASLEEP WHILE WATCHING TV: 3
HOW LIKELY ARE YOU TO NOD OFF OR FALL ASLEEP WHILE SITTING AND READING: 3

## 2021-02-23 LAB — STATUS: NORMAL

## 2021-04-12 ENCOUNTER — APPOINTMENT (OUTPATIENT)
Dept: CT IMAGING | Age: 36
DRG: 137 | End: 2021-04-12
Payer: COMMERCIAL

## 2021-04-12 ENCOUNTER — HOSPITAL ENCOUNTER (INPATIENT)
Age: 36
LOS: 3 days | Discharge: HOME | DRG: 137 | End: 2021-04-15
Attending: EMERGENCY MEDICINE | Admitting: INTERNAL MEDICINE
Payer: COMMERCIAL

## 2021-04-12 DIAGNOSIS — R07.9 CHEST PAIN, UNSPECIFIED TYPE: ICD-10-CM

## 2021-04-12 DIAGNOSIS — R06.00 DYSPNEA, UNSPECIFIED TYPE: ICD-10-CM

## 2021-04-12 DIAGNOSIS — I51.7 CARDIOMEGALY: ICD-10-CM

## 2021-04-12 DIAGNOSIS — U07.1 COVID-19: Primary | ICD-10-CM

## 2021-04-12 DIAGNOSIS — J96.01 ACUTE RESPIRATORY FAILURE WITH HYPOXIA (HCC): ICD-10-CM

## 2021-04-12 PROBLEM — J96.00 ACUTE RESPIRATORY FAILURE (HCC): Status: ACTIVE | Noted: 2021-04-12

## 2021-04-12 LAB
ANION GAP SERPL CALCULATED.3IONS-SCNC: 12 MMOL/L (ref 4–16)
BASE EXCESS MIXED: 6.3 (ref 0–2)
BASE EXCESS: ABNORMAL (ref 0–2)
BASOPHILS ABSOLUTE: 0 K/CU MM
BASOPHILS RELATIVE PERCENT: 0.5 % (ref 0–1)
BUN BLDV-MCNC: 11 MG/DL (ref 6–23)
CALCIUM SERPL-MCNC: 8.3 MG/DL (ref 8.3–10.6)
CHLORIDE BLD-SCNC: 100 MMOL/L (ref 99–110)
CO2: 31 MMOL/L (ref 21–32)
CREAT SERPL-MCNC: 0.7 MG/DL (ref 0.6–1.1)
DIFFERENTIAL TYPE: ABNORMAL
EOSINOPHILS ABSOLUTE: 0.1 K/CU MM
EOSINOPHILS RELATIVE PERCENT: 1.9 % (ref 0–3)
GFR AFRICAN AMERICAN: >60 ML/MIN/1.73M2
GFR NON-AFRICAN AMERICAN: >60 ML/MIN/1.73M2
GLUCOSE BLD-MCNC: 148 MG/DL (ref 70–99)
HCO3 VENOUS: 33 MMOL/L (ref 19–25)
HCT VFR BLD CALC: 41.1 % (ref 37–47)
HEMOGLOBIN: 12.9 GM/DL (ref 12.5–16)
IMMATURE NEUTROPHIL %: 6.9 % (ref 0–0.43)
INTERPRETATION: NORMAL
LYMPHOCYTES ABSOLUTE: 1.6 K/CU MM
LYMPHOCYTES RELATIVE PERCENT: 27.1 % (ref 24–44)
MAGNESIUM: 1.7 MG/DL (ref 1.8–2.4)
MCH RBC QN AUTO: 29 PG (ref 27–31)
MCHC RBC AUTO-ENTMCNC: 31.4 % (ref 32–36)
MCV RBC AUTO: 92.4 FL (ref 78–100)
MONOCYTES ABSOLUTE: 0.4 K/CU MM
MONOCYTES RELATIVE PERCENT: 7.4 % (ref 0–4)
O2 SAT, VEN: 44.9 % (ref 50–70)
PCO2, VEN: 54.6 MMHG (ref 38–52)
PDW BLD-RTO: 13.2 % (ref 11.7–14.9)
PH VENOUS: 7.39 (ref 7.32–7.42)
PLATELET # BLD: 284 K/CU MM (ref 140–440)
PMV BLD AUTO: 11.5 FL (ref 7.5–11.1)
PO2, VEN: 25.8 MMHG (ref 28–48)
POTASSIUM SERPL-SCNC: 3.2 MMOL/L (ref 3.5–5.1)
PREGNANCY, URINE: NEGATIVE
RBC # BLD: 4.45 M/CU MM (ref 4.2–5.4)
SEGMENTED NEUTROPHILS ABSOLUTE COUNT: 3.3 K/CU MM
SEGMENTED NEUTROPHILS RELATIVE PERCENT: 56.2 % (ref 36–66)
SODIUM BLD-SCNC: 143 MMOL/L (ref 135–145)
SPECIFIC GRAVITY, URINE: 1 (ref 1–1.03)
TOTAL IMMATURE NEUTOROPHIL: 0.4 K/CU MM
TROPONIN T: <0.01 NG/ML
WBC # BLD: 5.8 K/CU MM (ref 4–10.5)

## 2021-04-12 PROCEDURE — 96374 THER/PROPH/DIAG INJ IV PUSH: CPT

## 2021-04-12 PROCEDURE — 96375 TX/PRO/DX INJ NEW DRUG ADDON: CPT

## 2021-04-12 PROCEDURE — 74177 CT ABD & PELVIS W/CONTRAST: CPT

## 2021-04-12 PROCEDURE — 83735 ASSAY OF MAGNESIUM: CPT

## 2021-04-12 PROCEDURE — 2580000003 HC RX 258: Performed by: EMERGENCY MEDICINE

## 2021-04-12 PROCEDURE — 71275 CT ANGIOGRAPHY CHEST: CPT

## 2021-04-12 PROCEDURE — 85025 COMPLETE CBC W/AUTO DIFF WBC: CPT

## 2021-04-12 PROCEDURE — 2060000000 HC ICU INTERMEDIATE R&B

## 2021-04-12 PROCEDURE — 99284 EMERGENCY DEPT VISIT MOD MDM: CPT

## 2021-04-12 PROCEDURE — 81025 URINE PREGNANCY TEST: CPT

## 2021-04-12 PROCEDURE — 82805 BLOOD GASES W/O2 SATURATION: CPT

## 2021-04-12 PROCEDURE — 84484 ASSAY OF TROPONIN QUANT: CPT

## 2021-04-12 PROCEDURE — 6370000000 HC RX 637 (ALT 250 FOR IP): Performed by: EMERGENCY MEDICINE

## 2021-04-12 PROCEDURE — 6360000002 HC RX W HCPCS: Performed by: EMERGENCY MEDICINE

## 2021-04-12 PROCEDURE — 93005 ELECTROCARDIOGRAM TRACING: CPT | Performed by: EMERGENCY MEDICINE

## 2021-04-12 PROCEDURE — 6360000004 HC RX CONTRAST MEDICATION: Performed by: EMERGENCY MEDICINE

## 2021-04-12 PROCEDURE — 1200000000 HC SEMI PRIVATE

## 2021-04-12 PROCEDURE — 80048 BASIC METABOLIC PNL TOTAL CA: CPT

## 2021-04-12 RX ORDER — ONDANSETRON 2 MG/ML
4 INJECTION INTRAMUSCULAR; INTRAVENOUS EVERY 6 HOURS PRN
Status: DISCONTINUED | OUTPATIENT
Start: 2021-04-12 | End: 2021-04-15 | Stop reason: HOSPADM

## 2021-04-12 RX ORDER — SODIUM CHLORIDE 0.9 % (FLUSH) 0.9 %
5-40 SYRINGE (ML) INJECTION EVERY 12 HOURS SCHEDULED
Status: DISCONTINUED | OUTPATIENT
Start: 2021-04-13 | End: 2021-04-15 | Stop reason: HOSPADM

## 2021-04-12 RX ORDER — MAGNESIUM SULFATE IN WATER 40 MG/ML
1000 INJECTION, SOLUTION INTRAVENOUS ONCE
Status: COMPLETED | OUTPATIENT
Start: 2021-04-12 | End: 2021-04-12

## 2021-04-12 RX ORDER — NICOTINE POLACRILEX 4 MG
15 LOZENGE BUCCAL PRN
Status: DISCONTINUED | OUTPATIENT
Start: 2021-04-12 | End: 2021-04-15 | Stop reason: HOSPADM

## 2021-04-12 RX ORDER — DICYCLOMINE HCL 20 MG
20 TABLET ORAL 4 TIMES DAILY PRN
Status: DISCONTINUED | OUTPATIENT
Start: 2021-04-12 | End: 2021-04-15 | Stop reason: HOSPADM

## 2021-04-12 RX ORDER — DEXAMETHASONE SODIUM PHOSPHATE 10 MG/ML
6 INJECTION, SOLUTION INTRAMUSCULAR; INTRAVENOUS EVERY 24 HOURS
Status: DISCONTINUED | OUTPATIENT
Start: 2021-04-13 | End: 2021-04-13

## 2021-04-12 RX ORDER — SUMATRIPTAN 100 MG/1
100 TABLET, FILM COATED ORAL DAILY PRN
Status: DISCONTINUED | OUTPATIENT
Start: 2021-04-13 | End: 2021-04-15 | Stop reason: HOSPADM

## 2021-04-12 RX ORDER — PROMETHAZINE HYDROCHLORIDE 25 MG/1
12.5 TABLET ORAL EVERY 6 HOURS PRN
Status: DISCONTINUED | OUTPATIENT
Start: 2021-04-12 | End: 2021-04-15 | Stop reason: HOSPADM

## 2021-04-12 RX ORDER — MAGNESIUM SULFATE 1 G/100ML
1000 INJECTION INTRAVENOUS ONCE
Status: DISCONTINUED | OUTPATIENT
Start: 2021-04-12 | End: 2021-04-12 | Stop reason: CLARIF

## 2021-04-12 RX ORDER — FENTANYL CITRATE 50 UG/ML
50 INJECTION, SOLUTION INTRAMUSCULAR; INTRAVENOUS ONCE
Status: COMPLETED | OUTPATIENT
Start: 2021-04-12 | End: 2021-04-12

## 2021-04-12 RX ORDER — GUAIFENESIN/DEXTROMETHORPHAN 100-10MG/5
5 SYRUP ORAL EVERY 4 HOURS PRN
Status: DISCONTINUED | OUTPATIENT
Start: 2021-04-12 | End: 2021-04-15 | Stop reason: HOSPADM

## 2021-04-12 RX ORDER — SODIUM CHLORIDE 9 MG/ML
25 INJECTION, SOLUTION INTRAVENOUS PRN
Status: DISCONTINUED | OUTPATIENT
Start: 2021-04-12 | End: 2021-04-15 | Stop reason: HOSPADM

## 2021-04-12 RX ORDER — ACETAMINOPHEN 650 MG/1
650 SUPPOSITORY RECTAL EVERY 6 HOURS PRN
Status: DISCONTINUED | OUTPATIENT
Start: 2021-04-12 | End: 2021-04-15 | Stop reason: HOSPADM

## 2021-04-12 RX ORDER — ONDANSETRON 2 MG/ML
4 INJECTION INTRAMUSCULAR; INTRAVENOUS EVERY 6 HOURS PRN
Status: DISCONTINUED | OUTPATIENT
Start: 2021-04-12 | End: 2021-04-13

## 2021-04-12 RX ORDER — ALBUTEROL SULFATE 90 UG/1
2 AEROSOL, METERED RESPIRATORY (INHALATION)
Status: DISCONTINUED | OUTPATIENT
Start: 2021-04-13 | End: 2021-04-15 | Stop reason: HOSPADM

## 2021-04-12 RX ORDER — PANTOPRAZOLE SODIUM 40 MG/1
40 TABLET, DELAYED RELEASE ORAL
Status: DISCONTINUED | OUTPATIENT
Start: 2021-04-13 | End: 2021-04-15 | Stop reason: HOSPADM

## 2021-04-12 RX ORDER — ACETAMINOPHEN 325 MG/1
650 TABLET ORAL EVERY 6 HOURS PRN
Status: DISCONTINUED | OUTPATIENT
Start: 2021-04-12 | End: 2021-04-15 | Stop reason: HOSPADM

## 2021-04-12 RX ORDER — POTASSIUM CHLORIDE 20 MEQ/1
20 TABLET, EXTENDED RELEASE ORAL ONCE
Status: COMPLETED | OUTPATIENT
Start: 2021-04-13 | End: 2021-04-13

## 2021-04-12 RX ORDER — DEXTROSE MONOHYDRATE 25 G/50ML
12.5 INJECTION, SOLUTION INTRAVENOUS PRN
Status: DISCONTINUED | OUTPATIENT
Start: 2021-04-12 | End: 2021-04-15 | Stop reason: HOSPADM

## 2021-04-12 RX ORDER — TRAZODONE HYDROCHLORIDE 100 MG/1
TABLET ORAL
COMMUNITY
Start: 2021-03-24 | End: 2022-02-04 | Stop reason: ALTCHOICE

## 2021-04-12 RX ORDER — DEXTROSE MONOHYDRATE 50 MG/ML
100 INJECTION, SOLUTION INTRAVENOUS PRN
Status: DISCONTINUED | OUTPATIENT
Start: 2021-04-12 | End: 2021-04-15 | Stop reason: HOSPADM

## 2021-04-12 RX ORDER — DEXAMETHASONE SODIUM PHOSPHATE 4 MG/ML
10 INJECTION, SOLUTION INTRA-ARTICULAR; INTRALESIONAL; INTRAMUSCULAR; INTRAVENOUS; SOFT TISSUE ONCE
Status: COMPLETED | OUTPATIENT
Start: 2021-04-12 | End: 2021-04-12

## 2021-04-12 RX ORDER — SODIUM CHLORIDE 0.9 % (FLUSH) 0.9 %
5-40 SYRINGE (ML) INJECTION PRN
Status: DISCONTINUED | OUTPATIENT
Start: 2021-04-12 | End: 2021-04-15 | Stop reason: HOSPADM

## 2021-04-12 RX ORDER — POLYETHYLENE GLYCOL 3350 17 G/17G
17 POWDER, FOR SOLUTION ORAL DAILY PRN
Status: DISCONTINUED | OUTPATIENT
Start: 2021-04-12 | End: 2021-04-15 | Stop reason: HOSPADM

## 2021-04-12 RX ORDER — FENTANYL CITRATE 50 UG/ML
25 INJECTION, SOLUTION INTRAMUSCULAR; INTRAVENOUS ONCE
Status: DISCONTINUED | OUTPATIENT
Start: 2021-04-12 | End: 2021-04-12

## 2021-04-12 RX ORDER — ACETAMINOPHEN 500 MG
1000 TABLET ORAL ONCE
Status: COMPLETED | OUTPATIENT
Start: 2021-04-12 | End: 2021-04-12

## 2021-04-12 RX ORDER — 0.9 % SODIUM CHLORIDE 0.9 %
1000 INTRAVENOUS SOLUTION INTRAVENOUS ONCE
Status: COMPLETED | OUTPATIENT
Start: 2021-04-12 | End: 2021-04-12

## 2021-04-12 RX ORDER — POLYVINYL ALCOHOL 14 MG/ML
1 SOLUTION/ DROPS OPHTHALMIC 3 TIMES DAILY
Status: DISCONTINUED | OUTPATIENT
Start: 2021-04-13 | End: 2021-04-15 | Stop reason: HOSPADM

## 2021-04-12 RX ORDER — SERTRALINE HYDROCHLORIDE 25 MG/1
25 TABLET, FILM COATED ORAL DAILY
Status: DISCONTINUED | OUTPATIENT
Start: 2021-04-13 | End: 2021-04-15 | Stop reason: HOSPADM

## 2021-04-12 RX ORDER — LOPERAMIDE HYDROCHLORIDE 2 MG/1
2 CAPSULE ORAL 4 TIMES DAILY PRN
Status: DISCONTINUED | OUTPATIENT
Start: 2021-04-12 | End: 2021-04-15 | Stop reason: HOSPADM

## 2021-04-12 RX ORDER — POTASSIUM CHLORIDE 7.45 MG/ML
10 INJECTION INTRAVENOUS ONCE
Status: DISCONTINUED | OUTPATIENT
Start: 2021-04-12 | End: 2021-04-15 | Stop reason: HOSPADM

## 2021-04-12 RX ORDER — TRAZODONE HYDROCHLORIDE 50 MG/1
100 TABLET ORAL NIGHTLY
Status: DISCONTINUED | OUTPATIENT
Start: 2021-04-13 | End: 2021-04-15 | Stop reason: HOSPADM

## 2021-04-12 RX ADMIN — DEXAMETHASONE SODIUM PHOSPHATE 10 MG: 4 INJECTION, SOLUTION INTRAMUSCULAR; INTRAVENOUS at 18:55

## 2021-04-12 RX ADMIN — ACETAMINOPHEN 1000 MG: 500 TABLET ORAL at 19:19

## 2021-04-12 RX ADMIN — SODIUM CHLORIDE 1000 ML: 9 INJECTION, SOLUTION INTRAVENOUS at 16:38

## 2021-04-12 RX ADMIN — ONDANSETRON 4 MG: 2 INJECTION INTRAMUSCULAR; INTRAVENOUS at 16:53

## 2021-04-12 RX ADMIN — MAGNESIUM SULFATE 1000 MG: 2 INJECTION INTRAVENOUS at 17:42

## 2021-04-12 RX ADMIN — IOPAMIDOL 75 ML: 755 INJECTION, SOLUTION INTRAVENOUS at 17:32

## 2021-04-12 RX ADMIN — FENTANYL CITRATE 50 MCG: 50 INJECTION, SOLUTION INTRAMUSCULAR; INTRAVENOUS at 16:53

## 2021-04-12 ASSESSMENT — PAIN DESCRIPTION - FREQUENCY: FREQUENCY: CONTINUOUS

## 2021-04-12 ASSESSMENT — PAIN DESCRIPTION - LOCATION: LOCATION: CHEST;ABDOMEN

## 2021-04-12 ASSESSMENT — PAIN DESCRIPTION - PAIN TYPE: TYPE: ACUTE PAIN

## 2021-04-12 ASSESSMENT — ENCOUNTER SYMPTOMS
WHEEZING: 1
VOMITING: 1
SORE THROAT: 0
RHINORRHEA: 0
HEMOPTYSIS: 1
ORTHOPNEA: 1
SPUTUM PRODUCTION: 1
SHORTNESS OF BREATH: 1

## 2021-04-12 ASSESSMENT — PAIN SCALES - GENERAL: PAINLEVEL_OUTOF10: 5

## 2021-04-12 NOTE — ED NOTES
Contacted [facility] [employer] to begin the transfer process.                          Jamie Medina RN  04/12/21 3205

## 2021-04-12 NOTE — ED NOTES
The patient presents to the er today with complaints of cough, shortness of breath, and chest pain. She reports that she was called last night and told that she is positive for COVID-19. She reports that she has been ill since 3/31/2021. She reports fevers initially, but not now. On arrival her initial oxygen saturations were 88% on room air. She was started on oxygen by cannula and the saturations improved to 96%. She does have a non productive cough.          Laura Camacho RN  04/12/21 6909

## 2021-04-12 NOTE — LETTER
[facility] ICU Stepdown  [address] [address]  Phone: [phone]  Fax: [phone]             April 15, 2021    Patient: Ion Fraire   YOB: 1985   Date of Visit: 4/12/2021       To Whom It May Concern:    Naif Waller was seen and treated in our facility  beginning 4/12/2021 until 4/15/2021. She may return to [address] [place] [address] on May 4th.        Sincerely,       Aayush Seth RN         Signature:__________________________________

## 2021-04-12 NOTE — ED PROVIDER NOTES
normal, multiple biopsy, repeat 1 year    COLONOSCOPY N/A 12/4/2019    COLONOSCOPY POLYPECTOMY SNARE/COLD BIOPSY performed by Tan Rodriguez MD at [address]  2010    ENDOSCOPY, COLON, DIAGNOSTIC  Last Done 2014    KNEE SURGERY Right 10/1/2020    EXCISION OF MASS ON RIGHT KNEE performed by Anya Rasheed DO at [address]  2013    Dr Kevin Skinner, Removed Adhesions    OTHER SURGICAL HISTORY  06/2011    Mediport Insertion Left Chest Area/revision done 6/2016- \"removed at [facility] 11/2017 after I got an infection    SKIN BIOPSY      \"skin cancer removed right shoulder\"8/2019    TUBAL LIGATION  2019    TUNNELED VENOUS PORT PLACEMENT Right 03/12/2018    smart port- [facility]-Dr Carlos Anne    WISDOM TOOTH EXTRACTION      All Four [place] Teeth Extracted In Past     Family History   Problem Relation Age of Onset    Migraines Mother     Heart Disease Father     Diabetes Father     High Cholesterol Father     Depression Maternal Aunt     Depression Maternal Uncle     Depression Paternal Aunt     Coronary Art Dis Paternal Aunt     Depression Paternal Uncle     Coronary Art Dis Paternal Uncle     Depression Maternal Grandmother     Depression Maternal Grandfather     Depression Paternal Grandmother     Coronary Art Dis Paternal Grandmother     Depression Paternal Grandfather     Coronary Art Dis Paternal Grandfather      Social History     Socioeconomic History    Marital status: Single     Spouse name: Not on file    Number of children: 1    Years of education: Not on file    Highest education level: Not on file   Occupational History    Not on file   Social Needs    Financial resource strain: Not on file    Food insecurity     Worry: Not on file     Inability: Not on file    Transportation needs     Medical: Not on file     Non-medical: Not on file   Tobacco Use    Smoking status: Never Smoker    Smokeless tobacco: Never Used   Substance and Sexual Activity  Alcohol use: No    Drug use: No    Sexual activity: Yes     Partners: Male   Lifestyle    Physical activity     Days per week: Not on file     Minutes per session: Not on file    Stress: Not on file   Relationships    Social connections     Talks on phone: Not on file     Gets together: Not on file     Attends Mandaeism service: Not on file     Active member of club or organization: Not on file     Attends meetings of clubs or organizations: Not on file     Relationship status: Not on file    Intimate partner violence     Fear of current or ex partner: Not on file     Emotionally abused: Not on file     Physically abused: Not on file     Forced sexual activity: Not on file   Other Topics Concern    Not on file   Social History Narrative    Lives with parents, has a daughter     Current Facility-Administered Medications   Medication Dose Route Frequency Provider Last Rate Last Admin    ondansetron (ZOFRAN) injection 4 mg  4 mg Intravenous Q6H PRN Shanice Flowers MD   4 mg at 04/12/21 1653    potassium chloride 10 mEq/100 mL IVPB (Peripheral Line)  10 mEq Intravenous Once Shanice Flowers MD   Stopped at 04/12/21 1730     Current Outpatient Medications   Medication Sig Dispense Refill    metFORMIN (GLUCOPHAGE) 500 MG tablet Take 500 mg by mouth 2 times daily (with meals)      traZODone (DESYREL) 100 MG tablet take 1 tablet by mouth nightly BEFORE BEDTIME      sertraline (ZOLOFT) 25 MG tablet take 1 tablet by mouth once daily      rizatriptan (MAXALT) 10 MG tablet       omeprazole (PRILOSEC) 10 MG delayed release capsule Take 10 mg by mouth daily      acetaminophen (AMINOFEN) 325 MG tablet Take 2 tablets by mouth every 6 hours as needed for Pain 20 tablet 0    ondansetron (ZOFRAN ODT) 4 MG disintegrating tablet Take 1 tablet by mouth every 6 hours 10 tablet 1    albuterol sulfate  (90 Base) MCG/ACT inhaler Inhale 2 puffs into the lungs every 6 hours as needed for Wheezing      loperamide (IMODIUM) 2 MG capsule Take 2 mg by mouth 4 times daily as needed for Diarrhea      Carboxymeth-Glycerin-Polysorb (REFRESH OPTIVE ADVANCED) 0.5-1-0.5 % SOLN Place 1 drop into both eyes 3 times daily      InFLIXimab (REMICADE IV) Infuse intravenously States last tx was 2/9/18 due on the 3/16/18 Every Six Weeks At [address] [facility]       Allergies   Allergen Reactions    Morphine      Other reaction(s): Headache  Triggers migraine. \"Triggers My Migraines\"  Other reaction(s): Headache    Tramadol      Other reaction(s): Headache  States triggers migraine headache  \"Triggers My Migraines\"  Other reaction(s): Headache       Nursing Notes Reviewed    Physical Exam:  Triage VS:    ED Triage Vitals [04/12/21 1536]   Enc Vitals Group      /72      Pulse 96      Resp 20      Temp 98 °F (36.7 °C)      Temp Source Oral      SpO2 (!) 87 %      Weight (!) 330 lb (149.7 kg)      Height 5' 5\" (1.651 m)      Head Circumference       Peak Flow       Pain Score       Pain Loc       Pain Edu? Excl. in [address]? Constitutional: Well developed, Well nourished, no acute distress  HENT: Normocephalic, Atraumatic, Bilateral external ears normal, Oropharynx moist, No oral exudates, Nose normal.   Eyes: PERRL, EOMI, Conjunctiva normal, No discharge. No scleral icterus. Neck: Normal range of motion, No tenderness, Supple. Lymphatic: No lymphadenopathy noted. Cardiovascular: Normal heart rate, Normal rhythm, No murmurs, gallops or rubs. Thorax & Lungs: Normal breath sounds, No respiratory distress, No wheezing. Abdomen: Soft, tenderness palpation bilateral lower quadrants, no rebound, no guarding, no masses, No pulsatile masses, No distention, Normal bowel sounds  Skin: Warm, Dry, Pink, No mottling, No erythema, No rash. Back: No tenderness, No CVA tenderness. No point tenderness the spine  Extremities: No edema, No tenderness, No cyanosis, Normal perfusion, No clubbing. wave inversions.      Avtar Proctor MD  04/12/21 1782

## 2021-04-13 LAB
ADENOVIRUS DETECTION BY PCR: NOT DETECTED
ALBUMIN SERPL-MCNC: 3.2 GM/DL (ref 3.4–5)
ALP BLD-CCNC: 64 IU/L (ref 40–128)
ALT SERPL-CCNC: 28 U/L (ref 10–40)
ANION GAP SERPL CALCULATED.3IONS-SCNC: 11 MMOL/L (ref 4–16)
AST SERPL-CCNC: 31 IU/L (ref 15–37)
BANDED NEUTROPHILS ABSOLUTE COUNT: 0.35 K/CU MM
BANDED NEUTROPHILS RELATIVE PERCENT: 7 % (ref 5–11)
BILIRUB SERPL-MCNC: 0.2 MG/DL (ref 0–1)
BORDETELLA PARAPERTUSSIS BY PCR: NOT DETECTED
BORDETELLA PERTUSSIS PCR: NOT DETECTED
BUN BLDV-MCNC: 8 MG/DL (ref 6–23)
C-REACTIVE PROTEIN, HIGH SENSITIVITY: 90.7 MG/L
CALCIUM SERPL-MCNC: 7.9 MG/DL (ref 8.3–10.6)
CHLAMYDOPHILA PNEUMONIA PCR: NOT DETECTED
CHLORIDE BLD-SCNC: 101 MMOL/L (ref 99–110)
CO2: 28 MMOL/L (ref 21–32)
CORONAVIRUS 229E PCR: NOT DETECTED
CORONAVIRUS HKU1 PCR: NOT DETECTED
CORONAVIRUS NL63 PCR: NOT DETECTED
CORONAVIRUS OC43 PCR: NOT DETECTED
CREAT SERPL-MCNC: 0.6 MG/DL (ref 0.6–1.1)
D DIMER: 329 NG/ML(DDU)
DIFFERENTIAL TYPE: ABNORMAL
EKG ATRIAL RATE: 91 BPM
EKG DIAGNOSIS: NORMAL
EKG P AXIS: 36 DEGREES
EKG P-R INTERVAL: 160 MS
EKG Q-T INTERVAL: 394 MS
EKG QRS DURATION: 92 MS
EKG QTC CALCULATION (BAZETT): 484 MS
EKG R AXIS: 19 DEGREES
EKG T AXIS: 39 DEGREES
EKG VENTRICULAR RATE: 91 BPM
ESTIMATED AVERAGE GLUCOSE: 157 MG/DL
FIBRINOGEN LEVEL: 429 MG/DL (ref 196.9–442.1)
GFR AFRICAN AMERICAN: >60 ML/MIN/1.73M2
GFR NON-AFRICAN AMERICAN: >60 ML/MIN/1.73M2
GLUCOSE BLD-MCNC: 143 MG/DL (ref 70–99)
GLUCOSE BLD-MCNC: 149 MG/DL (ref 70–99)
GLUCOSE BLD-MCNC: 165 MG/DL (ref 70–99)
GLUCOSE BLD-MCNC: 221 MG/DL (ref 70–99)
GLUCOSE BLD-MCNC: 226 MG/DL (ref 70–99)
GLUCOSE BLD-MCNC: 272 MG/DL (ref 70–99)
HBA1C MFR BLD: 7.1 % (ref 4.2–6.3)
HCT VFR BLD CALC: 38.9 % (ref 37–47)
HEMOGLOBIN: 12.2 GM/DL (ref 12.5–16)
HIGH SENSITIVE C-REACTIVE PROTEIN: 117.6 MG/L
HUMAN METAPNEUMOVIRUS PCR: NOT DETECTED
INFLUENZA A BY PCR: NOT DETECTED
INFLUENZA A H1 (2009) PCR: NOT DETECTED
INFLUENZA A H1 PANDEMIC PCR: NOT DETECTED
INFLUENZA A H3 PCR: NOT DETECTED
INFLUENZA B BY PCR: NOT DETECTED
INR BLD: 1.02 INDEX
LACTATE DEHYDROGENASE: 356 IU/L (ref 120–246)
LYMPHOCYTES ABSOLUTE: 0.9 K/CU MM
LYMPHOCYTES RELATIVE PERCENT: 17 % (ref 24–44)
MCH RBC QN AUTO: 29.3 PG (ref 27–31)
MCHC RBC AUTO-ENTMCNC: 31.4 % (ref 32–36)
MCV RBC AUTO: 93.5 FL (ref 78–100)
METAMYELOCYTES ABSOLUTE COUNT: 0.05 K/CU MM
METAMYELOCYTES PERCENT: 1 %
MONOCYTES ABSOLUTE: 0.5 K/CU MM
MONOCYTES RELATIVE PERCENT: 10 % (ref 0–4)
MYCOPLASMA PNEUMONIAE PCR: NOT DETECTED
PARAINFLUENZA 1 PCR: NOT DETECTED
PARAINFLUENZA 2 PCR: NOT DETECTED
PARAINFLUENZA 3 PCR: NOT DETECTED
PARAINFLUENZA 4 PCR: NOT DETECTED
PDW BLD-RTO: 13.2 % (ref 11.7–14.9)
PLATELET # BLD: 260 K/CU MM (ref 140–440)
PMV BLD AUTO: 11.6 FL (ref 7.5–11.1)
POTASSIUM SERPL-SCNC: 4.2 MMOL/L (ref 3.5–5.1)
PROCALCITONIN: 0.07
PROTHROMBIN TIME: 12.4 SECONDS (ref 11.7–14.5)
RBC # BLD: 4.16 M/CU MM (ref 4.2–5.4)
RBC # BLD: ABNORMAL 10*6/UL
RHINOVIRUS ENTEROVIRUS PCR: NOT DETECTED
RSV PCR: NOT DETECTED
SARS-COV-2: ABNORMAL
SEGMENTED NEUTROPHILS ABSOLUTE COUNT: 3.2 K/CU MM
SEGMENTED NEUTROPHILS RELATIVE PERCENT: 65 % (ref 36–66)
SODIUM BLD-SCNC: 140 MMOL/L (ref 135–145)
TOTAL PROTEIN: 6.8 GM/DL (ref 6.4–8.2)
TROPONIN T: <0.01 NG/ML
VITAMIN D 25-HYDROXY: 7 NG/ML
WBC # BLD: 5 K/CU MM (ref 4–10.5)
WBC # BLD: ABNORMAL 10*3/UL

## 2021-04-13 PROCEDURE — 94664 DEMO&/EVAL PT USE INHALER: CPT

## 2021-04-13 PROCEDURE — 2700000000 HC OXYGEN THERAPY PER DAY

## 2021-04-13 PROCEDURE — 85610 PROTHROMBIN TIME: CPT

## 2021-04-13 PROCEDURE — 2580000003 HC RX 258: Performed by: STUDENT IN AN ORGANIZED HEALTH CARE EDUCATION/TRAINING PROGRAM

## 2021-04-13 PROCEDURE — 6370000000 HC RX 637 (ALT 250 FOR IP): Performed by: STUDENT IN AN ORGANIZED HEALTH CARE EDUCATION/TRAINING PROGRAM

## 2021-04-13 PROCEDURE — 6370000000 HC RX 637 (ALT 250 FOR IP): Performed by: HOSPITALIST

## 2021-04-13 PROCEDURE — 84484 ASSAY OF TROPONIN QUANT: CPT

## 2021-04-13 PROCEDURE — 83036 HEMOGLOBIN GLYCOSYLATED A1C: CPT

## 2021-04-13 PROCEDURE — 6360000002 HC RX W HCPCS: Performed by: STUDENT IN AN ORGANIZED HEALTH CARE EDUCATION/TRAINING PROGRAM

## 2021-04-13 PROCEDURE — 80053 COMPREHEN METABOLIC PANEL: CPT

## 2021-04-13 PROCEDURE — 86141 C-REACTIVE PROTEIN HS: CPT

## 2021-04-13 PROCEDURE — 85384 FIBRINOGEN ACTIVITY: CPT

## 2021-04-13 PROCEDURE — 82962 GLUCOSE BLOOD TEST: CPT

## 2021-04-13 PROCEDURE — 0202U NFCT DS 22 TRGT SARS-COV-2: CPT

## 2021-04-13 PROCEDURE — 1200000000 HC SEMI PRIVATE

## 2021-04-13 PROCEDURE — 93010 ELECTROCARDIOGRAM REPORT: CPT | Performed by: INTERNAL MEDICINE

## 2021-04-13 PROCEDURE — 84145 PROCALCITONIN (PCT): CPT

## 2021-04-13 PROCEDURE — 82306 VITAMIN D 25 HYDROXY: CPT

## 2021-04-13 PROCEDURE — 94761 N-INVAS EAR/PLS OXIMETRY MLT: CPT

## 2021-04-13 PROCEDURE — 94640 AIRWAY INHALATION TREATMENT: CPT

## 2021-04-13 PROCEDURE — 6360000002 HC RX W HCPCS: Performed by: INTERNAL MEDICINE

## 2021-04-13 PROCEDURE — 99255 IP/OBS CONSLTJ NEW/EST HI 80: CPT | Performed by: INTERNAL MEDICINE

## 2021-04-13 PROCEDURE — 83615 LACTATE (LD) (LDH) ENZYME: CPT

## 2021-04-13 PROCEDURE — 85027 COMPLETE CBC AUTOMATED: CPT

## 2021-04-13 PROCEDURE — 85379 FIBRIN DEGRADATION QUANT: CPT

## 2021-04-13 PROCEDURE — 2580000003 HC RX 258: Performed by: INTERNAL MEDICINE

## 2021-04-13 PROCEDURE — 86140 C-REACTIVE PROTEIN: CPT

## 2021-04-13 PROCEDURE — 85007 BL SMEAR W/DIFF WBC COUNT: CPT

## 2021-04-13 RX ORDER — LACTOBACILLUS RHAMNOSUS GG 10B CELL
1 CAPSULE ORAL
Status: DISCONTINUED | OUTPATIENT
Start: 2021-04-14 | End: 2021-04-15 | Stop reason: HOSPADM

## 2021-04-13 RX ORDER — OMEGA-3S/DHA/EPA/FISH OIL/D3 300MG-1000
1000 CAPSULE ORAL DAILY
Status: DISCONTINUED | OUTPATIENT
Start: 2021-04-13 | End: 2021-04-15 | Stop reason: HOSPADM

## 2021-04-13 RX ORDER — METHYLPREDNISOLONE SODIUM SUCCINATE 125 MG/2ML
80 INJECTION, POWDER, LYOPHILIZED, FOR SOLUTION INTRAMUSCULAR; INTRAVENOUS ONCE
Status: COMPLETED | OUTPATIENT
Start: 2021-04-13 | End: 2021-04-13

## 2021-04-13 RX ADMIN — ALBUTEROL SULFATE 2 PUFF: 90 AEROSOL, METERED RESPIRATORY (INHALATION) at 11:35

## 2021-04-13 RX ADMIN — TRAZODONE HYDROCHLORIDE 100 MG: 50 TABLET ORAL at 01:55

## 2021-04-13 RX ADMIN — IPRATROPIUM BROMIDE 2 PUFF: 17 AEROSOL, METERED RESPIRATORY (INHALATION) at 19:38

## 2021-04-13 RX ADMIN — ENOXAPARIN SODIUM 30 MG: 30 INJECTION SUBCUTANEOUS at 01:56

## 2021-04-13 RX ADMIN — TRAZODONE HYDROCHLORIDE 100 MG: 50 TABLET ORAL at 20:56

## 2021-04-13 RX ADMIN — METHYLPREDNISOLONE SODIUM SUCCINATE 80 MG: 125 INJECTION, POWDER, FOR SOLUTION INTRAMUSCULAR; INTRAVENOUS at 15:38

## 2021-04-13 RX ADMIN — IPRATROPIUM BROMIDE 2 PUFF: 17 AEROSOL, METERED RESPIRATORY (INHALATION) at 11:35

## 2021-04-13 RX ADMIN — SODIUM CHLORIDE, PRESERVATIVE FREE 10 ML: 5 INJECTION INTRAVENOUS at 20:57

## 2021-04-13 RX ADMIN — POTASSIUM CHLORIDE 20 MEQ: 1500 TABLET, EXTENDED RELEASE ORAL at 01:56

## 2021-04-13 RX ADMIN — ALBUTEROL SULFATE 2 PUFF: 90 AEROSOL, METERED RESPIRATORY (INHALATION) at 15:28

## 2021-04-13 RX ADMIN — IPRATROPIUM BROMIDE 2 PUFF: 17 AEROSOL, METERED RESPIRATORY (INHALATION) at 15:28

## 2021-04-13 RX ADMIN — SERTRALINE 25 MG: 25 TABLET, FILM COATED ORAL at 08:24

## 2021-04-13 RX ADMIN — ALBUTEROL SULFATE 2 PUFF: 90 AEROSOL, METERED RESPIRATORY (INHALATION) at 19:38

## 2021-04-13 RX ADMIN — GUAIFENESIN AND DEXTROMETHORPHAN 5 ML: 100; 10 SYRUP ORAL at 20:56

## 2021-04-13 RX ADMIN — INSULIN LISPRO 1 UNITS: 100 INJECTION, SOLUTION INTRAVENOUS; SUBCUTANEOUS at 08:21

## 2021-04-13 RX ADMIN — CHOLECALCIFEROL (VITAMIN D3) 10 MCG (400 UNIT) TABLET 1000 UNITS: at 11:46

## 2021-04-13 RX ADMIN — ENOXAPARIN SODIUM 30 MG: 30 INJECTION SUBCUTANEOUS at 20:56

## 2021-04-13 RX ADMIN — INSULIN LISPRO 2 UNITS: 100 INJECTION, SOLUTION INTRAVENOUS; SUBCUTANEOUS at 11:44

## 2021-04-13 RX ADMIN — POLYVINYL ALCOHOL 1 DROP: 14 SOLUTION/ DROPS OPHTHALMIC at 08:24

## 2021-04-13 RX ADMIN — PANTOPRAZOLE SODIUM 40 MG: 40 TABLET, DELAYED RELEASE ORAL at 05:15

## 2021-04-13 RX ADMIN — SODIUM CHLORIDE 80 MG: 9 INJECTION, SOLUTION INTRAVENOUS at 20:56

## 2021-04-13 RX ADMIN — SODIUM CHLORIDE, PRESERVATIVE FREE 10 ML: 5 INJECTION INTRAVENOUS at 08:24

## 2021-04-13 RX ADMIN — ENOXAPARIN SODIUM 30 MG: 30 INJECTION SUBCUTANEOUS at 08:23

## 2021-04-13 RX ADMIN — PANTOPRAZOLE SODIUM 40 MG: 40 TABLET, DELAYED RELEASE ORAL at 15:38

## 2021-04-13 ASSESSMENT — PAIN SCALES - GENERAL
PAINLEVEL_OUTOF10: 0

## 2021-04-13 ASSESSMENT — ENCOUNTER SYMPTOMS
ANAL BLEEDING: 0
DIARRHEA: 1
COUGH: 1
ABDOMINAL PAIN: 1
SINUS PRESSURE: 0
BLOOD IN STOOL: 1
CHEST TIGHTNESS: 0
COLOR CHANGE: 0
NAUSEA: 1
BACK PAIN: 0
RECTAL PAIN: 0

## 2021-04-13 NOTE — PROGRESS NOTES
Patient arrived to unit from [facility] per stretcher. Alert and orient times 4. No skin issues noted at this time. Orint to room, call light, staff, meals, lights, tv, and phone and no questions at this time.

## 2021-04-13 NOTE — CONSULTS
Infectious Disease Consult Note  2021   Patient Name: Rony Long : 1985   Impression  Severe COVID-19 pneumonia with acute hypoxic respiratory failure. Date of symptom onset:3/31/2021  Date of positive COVID-19 PCR:2021  Exposure: unknown  Oxygen supplementation: nasal oxygen  Bacterial infection: unlikely  BMI:54.91 kg/m  Would not use tocilizumab as she is outside the window, will revisit this if her inflammatory markers start to rise. Outside window for remdesivir   Vitamin D deficiency  Morbid obesity  Crohn disease   Multi-morbidity: per PMHx  Plan:   Therapeutic: d/c dexamethasone, start Solumedrol infusion   Agree with vitamin D supplementation   Diagnostic:Trend CRP and procalcitonin. (If not already done then) check lipid panel, BNP, troponin, CK, LD, HIV screen, viral hepatitis serology, HbA1c, MRSA nares, urine legionella Ag, urine S pneumoniae Ag, respiratory culture.  F/u test:     Thank you for allowing me to consult in the care of this patient.  ------------------------  REASON FOR CONSULT: Infective syndrome  Requested by: Dr. Cristina Fuller is a 38 y.o.  female history of morbid obesity, Crohn's disease diagnosed in , has been on 6 weekly infliximab since , (9 weeks since last dose, missed her dose last she was informed by insurance to be covered) who was admitted 2021 for further evaluation and management of cough, rhinorrhea postnasal drip. Symptoms steadily worsened to cough, shortness of breath, fever, myalgias. She had pleuritic chest pain. She also has suprapubic discomfort which he attributes to her Crohn's disease. She had an outpatient test for COVID-19 and tested positive on 2021. On admission she was treated with dexamethasone. Now has no fever. ? Infectious diseases service was consulted to evaluate the pt, and recommend further investigative and therapeutic measures.   Review and summary of old records:  ROS: Other systems reviewed Including eyes, ENT, respiratory, cardiovascular, GI, , dermatologic, neurologic, psych, hem/lymphatic, musculoskeletal and endocrine were negative other than what is mentioned above.      Patient Active Problem List    Diagnosis Date Noted    Acute respiratory failure ([address] 2021    Knee mass, right     Patellofemoral arthritis of right knee 2020    Infrapatellar bursitis of right knee 2020    Bowen's disease of right shoulder 2019    Status post  delivery 2019    Preeclampsia, third trimester 2019    Pregnancy related condition 2019    Acute deep vein thrombosis (DVT) of non-extremity vein 2018    C. difficile colitis 2018    Crohn's disease of small and large intestines with complication ([address]     Neck swelling 2018    Leukocytosis 2018    Non-intractable vomiting with nausea 2018    Infection of venous access port     IBS (irritable bowel syndrome) 2018    Crohn's colitis, unspecified complication ([address]     Exacerbation of Crohn's disease with complication ([address]     Morbid obesity ([address] 2017    Multiple lung nodules 2013    Anxiety 2013    Crohn's disease ([address] 2012     Past Medical History:   Diagnosis Date    Acid reflux     Acute deep vein thrombosis (DVT) of non-extremity vein 2018    \"in my neck\"\"after mediport was put in\"    Anemia     Anxiety     Asthma     NO PULMONOLOGIST AT THIS TIME    Blood clot due to device, implant, or graft 2018    had blood clots in neck due to med port    Bowen's disease     Cancer ([address]     skin cancer shoulder 2019    Crohn's disease ([address] Dx 2010    Remicade Infusions Every Six Weeks, [facility] Depression     Fall     \"Passed Out And [place] On My Tailbone\"    Fatty liver     Gestational diabetes     \"2019- no medication - just diet controlled with pregnancy\"    Hyperlipidemia     Hypertension     \"yrs ago was on b/p medication- off medication since 2018- only took medication for 2 months\"    Lower back pain     \"Sometimes\"    MRSA (methicillin resistant staph aureus) culture positive 2018    Multiple pulmonary nodules 2013    Subcentimeter; needs repeat imaging in 3-6 months    Pancreatitis     Patellofemoral arthritis of right knee 2020    Shortness of breath on exertion     Teeth missing     Upper And Lower    Wears glasses     Wears glasses       Past Surgical History:   Procedure Laterality Date    ABDOMEN SURGERY      bowel resection     ADENOIDECTOMY  1995    APPENDECTOMY      Done During Colon Resection For Crohn's  Disease    BREAST SURGERY Left     \"Infected Lymph Node Removed\"     SECTION N/A 2019     SECTION performed by Viktor Mcqueen MD at [facility] [address]  2016    COLECTOMY  8-    Crohn's Disease , Dr. Henry Cortez, Appendectomy Also Done    COLONOSCOPY  2016    Polyps Removed In Past    COLONOSCOPY  2017    Hospitalized for c-diff    COLONOSCOPY  2018    normal, multiple biopsy, repeat 1 year    COLONOSCOPY N/A 2019    COLONOSCOPY POLYPECTOMY SNARE/COLD BIOPSY performed by Guy Lomeli MD at [facility]      ENDOSCOPY, COLON, DIAGNOSTIC  Last Done     KNEE SURGERY Right 10/1/2020    EXCISION OF MASS ON RIGHT KNEE performed by Pallavi Russo DO at [address]      Dr Angela Minor, Removed Adhesions    OTHER SURGICAL HISTORY  2011    Mediport Insertion Left Chest Area/revision done 2016- \"removed at [facility] 2017 after I got an infection    SKIN BIOPSY      \"skin cancer removed right shoulder\"2019    TUBAL LIGATION  2019    TUNNELED VENOUS PORT PLACEMENT Right 2018    [facility]    WISDOM TOOTH EXTRACTION      All [place] Teeth Extracted In Past      Family History   Problem Relation Age of Onset    Migraines Mother     Heart Disease Father     Diabetes Father     High Cholesterol Father     Depression Maternal Aunt     Depression Maternal Uncle     Depression Paternal Aunt     Coronary Art Dis Paternal Aunt     Depression Paternal Uncle     Coronary Art Dis Paternal Uncle     Depression Maternal Grandmother     Depression Maternal Grandfather     Depression Paternal Grandmother     Coronary Art Dis Paternal Grandmother     Depression Paternal Grandfather     Coronary Art Dis Paternal Grandfather       Infectious disease related family history - not contibutory. SOCIAL HISTORY  Social History     Tobacco Use    Smoking status: Never Smoker    Smokeless tobacco: Never Used   Substance Use Topics    Alcohol use: No       Born:   Lived   Occupation:   No recent travel of significance.  No recent unusual exposures.  NO pets    ? ALLERGIES  Allergies   Allergen Reactions    Morphine      Other reaction(s): Headache  Triggers migraine. \"Triggers My Migraines\"  Other reaction(s): Headache    Tramadol      Other reaction(s): Headache  States triggers migraine headache  \"Triggers My Migraines\"  Other reaction(s): Headache      MEDICATIONS  Reviewed and are per the chart/EMR. IMMUNIZATION HISTORY  Immunization History   Administered Date(s) Administered    Pneumococcal Polysaccharide ([id number]) 09/07/2013    Tdap (Boostrix, Adacel) 04/20/2020     ?   Antibiotics:   none?  -------------------------------------------------------------------------------------------------------------------    Vital Signs:  Vitals:    04/13/21 0818   BP: (!) 149/89   Pulse: 89   Resp: 25   Temp: 97.8 °F (36.6 °C)   SpO2: 94%         Exam:    VS: noted; wt  149.7 kg  Gen: alert and oriented X3, on nasal oxygen   Skin: no stigmata of endocarditis  Wounds: C/D/I  HEMT: AT/NC Oropharynx pink, moist, and without lesions or exudates;

## 2021-04-13 NOTE — H&P
History and Physical      Name:  Yajaira Ibrahim /Age/Sex: 1985  (38 y.o. female)   MRN & CSN:  [id number] & [id number] Admission Date/Time: 2021  3:32 PM   Location:  -A PCP: Mariano Garcia 15 Day: 1    Assessment and Plan:   Yajaira Ibrahim is a 38 y.o.  female  who presents with Acute respiratory failure ([address]    1. Acute hypoxic and hypercarbic respiratory failure secondary to COVID-19  2. COVID-19  3. History of asthma  -Admit to inpatient services with continuous pulse ox in COVID-19 unit with COVID-19 precautions and COVID-19 lab work  -CTA pulmonary with contrast shows no evidence of pulmonary embolus or acute thoracic aortic abnormality but does show extensive groundglass opacities bilaterally, and cardiomegaly.  -Symptom onset 3/31/2021. Was told on 2021 was positive from test that was done on 2021.  -Consult infectious disease given patient is on Remicade. Patient received IV Remicade every 6 weeks and has missed her injection by 3 weeks. This puts her at 9 weeks out. Given symptom onset patient does not qualify for remdesivir or convalescent plasma via standard protocols. Did discuss with pharmacy regarding Remicade and Actemra: labs look okay pending CRP, literature would state not using Remicade while taking Actemra, given 3 weeks past due of Remicade could consider Actemra and then waiting at least 4 weeks prior to Remicade, but given Crohn's disease there is a risk of GI perforation. Would like recommendations from infectious disease about further treatment in conjunction with Decadron. -ED gave 10 mg Decadron and we will continue this at 6 mg daily  -MDI albuterol and Atrovent every 4 hours while awake    4. Hypokalemia  5. Hypomagnesemia  -Potassium 3.2 and magnesium 1.7 in ED. ED gave 10 mEq IV potassium and 2 g magnesium sulfate. We will give an additional 20 mEq oral potassium. -A.m. labs    6.  Crohn's disease  -Patient endorsed missing Remicade as above and thinks she is going into a flare  -Patient endorses right lower quadrant abdominal pain with 3 episodes of bloody bowel movements with clots. Normal bowel movement today. -Decadron as above, add Bentyl and continue home Lomotil  -CT abdomen pelvis with IV contrast shows no acute intestinal abnormalities    7. Fatty infiltrate of liver: Likely Neldon Roch  -CT abdomen pelvis with IV contrast shows diffuse fatty replacement of liver again noted compared to abdominal CT study from March 6, 2020.  -Recommend outpatient follow-up    Other chronic medical conditions:  Continue all home meds except stated above or contraindicated.  GERD   Depression with anxiety   Insomnia   Migraines   Dry eyes   Diabetes mellitus type 2 not on insulin therapy: Continue home medications, diabetic diet, low SSI + BG checks ACHS, hypoglycemic protocol, and target -180   Morbid obesity    Diet No diet orders on file   DVT Prophylaxis [x] Lovenox, []  Heparin, [] SCDs, [] Ambulation   GI Prophylaxis [x] PPI,  [] H2 Blocker,  [] Carafate,  [] Diet/Tube Feeds   Code Status Prior   Disposition Patient requires continued admission due to Acute respiratory failure ([address]   MDM [] Low, [] Moderate,[x]  High  Patient's risk as above due to acuity of condition with potential for decompensation. History of Present Illness:     Chief Complaint: Acute respiratory failure ([address]    Margareth Hunter is a 38 y.o.  female with a reviewed and noncontributory family history and a PMH as stated above, who presents with chief complaint of shortness of breath. Patient reports COVID-19 symptoms started on March 31, 2021 with a hoarse voice and \"chest cold. \"  Patient states her symptoms included chills, fevers, headaches, myalgias, nausea with emesis, and loss of taste and smell. Patient endorsed initial mild cough that was intermittent in nature but worsened acutely over the last week.   Patient states shortness of breath started 4/5/2021 and has been constant and gradually worsening. Associated symptoms include hemoptysis, WILBURN, orthopnea, worsening sputum production and pleuritic chest wall discomfort on breathing. Pertinent negatives include no leg pain, leg swelling, PND, rhinorrhea or sore throat. The symptoms are aggravated by exercise and any activity. She has tried her home beta agonist inhalers and hot tea with honey for the symptoms. The treatment provided mild relief. Her past medical history is significant for asthma. Patient does endorse right lower quadrant abdominal pain, diarrhea, and 3 days of mild bloody bowel movements with clots, with normal return of bowel movements today. Denied dark tarry stools. Patient notes history of Crohn's disease and states \"I think I am going into a flare. \"  Patient endorses having Remicade infusion every 6 weeks but is currently 3 weeks late. Patient endorses abdominal pain for last 4 to 5 days. States it is typical for her Crohn's flareup. States no radiation of pain. Patient does endorse anorexia with no solid food since 4/3/2021 until tonight. Endorses ability to keep food down. Otherwise denies sinus pressure, chest pain, dysuria, frequency, urgency, or hematuria. ROS:   Review of Systems   Constitutional: Positive for activity change, appetite change, chills, fatigue and fever. Negative for diaphoresis. HENT: Negative for congestion, rhinorrhea, sinus pressure and sore throat. Loss of taste and smell   Eyes: Negative for visual disturbance. Respiratory: Positive for cough, hemoptysis, sputum production, shortness of breath and wheezing. Negative for chest tightness. WILBURN and orthopnea   Cardiovascular: Positive for orthopnea. Negative for chest pain, palpitations, leg swelling and PND. Gastrointestinal: Positive for abdominal pain, blood in stool, diarrhea, nausea and vomiting. Negative for anal bleeding and rectal pain. Genitourinary: Negative for dysuria, frequency, hematuria and urgency. Musculoskeletal: Positive for myalgias. Negative for arthralgias and back pain. Skin: Negative for color change, pallor and rash. Neurological: Positive for headaches. Negative for dizziness, seizures, syncope, speech difficulty, weakness and numbness. Psychiatric/Behavioral: Negative for confusion. Objective:   No intake or output data in the 24 hours ending 04/12/21 2309   Vitals:   Vitals:    04/12/21 2241   BP: (!) 145/87   Pulse: 85   Resp: 17   Temp: 97.8 °F (36.6 °C)   SpO2: 93%     BP (!) 145/87   Pulse 85   Temp 97.8 °F (36.6 °C) (Oral)   Resp 17   Ht 5' 5\" (1.651 m)   Wt (!) 330 lb (149.7 kg)   LMP 03/29/2021   SpO2 93%   BMI 54.91 kg/m²   Physical Exam:   Physical Exam  Vitals signs and nursing note reviewed. Constitutional:       General: She is awake. She is not in acute distress. Appearance: Normal appearance. She is morbidly obese. She is ill-appearing. She is not toxic-appearing or diaphoretic. Interventions: She is not intubated. Nasal cannula in place. HENT:      Head: Atraumatic. Right Ear: External ear normal.      Left Ear: External ear normal.      Nose: Nose normal. No rhinorrhea. Mouth/Throat:      Mouth: Mucous membranes are moist.   Eyes:      General: No scleral icterus. Extraocular Movements: Extraocular movements intact. Conjunctiva/sclera: Conjunctivae normal.      Pupils: Pupils are equal, round, and reactive to light. Neck:      Musculoskeletal: Neck supple. Cardiovascular:      Rate and Rhythm: Normal rate and regular rhythm. Pulses: Normal pulses. Heart sounds: Normal heart sounds. No murmur. No gallop. Pulmonary:      Effort: Pulmonary effort is normal. No tachypnea, accessory muscle usage or respiratory distress. She is not intubated. Breath sounds: Wheezing and rhonchi present. No rales.    Abdominal:      General: Abdomen is protuberant. Bowel sounds are normal. There is no distension. Palpations: Abdomen is soft. Tenderness: There is abdominal tenderness in the right lower quadrant. There is no guarding or rebound. Negative signs include Bains's sign and Rovsing's sign. Musculoskeletal: Normal range of motion. Right lower le+ Edema present. Left lower le+ Edema present. Skin:     General: Skin is warm and dry. Capillary Refill: Capillary refill takes less than 2 seconds. Neurological:      General: No focal deficit present. Mental Status: She is alert and oriented to person, place, and time. Mental status is at baseline. Cranial Nerves: No dysarthria or facial asymmetry. Motor: No tremor or seizure activity. Psychiatric:         Mood and Affect: Mood normal. Mood is not anxious. Speech: Speech normal. She is communicative. Speech is not slurred. Behavior: Behavior is cooperative.        Past Medical History:      Past Medical History:   Diagnosis Date    Acid reflux     Acute deep vein thrombosis (DVT) of non-extremity vein 2018    \"in my neck\"\"after mediport was put in\"    Anemia     Anxiety     Asthma     NO PULMONOLOGIST AT THIS TIME    Blood clot due to device, implant, or graft 2018    had blood clots in neck due to med port    Bowen's disease     Cancer ([facility]     skin cancer shoulder 2019    Crohn's disease ([facility] Dx     Remicade Infusions Every Six Weeks, Sees Dr. Avila Nobles, [address] Depression     Fall     \"Passed Out And Randa Bang On My Romie Drought"    Fatty liver     Gestational diabetes     \"2019- no medication - just diet controlled with pregnancy\"    Hyperlipidemia     Hypertension     \"yrs ago was on b/p medication- off medication since 2018- only took medication for 2 months\"    Lower back pain     \"Sometimes\"    MRSA (methicillin resistant staph aureus) culture positive 2018    Multiple Transportation needs     Medical: None     Non-medical: None   Tobacco Use    Smoking status: Never Smoker    Smokeless tobacco: Never Used   Substance and Sexual Activity    Alcohol use: No    Drug use: No    Sexual activity: Yes     Partners: Male   Lifestyle    Physical activity     Days per week: None     Minutes per session: None    Stress: None   Relationships    Social connections     Talks on phone: None     Gets together: None     Attends Judaism service: None     Active member of club or organization: None     Attends meetings of clubs or organizations: None     Relationship status: None    Intimate partner violence     Fear of current or ex partner: None     Emotionally abused: None     Physically abused: None     Forced sexual activity: None   Other Topics Concern    None   Social History Narrative    Lives with parents, has a daughter       Data:   CBC with Differential:    Lab Results   Component Value Date    WBC 5.8 04/12/2021    RBC 4.45 04/12/2021    HGB 12.9 04/12/2021    HCT 41.1 04/12/2021     04/12/2021    MCV 92.4 04/12/2021    MCH 29.0 04/12/2021    MCHC 31.4 04/12/2021    RDW 13.2 04/12/2021    SEGSPCT 56.2 04/12/2021    BANDSPCT 4 01/23/2019    LYMPHOPCT 27.1 04/12/2021    MONOPCT 7.4 04/12/2021    EOSPCT 1.3 05/04/2012    BASOPCT 0.5 04/12/2021    MONOSABS 0.4 04/12/2021    LYMPHSABS 1.6 04/12/2021    EOSABS 0.1 04/12/2021    BASOSABS 0.0 04/12/2021    DIFFTYPE AUTOMATED DIFFERENTIAL 04/12/2021       CMP:     Lab Results   Component Value Date     04/12/2021    K 3.2 04/12/2021    K 4.0 01/19/2018     04/12/2021    CO2 31 04/12/2021    BUN 11 04/12/2021    CREATININE 0.7 04/12/2021    GFRAA >60 04/12/2021    LABGLOM >60 04/12/2021    GLUCOSE 148 04/12/2021    PROT 7.8 03/06/2020    PROT 7.4 03/10/2013    LABALBU 3.8 03/06/2020    CALCIUM 8.3 04/12/2021    BILITOT 0.2 03/06/2020    ALKPHOS 63 03/06/2020    AST 28 03/06/2020    ALT 36 03/06/2020 recognition software. While attempts have been made to review the dictation as it is transcribed, on occasion the spoken word can be misinterpreted by the technology leading to omissions or inappropriate words, phrases or sentences.

## 2021-04-13 NOTE — CARE COORDINATION
.CM has reviewed pt's chart for needs. CM screening shows that pt has PCP, insurance and is independent PTA. If needs arise please contact [facility].   TE

## 2021-04-13 NOTE — PROGRESS NOTES
Spoke with patient via phone, emr reviewed, presented to [facility] d/t sob, fever, vomiting, pt covid positive, has covid pneumonia. GI consulted d/t Crohn's disease, missed remicaide infusion about 3 weeks ago, last infusion about 9 weeks ago. Reports bloody diarrhea with clots for approx 9 days prior to admission. Also with  intermittent lower abdominal pain, described as \"charley horse on the inside\", associated with some intermittent nausea. Hx of poor outpatient follow up. Pt currently on solumedrol 80 mg daily, which will adequately treat colon inflammation while inpatient. On bentyl, lovenox, IVF, diet as tolerated.     IBD panel, esr, magnesium, vitamin d, hepatitis b iron studies, urine histoplasma antigen, ua, vitamin a, zinc ordered    Full consult note in am

## 2021-04-13 NOTE — PLAN OF CARE
Problem: Falls - Risk of:  Goal: Will remain free from falls  Description: Will remain free from falls  Outcome: Ongoing  Goal: Absence of physical injury  Description: Absence of physical injury  Outcome: Ongoing     Problem: Gas Exchange - Impaired  Goal: Absence of hypoxia  Outcome: Ongoing  Goal: Promote optimal lung function  Outcome: Ongoing     Problem: Breathing Pattern - Ineffective  Goal: Ability to achieve and maintain a regular respiratory rate  Outcome: Ongoing     Problem:  Body Temperature -  Risk of, Imbalanced  Goal: Ability to maintain a body temperature within defined limits  Outcome: Ongoing  Goal: Will regain or maintain usual level of consciousness  Outcome: Ongoing  Goal: Complications related to the disease process, condition or treatment will be avoided or minimized  Outcome: Ongoing     Problem: Isolation Precautions - Risk of Spread of Infection  Goal: Prevent transmission of infection  Outcome: Ongoing     Problem: Nutrition Deficits  Goal: Optimize nutritional status  Outcome: Ongoing     Problem: Risk for Fluid Volume Deficit  Goal: Maintain normal heart rhythm  Outcome: Ongoing  Goal: Maintain absence of muscle cramping  Outcome: Ongoing  Goal: Maintain normal serum potassium, sodium, calcium, phosphorus, and pH  Outcome: Ongoing     Problem: Loneliness or Risk for Loneliness  Goal: Demonstrate positive use of time alone when socialization is not possible  Outcome: Ongoing     Problem: Fatigue  Goal: Verbalize increase energy and improved vitality  Outcome: Ongoing     Problem: Patient Education: Go to Patient Education Activity  Goal: Patient/Family Education  Outcome: Ongoing

## 2021-04-13 NOTE — PROGRESS NOTES
[address] [place] [address]  HOSPITALIST PROGRESS NOTE                       Name:  Gemini Lopez /Age/Sex: 1985  (38 y.o. female)   MRN & CSN:  [id number] & [id number] Admission Date/Time: 2021  3:32 PM   Location:  -A Attending:  Maria A Burgos MD                                                  HPI  Gemini Lopez is a 38 y.o. female who presents with acute hypoxic respiratory failure on     SUBJECTIVE  -on 4 liters NC oxygen, reports some shortness of breath, some intermittent nausea. ROS- no chest pain, no abdominal pain, no fever      ALLERGIES:   Allergies   Allergen Reactions    Morphine      Other reaction(s): Headache  Triggers migraine. \"Triggers My Migraines\"  Other reaction(s): Headache    Tramadol      Other reaction(s): Headache  States triggers migraine headache  \"Triggers My Migraines\"  Other reaction(s): Headache       PCP: SHAQUILLE Cohen NP    PAST MEDICAL HISTORY, SURGICAL HISTORY, SOCIAL HISTORY and  HOME MEDICATIONS all reviewed. OBJECTIVE  Vitals:    21 2343 21 0100 21 0738 21 0818   BP:    (!) 149/89   Pulse: 75   89   Resp:  27  25   Temp:    97.8 °F (36.6 °C)   TempSrc:    Oral   SpO2:   92% 94%   Weight:       Height:           PHYSICAL EXAM  GEN Awake female, sitting upright in bed in no apparent distress. EYES Pupils are equally round. No scleral erythema, discharge, or conjunctivitis. HENT Mucous membranes are moist. Oral pharynx without exudates, no evidence of thrush. NECK Supple, no apparent thyromegaly or masses. RESP Unlabored respiration, bilateral rhonchi  CARDIO/VASC S1/S2 auscultated. Regular rate without appreciable murmurs, rubs, or gallops. No JVD or carotid bruits. Peripheral pulses equal bilaterally and palpable. GI Abdomen is soft without significant tenderness, masses, or guarding. Bowel sounds are normoactive. Rectal exam deferred.  No costovertebral angle tenderness.  Normal appearing external dextrose, dicyclomine, guaiFENesin-dextromethorphan        ASSESSMENT and [facility] Day: 2    1-Acute hypoxic respiratory failure likely due to covid pneumonia  -reported outpatient tested positive for covid on 4/9  -CTA with bilateral opacities  -on decadron, not qualifying for remdesevir as symptoms started more than >7 days PTA(on 3/31)  -monitor inflammatory markers, hold off abx as procal low  2-Probable covid related gastroenteritis- CT negative, symptomatic management     -replace electrolytes prn and vitamin D      Other issues  -asthma- not wheezing on exam  -Crohn's disease- missed remicade- will discuss with her GI  -fatty liver  -GERD  -Diabetes mellitus type 2 not on insulin therapy: Continue home medications, diabetic diet, low SSI + BG checks ACHS, hypoglycemic protocol, and target -180  -Morbid obesity with BMI of 54- complicating above    Disp:     Diet DIET CARB CONTROL; Carb Control: 5 carb choices (75 gms)/meal   DVT Prophylaxis [] Lovenox, []  Heparin, [] SCDs, [] Ambulation   GI Prophylaxis [] PPI,  [] H2 Blocker,  [] Carafate,  [] Diet/Tube Feeds   Code Status Full Code   Disposition Patient requires continued admission due to acute hypoxic respiratory failure   CMS Level of Risk [] Low, [] Moderate,[x]  High  Patient's risk as above due to acute hypoxic respiratory failure     ART ORNELAS MD 4/13/2021 10:10 AM

## 2021-04-14 PROBLEM — J12.82 PNEUMONIA DUE TO COVID-19 VIRUS: Status: ACTIVE | Noted: 2021-04-14

## 2021-04-14 PROBLEM — U07.1 PNEUMONIA DUE TO COVID-19 VIRUS: Status: ACTIVE | Noted: 2021-04-14

## 2021-04-14 LAB
ALBUMIN SERPL-MCNC: 3.4 GM/DL (ref 3.4–5)
ALP BLD-CCNC: 67 IU/L (ref 40–128)
ALT SERPL-CCNC: 21 U/L (ref 10–40)
ANION GAP SERPL CALCULATED.3IONS-SCNC: 11 MMOL/L (ref 4–16)
AST SERPL-CCNC: 23 IU/L (ref 15–37)
BILIRUB SERPL-MCNC: 0.2 MG/DL (ref 0–1)
BUN BLDV-MCNC: 13 MG/DL (ref 6–23)
CALCIUM SERPL-MCNC: 8.5 MG/DL (ref 8.3–10.6)
CHLORIDE BLD-SCNC: 101 MMOL/L (ref 99–110)
CO2: 29 MMOL/L (ref 21–32)
CREAT SERPL-MCNC: 0.7 MG/DL (ref 0.6–1.1)
D DIMER: 284 NG/ML(DDU)
DIFFERENTIAL TYPE: ABNORMAL
ERYTHROCYTE SEDIMENTATION RATE: 65 MM/HR (ref 0–20)
FOLATE: 6.7 NG/ML (ref 3.1–17.5)
GFR AFRICAN AMERICAN: >60 ML/MIN/1.73M2
GFR NON-AFRICAN AMERICAN: >60 ML/MIN/1.73M2
GLUCOSE BLD-MCNC: 162 MG/DL (ref 70–99)
GLUCOSE BLD-MCNC: 172 MG/DL (ref 70–99)
GLUCOSE BLD-MCNC: 210 MG/DL (ref 70–99)
GLUCOSE BLD-MCNC: 213 MG/DL (ref 70–99)
GLUCOSE BLD-MCNC: 273 MG/DL (ref 70–99)
HCT VFR BLD CALC: 39.1 % (ref 37–47)
HEMOGLOBIN: 11.8 GM/DL (ref 12.5–16)
HEPATITIS B SURFACE ANTIGEN: NON REACTIVE
HIGH SENSITIVE C-REACTIVE PROTEIN: 47.1 MG/L
LYMPHOCYTES ABSOLUTE: 1.5 K/CU MM
LYMPHOCYTES RELATIVE PERCENT: 14 % (ref 24–44)
MAGNESIUM: 1.7 MG/DL (ref 1.8–2.4)
MCH RBC QN AUTO: 28.4 PG (ref 27–31)
MCHC RBC AUTO-ENTMCNC: 30.2 % (ref 32–36)
MCV RBC AUTO: 94.2 FL (ref 78–100)
METAMYELOCYTES ABSOLUTE COUNT: 0.11 K/CU MM
METAMYELOCYTES PERCENT: 1 %
MONOCYTES ABSOLUTE: 0.5 K/CU MM
MONOCYTES RELATIVE PERCENT: 5 % (ref 0–4)
PDW BLD-RTO: 13.1 % (ref 11.7–14.9)
PLATELET # BLD: 283 K/CU MM (ref 140–440)
PMV BLD AUTO: 12 FL (ref 7.5–11.1)
POTASSIUM SERPL-SCNC: 4.3 MMOL/L (ref 3.5–5.1)
RBC # BLD: 4.15 M/CU MM (ref 4.2–5.4)
SEGMENTED NEUTROPHILS ABSOLUTE COUNT: 8.5 K/CU MM
SEGMENTED NEUTROPHILS RELATIVE PERCENT: 80 % (ref 36–66)
SODIUM BLD-SCNC: 141 MMOL/L (ref 135–145)
TOTAL PROTEIN: 7.2 GM/DL (ref 6.4–8.2)
VITAMIN B-12: 691.3 PG/ML (ref 211–911)
VITAMIN D 25-HYDROXY: 7.27 NG/ML
WBC # BLD: 10.6 K/CU MM (ref 4–10.5)

## 2021-04-14 PROCEDURE — 99233 SBSQ HOSP IP/OBS HIGH 50: CPT | Performed by: INTERNAL MEDICINE

## 2021-04-14 PROCEDURE — 94640 AIRWAY INHALATION TREATMENT: CPT

## 2021-04-14 PROCEDURE — 6370000000 HC RX 637 (ALT 250 FOR IP): Performed by: STUDENT IN AN ORGANIZED HEALTH CARE EDUCATION/TRAINING PROGRAM

## 2021-04-14 PROCEDURE — 86141 C-REACTIVE PROTEIN HS: CPT

## 2021-04-14 PROCEDURE — 86255 FLUORESCENT ANTIBODY SCREEN: CPT

## 2021-04-14 PROCEDURE — 1200000000 HC SEMI PRIVATE

## 2021-04-14 PROCEDURE — 6370000000 HC RX 637 (ALT 250 FOR IP): Performed by: HOSPITALIST

## 2021-04-14 PROCEDURE — 80053 COMPREHEN METABOLIC PANEL: CPT

## 2021-04-14 PROCEDURE — 2580000003 HC RX 258: Performed by: STUDENT IN AN ORGANIZED HEALTH CARE EDUCATION/TRAINING PROGRAM

## 2021-04-14 PROCEDURE — 87340 HEPATITIS B SURFACE AG IA: CPT

## 2021-04-14 PROCEDURE — 85007 BL SMEAR W/DIFF WBC COUNT: CPT

## 2021-04-14 PROCEDURE — 2580000003 HC RX 258: Performed by: INTERNAL MEDICINE

## 2021-04-14 PROCEDURE — 82746 ASSAY OF FOLIC ACID SERUM: CPT

## 2021-04-14 PROCEDURE — 6360000002 HC RX W HCPCS: Performed by: INTERNAL MEDICINE

## 2021-04-14 PROCEDURE — 2700000000 HC OXYGEN THERAPY PER DAY

## 2021-04-14 PROCEDURE — 83735 ASSAY OF MAGNESIUM: CPT

## 2021-04-14 PROCEDURE — 84630 ASSAY OF ZINC: CPT

## 2021-04-14 PROCEDURE — 6370000000 HC RX 637 (ALT 250 FOR IP): Performed by: NURSE PRACTITIONER

## 2021-04-14 PROCEDURE — 85379 FIBRIN DEGRADATION QUANT: CPT

## 2021-04-14 PROCEDURE — 86671 FUNGUS NES ANTIBODY: CPT

## 2021-04-14 PROCEDURE — 94761 N-INVAS EAR/PLS OXIMETRY MLT: CPT

## 2021-04-14 PROCEDURE — 6360000002 HC RX W HCPCS: Performed by: STUDENT IN AN ORGANIZED HEALTH CARE EDUCATION/TRAINING PROGRAM

## 2021-04-14 PROCEDURE — 84590 ASSAY OF VITAMIN A: CPT

## 2021-04-14 PROCEDURE — 82306 VITAMIN D 25 HYDROXY: CPT

## 2021-04-14 PROCEDURE — 82607 VITAMIN B-12: CPT

## 2021-04-14 PROCEDURE — 85652 RBC SED RATE AUTOMATED: CPT

## 2021-04-14 PROCEDURE — 82962 GLUCOSE BLOOD TEST: CPT

## 2021-04-14 PROCEDURE — 85027 COMPLETE CBC AUTOMATED: CPT

## 2021-04-14 RX ADMIN — CHOLECALCIFEROL (VITAMIN D3) 10 MCG (400 UNIT) TABLET 1000 UNITS: at 12:56

## 2021-04-14 RX ADMIN — ALBUTEROL SULFATE 2 PUFF: 90 AEROSOL, METERED RESPIRATORY (INHALATION) at 16:57

## 2021-04-14 RX ADMIN — TRAZODONE HYDROCHLORIDE 100 MG: 50 TABLET ORAL at 21:19

## 2021-04-14 RX ADMIN — PANTOPRAZOLE SODIUM 40 MG: 40 TABLET, DELAYED RELEASE ORAL at 06:11

## 2021-04-14 RX ADMIN — IPRATROPIUM BROMIDE 2 PUFF: 17 AEROSOL, METERED RESPIRATORY (INHALATION) at 21:25

## 2021-04-14 RX ADMIN — GUAIFENESIN AND DEXTROMETHORPHAN 5 ML: 100; 10 SYRUP ORAL at 08:52

## 2021-04-14 RX ADMIN — ENOXAPARIN SODIUM 30 MG: 30 INJECTION SUBCUTANEOUS at 08:43

## 2021-04-14 RX ADMIN — SODIUM CHLORIDE 80 MG: 9 INJECTION, SOLUTION INTRAVENOUS at 22:03

## 2021-04-14 RX ADMIN — GUAIFENESIN AND DEXTROMETHORPHAN 5 ML: 100; 10 SYRUP ORAL at 12:56

## 2021-04-14 RX ADMIN — ENOXAPARIN SODIUM 30 MG: 30 INJECTION SUBCUTANEOUS at 21:19

## 2021-04-14 RX ADMIN — Medication 1 CAPSULE: at 08:43

## 2021-04-14 RX ADMIN — ALBUTEROL SULFATE 2 PUFF: 90 AEROSOL, METERED RESPIRATORY (INHALATION) at 21:24

## 2021-04-14 RX ADMIN — PANTOPRAZOLE SODIUM 40 MG: 40 TABLET, DELAYED RELEASE ORAL at 17:24

## 2021-04-14 RX ADMIN — GUAIFENESIN AND DEXTROMETHORPHAN 5 ML: 100; 10 SYRUP ORAL at 04:38

## 2021-04-14 RX ADMIN — SODIUM CHLORIDE, PRESERVATIVE FREE 10 ML: 5 INJECTION INTRAVENOUS at 08:44

## 2021-04-14 RX ADMIN — ALBUTEROL SULFATE 2 PUFF: 90 AEROSOL, METERED RESPIRATORY (INHALATION) at 08:30

## 2021-04-14 RX ADMIN — INSULIN LISPRO 2 UNITS: 100 INJECTION, SOLUTION INTRAVENOUS; SUBCUTANEOUS at 12:37

## 2021-04-14 RX ADMIN — POLYVINYL ALCOHOL 1 DROP: 14 SOLUTION/ DROPS OPHTHALMIC at 08:44

## 2021-04-14 RX ADMIN — INSULIN LISPRO 1 UNITS: 100 INJECTION, SOLUTION INTRAVENOUS; SUBCUTANEOUS at 17:25

## 2021-04-14 RX ADMIN — SERTRALINE 25 MG: 25 TABLET, FILM COATED ORAL at 08:43

## 2021-04-14 RX ADMIN — SODIUM CHLORIDE, PRESERVATIVE FREE 10 ML: 5 INJECTION INTRAVENOUS at 21:19

## 2021-04-14 RX ADMIN — GUAIFENESIN AND DEXTROMETHORPHAN 5 ML: 100; 10 SYRUP ORAL at 20:20

## 2021-04-14 RX ADMIN — IPRATROPIUM BROMIDE 2 PUFF: 17 AEROSOL, METERED RESPIRATORY (INHALATION) at 08:30

## 2021-04-14 RX ADMIN — IPRATROPIUM BROMIDE 2 PUFF: 17 AEROSOL, METERED RESPIRATORY (INHALATION) at 16:57

## 2021-04-14 RX ADMIN — INSULIN LISPRO 1 UNITS: 100 INJECTION, SOLUTION INTRAVENOUS; SUBCUTANEOUS at 08:45

## 2021-04-14 RX ADMIN — POLYVINYL ALCOHOL 1 DROP: 14 SOLUTION/ DROPS OPHTHALMIC at 17:35

## 2021-04-14 ASSESSMENT — PAIN SCALES - GENERAL
PAINLEVEL_OUTOF10: 0
PAINLEVEL_OUTOF10: 0

## 2021-04-14 NOTE — PROGRESS NOTES
[address] [place] [address]  HOSPITALIST PROGRESS NOTE                       Name:  Margareth Hunter /Age/Sex: 1985  (38 y.o. female)   MRN & CSN:  [id number] & [id number] Admission Date/Time: 2021  3:32 PM   Location:  -A Attending:  Michelle Antonio MD                                                  HPI  Margareth Hunter is a 38 y.o. female who presents with acute hypoxic respiratory failure on     SUBJECTIVE  Subjectively not feeling good, on NC oxygen at 2 liters though. No fever    ROS- no chest pain, no abdominal pain, no fever      ALLERGIES:   Allergies   Allergen Reactions    Morphine      Other reaction(s): Headache  Triggers migraine. \"Triggers My Migraines\"  Other reaction(s): Headache    Tramadol      Other reaction(s): Headache  States triggers migraine headache  \"Triggers My Migraines\"  Other reaction(s): Headache       PCP: SHAQUILLE Grider NP    PAST MEDICAL HISTORY, SURGICAL HISTORY, SOCIAL HISTORY and  HOME MEDICATIONS all reviewed. OBJECTIVE  Vitals:    21 0830 21 0900 21 1000 21 1100   BP:       Pulse:  90 83 69   Resp: 27 18 15 20   Temp:       TempSrc:       SpO2: 95% 95% 100% 95%   Weight:       Height:           PHYSICAL EXAM  GEN Awake female, sitting upright in bed in no apparent distress. EYES Pupils are equally round. No scleral erythema, discharge, or conjunctivitis. HENT Mucous membranes are moist. Oral pharynx without exudates, no evidence of thrush. NECK Supple, no apparent thyromegaly or masses. RESP Unlabored respiration, bilateral rhonchi  CARDIO/VASC S1/S2 auscultated. Regular rate without appreciable murmurs, rubs, or gallops. No JVD or carotid bruits. Peripheral pulses equal bilaterally and palpable. GI Abdomen is soft without significant tenderness, masses, or guarding. Bowel sounds are normoactive. Rectal exam deferred.  No costovertebral angle tenderness. Normal appearing external genitalia.    HEME/LYMPH No polyethylene glycol, acetaminophen **OR** acetaminophen, glucose, dextrose, glucagon (rDNA), dextrose, dicyclomine, guaiFENesin-dextromethorphan        ASSESSMENT and PLAN  Hospital Day: 3    1-Acute hypoxic respiratory failure likely due to covid pneumonia  -reported outpatient tested positive for covid on 4/9  -CTA with bilateral opacities  -received decadron on admission, ID switched to solumedrol infusion on 4/13,  not qualifying for remdesevir as symptoms started more than >7 days PTA(on 3/31)  -monitor inflammatory markers, hold off abx as procal low  2-Probable covid related gastroenteritis- CT negative, symptomatic management     -replace electrolytes prn and vitamin D      Other issues  -asthma- not wheezing on exam  -Crohn's disease- missed remicade- will discuss with her GI  -fatty liver  -GERD  -Diabetes mellitus type 2 not on insulin therapy: Continue home medications, diabetic diet, low SSI + BG checks ACHS, hypoglycemic protocol, and target -180  -Morbid obesity with BMI of 54- complicating above    Disp:     Diet DIET CARB CONTROL; Carb Control: 5 carb choices (75 gms)/meal   DVT Prophylaxis [] Lovenox, []  Heparin, [] SCDs, [] Ambulation   GI Prophylaxis [] PPI,  [] H2 Blocker,  [] Carafate,  [] Diet/Tube Feeds   Code Status Full Code   Disposition Patient requires continued admission due to acute hypoxic respiratory failure   CMS Level of Risk [] Low, [] Moderate,[x]  High  Patient's risk as above due to acute hypoxic respiratory failure     ART ORNELAS MD 4/14/2021 12:16 PM

## 2021-04-14 NOTE — PLAN OF CARE
Problem: Falls - Risk of:  Goal: Will remain free from falls  Description: Will remain free from falls  Outcome: Ongoing  Goal: Absence of physical injury  Description: Absence of physical injury  Outcome: Ongoing     Problem: Gas Exchange - Impaired  Goal: Absence of hypoxia  Outcome: Ongoing  Goal: Promote optimal lung function  Outcome: Ongoing     Problem: Breathing Pattern - Ineffective  Goal: Ability to achieve and maintain a regular respiratory rate  Outcome: Ongoing     Problem:  Body Temperature -  Risk of, Imbalanced  Goal: Ability to maintain a body temperature within defined limits  Outcome: Ongoing  Goal: Will regain or maintain usual level of consciousness  Outcome: Ongoing  Goal: Complications related to the disease process, condition or treatment will be avoided or minimized  Outcome: Ongoing     Problem: Isolation Precautions - Risk of Spread of Infection  Goal: Prevent transmission of infection  Outcome: Ongoing     Problem: Nutrition Deficits  Goal: Optimize nutritional status  Outcome: Ongoing     Problem: Risk for Fluid Volume Deficit  Goal: Maintain normal heart rhythm  Outcome: Ongoing  Goal: Maintain absence of muscle cramping  Outcome: Ongoing  Goal: Maintain normal serum potassium, sodium, calcium, phosphorus, and pH  Outcome: Ongoing     Problem: Loneliness or Risk for Loneliness  Goal: Demonstrate positive use of time alone when socialization is not possible  Outcome: Ongoing     Problem: Fatigue  Goal: Verbalize increase energy and improved vitality  Outcome: Ongoing

## 2021-04-14 NOTE — CONSULTS
 Depression     Fall     \"Passed Out And Bob Cap On My Dulce Bigger"    Fatty liver     Gestational diabetes     \"2019- no medication - just diet controlled with pregnancy\"    Hyperlipidemia     Hypertension     \"yrs ago was on b/p medication- off medication since - only took medication for 2 months\"    Lower back pain     \"Sometimes\"    MRSA (methicillin resistant staph aureus) culture positive 2018    Multiple pulmonary nodules 2013    Subcentimeter; needs repeat imaging in 3-6 months    Pancreatitis     Patellofemoral arthritis of right knee 2020    Shortness of breath on exertion     Teeth missing     Upper And Lower    Wears glasses     Wears glasses        Past Surgical History:        Procedure Laterality Date    ABDOMEN SURGERY      bowel resection     ADENOIDECTOMY      APPENDECTOMY      Done During Colon Resection For Crohn's  Disease    BREAST SURGERY Left     \"Infected Lymph Node Removed\"     SECTION N/A 2019     SECTION performed by Laura Arevalo MD at [facility] L[facility]D [address]  2016    COLECTOMY  8-    Crohn's Disease , Dr. Beny Mead, Appendectomy Also Done    COLONOSCOPY  2016    Polyps Removed In Past    COLONOSCOPY  2017    Hospitalized for c-diff    COLONOSCOPY  2018    normal, multiple biopsy, repeat 1 year    COLONOSCOPY N/A 2019    COLONOSCOPY POLYPECTOMY SNARE/COLD BIOPSY performed by Imtiaz Morrison MD at [facility]      ENDOSCOPY, COLON, DIAGNOSTIC  Last Done 2014    KNEE SURGERY Right 10/1/2020    EXCISION OF MASS ON RIGHT KNEE performed by Rebecca Kaur DO at [address]      Dr Selvin Raya, Removed Adhesions    OTHER SURGICAL HISTORY  2011    Mediport Insertion Left Chest Area/revision done 2016- \"removed at [facility] 2017 after I got an infection    SKIN BIOPSY      \"skin cancer removed right shoulder\"2019    TUBAL LIGATION  2019    TUNNELED VENOUS PORT PLACEMENT Right 03/12/2018    smart port- [facility]-Dr Edna Carrington    WISDOM TOOTH EXTRACTION      All [place] Teeth Extracted In Past         Current Medications:    Medications    Scheduled Medications:    vitamin D3  1,000 Units Oral Daily    lactobacillus  1 capsule Oral Daily with breakfast    methylPREDNISolone (SOLU-MEDROL) IVPB  80 mg Intravenous Daily    potassium chloride  10 mEq Intravenous Once    polyvinyl alcohol  1 drop Both Eyes TID    [START ON 4/15/2021] metFORMIN  500 mg Oral BID WC    pantoprazole  40 mg Oral BID AC    sertraline  25 mg Oral Daily    traZODone  100 mg Oral Nightly    sodium chloride flush  5-40 mL Intravenous 2 times per day    enoxaparin  30 mg Subcutaneous BID    ipratropium  2 puff Inhalation Q4H While awake    albuterol sulfate HFA  2 puff Inhalation Q4H While awake    insulin lispro  0-6 Units Subcutaneous TID WC    insulin lispro  0-3 Units Subcutaneous QPM     PRN Medications: loperamide, SUMAtriptan, sodium chloride flush, sodium chloride, promethazine **OR** ondansetron, polyethylene glycol, acetaminophen **OR** acetaminophen, glucose, dextrose, glucagon (rDNA), dextrose, dicyclomine, guaiFENesin-dextromethorphan      Allergies:  Morphine and Tramadol    Social History:   TOBACCO:   reports that she has never smoked. She has never used smokeless tobacco.  ETOH:   reports no history of alcohol use.     Family History:       Problem Relation Age of Onset    Migraines Mother     Heart Disease Father     Diabetes Father     High Cholesterol Father     Depression Maternal Aunt     Depression Maternal Uncle     Depression Paternal Aunt     Coronary Art Dis Paternal Aunt     Depression Paternal Uncle     Coronary Art Dis Paternal Uncle     Depression Maternal Grandmother     Depression Maternal Grandfather     Depression Paternal Grandmother     Coronary Art Dis Paternal Grandmother     Depression Paternal Grandfather     Coronary Art Dis Paternal Grandfather    . REVIEW OF SYSTEMS:    The positive ROS will be identified in bold, otherwise ROS are negative     CONSTITUTIONAL:  Neg   Recent weight changes, fatigue, fever, chills or night sweats  MOUTH/THROAT:  Neg  bleeding gums, hoarseness or sore throat. RESPIRATORY:   Neg SOB, wheeze, cough, sputum, hemoptysis or bronchitis  CARDIOVASCULAR:  Neg chest pain, palpitations, dyspnea on exertion, orthopnea, paroxysmal nocturnal dyspnea or edema  GASTROINTESTINAL:  SEE HPI  HEMATOLOGIC/LYMPHATIC:  Neg  Anemia, bleeding tendency  MUSCULOSKELETAL:    New myalgias, bone pain, joint pain, swelling or stiffness and has had change in gait. NEUROLOGICAL:  Neg  Loss of Consciousness, memory loss, forgetfulness, periods of confusion, difficulty concentrating, seizures, decline in intellect, nervousness, insomina, aphasia or dysarthria. SKIN:  Neg  skin or hair changes, and has no itching, rashes, or sores. PSYCHIATRIC:  Neg depression, personality changes, anxiety. ENDOCRINE:  Neg polydipsia, polyuria, abnormal weight changes, heat /cold intolerance.   ALL/IMM:  Neg reactions to drugs other than listed    PHYSICAL EXAM:  Deferred to hospitalist exam     Vitals:    BP (!) 142/76   Pulse 67   Temp 97.7 °F (36.5 °C) (Oral)   Resp 30   Ht 5' 5\" (1.651 m)   Wt (!) 330 lb (149.7 kg)   LMP 03/29/2021   SpO2 90%   BMI 54.91 kg/m²         DATA:    ABGs: No results found for: PHART, PO2ART, [id number]  CBC:   Recent Labs     04/12/21  1642 04/13/21  0430   WBC 5.8 5.0   HGB 12.9 12.2*    260     BMP:    Recent Labs     04/12/21  1642 04/13/21  0430    140   K 3.2* 4.2    101   CO2 31 28   BUN 11 8   CREATININE 0.7 0.6   GLUCOSE 148* 165*     Magnesium:   Lab Results   Component Value Date    MG 1.7 04/12/2021     Hepatic:   Recent Labs     04/13/21  0430   AST 31   ALT 28   BILITOT 0.2   ALKPHOS 64     No results for input(s): LIPASE, AMYLASE in the last 72

## 2021-04-15 VITALS
RESPIRATION RATE: 21 BRPM | TEMPERATURE: 97.6 F | BODY MASS INDEX: 48.82 KG/M2 | OXYGEN SATURATION: 95 % | SYSTOLIC BLOOD PRESSURE: 149 MMHG | DIASTOLIC BLOOD PRESSURE: 84 MMHG | HEIGHT: 65 IN | HEART RATE: 80 BPM | WEIGHT: 293 LBS

## 2021-04-15 LAB
ALBUMIN SERPL-MCNC: 3.3 GM/DL (ref 3.4–5)
ALP BLD-CCNC: 69 IU/L (ref 40–128)
ALT SERPL-CCNC: 26 U/L (ref 10–40)
ANION GAP SERPL CALCULATED.3IONS-SCNC: 11 MMOL/L (ref 4–16)
AST SERPL-CCNC: 37 IU/L (ref 15–37)
BASOPHILS ABSOLUTE: 0 K/CU MM
BASOPHILS RELATIVE PERCENT: 0.2 % (ref 0–1)
BILIRUB SERPL-MCNC: 0.2 MG/DL (ref 0–1)
BUN BLDV-MCNC: 19 MG/DL (ref 6–23)
CALCIUM SERPL-MCNC: 8.4 MG/DL (ref 8.3–10.6)
CHLORIDE BLD-SCNC: 101 MMOL/L (ref 99–110)
CO2: 28 MMOL/L (ref 21–32)
CREAT SERPL-MCNC: 0.7 MG/DL (ref 0.6–1.1)
D DIMER: 357 NG/ML(DDU)
DIFFERENTIAL TYPE: ABNORMAL
EOSINOPHILS ABSOLUTE: 0 K/CU MM
EOSINOPHILS RELATIVE PERCENT: 0 % (ref 0–3)
GFR AFRICAN AMERICAN: >60 ML/MIN/1.73M2
GFR NON-AFRICAN AMERICAN: >60 ML/MIN/1.73M2
GLUCOSE BLD-MCNC: 138 MG/DL (ref 70–99)
GLUCOSE BLD-MCNC: 143 MG/DL (ref 70–99)
GLUCOSE BLD-MCNC: 166 MG/DL (ref 70–99)
GLUCOSE BLD-MCNC: 168 MG/DL (ref 70–99)
HCT VFR BLD CALC: 37.3 % (ref 37–47)
HEMOGLOBIN: 11.5 GM/DL (ref 12.5–16)
HIGH SENSITIVE C-REACTIVE PROTEIN: 14.2 MG/L
IMMATURE NEUTROPHIL %: 3.8 % (ref 0–0.43)
LYMPHOCYTES ABSOLUTE: 1.9 K/CU MM
LYMPHOCYTES RELATIVE PERCENT: 18.2 % (ref 24–44)
MCH RBC QN AUTO: 29 PG (ref 27–31)
MCHC RBC AUTO-ENTMCNC: 30.8 % (ref 32–36)
MCV RBC AUTO: 94 FL (ref 78–100)
MONOCYTES ABSOLUTE: 0.6 K/CU MM
MONOCYTES RELATIVE PERCENT: 6.1 % (ref 0–4)
NUCLEATED RBC %: 0 %
PDW BLD-RTO: 13.2 % (ref 11.7–14.9)
PLATELET # BLD: 259 K/CU MM (ref 140–440)
PMV BLD AUTO: 12 FL (ref 7.5–11.1)
POTASSIUM SERPL-SCNC: 4.5 MMOL/L (ref 3.5–5.1)
PROCALCITONIN: 0.02
RBC # BLD: 3.97 M/CU MM (ref 4.2–5.4)
SEGMENTED NEUTROPHILS ABSOLUTE COUNT: 7.3 K/CU MM
SEGMENTED NEUTROPHILS RELATIVE PERCENT: 71.7 % (ref 36–66)
SODIUM BLD-SCNC: 140 MMOL/L (ref 135–145)
TOTAL IMMATURE NEUTOROPHIL: 0.39 K/CU MM
TOTAL NUCLEATED RBC: 0 K/CU MM
TOTAL PROTEIN: 6.8 GM/DL (ref 6.4–8.2)
WBC # BLD: 10.2 K/CU MM (ref 4–10.5)

## 2021-04-15 PROCEDURE — 86141 C-REACTIVE PROTEIN HS: CPT

## 2021-04-15 PROCEDURE — 6360000002 HC RX W HCPCS: Performed by: STUDENT IN AN ORGANIZED HEALTH CARE EDUCATION/TRAINING PROGRAM

## 2021-04-15 PROCEDURE — 85025 COMPLETE CBC W/AUTO DIFF WBC: CPT

## 2021-04-15 PROCEDURE — 6370000000 HC RX 637 (ALT 250 FOR IP): Performed by: NURSE PRACTITIONER

## 2021-04-15 PROCEDURE — 85379 FIBRIN DEGRADATION QUANT: CPT

## 2021-04-15 PROCEDURE — 94618 PULMONARY STRESS TESTING: CPT

## 2021-04-15 PROCEDURE — 99233 SBSQ HOSP IP/OBS HIGH 50: CPT | Performed by: INTERNAL MEDICINE

## 2021-04-15 PROCEDURE — 6370000000 HC RX 637 (ALT 250 FOR IP): Performed by: STUDENT IN AN ORGANIZED HEALTH CARE EDUCATION/TRAINING PROGRAM

## 2021-04-15 PROCEDURE — 36415 COLL VENOUS BLD VENIPUNCTURE: CPT

## 2021-04-15 PROCEDURE — 82962 GLUCOSE BLOOD TEST: CPT

## 2021-04-15 PROCEDURE — 6370000000 HC RX 637 (ALT 250 FOR IP): Performed by: HOSPITALIST

## 2021-04-15 PROCEDURE — 2700000000 HC OXYGEN THERAPY PER DAY

## 2021-04-15 PROCEDURE — 2580000003 HC RX 258: Performed by: STUDENT IN AN ORGANIZED HEALTH CARE EDUCATION/TRAINING PROGRAM

## 2021-04-15 PROCEDURE — 94640 AIRWAY INHALATION TREATMENT: CPT

## 2021-04-15 PROCEDURE — 94761 N-INVAS EAR/PLS OXIMETRY MLT: CPT

## 2021-04-15 PROCEDURE — 80053 COMPREHEN METABOLIC PANEL: CPT

## 2021-04-15 PROCEDURE — 84145 PROCALCITONIN (PCT): CPT

## 2021-04-15 RX ORDER — PREDNISONE 10 MG/1
TABLET ORAL
Qty: 40 TABLET | Refills: 0 | Status: SHIPPED | OUTPATIENT
Start: 2021-04-15 | End: 2021-07-23 | Stop reason: ALTCHOICE

## 2021-04-15 RX ORDER — DICYCLOMINE HCL 20 MG
20 TABLET ORAL 4 TIMES DAILY PRN
Qty: 40 TABLET | Refills: 0 | Status: SHIPPED | OUTPATIENT
Start: 2021-04-15 | End: 2021-08-18 | Stop reason: ALTCHOICE

## 2021-04-15 RX ORDER — CHOLECALCIFEROL (VITAMIN D3) 25 MCG
1000 TABLET ORAL DAILY
Qty: 30 TABLET | Refills: 0 | Status: SHIPPED | OUTPATIENT
Start: 2021-04-16 | End: 2021-08-18 | Stop reason: ALTCHOICE

## 2021-04-15 RX ORDER — LACTOBACILLUS RHAMNOSUS GG 10B CELL
1 CAPSULE ORAL
Qty: 30 CAPSULE | Refills: 0 | Status: SHIPPED | OUTPATIENT
Start: 2021-04-16 | End: 2021-07-23 | Stop reason: ALTCHOICE

## 2021-04-15 RX ORDER — GUAIFENESIN/DEXTROMETHORPHAN 100-10MG/5
5 SYRUP ORAL EVERY 4 HOURS PRN
Qty: 120 ML | Refills: 0 | Status: SHIPPED | OUTPATIENT
Start: 2021-04-15 | End: 2021-04-25

## 2021-04-15 RX ADMIN — METFORMIN HYDROCHLORIDE 500 MG: 500 TABLET ORAL at 08:27

## 2021-04-15 RX ADMIN — CHOLECALCIFEROL (VITAMIN D3) 10 MCG (400 UNIT) TABLET 1000 UNITS: at 08:27

## 2021-04-15 RX ADMIN — PANTOPRAZOLE SODIUM 40 MG: 40 TABLET, DELAYED RELEASE ORAL at 06:10

## 2021-04-15 RX ADMIN — ALBUTEROL SULFATE 2 PUFF: 90 AEROSOL, METERED RESPIRATORY (INHALATION) at 07:30

## 2021-04-15 RX ADMIN — INSULIN LISPRO 1 UNITS: 100 INJECTION, SOLUTION INTRAVENOUS; SUBCUTANEOUS at 08:28

## 2021-04-15 RX ADMIN — IPRATROPIUM BROMIDE 2 PUFF: 17 AEROSOL, METERED RESPIRATORY (INHALATION) at 07:30

## 2021-04-15 RX ADMIN — IPRATROPIUM BROMIDE 2 PUFF: 17 AEROSOL, METERED RESPIRATORY (INHALATION) at 11:07

## 2021-04-15 RX ADMIN — ENOXAPARIN SODIUM 30 MG: 30 INJECTION SUBCUTANEOUS at 08:27

## 2021-04-15 RX ADMIN — Medication 1 CAPSULE: at 08:27

## 2021-04-15 RX ADMIN — ALBUTEROL SULFATE 2 PUFF: 90 AEROSOL, METERED RESPIRATORY (INHALATION) at 15:29

## 2021-04-15 RX ADMIN — INSULIN LISPRO 1 UNITS: 100 INJECTION, SOLUTION INTRAVENOUS; SUBCUTANEOUS at 12:03

## 2021-04-15 RX ADMIN — SODIUM CHLORIDE, PRESERVATIVE FREE 10 ML: 5 INJECTION INTRAVENOUS at 08:27

## 2021-04-15 RX ADMIN — METFORMIN HYDROCHLORIDE 500 MG: 500 TABLET ORAL at 16:52

## 2021-04-15 RX ADMIN — SERTRALINE 25 MG: 25 TABLET, FILM COATED ORAL at 08:27

## 2021-04-15 RX ADMIN — INSULIN LISPRO 1 UNITS: 100 INJECTION, SOLUTION INTRAVENOUS; SUBCUTANEOUS at 16:53

## 2021-04-15 RX ADMIN — PANTOPRAZOLE SODIUM 40 MG: 40 TABLET, DELAYED RELEASE ORAL at 16:52

## 2021-04-15 RX ADMIN — GUAIFENESIN AND DEXTROMETHORPHAN 5 ML: 100; 10 SYRUP ORAL at 06:10

## 2021-04-15 RX ADMIN — ALBUTEROL SULFATE 2 PUFF: 90 AEROSOL, METERED RESPIRATORY (INHALATION) at 11:07

## 2021-04-15 RX ADMIN — IPRATROPIUM BROMIDE 2 PUFF: 17 AEROSOL, METERED RESPIRATORY (INHALATION) at 15:30

## 2021-04-15 ASSESSMENT — PAIN SCALES - GENERAL: PAINLEVEL_OUTOF10: 0

## 2021-04-15 NOTE — PROGRESS NOTES
Infectious Disease Progress Note  2021   Patient Name: Michelle Shore : 1985       Reason for visit: F/u COVID-19 pneumonia, acute respiratory failure? History:? Interval history noted  Denies n/v/d/f or untoward effects of antimicrobials  Feels somewhat better, less abdominal pain. Physical Exam:  Vital Signs: /70   Pulse 84   Temp 97.7 °F (36.5 °C) (Oral)   Resp 13   Ht 5' 5\" (1.651 m)   Wt (!) 330 lb (149.7 kg)   LMP 2021   SpO2 91%   BMI 54.91 kg/m²     Gen: alert and oriented X3, nasal oxygen  Skin: no stigmata of endocarditis  Wounds: C/D/I  HEMT: AT/NC Oropharynx pink, moist, and without lesions or exudates; dentition in good state of repair  Eyes: PERRLA, EOMI, conjunctiva pink, sclera anicteric. Neck: Supple. Trachea midline. No LAD. Chest: Transmitted breath sounds  Heart: RRR  Abd: soft, non-distended, no tenderness, no hepatomegaly. Normoactive bowel sounds. Ext: no clubbing, cyanosis, or edema  Catheter Site: without erythema or tenderness  LDA:   Neuro: Mental status intact. CN 2-12 intact and no focal sensory or motor deficits     Radiologic / Imaging / TESTING  No results found.      Labs:    Recent Results (from the past 24 hour(s))   POCT Glucose    Collection Time: 21  8:32 AM   Result Value Ref Range    POC Glucose 162 (H) 70 - 99 MG/DL   POCT Glucose    Collection Time: 21 11:21 AM   Result Value Ref Range    POC Glucose 210 (H) 70 - 99 MG/DL   Sedimentation Rate    Collection Time: 21 12:50 PM   Result Value Ref Range    Sed Rate 65 (H) 0 - 20 MM/HR   CBC Auto Differential    Collection Time: 21 12:50 PM   Result Value Ref Range    WBC 10.6 (H) 4.0 - 10.5 K/CU MM    RBC 4.15 (L) 4.2 - 5.4 M/CU MM    Hemoglobin 11.8 (L) 12.5 - 16.0 GM/DL    Hematocrit 39.1 37 - 47 %    MCV 94.2 78 - 100 FL    MCH 28.4 27 - 31 PG    MCHC 30.2 (L) 32.0 - 36.0 %    RDW 13.1 11.7 - 14.9 %    Platelets 508 174 - 404 K/CU MM    MPV 12.0 (H) 7.5 - 11.1 FL Metamyelocytes Relative 1 (H) 0.0 %    Segs Relative 80.0 (H) 36 - 66 %    Lymphocytes % 14.0 (L) 24 - 44 %    Monocytes % 5.0 (H) 0 - 4 %    Metamyelocytes Absolute 0.11 K/CU MM    Segs Absolute 8.5 K/CU MM    Lymphocytes Absolute 1.5 K/CU MM    Monocytes Absolute 0.5 K/CU MM    Differential Type MANUAL DIFFERENTIAL    Comprehensive Metabolic Panel w/ Reflex to MG    Collection Time: 04/14/21 12:50 PM   Result Value Ref Range    Sodium 141 135 - 145 MMOL/L    Potassium 4.3 3.5 - 5.1 MMOL/L    Chloride 101 99 - 110 mMol/L    CO2 29 21 - 32 MMOL/L    BUN 13 6 - 23 MG/DL    CREATININE 0.7 0.6 - 1.1 MG/DL    Glucose 213 (H) 70 - 99 MG/DL    Calcium 8.5 8.3 - 10.6 MG/DL    Albumin 3.4 3.4 - 5.0 GM/DL    Total Protein 7.2 6.4 - 8.2 GM/DL    Total Bilirubin 0.2 0.0 - 1.0 MG/DL    ALT 21 10 - 40 U/L    AST 23 15 - 37 IU/L    Alkaline Phosphatase 67 40 - 128 IU/L    GFR Non-African American >60 >60 mL/min/1.73m2    GFR African American >60 >60 mL/min/1.73m2    Anion Gap 11 4 - 16   C-Reactive Protein    Collection Time: 04/14/21 12:50 PM   Result Value Ref Range    CRP, High Sensitivity 47.1 mg/L   D-Dimer, Quantitative    Collection Time: 04/14/21 12:50 PM   Result Value Ref Range    D-Dimer, Quant 284 (H) <230 NG/mL(DDU)   POCT Glucose    Collection Time: 04/14/21  4:58 PM   Result Value Ref Range    POC Glucose 172 (H) 70 - 99 MG/DL     CULTURE results: Invalid input(s): BLOOD CULTURE,  URINE CULTURE, SURGICAL CULTURE    Diagnosis:  Patient Active Problem List   Diagnosis    Crohn's disease ([address]    Anxiety    Multiple lung nodules    Crohn's colitis, unspecified complication ([address]    Exacerbation of Crohn's disease with complication ([address]    IBS (irritable bowel syndrome)    C. difficile colitis    Crohn's disease of small and large intestines with complication ([address]    Neck swelling    Leukocytosis    Non-intractable vomiting with nausea    Infection of venous access port    Acute deep vein thrombosis (DVT) of non-extremity vein    Morbid obesity (HCC)    Preeclampsia, third trimester    Pregnancy related condition    Status post  delivery    Bowen's disease of right shoulder    Patellofemoral arthritis of right knee    Infrapatellar bursitis of right knee    Knee mass, right    Acute respiratory failure ([address]    Pneumonia due to COVID-19 virus       Active Problems  Principal Problem:    Acute respiratory failure (HCC)  Active Problems:    Crohn's disease ([address]    Pneumonia due to COVID-19 virus  Resolved Problems:    * No resolved hospital problems. *      Impression and plan   Summary and rationale: Patient is a 38 y.o.  female morbid obesity, Crohn's disease, vitamin D deficiency who presented to the hospital with complaints of shortness of breath and myalgia. Diagnosed with severe COVID-19 and acute hypoxic respiratory failure.    COVID-19 symptom onset: 3/31/2021   COVID-19 test date: 2021   Expected discontinuation of isolation precautions: 2021   Clinical status: Improving, oxygen needs down to 2 L/min   Therapeutic:  o Ongoing antibiotics:   o Anti-inflammatory agents:  - Dexamethasone: 2021  - Solu-Medrol: 2021  - Tocilizumab:  o Completed antibiotics:   o Convalescent plasma receipt: No  o Other agents:    Diagnostic: Trend CRP   F/u:   Other:      Electronically signed by: Electronically signed by Judd Torrez MD on 2021 at 8:54 PM

## 2021-04-15 NOTE — PROGRESS NOTES
Pt did not qualify for Home O2 during first 2 steps of evaluation. Step 3 will not be needed due to GONZALO diagnosis.

## 2021-04-15 NOTE — PROGRESS NOTES
All discharge instructions reviewed with pt at this time. All questions answered. Pt verbalized understanding of quarantine, new medications, follow up appointments and when to seek additional medical treatment. PIV removed.  Pt to be taken to main entrance via wheelchair with face mask in place

## 2021-04-15 NOTE — CONSULTS
Subjective:   CHIEF COMPLAINT / HPI:  38year old female admitted with increasing sob. She has off and on wheeze associated with it. She has cough without any sputum production. she denies any fever or hemoptysis. She is recently diagnosed with severe irma with /hour and was supposed to have cpap titration study done but cancelled as she got sick.       Past Medical History:  Past Medical History:   Diagnosis Date    Acid reflux     Acute deep vein thrombosis (DVT) of non-extremity vein 2018    \"in my neck\"\"after mediport was put in\"    Anemia     Anxiety     Asthma     NO PULMONOLOGIST AT THIS TIME    Blood clot due to device, implant, or graft 2018    had blood clots in neck due to med port    Bowen's disease     Cancer ([facility]     skin cancer shoulder 2019    Crohn's disease ([facility] Dx     Remicade Infusions Every Six Weeks, [place] Hu[place], [place]    Depression     2012    \"Passed Out And [place] On My Palma Check"    Fatty liver     Gestational diabetes     \"2019- no medication - just diet controlled with pregnancy\"    Hyperlipidemia     Hypertension     \"yrs ago was on b/p medication- off medication since 2018- only took medication for 2 months\"    Lower back pain     \"Sometimes\"    MRSA (methicillin resistant staph aureus) culture positive 2018    Multiple pulmonary nodules 2013    Subcentimeter; needs repeat imaging in 3-6 months    Pancreatitis     Patellofemoral arthritis of right knee 2020    Shortness of breath on exertion     Teeth missing     Upper And Lower    Wears glasses     Wears glasses        Past Surgical History:        Procedure Laterality Date    ABDOMEN SURGERY      bowel resection     ADENOIDECTOMY      APPENDECTOMY      Done During Colon Resection For Crohn's  Disease    BREAST SURGERY Left     \"Infected Lymph Node Removed\"     SECTION N/A 2019     SECTION performed Oral, Nightly  sodium chloride flush 0.9 % injection 5-40 mL, 5-40 mL, Intravenous, 2 times per day  sodium chloride flush 0.9 % injection 5-40 mL, 5-40 mL, Intravenous, PRN  0.9 % sodium chloride infusion, 25 mL, Intravenous, PRN  enoxaparin (LOVENOX) injection 30 mg, 30 mg, Subcutaneous, BID  promethazine (PHENERGAN) tablet 12.5 mg, 12.5 mg, Oral, Q6H PRN **OR** ondansetron (ZOFRAN) injection 4 mg, 4 mg, Intravenous, Q6H PRN  polyethylene glycol (GLYCOLAX) packet 17 g, 17 g, Oral, Daily PRN  acetaminophen (TYLENOL) tablet 650 mg, 650 mg, Oral, Q6H PRN **OR** acetaminophen (TYLENOL) suppository 650 mg, 650 mg, Rectal, Q6H PRN  glucose (GLUTOSE) 40 % oral gel 15 g, 15 g, Oral, PRN  dextrose 50 % IV solution, 12.5 g, Intravenous, PRN  glucagon (rDNA) injection 1 mg, 1 mg, Intramuscular, PRN  dextrose 5 % solution, 100 mL/hr, Intravenous, PRN  dicyclomine (BENTYL) tablet 20 mg, 20 mg, Oral, 4x Daily PRN  ipratropium (ATROVENT HFA) 17 MCG/ACT inhaler 2 puff, 2 puff, Inhalation, Q4H While awake  albuterol sulfate  (90 Base) MCG/ACT inhaler 2 puff, 2 puff, Inhalation, Q4H While awake  guaiFENesin-dextromethorphan (ROBITUSSIN DM) 100-10 MG/5ML syrup 5 mL, 5 mL, Oral, Q4H PRN  insulin lispro (HUMALOG) injection vial 0-6 Units, 0-6 Units, Subcutaneous, TID WC  insulin lispro (HUMALOG) injection vial 0-3 Units, 0-3 Units, Subcutaneous, QPM    Allergies   Allergen Reactions    Morphine      Other reaction(s): Headache  Triggers migraine.    \"Triggers My Migraines\"  Other reaction(s): Headache    Tramadol      Other reaction(s): Headache  States triggers migraine headache  \"Triggers My Migraines\"  Other reaction(s): Headache       Social History:    Social History     Socioeconomic History    Marital status: Single     Spouse name: None    Number of children: 1    Years of education: None    Highest education level: None   Occupational History    None   Social Needs    Financial resource strain: None   Seun-Linda insecurity     Worry: None     Inability: None    Transportation needs     Medical: None     Non-medical: None   Tobacco Use    Smoking status: Never Smoker    Smokeless tobacco: Never Used   Substance and Sexual Activity    Alcohol use: No    Drug use: No    Sexual activity: Yes     Partners: Male   Lifestyle    Physical activity     Days per week: None     Minutes per session: None    Stress: None   Relationships    Social connections     Talks on phone: None     Gets together: None     Attends Denominational service: None     Active member of club or organization: None     Attends meetings of clubs or organizations: None     Relationship status: None    Intimate partner violence     Fear of current or ex partner: None     Emotionally abused: None     Physically abused: None     Forced sexual activity: None   Other Topics Concern    None   Social History Narrative    Lives with parents, has a daughter       Family History:   Family History   Problem Relation Age of Onset    Migraines Mother     Heart Disease Father     Diabetes Father     High Cholesterol Father     Depression Maternal Aunt     Depression Maternal Uncle     Depression Paternal Aunt     Coronary Art Dis Paternal Aunt     Depression Paternal Uncle     Coronary Art Dis Paternal Uncle     Depression Maternal Grandmother     Depression Maternal Grandfather     Depression Paternal Grandmother     Coronary Art Dis Paternal Grandmother     Depression Paternal Grandfather     Coronary Art Dis Paternal Grandfather        Immunization:  Immunization History   Administered Date(s) Administered    Pneumococcal Polysaccharide ([id number]) 09/07/2013    Tdap (Boostrix, Adacel) 04/20/2020         REVIEW OF SYSTEMS:    CONSTITUTIONAL:  negative for fevers, chills, diaphoresis, activity change, appetite change, fatigue, night sweats and unexpected weight change.    EYES:  negative for blurred vision, eye discharge, visual disturbance and icterus  HEENT:  negative for hearing loss, tinnitus, ear drainage, sinus pressure, nasal congestion, epistaxis and snoring  RESPIRATORY:  See HPI  CARDIOVASCULAR:  negative for chest pain, palpitations, exertional chest pressure/discomfort, edema, syncope  GASTROINTESTINAL:  negative for nausea, vomiting, diarrhea, constipation, blood in stool and abdominal pain  GENITOURINARY:  negative for frequency, dysuria and hematuria  HEMATOLOGIC/LYMPHATIC:  negative for easy bruising, bleeding and lymphadenopathy  ALLERGIC/IMMUNOLOGIC:  negative for recurrent infections, angioedema, anaphylaxis and drug reactions  ENDOCRINE:  negative for weight changes and diabetic symptoms including polyuria, polydipsia and polyphagia    MUSCULOSKELETAL:  negative for  pain, joint swelling, decreased range of motion and muscle weakness  NEUROLOGICAL:  negative for headaches, slurred speech, unilateral weakness  PSYCHIATRIC/BEHAVIORAL: negative for hallucinations, behavioral problems, confusion and agitation. Objective:   PHYSICAL EXAM:      VITALS:    Vitals:    04/15/21 0700 04/15/21 0735 04/15/21 0752 04/15/21 1112   BP:   (!) 149/84    Pulse: 71  69    Resp:   24    Temp:   97.6 °F (36.4 °C)    TempSrc:   Oral    SpO2:  97% 96% 96%   Weight:       Height:             CONSTITUTIONAL:  awake, alert, cooperative, no apparent distress, and appears stated age  NECK:  Supple, symmetrical, trachea midline, no adenopathy, thyroid symmetric, not enlarged and no tenderness  CHEST: Chest expansion equal and symmetrical, no intercostal retraction. LUNGS:  no increased work of breathing, has expiratory wheezes both lungs, no crackles. CARDIOVASCULAR: S1 and S2, no edema and no JVD  ABDOMEN:  normal bowel sounds, non-distended and no masses palpated, and no tenderness to palpation. No hepatospleenomegaly  LYMPHADENOPATHY:  no axillary or supraclavicular adenopathy. No cervical adnenopathy  PSYCHIATRIC: Oriented to person place and time.  No obvious depression or anxiety. MUSCULOSKELETAL: No obvious misalignment or effusion of the joints. No clubbing, cyanosis of the digits. RIGHT AND LEFT LOWER EXTREMITIES: No edema, no inflammation, no tenderness. SKIN:  normal skin color, texture, turgor and no redness, warmth, or swelling. No palpable nodules     EXAMINATION:   CTA OF THE CHEST 4/12/2021 4:09 pm       TECHNIQUE:   CTA of the chest was performed after the administration of intravenous   contrast.  Multiplanar reformatted images are provided for review.  MIP   images are provided for review. Dose modulation, iterative reconstruction,   and/or weight based adjustment of the mA/kV was utilized to reduce the   radiation dose to as low as reasonably achievable.       COMPARISON:   None.       HISTORY:   ORDERING SYSTEM PROVIDED HISTORY: sob, + covid   TECHNOLOGIST PROVIDED HISTORY:   Reason for exam:->sob, + covid   Decision Support Exception->Emergency Medical Condition (MA)       FINDINGS:   Pulmonary Arteries: Pulmonary arteries are adequately opacified for   evaluation. No evidence of intraluminal filling defect to suggest pulmonary   embolism.  Main pulmonary artery is normal in caliber.       Mediastinum: The heart is enlarged.  The thoracic aorta and proximal great   vessels are patent and of normal caliber. No pericardial effusion. No   enlarged or suspicious axillary, hilar, or mediastinal lymphadenopathy.       Lungs/pleura: The trachea and mainstem bronchi are widely patent.  There is   extensive ground-glass opacity throughout the lungs bilaterally.  No discrete   pulmonary nodule or mass. No pleural effusion or pneumothorax.       Soft Tissues/Bones: Degenerative changes are present throughout the thoracic   spine.       Upper Abdomen:  The visualized upper abdomen is unremarkable.           Impression   No evidence of pulmonary embolism or acute thoracic aortic abnormality.       Extensive ground-glass opacity throughout the lungs bilaterally, relatively   symmetric in appearance.  The finding is nonspecific although suspicious for   active COVID-19 viral pneumonia in the appropriate setting.       Cardiomegaly.  No significant pleural effusion.           Order History    Open Order Details       DATA:                abg 7.39/54/25        Assessment:     1. Ac hypoxemic and hypercapneic resp failure  2. covid pneumonia  3. Asthma  4. Severe irma with ahi 149/hr          Plan:     1. D/w pt  2. Adequate o2 adm  3. vapotherm if needed  4. Bd rx  5. On iv steroids  6. She did not receive any  remdesivir as did not qualify  7. autopap while sleeping  8. Sleep study for cpap titration to be scheduled as outpt  9.  Thanks will follow

## 2021-04-15 NOTE — PROGRESS NOTES
[facility]        Progress Note       4/15/2021  9:16 AM    Patient:    Татьяна Hernandez  : 1985   38 y.o. MRN: [id number]  Admitted: 2021  3:32 PM ATT: Martina Parsons MD   [id number]  AdmitDx: Cardiomegaly [I51.7]  Acute respiratory failure ([address] [J96.00]  Dyspnea, unspecified type [R06.00]  Chest pain, unspecified type [R07.9]  COVID-19 [U07.1]  PCP: SHAQUILLE Venegas - NP    SUBJECTIVE:  Chart reviewed, events noted  Patient feeling better. C/o coughing. Last BM this morning, non bloody, formed. Tolerating PO diet. No N/V or abdominal pain.     ROS:  The positive ROS will be identified in bold     CONSTITUTIONAL:  Neg  Weight loss, fatigue, fever  MOUTH/THROAT:  Neg  Bleeding gums, hoarseness or sore throat  RESPIRATORY:   Neg SOB, wheeze, cough, hemoptysis or bronchitis  CARDIOVASCULAR:  Neg Chest pain, palpitations, dyspnea on exertion, edema  GASTROINTESTINAL:  SEE HPI  HEMATOLOGIC/LYMPHATIC:  Neg  Anemia, bleeding tendency  MUSCULOSKELETAL: Neg  New myalgias, joint pain, swelling or stiffness  NEUROLOGICAL:  Neg  Loss of Consciousness, memory loss, forgetfulness, periods of confusion, difficulty concentrating, seizures, insomnia, aphasia    SKIN:  Neg No itching, rashes, or sores  PSYCHIATRIC:  Neg Depression, personality changes, anxiety    OBJECTIVE:      BP (!) 149/84   Pulse 69   Temp 97.6 °F (36.4 °C) (Oral)   Resp 24   Ht 5' 5\" (1.651 m)   Wt (!) 330 lb (149.7 kg)   LMP 2021   SpO2 96%   BMI 54.91 kg/m²     NAD, appears comfortable, sitting up in bed  Lips and mucous membranes pink and moist  RRR, Nl s1s2, no murmurs, no JVD  Lungs CTA, diminished bilaterally, respirations even and unlabored   Abdomen soft, ND, NT, no HSM, bowel sounds normal  Skin pink, warm, dry and CR brisk   No edema bilateral lower extremities   AAOx3    CBC:   Recent Labs     21  1642 21  0430 21  1250   WBC 5.8 5.0 10.6*   HGB 12.9 12.2* 11.8*  260 283     BMP:    Recent Labs     04/12/21  1642 04/13/21  0430 04/14/21  1250    140 141   K 3.2* 4.2 4.3    101 101   CO2 31 28 29   BUN 11 8 13   CREATININE 0.7 0.6 0.7   GLUCOSE 148* 165* 213*     Magnesium:   Lab Results   Component Value Date    MG 1.7 04/14/2021     Hepatic:   Recent Labs     04/13/21  0430 04/14/21  1250   AST 31 23   ALT 28 21   BILITOT 0.2 0.2   ALKPHOS 64 67     INR:   Recent Labs     04/13/21  0430   INR 1.02         Intake/Output Summary (Last 24 hours) at 4/15/2021 6641  Last data filed at 4/14/2021 2140  Gross per 24 hour   Intake 1320 ml   Output 500 ml   Net 820 ml         Medications    Scheduled Medications:    vitamin D3  1,000 Units Oral Daily    lactobacillus  1 capsule Oral Daily with breakfast    methylPREDNISolone (SOLU-MEDROL) IVPB  80 mg Intravenous Daily    potassium chloride  10 mEq Intravenous Once    polyvinyl alcohol  1 drop Both Eyes TID    metFORMIN  500 mg Oral BID WC    pantoprazole  40 mg Oral BID AC    sertraline  25 mg Oral Daily    traZODone  100 mg Oral Nightly    sodium chloride flush  5-40 mL Intravenous 2 times per day    enoxaparin  30 mg Subcutaneous BID    ipratropium  2 puff Inhalation Q4H While awake    albuterol sulfate HFA  2 puff Inhalation Q4H While awake    insulin lispro  0-6 Units Subcutaneous TID WC    insulin lispro  0-3 Units Subcutaneous QPM     PRN Medications: loperamide, SUMAtriptan, sodium chloride flush, sodium chloride, promethazine **OR** ondansetron, polyethylene glycol, acetaminophen **OR** acetaminophen, glucose, dextrose, glucagon (rDNA), dextrose, dicyclomine, guaiFENesin-dextromethorphan  Infusions:    sodium chloride      dextrose           Patient Active Problem List   Diagnosis Code    Crohn's disease ([facility] K50.90    Anxiety F41.9    Multiple lung nodules R91.8    Crohn's colitis, unspecified complication ([facility] A69.566    Exacerbation of Crohn's disease with complication ([facility] K50.919    IBS (irritable bowel syndrome) K58.9    C. difficile colitis A04.72    Crohn's disease of small and large intestines with complication (HCC) K89.868    Neck swelling R22.1    Leukocytosis D72.829    Non-intractable vomiting with nausea R11.2    Infection of venous access port T80.219A    Acute deep vein thrombosis (DVT) of non-extremity vein I82.90    Morbid obesity (HCC) E66.01    Preeclampsia, third trimester O14.93    Pregnancy related condition O26.90    Status post  delivery Z98.891    Bowen's disease of right shoulder D04.61    Patellofemoral arthritis of right knee M17.11    Infrapatellar bursitis of right knee M70.51    Knee mass, right R22.41    Acute respiratory failure (HCC) J96.00    Pneumonia due to COVID-19 virus U07.1, J12.82        ASSESSMENT AND RECOMMENDATIONS:    Hx of Crohns, missed Remicade   CT 21  No acute intestinal abnormality.  Gallbladder surgically absent.  Diffuse   fatty replacement of the liver again noted.      No leukocytosis, afebrile      Complicated by COVID and noncompliance to office visits     RECOMMENDATIONS:  Continue steroids for COVID  Flare with rectal bleeding, resolved   Resume Remicade when COVID symptoms resolve, outpatient   Bently as needed  IVF  Probiotic   Annual IBD panel   Lovenox     Stable from GI standpoint   FU OP    Discussed plan of care with patient and RN    Patient clinical, biochemical, and radiological information discussed with Dr. Simran Mota. He agrees with the assessment and plan.      Melhcor Ta, CNP  4/15/2021  9:16 AM     Improving from GI stand point  Continue present care  FU as op  D/w José Miguel

## 2021-04-15 NOTE — PROGRESS NOTES
Infectious Disease Progress Note  4/15/2021   Patient Name: Rosalie Valente : 1985       Reason for visit: F/u COVID-19 pneumonia, acute respiratory failure? History:? Interval history noted  Denies n/v/d/f or untoward effects of antimicrobials  Feels somewhat better, less abdominal pain. Physical Exam:  Vital Signs: BP (!) 149/84   Pulse 69   Temp 97.6 °F (36.4 °C) (Oral)   Resp 24   Ht 5' 5\" (1.651 m)   Wt (!) 330 lb (149.7 kg)   LMP 2021   SpO2 96%   BMI 54.91 kg/m²     Gen: alert and oriented X3, nasal oxygen  Skin: no stigmata of endocarditis  Wounds: C/D/I  HEMT: AT/NC Oropharynx pink, moist, and without lesions or exudates; dentition in good state of repair  Eyes: PERRLA, EOMI, conjunctiva pink, sclera anicteric. Neck: Supple. Trachea midline. No LAD. Chest: Transmitted breath sounds  Heart: RRR  Abd: soft, non-distended, no tenderness, no hepatomegaly. Normoactive bowel sounds. Ext: no clubbing, cyanosis, or edema  Catheter Site: without erythema or tenderness  LDA:   Neuro: Mental status intact. CN 2-12 intact and no focal sensory or motor deficits     Radiologic / Imaging / TESTING  No results found.      Labs:    Recent Results (from the past 24 hour(s))   Sedimentation Rate    Collection Time: 21 12:50 PM   Result Value Ref Range    Sed Rate 65 (H) 0 - 20 MM/HR   Vitamin B12 & folate    Collection Time: 21 12:50 PM   Result Value Ref Range    Vitamin B-12 691.3 211 - 911 pg/ml    Folate 6.7 3.1 - 17.5 NG/ML   Magnesium    Collection Time: 21 12:50 PM   Result Value Ref Range    Magnesium 1.7 (L) 1.8 - 2.4 mg/dl   Vitamin D 25 hydroxy    Collection Time: 21 12:50 PM   Result Value Ref Range    Vit D, 25-Hydroxy 7.27 (L) >20 NG/ML   Hepatitis B surface antigen    Collection Time: 21 12:50 PM   Result Value Ref Range    Hepatitis B Surface Ag NON REACTIVE NON REACTIVE   CBC Auto Differential    Collection Time: 21 12:50 PM   Result Value Ref Range    WBC 10.6 (H) 4.0 - 10.5 K/CU MM    RBC 4.15 (L) 4.2 - 5.4 M/CU MM    Hemoglobin 11.8 (L) 12.5 - 16.0 GM/DL    Hematocrit 39.1 37 - 47 %    MCV 94.2 78 - 100 FL    MCH 28.4 27 - 31 PG    MCHC 30.2 (L) 32.0 - 36.0 %    RDW 13.1 11.7 - 14.9 %    Platelets 657 280 - 656 K/CU MM    MPV 12.0 (H) 7.5 - 11.1 FL    Metamyelocytes Relative 1 (H) 0.0 %    Segs Relative 80.0 (H) 36 - 66 %    Lymphocytes % 14.0 (L) 24 - 44 %    Monocytes % 5.0 (H) 0 - 4 %    Metamyelocytes Absolute 0.11 K/CU MM    Segs Absolute 8.5 K/CU MM    Lymphocytes Absolute 1.5 K/CU MM    Monocytes Absolute 0.5 K/CU MM    Differential Type MANUAL DIFFERENTIAL    Comprehensive Metabolic Panel w/ Reflex to MG    Collection Time: 04/14/21 12:50 PM   Result Value Ref Range    Sodium 141 135 - 145 MMOL/L    Potassium 4.3 3.5 - 5.1 MMOL/L    Chloride 101 99 - 110 mMol/L    CO2 29 21 - 32 MMOL/L    BUN 13 6 - 23 MG/DL    CREATININE 0.7 0.6 - 1.1 MG/DL    Glucose 213 (H) 70 - 99 MG/DL    Calcium 8.5 8.3 - 10.6 MG/DL    Albumin 3.4 3.4 - 5.0 GM/DL    Total Protein 7.2 6.4 - 8.2 GM/DL    Total Bilirubin 0.2 0.0 - 1.0 MG/DL    ALT 21 10 - 40 U/L    AST 23 15 - 37 IU/L    Alkaline Phosphatase 67 40 - 128 IU/L    GFR Non-African American >60 >60 mL/min/1.73m2    GFR African American >60 >60 mL/min/1.73m2    Anion Gap 11 4 - 16   C-Reactive Protein    Collection Time: 04/14/21 12:50 PM   Result Value Ref Range    CRP, High Sensitivity 47.1 mg/L   D-Dimer, Quantitative    Collection Time: 04/14/21 12:50 PM   Result Value Ref Range    D-Dimer, Quant 284 (H) <230 NG/mL(DDU)   POCT Glucose    Collection Time: 04/14/21  4:58 PM   Result Value Ref Range    POC Glucose 172 (H) 70 - 99 MG/DL   POCT Glucose    Collection Time: 04/14/21  9:03 PM   Result Value Ref Range    POC Glucose 273 (H) 70 - 99 MG/DL   POCT Glucose    Collection Time: 04/15/21  7:50 AM   Result Value Ref Range    POC Glucose 168 (H) 70 - 99 MG/DL     CULTURE results: Invalid input(s): BLOOD CULTURE,  URINE CULTURE, SURGICAL CULTURE    Diagnosis:  Patient Active Problem List   Diagnosis    Crohn's disease ([address]    Anxiety    Multiple lung nodules    Crohn's colitis, unspecified complication ([address]    Exacerbation of Crohn's disease with complication ([address]    IBS (irritable bowel syndrome)    C. difficile colitis    Crohn's disease of small and large intestines with complication ([address]    Neck swelling    Leukocytosis    Non-intractable vomiting with nausea    Infection of venous access port    Acute deep vein thrombosis (DVT) of non-extremity vein    Morbid obesity (HCC)    Preeclampsia, third trimester    Pregnancy related condition    Status post  delivery    Bowen's disease of right shoulder    Patellofemoral arthritis of right knee    Infrapatellar bursitis of right knee    Knee mass, right    Acute respiratory failure ([address]    Pneumonia due to COVID-19 virus       Active Problems  Principal Problem:    Acute respiratory failure (HCC)  Active Problems:    Crohn's disease ([facility]    Pneumonia due to COVID-19 virus  Resolved Problems:    * No resolved hospital problems. *      Impression and plan   Summary and rationale: Patient is a 38 y.o.  female morbid obesity, Crohn's disease, vitamin D deficiency who presented to the hospital with complaints of shortness of breath and myalgia. Diagnosed with severe COVID-19 and acute hypoxic respiratory failure.  COVID-19 symptom onset: 3/31/2021   COVID-19 test date: 2021   Expected discontinuation of isolation precautions: 2021   Clinical status: Improving, oxygen needs down to 2 L/min   Therapeutic:  o Ongoing antibiotics:   o Anti-inflammatory agents:  - Dexamethasone: 2021  - Solu-Medrol: 2021  - Tocilizumab:  o Completed antibiotics:   o Convalescent plasma receipt: No  o Other agents:    Diagnostic: Trend CRP   F/u:   Other: may discharge home with prednisone taper.        Electronically

## 2021-04-15 NOTE — DISCHARGE SUMMARY
Discharge Summary    Name:  Ion Fraire /Age/Sex: 1985  (38 y.o. female)   MRN & CSN:  [id number] & [id number] Admission Date/Time: 2021  3:32 PM   Attending:  Pat Pathak MD Discharging Physician: Pricilla Payton MD     HPI and Hospital Course: Ion Fraire is a 38 y.o.  female  who was admitted with acute respiratory failure    HPI- as per H and [address]  1-Acute hypoxic respiratory failure likely due to covid pneumonia  -reported outpatient tested positive for covid on   -CTA with bilateral opacities  -received decadron on admission, ID switched to solumedrol infusion on ,  not qualifying for remdesevir as symptoms started more than >7 days PTA(on 3/31)  -monitor inflammatory markers, hold off abx as procal low  -has much improved- ambulated and did not qualify for home oxygen- see discharge medications as below  2-Probable covid related gastroenteritis- CT negative, symptomatic management -resolved         The patient expressed appropriate understanding of and agreement with the discharge recommendations, medications, and plan.      Consults this admission:  IP CONSULT TO HOSPITALIST  IP CONSULT TO INFECTIOUS DISEASES  IP CONSULT TO GI  IP CONSULT TO PULMONOLOGY    Discharge Instruction:   Follow up appointments:   Primary care physician:  within 2 weeks    Diet:  General/cardiac/ADA/as tolerated  Activity: {discharge activity: as tolerated  Disposition: Discharged to:   [x]Home, [][facility], []SNF, []Acute Rehab, []Hospice   Condition on discharge: Stable    Discharge Medications:      Sixto Dean   Home Medication Instructions KEYA:[id number]    Printed on:04/15/21 1246   Medication Information                      acetaminophen (AMINOFEN) 325 MG tablet  Take 2 tablets by mouth every 6 hours as needed for Pain             albuterol sulfate  (90 Base) MCG/ACT inhaler  Inhale 2 puffs into the lungs every 6 hours as needed for Wheezing

## 2021-04-16 ENCOUNTER — CARE COORDINATION (OUTPATIENT)
Dept: CASE MANAGEMENT | Age: 36
End: 2021-04-16

## 2021-04-16 LAB — ZINC: 59.1 UG/DL (ref 60–120)

## 2021-04-16 NOTE — CARE COORDINATION
[facility] Transitions Initial Follow Up Call    Call within 2 business days of discharge: Yes    Patient: Marta De Jesus Patient : 1985   MRN: [id number]  Reason for Admission: Covid19 Pna, Ac Resp Failure, Gastroenteritis  Discharge Date: 4/15/21 RARS: Readmission Risk Score: 14  Facility: [address] [place] [address]       Complaint Diagnosis Description Type Department Provider    21 Shortness of Breath; Sweats COVID-19 . .. ED to Hosp-Admission (Discharged) (ADMITTED) Shailesh Perla MD; Gina Brumfield. .. Non-face-to-face services provided:  Obtained and reviewed discharge summary and/or continuity of care documents  Education of patient/family/caregiver/guardian to support self-management-Covid19 Pna  Assessment and support for treatment adherence and medication management-see below    Care Transitions 24 Hour Call    Schedule Follow Up Appointment with PCP: Declined  Do you have any ongoing symptoms?: Yes  Patient-reported symptoms: Shortness of Breath, Cough  Interventions for patient-reported symptoms: Other (Comment: No identified, reported need)  Do you have a copy of your discharge instructions?: Yes  Do you have all of your prescriptions and are they filled?: Yes  Have you been contacted by a [address]?: No  Have you scheduled your follow up appointment?: No (Comment: see note)  Were you discharged with any Home Care or Post Acute Services: Yes  Post Acute Services: Other (Comment: Established w/ [facility] for Remicaide infusions)  Do you feel like you have everything you need to keep you well at home?: Yes  Care Transitions Interventions  No Identified Needs       Follow Up  No future appointments.     Challenges to be reviewed by the provider   Additional needs identified to be addressed with provider : No       Method of communication with provider : [address]: 21 Positive  PATIENT RISK FACTORS: Asthma, Crohns on iv Remicade   RARS: 14  [facility] PCP: No    Was this a readmission? No  Patient stated reason for admission: shortness of breath, abdominal pain, chills, sweats and myalgias  Patients top risk factors for readmission: Covid19, Crohns     Spoke w/ Patient for discharge call. Patient reports ongoing sob on exertion which resolves w/ rest and occasional cough w/ white sputum, sx improving. Reports diarrhea resolving. Denies ac resp distress, fever, chills or other Covid19 sx. Advised rest, pacing activity, adequate hydration and nutrition. Patient ordered pulse ox which is to be delivered today. Reviewed parameters and when to contact MD.     Reviewed discharge instructions, medical action plan and Covid19 red flags such as increased shortness of breath, increasing fever and signs of decompensation. Discussed increased risk of blood clots associated w/ Covid19, sx and preventative measures. Patient verbalizes understanding. Discussed exposure protocols and quarantine with CDC Guidelines What to do if you are sick with coronavirus disease 2019.  Patient reports adhering to quarantine guidelines. Has family/friends who are able to drop off any needed supplies or food. Advised to contact [facility]/PCP re: any additional Covid19 questions. Confirmed copy of AVS received, reviewed. Obtained and taking all new rx upon discharge. Med education provided, stressed importance of not skipping Prednisone and completing entire course unless otherwise directed by md. No med questions voiced. Active w/ [facility] RN for Remicade infusions. Denies need for additional in home support, dme, community assistance. Patient to schedule 7 [facility] f/u appt as in quarantine thru 5/3/21. Has transportation for future appts. Advised to contact PCP 24/7 re: any health concerns for early outpt intervention.     Advance Care Planning   Healthcare Decision Maker:    Primary Decision Maker: Gianna Herbert - [facility] - [phone]

## 2021-04-17 LAB
INFLAMMATORY BOWEL DISEASE: ABNORMAL
INTERPRETATION: ABNORMAL
NEUTROPHIL CYTOPLASMIC AB IGG: ABNORMAL
S. CEREVISIAE AB IGA: 143.2 UNITS (ref 0–24.9)
S. CEREVISIAE AB IGG: 80.3 UNITS (ref 0–24.9)

## 2021-04-21 LAB
RETINYL PALMITATE: 0.02 MG/L (ref 0–0.1)
VITAMIN A LEVEL: 0.56 MG/L (ref 0.3–1.2)
VITAMIN A, INTERP: NORMAL

## 2021-05-18 ENCOUNTER — HOSPITAL ENCOUNTER (OUTPATIENT)
Dept: LAB | Age: 36
Discharge: HOME | End: 2021-05-18
Payer: COMMERCIAL

## 2021-05-18 LAB
SARS-COV-2: DETECTED
SOURCE: ABNORMAL

## 2021-05-18 PROCEDURE — U0005 INFEC AGEN DETEC AMPLI PROBE: HCPCS

## 2021-05-18 PROCEDURE — U0003 INFECTIOUS AGENT DETECTION BY NUCLEIC ACID (DNA OR RNA); SEVERE ACUTE RESPIRATORY SYNDROME CORONAVIRUS 2 (SARS-COV-2) (CORONAVIRUS DISEASE [COVID-19]), AMPLIFIED PROBE TECHNIQUE, MAKING USE OF HIGH THROUGHPUT TECHNOLOGIES AS DESCRIBED BY CMS-2020-01-R: HCPCS

## 2021-06-25 ENCOUNTER — TELEPHONE (OUTPATIENT)
Dept: BARIATRICS/WEIGHT MGMT | Age: 36
End: 2021-06-25

## 2021-06-25 NOTE — TELEPHONE ENCOUNTER
Patient has [facility] qualifies for all programs.  Left message for [facility] referral

## 2021-07-02 ENCOUNTER — HOSPITAL ENCOUNTER (OUTPATIENT)
Dept: SLEEP CENTER | Age: 36
Discharge: HOME | End: 2021-07-02
Payer: COMMERCIAL

## 2021-07-02 VITALS — BODY MASS INDEX: 48.82 KG/M2 | HEIGHT: 65 IN | WEIGHT: 293 LBS

## 2021-07-02 DIAGNOSIS — G47.33 OSA (OBSTRUCTIVE SLEEP APNEA): ICD-10-CM

## 2021-07-02 DIAGNOSIS — G47.33 OBSTRUCTIVE SLEEP APNEA (ADULT) (PEDIATRIC): ICD-10-CM

## 2021-07-02 DIAGNOSIS — Z01.818 PRE-PROCEDURAL EXAMINATION: Primary | ICD-10-CM

## 2021-07-02 PROCEDURE — 95811 POLYSOM 6/>YRS CPAP 4/> PARM: CPT

## 2021-07-02 ASSESSMENT — SLEEP AND FATIGUE QUESTIONNAIRES
NECK CIRCUMFERENCE (INCHES): 17
HOW LIKELY ARE YOU TO NOD OFF OR FALL ASLEEP WHILE SITTING AND TALKING TO SOMEONE: 2
HOW LIKELY ARE YOU TO NOD OFF OR FALL ASLEEP IN A CAR, WHILE STOPPED FOR A FEW MINUTES IN TRAFFIC: 1
HOW LIKELY ARE YOU TO NOD OFF OR FALL ASLEEP WHILE WATCHING TV: 3
ESS TOTAL SCORE: 19
HOW LIKELY ARE YOU TO NOD OFF OR FALL ASLEEP WHEN YOU ARE A PASSENGER IN A CAR FOR AN HOUR WITHOUT A BREAK: 3
HOW LIKELY ARE YOU TO NOD OFF OR FALL ASLEEP WHILE SITTING QUIETLY AFTER LUNCH WITHOUT ALCOHOL: 2
HOW LIKELY ARE YOU TO NOD OFF OR FALL ASLEEP WHILE LYING DOWN TO REST IN THE AFTERNOON WHEN CIRCUMSTANCES PERMIT: 3
HOW LIKELY ARE YOU TO NOD OFF OR FALL ASLEEP WHILE SITTING INACTIVE IN A PUBLIC PLACE: 2
HOW LIKELY ARE YOU TO NOD OFF OR FALL ASLEEP WHILE SITTING AND READING: 3

## 2021-07-03 NOTE — PROGRESS NOTES
7/3/2021  sleep study  for [employer]  1985 is complete. Results are pending physician review.     Electronically signed by Puma Denis on 7/3/2021 at 7:57 AM

## 2021-07-07 LAB — STATUS: NORMAL

## 2021-07-23 ENCOUNTER — OFFICE VISIT MH/BH (OUTPATIENT)
Dept: BARIATRICS/WEIGHT MGMT | Age: 36
End: 2021-07-23
Payer: COMMERCIAL

## 2021-07-23 ENCOUNTER — INITIAL CONSULT (OUTPATIENT)
Dept: CARDIOLOGY CLINIC | Age: 36
End: 2021-07-23
Payer: COMMERCIAL

## 2021-07-23 VITALS
HEART RATE: 111 BPM | WEIGHT: 293 LBS | BODY MASS INDEX: 48.82 KG/M2 | TEMPERATURE: 97.1 F | DIASTOLIC BLOOD PRESSURE: 78 MMHG | SYSTOLIC BLOOD PRESSURE: 128 MMHG | HEIGHT: 65 IN | OXYGEN SATURATION: 98 %

## 2021-07-23 VITALS
HEIGHT: 65 IN | HEART RATE: 100 BPM | WEIGHT: 293 LBS | DIASTOLIC BLOOD PRESSURE: 80 MMHG | BODY MASS INDEX: 48.82 KG/M2 | SYSTOLIC BLOOD PRESSURE: 130 MMHG

## 2021-07-23 DIAGNOSIS — E66.01 MORBID OBESITY WITH BMI OF 50.0-59.9, ADULT (HCC): Primary | ICD-10-CM

## 2021-07-23 DIAGNOSIS — E66.01 CLASS 3 SEVERE OBESITY DUE TO EXCESS CALORIES WITHOUT SERIOUS COMORBIDITY WITH BODY MASS INDEX (BMI) OF 50.0 TO 59.9 IN ADULT (HCC): ICD-10-CM

## 2021-07-23 DIAGNOSIS — R06.02 SHORTNESS OF BREATH: ICD-10-CM

## 2021-07-23 DIAGNOSIS — R60.0 EDEMA OF LOWER EXTREMITY: Primary | ICD-10-CM

## 2021-07-23 DIAGNOSIS — I10 ESSENTIAL HYPERTENSION: ICD-10-CM

## 2021-07-23 PROBLEM — E66.813 CLASS 3 SEVERE OBESITY DUE TO EXCESS CALORIES WITHOUT SERIOUS COMORBIDITY WITH BODY MASS INDEX (BMI) OF 50.0 TO 59.9 IN ADULT: Status: ACTIVE | Noted: 2017-09-26

## 2021-07-23 PROCEDURE — 99204 OFFICE O/P NEW MOD 45 MIN: CPT | Performed by: SURGERY

## 2021-07-23 PROCEDURE — G8417 CALC BMI ABV UP PARAM F/U: HCPCS | Performed by: INTERNAL MEDICINE

## 2021-07-23 PROCEDURE — 1036F TOBACCO NON-USER: CPT | Performed by: SURGERY

## 2021-07-23 PROCEDURE — 1036F TOBACCO NON-USER: CPT | Performed by: INTERNAL MEDICINE

## 2021-07-23 PROCEDURE — 99204 OFFICE O/P NEW MOD 45 MIN: CPT | Performed by: INTERNAL MEDICINE

## 2021-07-23 PROCEDURE — G8427 DOCREV CUR MEDS BY ELIG CLIN: HCPCS | Performed by: SURGERY

## 2021-07-23 PROCEDURE — G8427 DOCREV CUR MEDS BY ELIG CLIN: HCPCS | Performed by: INTERNAL MEDICINE

## 2021-07-23 PROCEDURE — 93000 ELECTROCARDIOGRAM COMPLETE: CPT | Performed by: INTERNAL MEDICINE

## 2021-07-23 PROCEDURE — G8417 CALC BMI ABV UP PARAM F/U: HCPCS | Performed by: SURGERY

## 2021-07-23 RX ORDER — FUROSEMIDE 40 MG/1
40 TABLET ORAL DAILY
Qty: 60 TABLET | Refills: 3 | Status: SHIPPED | OUTPATIENT
Start: 2021-07-23 | End: 2022-02-04 | Stop reason: ALTCHOICE

## 2021-07-23 RX ORDER — FUROSEMIDE 20 MG/1
20 TABLET ORAL DAILY
COMMUNITY
End: 2021-07-23 | Stop reason: SDUPTHER

## 2021-07-23 ASSESSMENT — ENCOUNTER SYMPTOMS
PHOTOPHOBIA: 0
SORE THROAT: 0
BLOOD IN STOOL: 0
COUGH: 0
VOMITING: 0
BACK PAIN: 1
WHEEZING: 0
ABDOMINAL PAIN: 1
CONSTIPATION: 0
VOICE CHANGE: 0
NAUSEA: 0
COLOR CHANGE: 0
ANAL BLEEDING: 0
TROUBLE SWALLOWING: 0
SHORTNESS OF BREATH: 1
DIARRHEA: 0

## 2021-07-23 NOTE — PROGRESS NOTES
mild MR    Current Outpatient Medications   Medication Sig Dispense Refill    furosemide (LASIX) 20 MG tablet Take 20 mg by mouth daily      dicyclomine (BENTYL) 20 MG tablet Take 1 tablet by mouth 4 times daily as needed (abdominal pain) 40 tablet 0    Cholecalciferol (VITAMIN D3) 25 MCG TABS Take 1 tablet by mouth daily 30 tablet 0    Misc. Devices (FINGERTIP PULSE OXIMETER) MISC Use as directed to check oxygen saturations as needed for shortness of breath 1 each 0    metFORMIN (GLUCOPHAGE) 500 MG tablet Take 1,000 mg by mouth 2 times daily (with meals)       traZODone (DESYREL) 100 MG tablet take 1 tablet by mouth nightly BEFORE BEDTIME      sertraline (ZOLOFT) 25 MG tablet take 1 tablet by mouth once daily      rizatriptan (MAXALT) 10 MG tablet       omeprazole (PRILOSEC) 10 MG delayed release capsule Take 10 mg by mouth daily      acetaminophen (AMINOFEN) 325 MG tablet Take 2 tablets by mouth every 6 hours as needed for Pain 20 tablet 0    ondansetron (ZOFRAN ODT) 4 MG disintegrating tablet Take 1 tablet by mouth every 6 hours 10 tablet 1    albuterol sulfate  (90 Base) MCG/ACT inhaler Inhale 2 puffs into the lungs every 6 hours as needed for Wheezing      loperamide (IMODIUM) 2 MG capsule Take 2 mg by mouth 4 times daily as needed for Diarrhea      Carboxymeth-Glycerin-Polysorb (REFRESH OPTIVE ADVANCED) 0.5-1-0.5 % SOLN Place 1 drop into both eyes 3 times daily       No current facility-administered medications for this visit.        Allergies:     Morphine and Tramadol    Patient History:    Past Medical History:   Diagnosis Date    Acid reflux     Acute deep vein thrombosis (DVT) of non-extremity vein 04/25/2018    \"in my neck\"\"after mediport was put in\"    Anemia     Anxiety     Asthma     NO PULMONOLOGIST AT THIS TIME    Blood clot due to device, implant, or graft 04/2018    had blood clots in neck due to med port    Bowen's disease     Cancer ([facility] [address]     skin cancer shoulder 2019  Crohn's disease ([facility] [address] Dx     Remicade Infusions Every Six Weeks, Sees Dr[facility] Erica Primrose [facility], [place]    Depression     Fall     \"Passed Out And Javon Shuck On My Jeremias Luster"    Fatty liver     Gestational diabetes     \"2019- no medication - just diet controlled with pregnancy\"    Hyperlipidemia     Hypertension     \"yrs ago was on b/p medication- off medication since 2018- only took medication for 2 months\"    Lower back pain     \"Sometimes\"    MRSA (methicillin resistant staph aureus) culture positive 2018    Multiple pulmonary nodules 2013    Subcentimeter; needs repeat imaging in 3-6 months    Pancreatitis     Patellofemoral arthritis of right knee 2020    Shortness of breath on exertion     Teeth missing     Upper And Lower    Wears glasses     Wears glasses      Past Surgical History:   Procedure Laterality Date    ABDOMEN SURGERY      bowel resection     ADENOIDECTOMY      APPENDECTOMY      Done During Colon Resection For Crohn's  Disease    BREAST SURGERY Left     \"Infected Lymph Node Removed\"     SECTION N/A 2019     SECTION performed by Karlie Moore MD at [facility] L[facility]D [address]  2016    COLECTOMY  8-11    Crohn's Disease , Dr. Paula Alfredo, Appendectomy Also Done    COLONOSCOPY  2016    Polyps Removed In Past    COLONOSCOPY  2017    Hospitalized for c-diff    COLONOSCOPY  2018    normal, multiple biopsy, repeat 1 year    COLONOSCOPY N/A 2019    COLONOSCOPY POLYPECTOMY SNARE/COLD BIOPSY performed by Lily Schmidt MD at [facility]      ENDOSCOPY, COLON, DIAGNOSTIC  Last Done 2014    KNEE SURGERY Right 10/1/2020    EXCISION OF MASS ON RIGHT KNEE performed by Judah Mccormick DO at [address]      Dr Anusha Luevano, Removed Adhesions    OTHER SURGICAL HISTORY  2011    Mediport Insertion Left Chest Area/revision done 2016- \"removed at [facility] 11/2017 after I got an infection    SKIN BIOPSY      \"skin cancer removed right shoulder\"8/2019    TUBAL LIGATION  2019    TUNNELED VENOUS PORT PLACEMENT Right 03/12/2018    smart port- [facility]-Dr Sanchez Pap    WISDOM TOOTH EXTRACTION      All [place] Teeth Extracted In Past     Family History   Problem Relation Age of Onset    Migraines Mother     Heart Disease Father     Diabetes Father     High Cholesterol Father     Depression Maternal Aunt     Depression Maternal Uncle     Depression Paternal Aunt     Coronary Art Dis Paternal Aunt     Depression Paternal Uncle     Coronary Art Dis Paternal Uncle     Depression Maternal Grandmother     Depression Maternal Grandfather     Depression Paternal Grandmother     Coronary Art Dis Paternal Grandmother     Depression Paternal Grandfather     Coronary Art Dis Paternal Grandfather      Social History     Tobacco Use    Smoking status: Never Smoker    Smokeless tobacco: Never Used   Substance Use Topics    Alcohol use: No        Review of Systems:     · Constitutional:  No Fever or Weight Loss   · Eyes: No Decreased Vision  · ENT: No Headaches, Hearing Loss or Vertigo  · Cardiovascular: No Chest Pain,  + Shortness of breath, No Palpitations. + Edema   · Respiratory: No cough or wheezing .  No Respiratory distress   · Gastrointestinal: No abdominal pain, appetite loss, blood in stools, constipation, diarrhea or heartburn  · Genitourinary: No dysuria, trouble voiding, or hematuria  · Musculoskeletal:  denies any new  joint aches , or pain   · Integumentary: No rash or pruritis  · Neurological: No TIA or stroke symptoms  · Psychiatric: No anxiety or depression  · Endocrine: No malaise, fatigue or temperature intolerance  · Hematologic/Lymphatic: No bleeding problems, blood clots or swollen lymph nodes  · Allergic/Immunologic: No nasal congestion or hives        Objective:      Physical Exam:    /80   Pulse 100   Ht 5' 5\" (1.651 m) Wt (!) 342 lb (155.1 kg)   BMI 56.91 kg/m²   Wt Readings from Last 3 Encounters:   07/23/21 (!) 342 lb (155.1 kg)   07/02/21 (!) 326 lb (147.9 kg)   04/12/21 (!) 330 lb (149.7 kg)     Body mass index is 56.91 kg/m². Vitals:    07/23/21 0903   BP: 130/80   Pulse: 100        General Appearance and Constitutional: Conversant, Well developed, Well nourished, No acute distress, Non-toxic appearance. HEENT:  Normocephalic, Atraumatic, Bilateral external ears normal, Oropharynx moist, No oral exudates,   Nose normal.   Neck- Normal range of motion, No tenderness, Supple  Eyes:  EOMI, Conjunctiva normal, No discharge. Respiratory:  Normal breath sounds, No respiratory distress, No wheezing, No Rales, No Ronchi. No chest tenderness. Cardiovascular: S1-S2, no added heart sounds, No Mumurs appreciated. No gallops, rubs. +2 nonpitting pedal Edema   GI:  Bowel sounds normal, Soft, No tenderness,  :  No costovertebral angle tenderness   Musculoskeletal:  No gross deformities.  Back- No tenderness  Integument:  Well hydrated, no rash   Lymphatic:  No lymphadenopathy noted   Neurologic:  Alert & oriented x 3, Normal motor function, normal sensory function, no focal deficits noted   Psychiatric:  Speech and behavior appropriate       Medical decision making and Data review:    DATA:    Lab Results   Component Value Date    TROPONINT <0.010 04/13/2021     BNP:    Lab Results   Component Value Date    PROBNP 86.04 04/23/2018     PT/INR:  No results found for: [facility]  Lab Results   Component Value Date    LABA1C 7.1 (H) 04/13/2021     Lab Results   Component Value Date    TRIG 99 12/23/2016     Lab Results   Component Value Date    WBC 10.2 04/15/2021    HGB 11.5 (L) 04/15/2021    HCT 37.3 04/15/2021    MCV 94.0 04/15/2021     04/15/2021     TSH: No results found for: TSH  Lab Results   Component Value Date    AST 37 04/15/2021    ALT 26 04/15/2021    BILIDIR 0.2 01/28/2019    BILITOT 0.2 04/15/2021    ALKPHOS 69 04/15/2021         All labs, medications and tests reviewed by myself including data and history from outside source , patient and available family . 1. Edema of lower extremity    2. Shortness of breath    3. Class 3 severe obesity due to excess calories without serious comorbidity with body mass index (BMI) of 50.0 to 59.9 in adult ([facility] [address]    4. Essential hypertension         Impression and Plan:      1. Lower extremity nonpitting 2+ edema  2. Mild shortness of breath with exertion    Increase Lasix to 40 mg daily  Obtain echocardiogram to rule out structural heart disease  Obtain lower extremity venous Doppler study to rule out venous insufficiency  Patient was again advised to wear compression stockings  Keep fluid intake to 18 cc/day  Low-salt diet  Renal function and hepatic function were within normal limits on recent labs. 3. Sleep apnea: Patient underwent sleep study recently, results awaited. If positive patient will likely benefit from CPAP  4. Borderline hypertension blood pressure 130/80: Increase Lasix, low-salt diet. Assess response clinically. 5. Morbid obesity with BMI of 56.91. Patient has gained 40 pounds in the past 1-1/2-year. Patient to see weight management clinic later today. Low-fat low-salt diet and exercise as tolerated advised. 6. History of Crohn's disease: Follow-up with GI   7. Diabetes mellitus: Patient is on Metformin. Stable. Return in about 1 month (around 8/23/2021). Counseled extensively and medication compliance urged. We discussed that for the  prevention of ASCVD our  goal is aggressive risk modification. Patient is encouraged to exercise even a brisk walk for 30 minutes  at least 3 to 4 times a week   Various goals were discussed and questions answered. Continue current medications. Appropriate prescriptions are addressed and refills ordered. Questions answered and patient verbalizes understanding.   Call for any problems, questions, or concerns.

## 2021-07-23 NOTE — PROGRESS NOTES
Bariatric Surgery Consultation    Umu Gutiérrez, 1985, 38 y.o.,  female, CSN:   07/23/21     Chief Complaint:    Chief Complaint   Patient presents with    New Patient     NP medication eval has surg benefits       SUBJECTIVE:  Sunita Buchanan is a 38 y.o. female being seen for morbid obesity, considering weight loss surgery; Allyson's, Height: 5' 5\" (165.1 cm), Weight: (!) 342 lb 14.4 oz (155.5 kg), Current Body mass index is 57.06 kg/m². The patient's PCP is the referring physician SHAQUILLE Branham - [place]    HPI:  Sunita Buchanan has suffered from overweight / severe obesity for (30) years, and was of gradual onset but progressive course - tried MANY different diets and on and off over the weeks, months and years mood changes, and zoloft on it for her fiance passed away. ., CAD MI. .   single and work status is works from home, insurance stuff mortgage Co, and has 2 children - 15 and 3 yo. Mortality from the morbid obesity is very high:       Allyson's life is significantly affected by weight related to her co-morbidities. The patient has also tried but failed self directed diet and exercise.     Comorbid Conditions:  Significant diseases affecting this patient are   Past Medical History:   Diagnosis Date    Acid reflux     Acute deep vein thrombosis (DVT) of non-extremity vein 04/25/2018    \"in my neck\"\"after mediport was put in\"    Anemia     Anxiety     Asthma     NO PULMONOLOGIST AT THIS TIME    Blood clot due to device, implant, or graft 04/2018    had blood clots in neck due to med port    Bowen's disease     Cancer ([facility]     skin cancer shoulder 2019    Crohn's disease ([facility] Dx 2010    Remicade Infusions Every Six Weeks, Sees Dr. Aaron Johnson, [place] [address] Depression     Fall 2012    \"Passed Out And [place] On My Tailbone\"    Fatty liver     Gestational diabetes     \"2019- no medication - just diet controlled with pregnancy\"    Hyperlipidemia     Hypertension     \"yrs ago was on b/p medication- off medication since 2018- only took medication for 2 months\"    Lower back pain     \"Sometimes\"    MRSA (methicillin resistant staph aureus) culture positive 04/2018    Multiple pulmonary nodules 6/2013    Subcentimeter; needs repeat imaging in 3-6 months    Pancreatitis     Patellofemoral arthritis of right knee 8/19/2020    Shortness of breath on exertion     Teeth missing     Upper And Lower    Wears glasses     Wears glasses      And     Review of Systems - Review of Systems   Constitutional: Positive for fatigue. Negative for activity change, chills, diaphoresis and fever. HENT: Negative for sore throat, trouble swallowing and voice change. Eyes: Negative for photophobia and visual disturbance. Respiratory: Positive for shortness of breath. Negative for cough and wheezing. Cardiovascular: Positive for leg swelling. Negative for chest pain and palpitations. Gastrointestinal: Positive for abdominal pain (Crohn's - bowel resection 2011, distal ilectomy and appy). Negative for anal bleeding, blood in stool, constipation, diarrhea, nausea and vomiting. Endocrine: Positive for polyphagia. Negative for cold intolerance, heat intolerance, polydipsia and polyuria. Genitourinary: Positive for urgency (Very sporadic). Negative for dysuria, frequency and hematuria. Musculoskeletal: Positive for arthralgias, back pain and gait problem. Negative for joint swelling, myalgias and neck stiffness. Skin: Negative for color change and rash. Neurological: Negative for seizures, speech difficulty, light-headedness and numbness. Hematological: Negative for adenopathy. Does not bruise/bleed easily. Psychiatric/Behavioral: Positive for sleep disturbance (Has GONZALO, and getting CPAP, soon - quit breathing 147x / hr). The patient is nervous/anxious. All others reviewed and are negative. Allergies:   Allergies   Allergen Reactions    Morphine Other reaction(s): Headache  Triggers migraine. \"Triggers My Migraines\"  Other reaction(s): Headache    Tramadol      Other reaction(s): Headache  States triggers migraine headache  \"Triggers My Migraines\"  Other reaction(s): Headache       Medications:  Current Outpatient Medications   Medication Sig Dispense Refill    furosemide (LASIX) 40 MG tablet Take 1 tablet by mouth daily 60 tablet 3    dicyclomine (BENTYL) 20 MG tablet Take 1 tablet by mouth 4 times daily as needed (abdominal pain) 40 tablet 0    Cholecalciferol (VITAMIN D3) 25 MCG TABS Take 1 tablet by mouth daily 30 tablet 0    Misc. Devices (FINGERTIP PULSE OXIMETER) MISC Use as directed to check oxygen saturations as needed for shortness of breath 1 each 0    metFORMIN (GLUCOPHAGE) 500 MG tablet Take 1,000 mg by mouth 2 times daily (with meals)       traZODone (DESYREL) 100 MG tablet take 1 tablet by mouth nightly BEFORE BEDTIME      sertraline (ZOLOFT) 25 MG tablet take 1 tablet by mouth once daily      rizatriptan (MAXALT) 10 MG tablet       omeprazole (PRILOSEC) 10 MG delayed release capsule Take 10 mg by mouth daily      acetaminophen (AMINOFEN) 325 MG tablet Take 2 tablets by mouth every 6 hours as needed for Pain 20 tablet 0    ondansetron (ZOFRAN ODT) 4 MG disintegrating tablet Take 1 tablet by mouth every 6 hours 10 tablet 1    albuterol sulfate  (90 Base) MCG/ACT inhaler Inhale 2 puffs into the lungs every 6 hours as needed for Wheezing      loperamide (IMODIUM) 2 MG capsule Take 2 mg by mouth 4 times daily as needed for Diarrhea      Carboxymeth-Glycerin-Polysorb (REFRESH OPTIVE ADVANCED) 0.5-1-0.5 % SOLN Place 1 drop into both eyes 3 times daily       No current facility-administered medications for this visit.        Past Surgical History:  Past Surgical History:   Procedure Laterality Date    ABDOMEN SURGERY  2011    bowel resection     ADENOIDECTOMY  1995    APPENDECTOMY  2011    Done During Colon Resection For Crohn's  Disease    BREAST SURGERY Left     \"Infected Lymph Node Removed\"     SECTION N/A 2019     SECTION performed by Sandra Joe MD at [facility] [address]  2016    COLECTOMY  8-11    Crohn's Disease , Dr. Brendan Perry, Appendectomy Also Done    COLONOSCOPY  2016    Polyps Removed In Past    COLONOSCOPY  2017    Hospitalized for c-diff    COLONOSCOPY  2018    normal, multiple biopsy, repeat 1 year    COLONOSCOPY N/A 2019    COLONOSCOPY POLYPECTOMY SNARE/COLD BIOPSY performed by Suzanne Jiang MD at [place] [address]      ENDOSCOPY, COLON, DIAGNOSTIC  Last Done     KNEE SURGERY Right 10/1/2020    EXCISION OF MASS ON RIGHT KNEE performed by Leonel Bear DO at [facility] [address]      Dr Genevieve Garcia, Removed Adhesions    OTHER SURGICAL HISTORY  2011    Mediport Insertion Left Chest Area/revision done 2016- \"removed at [facility] 2017 after I got an infection    SKIN BIOPSY      \"skin cancer removed right shoulder\"2019    TUBAL LIGATION  2019    TUNNELED VENOUS PORT PLACEMENT Right 2018    smart port- [facility]-Dr Anika Pavon    WISDOM TOOTH EXTRACTION      All Four [place] Teeth Extracted In Past       Family History:  Family History   Problem Relation Age of Onset    Migraines Mother     Heart Disease Father     Diabetes Father     High Cholesterol Father     Depression Maternal Aunt     Depression Maternal Uncle     Depression Paternal Aunt     Coronary Art Dis Paternal Aunt     Depression Paternal Uncle     Coronary Art Dis Paternal Uncle     Depression Maternal Grandmother     Depression Maternal Grandfather     Depression Paternal Grandmother     Coronary Art Dis Paternal Grandmother     Depression Paternal Grandfather     Coronary Art Dis Paternal Grandfather        Social History:  Social History     Socioeconomic History    Marital status: Single     Spouse name: Not on file    Number of children: 1    Years of education: Not on file    Highest education level: Not on file   Occupational History    Not on file   Tobacco Use    Smoking status: Never Smoker    Smokeless tobacco: Never Used   Vaping Use    Vaping Use: Never used   Substance and Sexual Activity    Alcohol use: No    Drug use: No    Sexual activity: Yes     Partners: Male   Other Topics Concern    Not on file   Social History Narrative    Lives with parents, has a daughter     Social Determinants of Health     Financial Resource Strain:     Difficulty of Paying Living Expenses:    Food Insecurity:     Worried About [address] in the Last Year:     [address] in the Last Year:    Transportation Needs:     Lack of Transportation (Medical):  Lack of Transportation (Non-Medical):    Physical Activity:     Days of Exercise per Week:     Minutes of Exercise per Session:    Stress:     Feeling of Stress :    Social Connections:     Frequency of Communication with Friends and Family:     Frequency of Social Gatherings with Friends and Family:     Attends Hinduism Services:     Active Member of Clubs or Organizations:     Attends Club or Organization Meetings:     Marital Status:    Intimate Partner Violence:     Fear of Current or Ex-Partner:     Emotionally Abused:     Physically Abused:     Sexually Abused:          OBJECTIVE:     Physical Exam   /78   Pulse 111   Temp 97.1 °F (36.2 °C)   Ht 5' 5\" (1.651 m)   Wt (!) 342 lb 14.4 oz (155.5 kg)   SpO2 98%   BMI 57.06 kg/m²    Constitutional:  Vital signs are normal. The patient appears well-developed and well-nourished. Head: Normocephalic. Neck: No mass and no thyromegaly present. Cardiovascular: Normal rate, regular rhythm, S1 normal and S2 normal.  No murmurs. Edema Pulmonary/Chest: Effort normal and breath sounds normal.   Abdominal: Soft. Normal appearance. There is no splenomegaly, but fatty liver. No tenderness. There is no rigidity, no rebound, no guarding and no Bains's sign. Musculoskeletal:        Right lower leg: Normal. No tenderness and no edema. Left lower leg: Normal. No tenderness and no edema. Lymphadenopathy:     No cervical adenopathy. No axillary adenopathy. Skin: Skin is warm, dry and intact. No rash. Psychiatric: The patient is alert and oriented. The patient has a normal mood and affect. Speech is normal and behavior is normal. Judgment and thought content normal. Cognition and memory are normal.     ASSESSMENT & PLAN:    Patient Active Problem List   Diagnosis    Crohn's disease ([address]    Anxiety    Multiple lung nodules    Crohn's colitis, unspecified complication ([address]    Exacerbation of Crohn's disease with complication ([address]    IBS (irritable bowel syndrome)    C. difficile colitis    Crohn's disease of small and large intestines with complication ([address]    Neck swelling    Leukocytosis    Non-intractable vomiting with nausea    Infection of venous access port    Acute deep vein thrombosis (DVT) of non-extremity vein    Class 3 severe obesity due to excess calories without serious comorbidity with body mass index (BMI) of 50.0 to 59.9 in adult ([address]    Preeclampsia, third trimester    Pregnancy related condition    Status post  delivery    Bowen's disease of right shoulder    Patellofemoral arthritis of right knee    Infrapatellar bursitis of right knee    Knee mass, right    Acute respiratory failure ([address]    Pneumonia due to COVID-19 virus    Shortness of breath    Edema of lower extremity    Essential hypertension    Morbid obesity with BMI of 50.0-59.9, adult ([address]       Dr Xenia Phan performed her 2 Surgeries, the bowel resection and the lap suzy. The patient is good candidate for surgery. The patient is interested in the RoboticSleeve Gastrectomy. Will continue work up toward surgery.     Counseled extensively regardin) DIET and MONITOR the Weight:  - 1500 Kcal Diet/day. - Write down everything you eat and drink for 1 wk  - No calories from drinks  - Slim fast diet: 4 cans per day 1-2 days a week with only NO caloriesdrinks those days    2) EXERCISE: 1 hr/day 5 days a week    3) DIETITIAN / NUTRITIONAL CONSULT, evaluation and counseling to bolanos healthy diet ~1500 Kcal /day    4) EXERCISE PHYSIOLOGIST CONSULT    5)PSYCHOLOGICAL EVALUATION    6) MONTHLY FOLLOW UP,  to follow and document diet and exercise trial    7) Planning a Sleeve Gastrectomy in few months    8) 2 Pictures were taken today to concretely monitorweight loss over time. 9) CARDIOLOGY OPTIMIZATION AND CLEARANCE BEFORE SURGERY    10) PULMONOLOGY OPTIMIZATION AND CLEARANCE BEFORE SURGERY    11) The patient is a female of reproductive age and she was advised she should NOT become pregnant during the bariatric workup process and for at least 12-18 months after surgery. The patient is agreeable to this plan. Patient reports there is no worry of this and she is agreeable. WILL CHECK BASELINE BARIATRIC COMPREHENSIVE LABS. Orders Placed This Encounter   Procedures    Basic Metabolic Panel    CBC Auto Differential    Hemoglobin A1C    Hepatic Function Panel    Iron    Lipid Panel    TSH without Reflex    Amb Referral to Nutrition Services    Ambulatory referral to Physical Therapy        Pt was handed a food diary notebook to help monitor Mike intake, and patientinformation pamphlet on Sleeve Gastrectomy. I counseled the patient regarding weight loss, appropriate diet and exercise, and healthy eating habits.    - Eat slow, and chew 50-60 times before swallowing  - Eat smaller portions in smaller plates  - Weigh yourself at least 2-3 times a week    The patient will be scheduled for a follow up visit in Return in about 4 weeks (around 8/20/2021) for Bariatric follow up: diet, exercise & weight loss, For imaging and tests results review. .    Bariatric 1200 calorie diet, Yes MAX, heart healthy, 60-80 gram protein.    ____________________________________________    Gisele Bal MD, FACS, FICS  Member of the American Society of Metabolic and Bariatric Surgeons    7/23/2021  2:53 PM

## 2021-07-23 NOTE — PATIENT INSTRUCTIONS
Please be informed that if you contact our office outside of normal business hours the physician on call cannot help with any scheduling or rescheduling issues, procedure instruction questions or any type of medication issue. We advise you for any urgent/emergency that you go to the nearest emergency room! PLEASE CALL OUR OFFICE DURING NORMAL BUSINESS HOURS    Monday - Friday   8 am to 5 pm    [address]: [phone]    [place]:  [phone]  **It is YOUR responsibilty to bring medication bottles and/or updated medication list to [address] [place] [address]. This will allow us to better serve you and all your healthcare needs**  Please hold on to these instructions the  will call you within 1-9 business days when we receive authorization from your insurance. Echocardiogram    WHAT TO EXPECT:   ? This test will take approximately 45 minutes. ? It is an ultrasound of the heart. ? It can look at the valves and chambers inside the heart   ? There is no special instructions for this test.     If you are unable to keep this appointment, please notify us 24 hours prior to test at [phone].

## 2021-07-26 ENCOUNTER — TELEPHONE (OUTPATIENT)
Dept: CARDIOLOGY CLINIC | Age: 36
End: 2021-07-26

## 2021-07-28 ENCOUNTER — PROCEDURE VISIT (OUTPATIENT)
Dept: CARDIOLOGY CLINIC | Age: 36
End: 2021-07-28
Payer: COMMERCIAL

## 2021-07-28 DIAGNOSIS — R60.0 EDEMA OF LOWER EXTREMITY: ICD-10-CM

## 2021-07-28 DIAGNOSIS — R06.02 SHORTNESS OF BREATH: ICD-10-CM

## 2021-07-28 LAB
LV EF: 58 %
LVEF MODALITY: NORMAL

## 2021-07-28 PROCEDURE — 93306 TTE W/DOPPLER COMPLETE: CPT | Performed by: INTERNAL MEDICINE

## 2021-07-29 ENCOUNTER — TELEPHONE (OUTPATIENT)
Dept: CARDIOLOGY CLINIC | Age: 36
End: 2021-07-29

## 2021-07-30 ENCOUNTER — TELEPHONE (OUTPATIENT)
Dept: CARDIOLOGY CLINIC | Age: 36
End: 2021-07-30

## 2021-07-30 NOTE — TELEPHONE ENCOUNTER
ECHO        Summary   Limited study due to patients body habitus. Left ventricular function and size is normal, EF is estimated at 55-60%. Mild left ventricular hypertrophy. E/A reversal; indeterminate diastolic function. No regional wall motion abnormalities were detected. No significant valvular disease noted. Mild mitral regurgitation is present. RVSP is 12 mmHg. No evidence of pericardial effusion.     PT IS [address]

## 2021-08-02 ENCOUNTER — TELEPHONE (OUTPATIENT)
Dept: CARDIOLOGY CLINIC | Age: 36
End: 2021-08-02

## 2021-08-10 ENCOUNTER — HOSPITAL ENCOUNTER (OUTPATIENT)
Dept: PHYSICAL THERAPY | Age: 36
Setting detail: THERAPIES SERIES
Discharge: HOME | End: 2021-08-10
Payer: COMMERCIAL

## 2021-08-10 ENCOUNTER — OFFICE VISIT (OUTPATIENT)
Dept: BARIATRICS/WEIGHT MGMT | Age: 36
End: 2021-08-10

## 2021-08-10 VITALS — HEIGHT: 65 IN | BODY MASS INDEX: 48.82 KG/M2 | WEIGHT: 293 LBS

## 2021-08-10 DIAGNOSIS — E66.01 MORBID OBESITY WITH BMI OF 50.0-59.9, ADULT (HCC): Primary | ICD-10-CM

## 2021-08-10 PROCEDURE — 97110 THERAPEUTIC EXERCISES: CPT

## 2021-08-10 PROCEDURE — 97161 PT EVAL LOW COMPLEX 20 MIN: CPT

## 2021-08-10 PROCEDURE — 99999 PR OFFICE/OUTPT VISIT,PROCEDURE ONLY: CPT

## 2021-08-10 ASSESSMENT — PAIN DESCRIPTION - FREQUENCY: FREQUENCY: INTERMITTENT

## 2021-08-10 ASSESSMENT — PAIN DESCRIPTION - PROGRESSION: CLINICAL_PROGRESSION: GRADUALLY WORSENING

## 2021-08-10 ASSESSMENT — PAIN DESCRIPTION - LOCATION: LOCATION: KNEE;BACK

## 2021-08-10 ASSESSMENT — PAIN - FUNCTIONAL ASSESSMENT: PAIN_FUNCTIONAL_ASSESSMENT: PREVENTS OR INTERFERES SOME ACTIVE ACTIVITIES AND ADLS

## 2021-08-10 ASSESSMENT — PAIN DESCRIPTION - DESCRIPTORS: DESCRIPTORS: SHARP;ACHING

## 2021-08-10 ASSESSMENT — PAIN SCALES - GENERAL: PAINLEVEL_OUTOF10: 7

## 2021-08-10 ASSESSMENT — PAIN DESCRIPTION - PAIN TYPE: TYPE: CHRONIC PAIN

## 2021-08-10 ASSESSMENT — PAIN DESCRIPTION - ONSET: ONSET: GRADUAL

## 2021-08-10 NOTE — PROGRESS NOTES
Outpatient Nutrition Counseling    REASON FOR VISIT: Initial Nutrition Class    Chief Complaint:    Chief Complaint   Patient presents with    Weight Management       SUBJECTIVE:  Pt here for initial nutrition class. Instructed on mindful eating, label reading, calorie counting, healthy food choices and goal setting. Pt was provided healthy food list and goal card. Pt verbalized understanding and signed goal card. The patient is a 38 y.o. female being seen for morbid obesity, considering weight loss surgery; Allyson's, Height: 5' 5\" (165.1 cm), Weight: (!) 342 lb 14.4 oz (155.5 kg), Current Body mass index is 57.06 kg/m². The patient's PCP is SHAQUILLE Houser NP   Allyson's life is significantlyaffected by weight related to her co-morbidities. Comorbid Conditions:  Significant diseases affecting this patient are   Past Medical History:   Diagnosis Date    Acid reflux     Acute deep vein thrombosis (DVT) of non-extremity vein 04/25/2018    \"in my neck\"\"after mediport was put in\"    Anemia     Anxiety     Asthma     NO PULMONOLOGIST AT THIS TIME    Blood clot due to device, implant, or graft 04/2018    had blood clots in neck due to med port    Bowen's disease     Cancer ([facility]     skin cancer shoulder 2019    Crohn's disease ([facility] [address] [facility] Dx 2010    Remicade Infusions Every Six Weeks, [place]. Lauren [place], [place]    Depression     ECHO 07/28/2021    EF is estimated at 55-60%. Mild left ventricular hypertrophy. Mild mitral regurgitation is present.     Fall 2012    \"Passed Out And [place] On My [place]"    Fatty liver     Gestational diabetes     \"2019- no medication - just diet controlled with pregnancy\"    Hyperlipidemia     Hypertension     \"yrs ago was on b/p medication- off medication since 2018- only took medication for 2 months\"    Lower back pain     \"Sometimes\"    MRSA (methicillin resistant staph aureus) culture positive 04/2018    Multiple pulmonary nodules 2013    Subcentimeter; needs repeat imaging in 3-6 months    Pancreatitis     Patellofemoral arthritis of right knee 2020    Shortness of breath on exertion     Teeth missing     Upper And Lower    Wears glasses     Wears glasses    . Review of Systems - Review of Systems  Otherwise per HPI. Allergies: Allergies   Allergen Reactions    Morphine      Other reaction(s): Headache  Triggers migraine.    \"Triggers My Migraines\"  Other reaction(s): Headache    Tramadol      Other reaction(s): Headache  States triggers migraine headache  \"Triggers My Migraines\"  Other reaction(s): Headache       Past Surgical History:  Past Surgical History:   Procedure Laterality Date    ABDOMEN SURGERY      bowel resection     ADENOIDECTOMY      APPENDECTOMY      Done During Colon Resection For Crohn's  Disease    BREAST SURGERY Left     \"Infected Lymph Node Removed\"     SECTION N/A 2019     SECTION performed by Justine Rodgers [facility] at [facility]    CHOLECYSTECTOMY  2016    COLECTOMY      Crohn's Disease , Dr. Bonny Garcias, Appendectomy Also Done    COLONOSCOPY  2016    Polyps Removed In Past    COLONOSCOPY  2017    Hospitalized for c-diff    COLONOSCOPY  2018    normal, multiple biopsy, repeat 1 year    COLONOSCOPY N/A 2019    COLONOSCOPY POLYPECTOMY SNARE/COLD BIOPSY performed by Chryl Fothergill, [facility] at [facility]      ENDOSCOPY, COLON, DIAGNOSTIC  Last Done     KNEE SURGERY Right 10/1/2020    EXCISION OF MASS ON RIGHT KNEE performed by Laura Soliman DO at [address]      Dr Edilberto Hilton, Removed Adhesions    OTHER SURGICAL HISTORY  2011    Mediport Insertion Left Chest Area/revision done 2016- \"removed at [facility] 2017 after I got an infection    SKIN BIOPSY      \"skin cancer removed right shoulder\"2019    TUBAL LIGATION  2019    TUNNELED VENOUS PORT PLACEMENT Right 2018 [facility]-Dr Stafford Brain    WISDOM TOOTH EXTRACTION      All Four [place] Teeth Extracted In Past       Family History:  Family History   Problem Relation Age of Onset    Migraines Mother     Heart Disease Father     Diabetes Father     High Cholesterol Father     Depression Maternal Aunt     Depression Maternal Uncle     Depression Paternal Aunt     Coronary Art Dis Paternal Aunt     Depression Paternal Uncle     Coronary Art Dis Paternal Uncle     Depression Maternal Grandmother     Depression Maternal Grandfather     Depression Paternal Grandmother     Coronary Art Dis Paternal Grandmother     Depression Paternal Grandfather     Coronary Art Dis Paternal Grandfather        Social History:  Social History     Socioeconomic History    Marital status: Single     Spouse name: Not on file    Number of children: 1    Years of education: Not on file    Highest education level: Not on file   Occupational History    Not on file   Tobacco Use    Smoking status: Never Smoker    Smokeless tobacco: Never Used   Vaping Use    Vaping Use: Never used   Substance and Sexual Activity    Alcohol use: No    Drug use: No    Sexual activity: Yes     Partners: Male   Other Topics Concern    Not on file   Social History Narrative    Lives with parents, has a daughter     Social Determinants of Health     Financial Resource Strain:     Difficulty of Paying Living Expenses:    Food Insecurity:     Worried About Running Out of Food in the Last Year:     [address] in the Last Year:    Transportation Needs:     Lack of Transportation (Medical):      Lack of Transportation (Non-Medical):    Physical Activity:     Days of Exercise per Week:     Minutes of Exercise per Session:    Stress:     Feeling of Stress :    Social Connections:     Frequency of Communication with Friends and Family:     Frequency of Social Gatherings with Friends and Family:     Attends Jainism Services:     Active Member of Clubs or Organizations:     Attends Club or Organization Meetings:     Marital Status:    Intimate Partner Violence:     Fear of Current or Ex-Partner:     Emotionally Abused:     Physically Abused:     Sexually Abused:          OBJECTIVE:  Physical Exam   Ht 5' 5\" (1.651 m)   Wt (!) 342 lb 14.4 oz (155.5 kg)   BMI 57.06 kg/m²        NUTRITION DIAGNOSIS: Overweight / Obesity   Problem: Increased adiposity compared to reference standard or established norms   Etiology: Excess intake compared to output over time   S/S: Ht: 65\" Wt: 342.9 lbs BMI: 57.06    NUTRITION INTERVENTIONS:    Individualized treatment goals to address nutritiondiagnosis:   Instructed on 1200 kcal diet for weight loss   Provided sample menus, food lists, and goal card   Encouraged Physical activity as approved by physician    MONITORING/ EVALUATION/ PLAN:   Pt verbalized understanding of allmaterials covered   Pt asked pertinent questions throughout the session - expect compliance with nutrition guidelines presented   Provided pt with contact information should questions arise prior to next visit   Will f/u with pt in 4-5 months for further education and post-op diet instructions  PATRIC Juan MS, RDN, LD  8/10/2021

## 2021-08-10 NOTE — FLOWSHEET NOTE
Outpatient Physical Therapy  [place]           [x] Phone: [phone]   Fax: [phone]  [facility] [place]           [] Phone: [phone]   Fax: [phone]        Physical Therapy Daily Treatment Note  Date:  8/10/2021    Patient Name:  Glyn Bence    :  1985  MRN: [id number]  Restrictions/Precautions:    Diagnosis:   Morbid Obesity   Date of Injury/Surgery:   Treatment Diagnosis:   mod deconditioning, LBP    Insurance/Certification information:  [facility]    Referring Physician:   Dr Salo Calixto   Next Doctor Visit:    Plan of care signed (Y/N):  N, sent 8/10/21   Outcome Measure:   Visit# / total visits:   1/2 per POC  Pain level: 0/10   Goals:         Patient goals : lose weight, decrease pain  Short term goals  Time Frame for Short term goals: Defer to [address] term goals  Time Frame for Long term goals : 4 weeks 9/10/21  LTG: Utilizing group and individual instruction, patient will be educated in physical activity/ exercise concepts including use of basic fitness equipment and developing a personal exercise program       Summary of Evaluation:  Pt is 38year old female currently enrolled in weight management program. Pt now has difficulties completing prolonged activities including standing and walking due to fatigue and progressive pain. Pt demo deficits this date that include deconditioning and LBP with continued activity. Pt will benefit with PT services with return for education on physical activity to safely increase activity to prevent injury and safe weight loss. Patient agrees with established plan of care and assisted in the development of their short term and long term goals. Patient had no adverse reaction with initial treatment and there are no barriers to learning. Demonstrates no mental or cognitive disorder. Subjective:  See eval         Any changes in Ambulatory Summary Sheet?   None        Objective:  See eval   COVID screening questions were asked and patient attested that there had been no contact or symptoms        Exercises: (No more than 4 columns)   Exercise/Equipment 8/10/21  #1 Date Date           WARM UP                     TABLE      Sitting piriformis stretch  15\"x4                                STANDING                                                     PROPRIOCEPTION                                    MODALITIES                      Other Therapeutic Activities/Education:  Patient received education on their current pathology and how their condition effects them with their functional activities. Patient understood discussion and questions were answered. Patient understands their activity limitations and understands rational for treatment progression. Educated on benefits to initiating walking program with monitoring of heart rate and perceived exertion to tolerance with appropriate rest and continue as able with goal to accumulate 30 min of activity. Pt reported understanding. Home Exercise Program:  HO issued, reviewed and discussed with patient. Pt agreed to comply. Manual Treatments:  --      Modalities:  --      Communication with other providers:  POC sent       Assessment:  (Response towards treatment session) (Pain Rating)       Pt is 38year old female currently enrolled in weight management program. Pt now has difficulties completing prolonged activities including standing and walking due to fatigue and progressive pain. Pt demo deficits this date that include deconditioning and LBP with continued activity. Pt will benefit with PT services with return for education on physical activity to safely increase activity to prevent injury and safe weight loss. Patient agrees with established plan of care and assisted in the development of their short term and long term goals. Patient had no adverse reaction with initial treatment and there are no barriers to learning. Demonstrates no mental or cognitive disorder.      Plan for Next Session:  Return for physical activity education class.        Time In / Time Out:    0557-8361       If BWC Please Indicate Time In/Out/Total Time  CPT Code Time in Time out Total Time                                                            Total for session             Timed Code/Total Treatment Minutes:  10/45'  10' TE, 1 PT jake       Next Progress Note due:  Jake 8/10/21    Visit 10       Plan of Care Interventions:  [x] Therapeutic Exercise  [x] Modalities:  [x] Therapeutic Activity     [] Ultrasound  [x] Estim  [] Gait Training      [] Cervical Traction [] Lumbar Traction  [x] Neuromuscular Re-education    [x] Cold/hotpack [] Iontophoresis   [x] Instruction in HEP      [x] Vasopneumatic   [] Dry Needling    [x] Manual Therapy               [] Aquatic Therapy              Electronically signed by:  Nain Gerard PT, DPT, OCS  8/10/2021, 8:17 AM    8/10/2021 8:17 AM

## 2021-08-10 NOTE — PROGRESS NOTES
Physical Therapy  Initial Assessment  Date: 8/10/2021  Patient Name: Rommel Hernandez  MRN: [id number]  : 1985     Treatment Diagnosis: mod deconditioning, LBP    Restrictions       Subjective   General  Chart Reviewed: Yes  Patient assessed for rehabilitation services?: Yes  Referring Practitioner: Dr. Almaraz Shoulder  Diagnosis: Morbid Obesity  PT Visit Information  PT Insurance Information: [employer]  Subjective  Subjective: Recent start of weight management program. Seen Renetta Brown in Nutrition this morning. Has OA into B knees with R knee tissue removal last year. Does have sciatic pain with L LE affected with standing >15 min. Muscle relaxant with noted improvement since. attempted walking with limitation due to sciatic pain. Owns tension resistance and aerobic steps. Pain Screening  Patient Currently in Pain: Yes  Pain Assessment  Pain Assessment: 0-10  Pain Level: 7 (Best: 0/10    Worst: 7/10 with prolonged weightbearing.)  Patient's Stated Pain Goal: No pain  Pain Type: Chronic pain  Pain Location: Knee;Back  Pain Descriptors: Bettylou Gaster; Aching  Pain Frequency: Intermittent  Pain Onset: Gradual  Clinical Progression: Gradually worsening  Functional Pain Assessment: Prevents or interferes some active activities and ADLs  Vital Signs  Patient Currently in Pain: Yes    Vision/Hearing  Vision  Vision: Within Functional Limits  Hearing  Hearing: Within functional limits    Orientation  Orientation  Overall Orientation Status: Within Normal Limits    Social/Functional History  Social/Functional History  Type of Home: House  Home Layout: One level  ADL Assistance: Independent  Homemaking Assistance: Independent  Ambulation Assistance: Independent  Transfer Assistance: Independent  Active : Yes  Mode of Transportation: Car  Occupation: Full time employment  Type of occupation: [employer]  Leisure & Hobbies: None    Objective     Observation/Palpation  Palpation: Denies pain with palpation.   Observation: Normal gait mechanics. Able to complete toe/heel raises. PROM RLE (degrees)  RLE PROM: WFL  RLE General PROM: Limited due to soft tissue approximation. AROM RLE (degrees)  RLE AROM: WFL  RLE General AROM: no pain reported. PROM LLE (degrees)  LLE PROM: WFL  LLE General PROM: Limited due to soft tissue approximation. AROM LLE (degrees)  LLE AROM : WFL  LLE General AROM: no pain reported. Spine  Lumbar: Full without increase in pain. Strength RLE  Strength RLE: WFL  Comment: 4+/5 in all directions without increase in pain w/ exception of hip ext into low back. Strength LLE  Strength LLE: WFL  Comment: 4+/5 in all directions without increase in pain w/ exception of hip ext into low back. Strength Other  Other: 5x sit to stand: 18.90 sec without UE assist post 4/10 R knee pain                 Pre-ambulation     115bpm       96% Sat O2          6MWT:  942ftwith one rest break in sitting                     Post ambulation :   137bpm       94% Sat O2   Mod ZANE: 6/10     Pain into the low back 7/10 pain post     Balance  Single Leg Stance R Le  Single Leg Stance L Le  Comments: No increase in pain with testing. Assessment   Conditions Requiring Skilled Therapeutic Intervention  Body structures, Functions, Activity limitations: Decreased functional mobility ; Decreased endurance;Decreased strength; Increased pain  Pt is 38year old female currently enrolled in weight management program. Pt now has difficulties completing prolonged activities including standing and walking due to fatigue and progressive pain. Pt demo deficits this date that include deconditioning and LBP with continued activity. Pt will benefit with PT services with return for education on physical activity to safely increase activity to prevent injury and safe weight loss. Patient agrees with established plan of care and assisted in the development of their short term and long term goals.  Patient had no adverse reaction with initial treatment and there are no barriers to learning. Demonstrates no mental or cognitive disorder. Treatment Diagnosis: mod deconditioning, LBP  Prognosis: Good  Decision Making: Low Complexity  Barriers to Learning: None  Activity Tolerance  Activity Tolerance: Patient limited by pain; Patient limited by endurance         Plan   Plan  Times per week: 1  Plan weeks: 2  Specific instructions for Next Treatment: return for group class teaching safe independent exercise.   Current Treatment Recommendations: Strengthening, ROM, Home Exercise Program (group)       Goals  Short term goals  Time Frame for Short term goals: Defer to [address] [facility] term goals  Time Frame for Long term goals : 4 weeks 9/10/21  LTG 1: Utilizing group and individual instruction, patient will be educated in physical activity/ exercise concepts including use of basic fitness equipment and developing a personal exercise program     Patient Goals   Patient goals : lose weight, decrease pain       Nain Gerard PT, DPT, OCS    8/10/2021 6:07 PM

## 2021-08-10 NOTE — PLAN OF CARE
Needling  [] Manual Therapy               [] Aquatic Therapy       Other:          Frequency/Duration:  # Days per week: [x] 1 day # Weeks: [] 1 week [] 5 weeks     [] 2 days   [x] 2 weeks [] 6 weeks     [] 3 days   [] 3 weeks [] 7 weeks     [] 4 days   [] 4 weeks [] 8 weeks         [] 9 weeks [] 10 weeks         [] 11 weeks [] 12 weeks    Rehab Potential/Progress: [] Excellent [x] Good [] Fair  [] Poor     Goals:    Patient goals : lose weight, decrease pain  Short term goals  Time Frame for Short term goals: Defer to [address] [facility] [address] term goals  Time Frame for Long term goals : 4 weeks 9/10/21  LTG: Utilizing group and individual instruction, patient will be educated in physical activity/ exercise concepts including use of basic fitness equipment and developing a personal exercise program       Electronically signed by:  Abby Grace PT, DPT, OCS 8/10/2021, 6:08 PM    8/10/2021 6:08 PM         If you have any questions or concerns, please don't hesitate to call.   Thank you for your referral.      Physician Signature:________________________________Date:_________ TIME: _____  By signing above, therapists plan is approved by physician

## 2021-08-18 ENCOUNTER — PROCEDURE VISIT (OUTPATIENT)
Dept: CARDIOLOGY CLINIC | Age: 36
End: 2021-08-18
Payer: COMMERCIAL

## 2021-08-18 ENCOUNTER — OFFICE VISIT (OUTPATIENT)
Dept: BARIATRICS/WEIGHT MGMT | Age: 36
End: 2021-08-18
Payer: COMMERCIAL

## 2021-08-18 VITALS
WEIGHT: 293 LBS | HEART RATE: 86 BPM | DIASTOLIC BLOOD PRESSURE: 72 MMHG | TEMPERATURE: 97.7 F | HEIGHT: 65 IN | RESPIRATION RATE: 22 BRPM | BODY MASS INDEX: 48.82 KG/M2 | SYSTOLIC BLOOD PRESSURE: 110 MMHG

## 2021-08-18 DIAGNOSIS — R60.0 EDEMA OF LOWER EXTREMITY: ICD-10-CM

## 2021-08-18 DIAGNOSIS — E66.01 MORBID OBESITY WITH BMI OF 50.0-59.9, ADULT (HCC): Primary | ICD-10-CM

## 2021-08-18 PROCEDURE — G8417 CALC BMI ABV UP PARAM F/U: HCPCS | Performed by: SURGERY

## 2021-08-18 PROCEDURE — 1036F TOBACCO NON-USER: CPT | Performed by: SURGERY

## 2021-08-18 PROCEDURE — G8427 DOCREV CUR MEDS BY ELIG CLIN: HCPCS | Performed by: SURGERY

## 2021-08-18 PROCEDURE — 93970 EXTREMITY STUDY: CPT | Performed by: INTERNAL MEDICINE

## 2021-08-18 PROCEDURE — 99214 OFFICE O/P EST MOD 30 MIN: CPT | Performed by: SURGERY

## 2021-08-18 RX ORDER — CYCLOBENZAPRINE HCL 10 MG
TABLET ORAL
COMMUNITY
Start: 2021-07-21 | End: 2022-02-04

## 2021-08-18 RX ORDER — DILTIAZEM HYDROCHLORIDE 60 MG/1
TABLET, FILM COATED ORAL
COMMUNITY
Start: 2021-05-12 | End: 2021-08-31

## 2021-08-18 RX ORDER — ADALIMUMAB 80MG/0.8ML
KIT SUBCUTANEOUS
Status: ON HOLD | COMMUNITY
Start: 2021-08-03 | End: 2022-02-15 | Stop reason: HOSPADM

## 2021-08-18 RX ORDER — PANTOPRAZOLE SODIUM 40 MG/1
1 TABLET, DELAYED RELEASE ORAL DAILY
Status: ON HOLD | COMMUNITY
Start: 2021-07-21 | End: 2022-02-15 | Stop reason: HOSPADM

## 2021-08-18 ASSESSMENT — ENCOUNTER SYMPTOMS
ABDOMINAL PAIN: 1
TROUBLE SWALLOWING: 0
SHORTNESS OF BREATH: 1
PHOTOPHOBIA: 0
SORE THROAT: 0
DIARRHEA: 0
BLOOD IN STOOL: 0
VOMITING: 0
COLOR CHANGE: 0
VOICE CHANGE: 0
ABDOMINAL DISTENTION: 1
COUGH: 0
ANAL BLEEDING: 0
WHEEZING: 0
NAUSEA: 0
BACK PAIN: 1
CONSTIPATION: 0

## 2021-08-18 NOTE — PROGRESS NOTES
BARIATRIC SURGERY OFFICE NOTE    SUBJECTIVE:    Patient presenting today originally referred from Kristopher Davies, APRN - NP, for   Chief Complaint   Patient presents with    Follow-up     Here for Surgical Weight management 2nd visit. Patient has gained 3.8lbs since last visit. Lucille Cerrato HPI: Willi Vogel is a 38 y.o. female being seen for follow up for bariatric weight loss surgery. Allyson'slast month weight gain/loss was + 3.8 Lbs. Downloaded Trovit pal; 1200 Kcal;   Exercises: walks 3-4 minutes daily. .  Arthritis in her Right knee, major. .    Cardiac and Pulm referrals will get her CPAP for GONZALO tomorrow. And I will do her EGD, and Biopsies, this month. Cardiac:   Summary   Limited study due to patients body habitus. Left ventricular function and size is normal, EF is estimated at 55-60%. Mild left ventricular hypertrophy. E/A reversal; indeterminate diastolic function. No regional wall motion abnormalities were detected. No significant valvular disease noted. Mild mitral regurgitation is present. RVSP is 12 mmHg. No evidence of pericardial effusion. Signature      ------------------------------------------------------------------   Electronically signed by Cynthia Valdivia MD     Thoroughly reviewed the patient's medical history, family history, social history and review of systems with the patient today in theoffice. Please see medical record for pertinent positives.       Past Medical History:   Diagnosis Date    Acid reflux     Acute deep vein thrombosis (DVT) of non-extremity vein 04/25/2018    \"in my neck\"\"after mediport was put in\"    Anemia     Anxiety     Asthma     NO PULMONOLOGIST AT THIS TIME    Blood clot due to device, implant, or graft 04/2018    had blood clots in neck due to med port    Bowen's disease     Cancer ([address]     skin cancer shoulder 2019    Crohn's disease ([facility] Dx 2010    Remicade Infusions Every Six Weeks, [facility]     ECHO 2021    EF is estimated at 55-60%. Mild left ventricular hypertrophy. Mild mitral regurgitation is present.     Fall 2012    \"Passed Out And Lorette Degree On My Tailbone\"    Fatty liver     Gestational diabetes     \"2019- no medication - just diet controlled with pregnancy\"    Hyperlipidemia     Hypertension     \"yrs ago was on b/p medication- off medication since 2018- only took medication for 2 months\"    Lower back pain     \"Sometimes\"    MRSA (methicillin resistant staph aureus) culture positive 2018    Multiple pulmonary nodules 2013    Subcentimeter; needs repeat imaging in 3-6 months    Pancreatitis     Patellofemoral arthritis of right knee 2020    Shortness of breath on exertion     Teeth missing     Upper And Lower    Wears glasses     Wears glasses       Past Surgical History:   Procedure Laterality Date    ABDOMEN SURGERY      bowel resection     ADENOIDECTOMY      APPENDECTOMY      Done During Colon Resection For Crohn's  Disease    BREAST SURGERY Left     \"Infected Lymph Node Removed\"     SECTION N/A 2019     SECTION [facility]    CHOLECYSTECTOMY  2016    COLECTOMY  8-11    Crohn's Disease , Dr. Cristina Blanca, Appendectomy Also Done    COLONOSCOPY  2016    Polyps Removed In Past    COLONOSCOPY  2017    Hospitalized for c-diff    COLONOSCOPY  2018    normal, multiple biopsy, repeat 1 year    COLONOSCOPY N/A 2019    COLONOSCOPY POLYPECTOMY SNARE/COLD BIOPSY performed by Quinten Alegre MD at [facility]      ENDOSCOPY, COLON, DIAGNOSTIC  Last Done 2014    KNEE SURGERY Right 10/1/2020    EXCISION OF MASS ON RIGHT KNEE performed by Joaquín Kapadia DO at [address]      Dr Analy Ma, Removed Adhesions    OTHER SURGICAL HISTORY  2011    Mediport Insertion Left Chest Area/revision done 6/2016- \"rem[facility] 11/2017 after I got an infection    SKIN BIOPSY      \"skin cancer removed right shoulder\"8/2019    TUBAL LIGATION  2019    TUNNELED VENOUS PORT PLACEMENT Right 03/12/2018    smart port- [facility]-Dr Jluis Groves    WISDOM TOOTH EXTRACTION      All [place] Teeth Extracted In Past     Current Outpatient Medications   Medication Sig Dispense Refill    HUMIRA PEN-CD/UC/HS STARTER 80 MG/0.8ML PNKT       SYMBICORT 80-4.5 MCG/ACT AERO inhale 2 puffs by mouth twice a day      cyclobenzaprine (FLEXERIL) 10 MG tablet take 1 tablet by mouth three times a day      pantoprazole (PROTONIX) 40 MG tablet Take 1 tablet by mouth daily      furosemide (LASIX) 40 MG tablet Take 1 tablet by mouth daily 60 tablet 3    Misc. Devices (FINGERTIP PULSE OXIMETER) MISC Use as directed to check oxygen saturations as needed for shortness of breath 1 each 0    metFORMIN (GLUCOPHAGE) 1000 MG tablet Take 1,000 mg by mouth 2 times daily (with meals)       traZODone (DESYREL) 100 MG tablet take 1 tablet by mouth nightly BEFORE BEDTIME      sertraline (ZOLOFT) 50 MG tablet Take 50 mg by mouth daily       rizatriptan (MAXALT) 10 MG tablet       omeprazole (PRILOSEC) 10 MG delayed release capsule Take 10 mg by mouth daily      acetaminophen (AMINOFEN) 325 MG tablet Take 2 tablets by mouth every 6 hours as needed for Pain 20 tablet 0    ondansetron (ZOFRAN ODT) 4 MG disintegrating tablet Take 1 tablet by mouth every 6 hours 10 tablet 1    albuterol sulfate  (90 Base) MCG/ACT inhaler Inhale 2 puffs into the lungs every 6 hours as needed for Wheezing      loperamide (IMODIUM) 2 MG capsule Take 2 mg by mouth 4 times daily as needed for Diarrhea      Carboxymeth-Glycerin-Polysorb (REFRESH OPTIVE ADVANCED) 0.5-1-0.5 % SOLN Place 1 drop into both eyes 3 times daily       No current facility-administered medications for this visit.       Allergies   Allergen Reactions    Morphine      Other reaction(s): Headache  Triggers migraine. \"Triggers My Migraines\"  Other reaction(s): Headache    Tramadol      Other reaction(s): Headache  States triggers migraine headache  \"Triggers My Migraines\"  Other reaction(s): Headache           Review of Systems   Constitutional: Positive for fatigue. Negative for activity change, chills, diaphoresis and fever. HENT: Negative for sore throat, trouble swallowing and voice change. Eyes: Negative for photophobia and visual disturbance. Respiratory: Positive for shortness of breath. Negative for cough and wheezing. Cardiovascular: Positive for leg swelling. Negative for chest pain and palpitations. Gastrointestinal: Positive for abdominal distention and abdominal pain. Negative for anal bleeding, blood in stool, constipation, diarrhea, nausea and vomiting. Endocrine: Positive for polyphagia. Negative for cold intolerance, heat intolerance, polydipsia and polyuria. Genitourinary: Positive for urgency. Negative for dysuria, frequency and hematuria. Musculoskeletal: Positive for arthralgias, back pain and gait problem. Negative for joint swelling, myalgias and neck stiffness. Skin: Negative for color change and rash. Neurological: Negative for seizures, speech difficulty, light-headedness and numbness. Hematological: Negative for adenopathy. Does not bruise/bleed easily. Psychiatric/Behavioral: Positive for sleep disturbance. The patient is nervous/anxious. OBJECTIVE:    Vitals:    08/18/21 1512   BP: 110/72   Pulse: 86   Resp: 22   Temp: 97.7 °F (36.5 °C)     Body mass index is 57.69 kg/m². Physical Exam  Vitals reviewed. Constitutional:       General: She is not in acute distress. Appearance: She is well-developed. She is not diaphoretic. HENT:      Head: Normocephalic and atraumatic. Eyes:      General: No scleral icterus. Conjunctiva/sclera: Conjunctivae normal.      Pupils: Pupils are equal, round, and reactive to light. Neck:      Thyroid: No thyromegaly. Vascular: No JVD. Trachea: No tracheal deviation. Cardiovascular:      Rate and Rhythm: Normal rate and regular rhythm. Heart sounds: Normal heart sounds. No murmur heard. No friction rub. No gallop. Pulmonary:      Effort: Pulmonary effort is normal. No respiratory distress. Breath sounds: No stridor. No wheezing or rales. Chest:      Chest wall: No tenderness. Abdominal:      General: Bowel sounds are normal. There is no distension. Palpations: Abdomen is soft. There is no mass. Tenderness: There is no abdominal tenderness. There is no guarding or rebound. Musculoskeletal:         General: No tenderness. Normal range of motion. Cervical back: Normal range of motion. Lymphadenopathy:      Cervical: No cervical adenopathy. Skin:     General: Skin is warm and dry. Coloration: Skin is not pale. Findings: No erythema or rash. Neurological:      Mental Status: She is alert and oriented to person, place, and time. Cranial Nerves: No cranial nerve deficit. Coordination: Coordination normal.   Psychiatric:         Behavior: Behavior normal.         Thought Content: Thought content normal.         Judgment: Judgment normal.                 ASSESSMENT & PLAN:    1. Morbid obesity with BMI of 50.0-59.9, adult [facility]         Jackson Ag is a 38 y.o. female being seen for follow up for bariatric weight loss surgery. Allyson'slast month weight gain/loss was + 3.8 Lbs. Downloaded PlaceVine pal; 1200 Kcal;   Exercises: walks 3-4 minutes daily. .  Arthritis in her Right knee, major. .    Cardiac and Pulm referrals will get her CPAP for GONZALO tomorrow. And I will do her EGD, and Biopsies, this month. Patient was encouraged to journal all food intake. Keep calorie level atapproximately 1435-9694. Protein intake is to be a minimum of 60-80 grams per day. Water drinking was encouraged with a goal of 64oz-128oz daily. Beverages to be calorie free except for milk. Every other beverage should bewater. They are to avoid soda. Continue to increase level of physical activity. I counseled the patient regarding diet and exercise, in preparation for her planned Robotic Sleeve Gastrectomy. Counting calories, complying with the dietitian's recommendations, and complying with the preoperative workup including the dietitian counseling, exercise physiologist counseling,cardiologist evaluation and pre-operative optimization, pulmonologist evaluation and pre operative optimization, pre operative EGD evaluation. The patient expressed understanding and willingness to comply nicely; allquestions and concerns addressed in details. Patient counseled on the risks, benefits, and alternatives oftreatment plan at length while in the office today. Patient states an understanding and willingness to proceed with the plan. Orders Placed This Encounter   Procedures    AFL ([facility]) - Penelope Holt MD, Pulmonlogy, [place]        Follow Up:  Return in about 4 weeks (around 9/15/2021), or EGD.       Leopold Jack, MD, FACS, FICS  Member of the [employer] of Met[employer] and Bariatric Sur[employer]    [phone]    8/18/21

## 2021-08-19 ENCOUNTER — TELEPHONE (OUTPATIENT)
Dept: CARDIOLOGY CLINIC | Age: 36
End: 2021-08-19

## 2021-08-19 NOTE — TELEPHONE ENCOUNTER
Summary        No evidence of significant venous insufficiency noted in the bilateral lower    extremities .    No evidence of DVT or SVT in the bilateral common femoral vein, femoral    vein, popliteal vein, greater saphenous vein or small saphenous vein.      PT NOTIFIED

## 2021-08-25 ENCOUNTER — HOSPITAL ENCOUNTER (OUTPATIENT)
Dept: PHYSICAL THERAPY | Age: 36
Setting detail: THERAPIES SERIES
Discharge: HOME | End: 2021-08-25
Payer: COMMERCIAL

## 2021-08-25 NOTE — FLOWSHEET NOTE
Outpatient Physical Therapy                                                                    [place]           [x] Phone: [phone]   Fax: [phone]  [place]           [] Phone: [phone]   Fax: [phone]          Physical Therapy Daily Treatment Note  Date:  2021    Patient Name:  Zeinab Hernandez    :  1985  MRN: [id number]      Pt a no show for PT education class on 21 at 6:00 PM.       Rashida Norris PT, DPT, OCS    2021 8:02 AM

## 2021-08-30 ENCOUNTER — TELEPHONE (OUTPATIENT)
Dept: BARIATRICS/WEIGHT MGMT | Age: 36
End: 2021-08-30

## 2021-08-30 NOTE — TELEPHONE ENCOUNTER
SPOKE TO  Allyson Pinto REGARDING SURGERY (EGD WITH BX) SCHEDULED @ [address] [place] [address].  NOTIFIED OF DATES, TIMES AND LOCATION    PHONE ASSESSMENT   COVID - 9/10/21 @ 900  SURGERY - 9/14/21 @ 900      NPO AFTER MIDNIGHT  HOLD BLOOD THINNERS - NOT ON BLOOD THINNERS

## 2021-08-31 ENCOUNTER — OFFICE VISIT (OUTPATIENT)
Dept: CARDIOLOGY CLINIC | Age: 36
End: 2021-08-31
Payer: COMMERCIAL

## 2021-08-31 VITALS
HEART RATE: 84 BPM | DIASTOLIC BLOOD PRESSURE: 80 MMHG | SYSTOLIC BLOOD PRESSURE: 124 MMHG | HEIGHT: 65 IN | WEIGHT: 293 LBS | BODY MASS INDEX: 48.82 KG/M2

## 2021-08-31 DIAGNOSIS — R60.0 EDEMA OF LOWER EXTREMITY: Primary | ICD-10-CM

## 2021-08-31 DIAGNOSIS — I10 ESSENTIAL HYPERTENSION: ICD-10-CM

## 2021-08-31 DIAGNOSIS — E66.01 MORBID OBESITY WITH BMI OF 50.0-59.9, ADULT (HCC): ICD-10-CM

## 2021-08-31 DIAGNOSIS — G47.30 SLEEP APNEA, UNSPECIFIED TYPE: ICD-10-CM

## 2021-08-31 PROCEDURE — G8417 CALC BMI ABV UP PARAM F/U: HCPCS | Performed by: INTERNAL MEDICINE

## 2021-08-31 PROCEDURE — 99214 OFFICE O/P EST MOD 30 MIN: CPT | Performed by: INTERNAL MEDICINE

## 2021-08-31 PROCEDURE — 1036F TOBACCO NON-USER: CPT | Performed by: INTERNAL MEDICINE

## 2021-08-31 PROCEDURE — G8428 CUR MEDS NOT DOCUMENT: HCPCS | Performed by: INTERNAL MEDICINE

## 2021-08-31 NOTE — PROGRESS NOTES
Iron Trevino MD                                  CARDIOLOGY  NOTE      Chief Complaint:    Chief Complaint   Patient presents with    Results     NO NEW CARDIEAC SX PER PT    Edema          Pt follows up for WILBURN/LE Edema  Symptoms greatly improved since last visit       Echo 7/28/2021     Summary   Limited study due to patients body habitus. Left ventricular function and size is normal, EF is estimated at 55-60%. Mild left ventricular hypertrophy. E/A reversal; indeterminate diastolic function. No regional wall motion abnormalities were detected. No significant valvular disease noted. Mild mitral regurgitation is present. RVSP is 12 mmHg. No evidence of pericardial effusion. Venous Ultrasound : 8/18/2021         No evidence of significant venous insufficiency noted in the bilateral lower    extremities .    No evidence of DVT or SVT in the bilateral common femoral vein, femoral    vein, popliteal vein, greater saphenous vein or small saphenous vein. HPI:     Rao Heck is a 43y.o. year old female with prior medical history significant for Crohn's disease, morbid obesity with BMI of 54, history of DVT in 2018 treated at [facility], anxiety and depression, diabetes mellitus presents to the clinic for evaluation. Patient was admitted earlier this year in April 2021 for respiratory failure, presumed Covid pneumonia she was treated with steroids. Patient is referred by PCP for lower extremity edema and shortness of breath. Patient mentions that over the past 1-1/2 years since Covid started she has gained over 40 pounds. Since she was infected by COVID-19 itself in April, she has noted worsening swelling of the lower extremity with shortness of breath. Patient was started on Lasix 20 mg 2 months ago which she believes has improved the swelling bilateral.    Patient has also tried OTC compression stockings but did not comply due to discomfort.     Intermittently she is also been on steroids for COVID-19 as well as Crohn's disease. Her last dose was 3 to 4 months ago. Patient denies any chest pains or palpitations  Denies any prior history of established CAD CHF or arrhythmias    She is a non-smoker denies any alcohol or drug abuse    Echocardiogram in 2018 showed preserved LVEF and mild MR    Current Outpatient Medications   Medication Sig Dispense Refill    HUMIRA PEN-CD/UC/HS STARTER 80 MG/0.8ML PNKT       cyclobenzaprine (FLEXERIL) 10 MG tablet take 1 tablet by mouth three times a day      pantoprazole (PROTONIX) 40 MG tablet Take 1 tablet by mouth daily      furosemide (LASIX) 40 MG tablet Take 1 tablet by mouth daily 60 tablet 3    Misc. Devices (FINGERTIP PULSE OXIMETER) MISC Use as directed to check oxygen saturations as needed for shortness of breath 1 each 0    metFORMIN (GLUCOPHAGE) 1000 MG tablet Take 1,000 mg by mouth 2 times daily (with meals)       traZODone (DESYREL) 100 MG tablet take 1 tablet by mouth nightly BEFORE BEDTIME      sertraline (ZOLOFT) 50 MG tablet Take 50 mg by mouth daily       rizatriptan (MAXALT) 10 MG tablet       acetaminophen (AMINOFEN) 325 MG tablet Take 2 tablets by mouth every 6 hours as needed for Pain 20 tablet 0    ondansetron (ZOFRAN ODT) 4 MG disintegrating tablet Take 1 tablet by mouth every 6 hours 10 tablet 1    albuterol sulfate  (90 Base) MCG/ACT inhaler Inhale 2 puffs into the lungs every 6 hours as needed for Wheezing      loperamide (IMODIUM) 2 MG capsule Take 2 mg by mouth 4 times daily as needed for Diarrhea      Carboxymeth-Glycerin-Polysorb (REFRESH OPTIVE ADVANCED) 0.5-1-0.5 % SOLN Place 1 drop into both eyes 3 times daily       No current facility-administered medications for this visit.        Allergies:     Morphine and Tramadol    Patient History:    Past Medical History:   Diagnosis Date    Acid reflux     Acute deep vein thrombosis (DVT) of non-extremity vein 04/25/2018    \"in my neck\"\"after mediport was put in\"    Anemia     Anxiety     Asthma     NO PULMONOLOGIST AT THIS TIME    Blood clot due to device, implant, or graft 2018    had blood clots in neck due to med port    Bowen's disease     Cancer ([facility] [address]     skin cancer shoulder 2019    Crohn's disease ([facility] [address] Dx 2010    Remicade Infusions Every Six Weeks, [place], [place]    Depression     ECHO 2021    EF is estimated at 55-60%. Mild left ventricular hypertrophy. Mild mitral regurgitation is present.     Fall     \"Passed Out And Sparkle Dony On My Dexter Ivelisse"    Fatty liver     Gestational diabetes     \"2019- no medication - just diet controlled with pregnancy\"    Hyperlipidemia     Hypertension     \"yrs ago was on b/p medication- off medication since - only took medication for 2 months\"    Lower back pain     \"Sometimes\"    MRSA (methicillin resistant staph aureus) culture positive 2018    Multiple pulmonary nodules 2013    Subcentimeter; needs repeat imaging in 3-6 months    Pancreatitis     Patellofemoral arthritis of right knee 2020    Shortness of breath on exertion     Teeth missing     Upper And Lower    VL DUP LOWER EXTREMITY VENOUS BILATERAL 2021    No evidence of DVT or SVT in the bilateral common femoral vein, No evidence of significant venous insufficiency    Wears glasses     Wears glasses      Past Surgical History:   Procedure Laterality Date    ABDOMEN SURGERY      bowel resection     ADENOIDECTOMY      APPENDECTOMY      Done During Colon Resection For Crohn's  Disease    BREAST SURGERY Left     \"Infected Lymph Node Removed\"     SECTION N/A 2019     SECTION performed by Day Oviedo MD at [facility] [address]  2016    COLECTOMY  8-11    Crohn's Disease , Dr. Radha Etienne, Appendectomy Also Done    COLONOSCOPY  2016    Polyps Removed In Past    COLONOSCOPY  2017    Hospitalized for c-diff    COLONOSCOPY  03/06/2018    normal, multiple biopsy, repeat 1 year    COLONOSCOPY N/A 12/4/2019    COLONOSCOPY POLYPECTOMY SNARE/COLD BIOPSY performed by [facility]  2010    ENDOSCOPY, COLON, DIAGNOSTIC  Last Done 2014    KNEE SURGERY Right 10/1/2020    EXCISION OF MASS ON RIGHT KNEE performed by Lachelle Maloney DO at [address]  2013    Dr Zita Munoz, Removed Adhesions    OTHER SURGICAL HISTORY  06/2011    Mediport Insertion Left Chest Area/revision done 6/2016- \"removed at [facility] 11/2017 after I got an infection    SKIN BIOPSY      \"skin cancer removed right shoulder\"8/2019    TUBAL LIGATION  2019    TUNNELED VENOUS PORT PLACEMENT Right 03/12/2018    smart port- [facility]-Dr Gary Alcantar    WISDOM TOOTH EXTRACTION      [place] Teeth Extracted In Past     Family History   Problem Relation Age of Onset    Migraines Mother     Heart Disease Father     Diabetes Father     High Cholesterol Father     Depression Maternal Aunt     Depression Maternal Uncle     Depression Paternal Aunt     Coronary Art Dis Paternal Aunt     Depression Paternal Uncle     Coronary Art Dis Paternal Uncle     Depression Maternal Grandmother     Depression Maternal Grandfather     Depression Paternal Grandmother     Coronary Art Dis Paternal Grandmother     Depression Paternal Grandfather     Coronary Art Dis Paternal Grandfather      Social History     Tobacco Use    Smoking status: Never Smoker    Smokeless tobacco: Never Used   Substance Use Topics    Alcohol use: No        Review of Systems:     · Constitutional:  No Fever or Weight Loss   · Eyes: No Decreased Vision  · ENT: No Headaches, Hearing Loss or Vertigo  · Cardiovascular: No Chest Pain,  + Shortness of breath, No Palpitations. + Edema   · Respiratory: No cough or wheezing .  No Respiratory distress   · Gastrointestinal: No abdominal pain, appetite loss, blood in stools, constipation, diarrhea or heartburn  · Genitourinary: No dysuria, trouble voiding, or hematuria  · Musculoskeletal:  denies any new  joint aches , or pain   · Integumentary: No rash or pruritis  · Neurological: No TIA or stroke symptoms  · Psychiatric: No anxiety or depression  · Endocrine: No malaise, fatigue or temperature intolerance  · Hematologic/Lymphatic: No bleeding problems, blood clots or swollen lymph nodes  · Allergic/Immunologic: No nasal congestion or hives        Objective:      Physical Exam:    /80   Pulse 84   Ht 5' 5\" (1.651 m)   Wt (!) 338 lb (153.3 kg)   BMI 56.25 kg/m²   Wt Readings from Last 3 Encounters:   08/31/21 (!) 338 lb (153.3 kg)   08/18/21 (!) 346 lb 11.2 oz (157.3 kg)   08/10/21 (!) 342 lb 14.4 oz (155.5 kg)     Body mass index is 56.25 kg/m². Vitals:    08/31/21 1552   BP: 124/80   Pulse: 84        General Appearance and Constitutional: Conversant, Well developed, Well nourished, No acute distress, Non-toxic appearance. HEENT:  Normocephalic, Atraumatic, Bilateral external ears normal, Oropharynx moist, No oral exudates,   Nose normal.   Neck- Normal range of motion, No tenderness, Supple  Eyes:  EOMI, Conjunctiva normal, No discharge. Respiratory:  Normal breath sounds, No respiratory distress, No wheezing, No Rales, No Ronchi. No chest tenderness. Cardiovascular: S1-S2, no added heart sounds, No Mumurs appreciated. No gallops, rubs. +2 nonpitting pedal Edema   GI:  Bowel sounds normal, Soft, No tenderness,  :  No costovertebral angle tenderness   Musculoskeletal:  No gross deformities.  Back- No tenderness  Integument:  Well hydrated, no rash   Lymphatic:  No lymphadenopathy noted   Neurologic:  Alert & oriented x 3, Normal motor function, normal sensory function, no focal deficits noted   Psychiatric:  Speech and behavior appropriate       Medical decision making and Data review:    DATA:    Lab Results   Component Value Date    TROPONINT <0.010 04/13/2021     BNP:    Lab Results Component Value Date    PROBNP 86.04 04/23/2018     PT/INR:  No results found for: [facility]  Lab Results   Component Value Date    LABA1C 7.1 (H) 04/13/2021     Lab Results   Component Value Date    TRIG 99 12/23/2016     Lab Results   Component Value Date    WBC 10.2 04/15/2021    HGB 11.5 (L) 04/15/2021    HCT 37.3 04/15/2021    MCV 94.0 04/15/2021     04/15/2021     TSH: No results found for: TSH  Lab Results   Component Value Date    AST 37 04/15/2021    ALT 26 04/15/2021    BILIDIR 0.2 01/28/2019    BILITOT 0.2 04/15/2021    ALKPHOS 69 04/15/2021         All labs, medications and tests reviewed by myself including data and history from outside source , patient and available family . 1. Edema of lower extremity    2. Morbid obesity with BMI of 50.0-59.9, adult ([facility] [address]    3. Essential hypertension    4. Sleep apnea, unspecified type         Impression and Plan:      1. Lower extremity nonpitting 1+ edema  2. Mild shortness of breath with exertion, improved with CPAP    Continue Lasix to 40 mg. Can be switched to every other day  Echocardiogram to rule out structural heart disease: Normal Echo   Obtain lower extremity venous Doppler study to rule out venous insufficiency  Low-salt diet    3. Sleep apnea and recently started CPAP and had impactful relief symptoms including better sleep hygiene, less fatigue and less swelling in the lower extremities. 4. Essential hypertension  Cont Lasix, low-salt diet. 5. Morbid obesity with BMI of 56.91. Patient has gained 40 pounds in the past 1-1/2-year. Patient is enrolled in weight loss clinic, currently on strict low calorie diet. 6. History of Crohn's disease: Follow-up with GI   7. Diabetes mellitus: Patient is on Metformin. Stable. Return in about 9 months (around 5/31/2022). Counseled extensively and medication compliance urged. We discussed that for the  prevention of ASCVD our  goal is aggressive risk modification. Patient is encouraged to exercise even a brisk walk for 30 minutes  at least 3 to 4 times a week   Various goals were discussed and questions answered. Continue current medications. Appropriate prescriptions are addressed and refills ordered. Questions answered and patient verbalizes understanding. Call for any problems, questions, or concerns.

## 2021-09-08 DIAGNOSIS — Z01.818 PREOP TESTING: Primary | ICD-10-CM

## 2021-09-08 NOTE — PROGRESS NOTES
Surgery   9/14/21   Arrival time 0700  Procedure time 0900                 1. Do not eat or drink anything after midnight - unless instructed by your doctor prior to surgery. This includes                   no water, chewing gum or mints. 2. Follow your directions as prescribed by the doctor for your procedure and medications. 3. Check with your Doctor regarding stopping Plavix, Coumadin, Lovenox,Effient,Pradaxa,Xarelto, Fragmin or other blood thinners and                   follow their instructions. pt. to take metformin and protonix with a tiny sip of water the morning of procedure. 4. Do not smoke, and do not drink any alcoholic beverages 24 hours prior to surgery. This includes NA Beer. 5. You may brush your teeth and gargle the morning of surgery. DO NOT SWALLOW WATER   6. You MUST make arrangements for a responsible adult to take you home after your surgery and be able to check on you every couple                   hours for the day. You will not be allowed to leave alone or drive yourself home. It is strongly suggested someone stay with you the first 24                   hrs. Your surgery will be cancelled if you do not have a ride home. 7. Please wear simple, loose fitting clothing to the hospital.  Nathalie Ortezring not bring valuables (money, credit cards, checkbooks, etc.) Do not wear any                   makeup (including no eye makeup) or nail polish on your fingers or toes. 8. DO NOT wear any jewelry or piercings on day of surgery. All body piercing jewelry must be removed. 9. If you have dentures, they will be removed before going to the OR; we will provide you a container. If you wear contact lenses or glasses,                  they will be removed; please bring a case for them. 10. If you  have a Living Will and Durable Power of  for Healthcare, please bring in a copy.            11. Please bring picture ID,  insurance card, paperwork from the doctors office (H & P, Consent, & card for implantable devices). 12. Take a shower the night before or morning of your procedure, do not apply any lotion, oil or powder. 13. Wear a mask covering your nose & mouth when entering the hospital. Have your covid-19 test performed within 2-7 days of your                  surgery. Quarantine yourself after the test until after your surgery. pt. states she is scheduled to get a covid test on 9/10/21 at Dr. Kendra Sharp office.

## 2021-09-10 ENCOUNTER — HOSPITAL ENCOUNTER (OUTPATIENT)
Age: 36
Setting detail: SPECIMEN
Discharge: HOME | End: 2021-09-10
Payer: COMMERCIAL

## 2021-09-10 ENCOUNTER — NURSE ONLY (OUTPATIENT)
Dept: SURGERY | Age: 36
End: 2021-09-10
Payer: COMMERCIAL

## 2021-09-10 DIAGNOSIS — Z01.818 PRE-OP TESTING: Primary | ICD-10-CM

## 2021-09-10 PROCEDURE — U0003 INFECTIOUS AGENT DETECTION BY NUCLEIC ACID (DNA OR RNA); SEVERE ACUTE RESPIRATORY SYNDROME CORONAVIRUS 2 (SARS-COV-2) (CORONAVIRUS DISEASE [COVID-19]), AMPLIFIED PROBE TECHNIQUE, MAKING USE OF HIGH THROUGHPUT TECHNOLOGIES AS DESCRIBED BY CMS-2020-01-R: HCPCS

## 2021-09-10 PROCEDURE — U0005 INFEC AGEN DETEC AMPLI PROBE: HCPCS

## 2021-09-10 PROCEDURE — 99211 OFF/OP EST MAY X REQ PHY/QHP: CPT | Performed by: SURGERY

## 2021-09-10 NOTE — PROGRESS NOTES
Patient collected pre-op COVID-19 screening instruction and collection supplies given to patient accordingly. Patient denies fever/cough/sob or recent travels. Patient voiced understanding of collection/quarantine instructions. COVID screening lab ordered, collection completed without difficulty, identifiers placed on specimen. AVS given at discharge. Results will be given via mychart or telephone call [facility] will manage any positive test results, the procedure will be rescheduled at a later date).

## 2021-09-10 NOTE — PATIENT INSTRUCTIONS
Pre-Procedure COVID-19 Self Testing  Quarantine Instructions  Day of Surgery Instructions         What to do before my surgery:    All patients scheduled for elective surgery must test for COVID19 72-96 hours prior to the surgery date.  Pre-Procedure COVID-19 Self-Test will be scheduled for you by your provider.  You can receive your Pre-Procedure COVID-19 Self-Test at:  [facility]. [address] [place] [address] Jaswinder Akins  [phone]   If you do not have the COVID-19 test we will cancel or reschedule your procedure   Once you test you must quarantine at home until after your procedure with only your immediate family members or whoever lives with you.  If you must work during your quarantine period, we ask that you continue to practice social distancing, wear a mask that covers your mouth and nose and perform all hand hygiene as recommended by the CDC.  If you must go to the grocery, etc. and cannot get someone to do this for you please wear a mask that covers your mouth and nose and perform all hand hygiene as recommended by the CDC.  Your surgeon's office will notify you with any concerns about your test result. What can I expect on the day of surgery?  Arrive at the time the office or hospital staff tell you on the day of your procedure.  Wear a mask when entering the hospital.     A member of the hospital staff will take your temperature and ask you a few questions as you enter the building.  In abundance of caution for the safety of all our patients and staff, please follow all hospital visitor guidelines in place at the time of your procedure. The staff caring for you will stay in close communication with your loved one and keep them updated on progress.  Please provide a phone number for us to use when communicating with your family or ride home.    When you are ready to discharge, we will notify your family/person with you to bring the car to the front entrance. We will take you to them after you receive all of your discharge instructions.

## 2021-09-11 LAB
SARS-COV-2: NOT DETECTED
SOURCE: NORMAL

## 2021-09-13 ENCOUNTER — ANESTHESIA EVENT (OUTPATIENT)
Dept: ENDOSCOPY | Age: 36
End: 2021-09-13
Payer: COMMERCIAL

## 2021-09-13 NOTE — ANESTHESIA PRE PROCEDURE
Department of Anesthesiology  Preprocedure Note       Name:  Gallo Edmonds   Age:  43 y.o.  :  1985                                          MRN:  [id number]         Date:  2021      Surgeon: Lia Mckeon):  Gold Rg MD    Procedure: Procedure(s):  EGD ESOPHAGOGASTRODUODENOSCOPY WITH BIOPSY    Medications prior to admission:   Prior to Admission medications    Medication Sig Start Date End Date Taking? Authorizing Provider   YASMANY NASSAR-CD/UC/HS STARTER 80 MG/0.8ML PNKT  8/3/21   Historical Provider, MD   cyclobenzaprine (FLEXERIL) 10 MG tablet take 1 tablet by mouth three times a day 21   Historical Provider, MD   pantoprazole (PROTONIX) 40 MG tablet Take 1 tablet by mouth daily 21   Historical Provider, MD   furosemide (LASIX) 40 MG tablet Take 1 tablet by mouth daily 7/23/21 3/20/22  Pastora Nguyen MD   Misc.  Devices (FINGERTIP PULSE OXIMETER) MISC Use as directed to check oxygen saturations as needed for shortness of breath 4/15/21   Mariela Maya MD   metFORMIN (GLUCOPHAGE) 1000 MG tablet Take 1,000 mg by mouth 2 times daily (with meals)     Historical Provider, MD   traZODone (DESYREL) 100 MG tablet take 1 tablet by mouth nightly BEFORE BEDTIME 3/24/21   Historical Provider, MD   sertraline (ZOLOFT) 50 MG tablet Take 50 mg by mouth daily  20   Historical Provider, MD   rizatriptan (MAXALT) 10 MG tablet  20   Historical Provider, MD   acetaminophen (AMINOFEN) 325 MG tablet Take 2 tablets by mouth every 6 hours as needed for Pain 20   Vikalashawn Velarde DO   ondansetron (ZOFRAN ODT) 4 MG disintegrating tablet Take 1 tablet by mouth every 6 hours 19   Autumn Calzada PA-C   albuterol sulfate  (90 Base) MCG/ACT inhaler Inhale 2 puffs into the lungs every 6 hours as needed for Wheezing    Historical Provider, MD   loperamide (IMODIUM) 2 MG capsule Take 2 mg by mouth 4 times daily as needed for Diarrhea    Historical Provider, MD Carboxymeth-Glycerin-Polysorb (REFRESH OPTIVE ADVANCED) 0.5-1-0.5 % SOLN Place 1 drop into both eyes 3 times daily    Historical Provider, MD       Current medications:    No current facility-administered medications for this encounter. Current Outpatient Medications   Medication Sig Dispense Refill    HUMIRA PEN-CD/UC/HS STARTER 80 MG/0.8ML PNKT       cyclobenzaprine (FLEXERIL) 10 MG tablet take 1 tablet by mouth three times a day      pantoprazole (PROTONIX) 40 MG tablet Take 1 tablet by mouth daily      furosemide (LASIX) 40 MG tablet Take 1 tablet by mouth daily 60 tablet 3    Misc. Devices (FINGERTIP PULSE OXIMETER) MISC Use as directed to check oxygen saturations as needed for shortness of breath 1 each 0    metFORMIN (GLUCOPHAGE) 1000 MG tablet Take 1,000 mg by mouth 2 times daily (with meals)       traZODone (DESYREL) 100 MG tablet take 1 tablet by mouth nightly BEFORE BEDTIME      sertraline (ZOLOFT) 50 MG tablet Take 50 mg by mouth daily       rizatriptan (MAXALT) 10 MG tablet       acetaminophen (AMINOFEN) 325 MG tablet Take 2 tablets by mouth every 6 hours as needed for Pain 20 tablet 0    ondansetron (ZOFRAN ODT) 4 MG disintegrating tablet Take 1 tablet by mouth every 6 hours 10 tablet 1    albuterol sulfate  (90 Base) MCG/ACT inhaler Inhale 2 puffs into the lungs every 6 hours as needed for Wheezing      loperamide (IMODIUM) 2 MG capsule Take 2 mg by mouth 4 times daily as needed for Diarrhea      Carboxymeth-Glycerin-Polysorb (REFRESH OPTIVE ADVANCED) 0.5-1-0.5 % SOLN Place 1 drop into both eyes 3 times daily         Allergies: Allergies   Allergen Reactions    Morphine      Other reaction(s): Headache  Triggers migraine.    \"Triggers My Migraines\"  Other reaction(s): Headache    Tramadol      Other reaction(s): Headache  States triggers migraine headache  \"Triggers My Migraines\"  Other reaction(s): Headache       Problem List:    Patient Active Problem List   Diagnosis Code    Crohn's disease ([facility] K50.90    Anxiety F41.9    Multiple lung nodules R91.8    Crohn's colitis, unspecified complication ([facility] K51.656    Exacerbation of Crohn's disease with complication ([facility] U34.798    IBS (irritable bowel syndrome) K58.9    C. difficile colitis A04.72    Crohn's disease of small and large intestines with complication ([facility] [facility] S62.965    Neck swelling R22.1    Leukocytosis D72.829    Non-intractable vomiting with nausea R11.2    Infection of venous access port T80.219A    Acute deep vein thrombosis (DVT) of non-extremity vein I82.90    Class 3 severe obesity due to excess calories without serious comorbidity with body mass index (BMI) of 50.0 to 59.9 in adult ([facility]) E66.01, Z68.43    Preeclampsia, third trimester O14.93    Pregnancy related condition O26.90    Status post  delivery Z98.891    Bowen's disease of right shoulder D04.61    Patellofemoral arthritis of right knee M17.11    Infrapatellar bursitis of right knee M70.51    Knee mass, right R22.41    Acute respiratory failure ([facility]) J96.00    Pneumonia due to COVID-19 virus U07.1, J12.82    Shortness of breath R06.02    Edema of lower extremity R60.0    Essential hypertension I10    Morbid obesity with BMI of 50.0-59.9, adult ([facility]) E66.01, Z68.43    Sleep apnea G47.30       Past Medical History:        Diagnosis Date    Acid reflux     Acute deep vein thrombosis (DVT) of non-extremity vein 2018    \"in my neck\"\"after mediport was put in\"    Anemia     Anxiety     Asthma     NO PULMONOLOGIST AT THIS TIME    Blood clot due to device, implant, or graft 2018    had blood clots in neck due to med port    Bowen's disease     Cancer ([facility]     skin cancer shoulder 2019    Crohn's disease ([facility] [address] Dx     Remicade Infusions Every Six Weeks, Se[facility] Dr. Samantha Lucio [facility]     ECHO 2021    EF is estimated at 55-60%.   Mild left ventricular hypertrophy. Mild mitral regurgitation is present.     Fall     \"Passed Out And Manjinder Lente On My [employer]"    Fatty liver     Gestational diabetes     \"2019- no medication - just diet controlled with pregnancy\"    Hyperlipidemia     Hypertension     \"yrs ago was on b/p medication- off medication since 2018- only took medication for 2 months\"    Lower back pain     \"Sometimes\"    MRSA (methicillin resistant staph aureus) culture positive 2018    Multiple pulmonary nodules 2013    Subcentimeter; needs repeat imaging in 3-6 months    Pancreatitis     Patellofemoral arthritis of right knee 2020    Shortness of breath on exertion     Teeth missing     Upper And Lower    VL DUP LOWER EXTREMITY VENOUS BILATERAL 2021    No evidence of DVT or SVT in the bilateral common femoral vein, No evidence of significant venous insufficiency    Wears glasses     Wears glasses        Past Surgical History:        Procedure Laterality Date    ABDOMEN SURGERY      bowel resection     ADENOIDECTOMY      APPENDECTOMY      Done During Colon Resection For Crohn's  Disease    BREAST SURGERY Left     \"Infected Lymph Node Removed\"     SECTION N/A 2019     SECTION performed by Ricci Patterson MD at [facility]    CHOLECYSTECTOMY  2016    COLECTOMY  8-11    Crohn's Disease , Dr. Bharathi Reddy, Appendectomy Also Done    COLONOSCOPY  2016    Polyps Removed In Past    COLONOSCOPY  2017    Hospitalized for c-diff    COLONOSCOPY  2018    normal, multiple biopsy, repeat 1 year    COLONOSCOPY N/A 2019    COLONOSCOPY POLYPECTOMY SNARE/COLD BIOPSY performed by Albin Trivedi MD at [facility]      ENDOSCOPY, COLON, DIAGNOSTIC  Last Done 2014    KNEE SURGERY Right 10/1/2020    EXCISION OF MASS ON RIGHT KNEE performed by Emma Nelson DO at [address]      Dr Tim Valadez, Removed Adhesions    OTHER SURGICAL HISTORY  06/2011    Mediport Insertion Left Chest Area/revision done 6/2016- \"removed at [facility] 11/2017 after I got an infection    SKIN BIOPSY      \"skin cancer removed right shoulder\"8/2019    TUBAL LIGATION  2019    TUNNELED VENOUS PORT PLACEMENT Right 03/12/2018    smart port- [facility]-Dr Wilian Posada    WISDOM TOOTH EXTRACTION      All [place] Teeth Extracted In Past       Social History:    Social History     Tobacco Use    Smoking status: Never Smoker    Smokeless tobacco: Never Used   Substance Use Topics    Alcohol use: No                                Counseling given: Not Answered      Vital Signs (Current):   Vitals:    09/08/21 1330   Weight: (!) 338 lb (153.3 kg)   Height: 5' 5\" (1.651 m)                                              BP Readings from Last 3 Encounters:   08/31/21 124/80   08/18/21 110/72   07/23/21 128/78       NPO Status:                                                                                 BMI:   Wt Readings from Last 3 Encounters:   08/31/21 (!) 338 lb (153.3 kg)   08/18/21 (!) 346 lb 11.2 oz (157.3 kg)   08/10/21 (!) 342 lb 14.4 oz (155.5 kg)     Body mass index is 56.25 kg/m². CBC:   Lab Results   Component Value Date    WBC 10.2 04/15/2021    RBC 3.97 04/15/2021    HGB 11.5 04/15/2021    HCT 37.3 04/15/2021    MCV 94.0 04/15/2021    RDW 13.2 04/15/2021     04/15/2021       CMP:   Lab Results   Component Value Date     04/15/2021    K 4.5 04/15/2021    K 4.0 01/19/2018     04/15/2021    CO2 28 04/15/2021    BUN 19 04/15/2021    CREATININE 0.7 04/15/2021    GFRAA >60 04/15/2021    LABGLOM >60 04/15/2021    GLUCOSE 138 04/15/2021    PROT 6.8 04/15/2021    PROT 7.4 03/10/2013    CALCIUM 8.4 04/15/2021    BILITOT 0.2 04/15/2021    ALKPHOS 69 04/15/2021    AST 37 04/15/2021    ALT 26 04/15/2021       POC Tests: No results for input(s): POCGLU, POCNA, POCK, POCCL, POCBUN, POCHEMO, POCHCT in the last 72 hours.     Coags:   Lab Results   Component Value Date    PROTIME 12.4 04/13/2021    PROTIME 10.4 02/05/2012    INR 1.02 04/13/2021    APTT 85.7 04/25/2018       HCG (If Applicable):   Lab Results   Component Value Date    PREGTESTUR NEGATIVE 04/12/2021        ABGs: No results found for: PHART, PO2ART, LVZ9AXV, GFI9JDO, BEART, D7OKCQJL     Type & Screen (If Applicable):  No results found for: LABABO, LABRH    Drug/Infectious Status (If Applicable):  Lab Results   Component Value Date    HEPCAB NON REACTIVE 02/22/2018       COVID-19 Screening (If Applicable):   Lab Results   Component Value Date    COVID19 NOT DETECTED 09/10/2021           Anesthesia Evaluation  Patient summary reviewed  Airway: Mallampati: II  TM distance: >3 FB   Neck ROM: full  Mouth opening: > = 3 FB Dental: normal exam         Pulmonary:normal exam    (+) sleep apnea:  asthma:                            Cardiovascular:    (+) hypertension:, hyperlipidemia               ROS comment:  Summary   Limited study due to patients body habitus. Left ventricular function and size is normal, EF is estimated at 55-60%. Mild left ventricular hypertrophy. E/A reversal; indeterminate diastolic function. No regional wall motion abnormalities were detected. No significant valvular disease noted. Mild mitral regurgitation is present. RVSP is 12 mmHg. No evidence of pericardial effusion. Signature      ------------------------------------------------------------------   Electronically signed by Giancarlo Phan MD (Interpreting   physician) on 07/28/2021 at 04:23 PM     Neuro/Psych:   (+) depression/anxiety             GI/Hepatic/Renal:   (+) GERD:, liver disease:, morbid obesity         ROS comment: Crohn's disease. Endo/Other:    (+) : arthritis:., malignancy/cancer. Abdominal:   (+) obese,     Abdomen: soft. Vascular:   + DVT, . Other Findings:           Anesthesia Plan      MAC     ASA 3     (Chart review 9/13/21)  Induction: intravenous.       Anesthetic plan and risks discussed with patient. Use of blood products discussed with patient whom.    Plan discussed with CRNA and surgical team.                SHAQUILLE Nugent - KEVIN   9/13/2021

## 2021-09-14 ENCOUNTER — ANESTHESIA (OUTPATIENT)
Dept: ENDOSCOPY | Age: 36
End: 2021-09-14
Payer: COMMERCIAL

## 2021-09-14 ENCOUNTER — HOSPITAL ENCOUNTER (OUTPATIENT)
Age: 36
Setting detail: OUTPATIENT SURGERY
Discharge: HOME | End: 2021-09-14
Attending: SURGERY | Admitting: SURGERY
Payer: COMMERCIAL

## 2021-09-14 VITALS
WEIGHT: 293 LBS | DIASTOLIC BLOOD PRESSURE: 90 MMHG | SYSTOLIC BLOOD PRESSURE: 162 MMHG | TEMPERATURE: 97.5 F | HEART RATE: 76 BPM | BODY MASS INDEX: 48.82 KG/M2 | HEIGHT: 65 IN | RESPIRATION RATE: 18 BRPM | OXYGEN SATURATION: 100 %

## 2021-09-14 VITALS — SYSTOLIC BLOOD PRESSURE: 107 MMHG | DIASTOLIC BLOOD PRESSURE: 61 MMHG | OXYGEN SATURATION: 94 %

## 2021-09-14 LAB — PREGNANCY TEST URINE, POC: NEGATIVE

## 2021-09-14 PROCEDURE — 2500000003 HC RX 250 WO HCPCS

## 2021-09-14 PROCEDURE — 2580000003 HC RX 258

## 2021-09-14 PROCEDURE — 43239 EGD BIOPSY SINGLE/MULTIPLE: CPT | Performed by: SURGERY

## 2021-09-14 PROCEDURE — 87077 CULTURE AEROBIC IDENTIFY: CPT

## 2021-09-14 PROCEDURE — 3700000000 HC ANESTHESIA ATTENDED CARE: Performed by: SURGERY

## 2021-09-14 PROCEDURE — 7100000011 HC PHASE II RECOVERY - ADDTL 15 MIN: Performed by: SURGERY

## 2021-09-14 PROCEDURE — 6360000002 HC RX W HCPCS

## 2021-09-14 PROCEDURE — 88342 IMHCHEM/IMCYTCHM 1ST ANTB: CPT

## 2021-09-14 PROCEDURE — 3609012400 HC EGD TRANSORAL BIOPSY SINGLE/MULTIPLE: Performed by: SURGERY

## 2021-09-14 PROCEDURE — 2580000003 HC RX 258: Performed by: SURGERY

## 2021-09-14 PROCEDURE — 81025 URINE PREGNANCY TEST: CPT

## 2021-09-14 PROCEDURE — U0003 INFECTIOUS AGENT DETECTION BY NUCLEIC ACID (DNA OR RNA); SEVERE ACUTE RESPIRATORY SYNDROME CORONAVIRUS 2 (SARS-COV-2) (CORONAVIRUS DISEASE [COVID-19]), AMPLIFIED PROBE TECHNIQUE, MAKING USE OF HIGH THROUGHPUT TECHNOLOGIES AS DESCRIBED BY CMS-2020-01-R: HCPCS

## 2021-09-14 PROCEDURE — 2709999900 HC NON-CHARGEABLE SUPPLY: Performed by: SURGERY

## 2021-09-14 PROCEDURE — 3700000001 HC ADD 15 MINUTES (ANESTHESIA): Performed by: SURGERY

## 2021-09-14 PROCEDURE — 88305 TISSUE EXAM BY PATHOLOGIST: CPT

## 2021-09-14 PROCEDURE — 7100000010 HC PHASE II RECOVERY - FIRST 15 MIN: Performed by: SURGERY

## 2021-09-14 RX ORDER — LIDOCAINE HYDROCHLORIDE 20 MG/ML
INJECTION, SOLUTION EPIDURAL; INFILTRATION; INTRACAUDAL; PERINEURAL PRN
Status: DISCONTINUED | OUTPATIENT
Start: 2021-09-14 | End: 2021-09-14 | Stop reason: SDUPTHER

## 2021-09-14 RX ORDER — SODIUM CHLORIDE, SODIUM LACTATE, POTASSIUM CHLORIDE, CALCIUM CHLORIDE 600; 310; 30; 20 MG/100ML; MG/100ML; MG/100ML; MG/100ML
INJECTION, SOLUTION INTRAVENOUS CONTINUOUS PRN
Status: DISCONTINUED | OUTPATIENT
Start: 2021-09-14 | End: 2021-09-14 | Stop reason: SDUPTHER

## 2021-09-14 RX ORDER — PROPOFOL 10 MG/ML
INJECTION, EMULSION INTRAVENOUS PRN
Status: DISCONTINUED | OUTPATIENT
Start: 2021-09-14 | End: 2021-09-14 | Stop reason: SDUPTHER

## 2021-09-14 RX ORDER — SODIUM CHLORIDE, SODIUM LACTATE, POTASSIUM CHLORIDE, CALCIUM CHLORIDE 600; 310; 30; 20 MG/100ML; MG/100ML; MG/100ML; MG/100ML
INJECTION, SOLUTION INTRAVENOUS CONTINUOUS
Status: DISCONTINUED | OUTPATIENT
Start: 2021-09-14 | End: 2021-09-14 | Stop reason: HOSPADM

## 2021-09-14 RX ADMIN — SODIUM CHLORIDE, POTASSIUM CHLORIDE, SODIUM LACTATE AND CALCIUM CHLORIDE: 600; 310; 30; 20 INJECTION, SOLUTION INTRAVENOUS at 08:58

## 2021-09-14 RX ADMIN — SODIUM CHLORIDE, POTASSIUM CHLORIDE, SODIUM LACTATE AND CALCIUM CHLORIDE: 600; 310; 30; 20 INJECTION, SOLUTION INTRAVENOUS at 08:23

## 2021-09-14 RX ADMIN — PROPOFOL 170 MG: 10 INJECTION, EMULSION INTRAVENOUS at 09:05

## 2021-09-14 RX ADMIN — LIDOCAINE HYDROCHLORIDE 100 MG: 20 INJECTION, SOLUTION EPIDURAL; INFILTRATION; INTRACAUDAL; PERINEURAL at 09:05

## 2021-09-14 ASSESSMENT — PAIN SCALES - GENERAL
PAINLEVEL_OUTOF10: 0
PAINLEVEL_OUTOF10: 0

## 2021-09-14 ASSESSMENT — PAIN - FUNCTIONAL ASSESSMENT: PAIN_FUNCTIONAL_ASSESSMENT: 0-10

## 2021-09-14 NOTE — PROGRESS NOTES
Patient discharge instructions and prescriptions reviewed and verified. RN reviewed d/c instructions with patient and her father, on the phone. All questions answered. Discharge paperwork signed by RN. Discharged to car  via wheelchair, home with her father.

## 2021-09-14 NOTE — PROGRESS NOTES
REPORT GIVEN TO ISAAC RN IN SDS. PT AWAKE AND RESPONSIVE. VS STABLE. DENIES C/O OR NEEDS AT THIS TIME.

## 2021-09-14 NOTE — ANESTHESIA POSTPROCEDURE EVALUATION
Department of Anesthesiology  Postprocedure Note    Patient: Sunita Buchanan  MRN: [id number]  YOB: 1985  Date of evaluation: 9/14/2021  Time:  9:20 AM     Procedure Summary     Date: 09/14/21 Room / Location: [address]    Anesthesia Start: 3303 Anesthesia Stop: 1345    Procedure: EGD BIOPSY (N/A ) Diagnosis: (MORBID OBESITY)    Surgeons: Mary Palomo MD Responsible Provider: Sally Man MD    Anesthesia Type: MAC ASA Status: 3          Anesthesia Type: MAC    Ezekiel Phase I:      Ezekiel Phase II:      Last vitals: Reviewed and per EMR flowsheets.        Anesthesia Post Evaluation    Patient location during evaluation: bedside  Patient participation: complete - patient participated  Level of consciousness: awake and alert  Pain score: 0  Airway patency: patent  Nausea & Vomiting: no nausea and no vomiting  Complications: no  Cardiovascular status: blood pressure returned to baseline  Respiratory status: acceptable, room air and spontaneous ventilation

## 2021-09-14 NOTE — PROGRESS NOTES
Patient returned to room from [facility] vizcarra+MATTHEW Brown (see doc flow), assessment completed as per doc flow. Patient has no c/o pain. Beverage offered. Call light in reach, bed in low position. RN to continue to monitor. Patient to call family.

## 2021-09-14 NOTE — H&P
HISTORY AND PHYSICAL EXAM    Date of Admission:  9/14/2021    PCP:  SHAQUILLE Branham NP    Chief Complaint / History of Present Illness:  Sunita Buchanan is a very pleasant 43 y.o. female who presents today for her preop EGD eval before her bariatric procedure. As noted her Body mass index is 55.75 kg/m². with significant co-morbidities as below. The patient has been complying with the pre-operative workup, lifestyle modifications and changes with the bariatric clinic, including:  Dietitian evaluation and counseling, psychologist evaluation and counseling, Exercise physiologist evaluation and counseling, cardiac preoperative clearance and optimization, pulmonology preoperative clearance and optimization, today will proceed with preoperative EGD for GERD, morbid obesity: Body mass index is 55.75 kg/m². , in preparation for a bariatric procedure; to r/o GERD, Hiatal Hernia, Peptic Ulcer Disease, Gastroparesis, Esophageal dysmotility; etc.    All risks and benefits, potential complications and possible alternatives discussed, and agreeable to proceed. PMH:   has a past medical history of Acid reflux, Acute deep vein thrombosis (DVT) of non-extremity vein (04/25/2018), Anemia, Anxiety, Asthma, Blood clot due to device, implant, or graft (04/2018), Bowen's disease, Cancer ([facility] [address] Crohn's disease ([facility] [address] (Dx 2010), Depression, ECHO (07/28/2021), Fall (2012), Fatty liver, Gestational diabetes, Hyperlipidemia, Hypertension, Lower back pain, MRSA (methicillin resistant staph aureus) culture positive (04/2018), Multiple pulmonary nodules (06/2013), Pancreatitis, Patellofemoral arthritis of right knee (08/19/2020), Shortness of breath on exertion, Teeth missing, VL DUP LOWER EXTREMITY VENOUS BILATERAL (08/18/2021), Wears glasses, and Wears glasses. PSH:   has a past surgical history that includes laparoscopy (2013);  Robson tooth extraction; Endoscopy, colon, diagnostic (Last Done 2014); colectomy (8-11); other surgical history (2011); Breast surgery (Left, ); Adenoidectomy (); Appendectomy (); Cholecystectomy (2016); Colonoscopy (2016); Colonoscopy (2017); Colonoscopy (2018); Tunneled venous port placement (Right, 2018); Abdomen surgery (); Dilation and curettage of uterus ();  section (N/A, 2019); Colonoscopy (N/A, 2019); skin biopsy; Tubal ligation (); and knee surgery (Right, 10/1/2020). Allergies: Allergies   Allergen Reactions    Morphine      Other reaction(s): Headache  Triggers migraine. \"Triggers My Migraines\"  Other reaction(s): Headache    Tramadol      Other reaction(s): Headache  States triggers migraine headache  \"Triggers My Migraines\"  Other reaction(s): Headache        Home Meds:    Prior to Admission medications    Medication Sig Start Date End Date Taking?  Authorizing Provider   cyclobenzaprine (FLEXERIL) 10 MG tablet take 1 tablet by mouth three times a day 21  Yes Historical Provider, MD   pantoprazole (PROTONIX) 40 MG tablet Take 1 tablet by mouth daily 21  Yes Historical Provider, MD   furosemide (LASIX) 40 MG tablet Take 1 tablet by mouth daily 7/23/21 3/20/22 Yes Rm Jorge MD   metFORMIN (GLUCOPHAGE) 1000 MG tablet Take 1,000 mg by mouth 2 times daily (with meals)    Yes Historical Provider, MD   traZODone (DESYREL) 100 MG tablet take 1 tablet by mouth nightly BEFORE BEDTIME 3/24/21  Yes Historical Provider, MD   sertraline (ZOLOFT) 50 MG tablet Take 50 mg by mouth daily  20  Yes Historical Provider, MD   rizatriptan (MAXALT) 10 MG tablet  20  Yes Historical Provider, MD   acetaminophen (AMINOFEN) 325 MG tablet Take 2 tablets by mouth every 6 hours as needed for Pain 20  Yes Racheal Garcia DO   ondansetron (ZOFRAN ODT) 4 MG disintegrating tablet Take 1 tablet by mouth every 6 hours 19  Yes Reece Severin Wagenknecht, PA-C   albuterol sulfate  (90 Base) MCG/ACT inhaler Inhale 2 puffs into the lungs every 6 hours as needed for Wheezing   Yes Historical Provider, MD   loperamide (IMODIUM) 2 MG capsule Take 2 mg by mouth 4 times daily as needed for Diarrhea   Yes Historical Provider, MD   Carboxymeth-Glycerin-Polysorb (REFRESH OPTIVE ADVANCED) 0.5-1-0.5 % SOLN Place 1 drop into both eyes 3 times daily   Yes Historical Provider, MD JADE PEN-CD/UC/HS STARTER 80 MG/0.8ML PNKT  8/3/21   Historical Provider, MD   Misc. Devices (FINGERTIP PULSE OXIMETER) MISC Use as directed to check oxygen saturations as needed for shortness of breath 4/15/21   Leigha Mckeon MD        [facility] Meds:    Current Facility-Administered Medications   Medication Dose Route Frequency Provider Last Rate Last Admin    lactated ringers infusion   IntraVENous Continuous Coello MD Kamille 100 mL/hr at 09/14/21 0823 New Bag at 09/14/21 0823       Social History / Family History:        Social History     Socioeconomic History    Marital status: Single     Spouse name: None    Number of children: 1    Years of education: None    Highest education level: None   Occupational History    None   Tobacco Use    Smoking status: Never Smoker    Smokeless tobacco: Never Used   Vaping Use    Vaping Use: Never used   Substance and Sexual Activity    Alcohol use: No    Drug use: No    Sexual activity: Yes     Partners: Male   Other Topics Concern    None   Social History Narrative    Lives with parents, has a daughter     Social Determinants of Health     Financial Resource Strain:     Difficulty of Paying Living Expenses:    Food Insecurity:     Worried About [address] in the Last Year:     [address] in the Last Year:    Transportation Needs:     Lack of Transportation (Medical):      Lack of Transportation (Non-Medical):    Physical Activity:     Days of Exercise per Week:     Minutes of Exercise per Session:    Stress:     Feeling of Stress :    Social Connections:     Frequency of Communication with Friends and Family:     Frequency of Social Gatherings with Friends and Family:     Attends Druze Services:     Active Member of Clubs or Organizations:     Attends Club or Organization Meetings:     Marital Status:    Intimate Partner Violence:     Fear of Current or Ex-Partner:     Emotionally Abused:     Physically Abused:     Sexually Abused:        Review of Systems:  Constitutional: Negative for fever, chills, diaphoresis, appetite change and fatigue. HENT: Negative for sore throat, trouble swallowing and voice change. Respiratory: Negative for cough, positive for shortness of breath no wheezing. Cardiovascular: Negative for chest pain positive for SOB with one flight of stairs exertion, no pitting LE edema. Gastrointestinal: Negative for nausea, vomiting, diarrhea, constipation, blood in stool, abdominal distention, anal bleeding or rectal pain. Musculoskeletal: Negative for joint swelling and arthralgias. Skin: Warm and dry, well perfused. Neurological: Negative for seizures, syncope, speech difficulty and weakness. Hematological/Lymphatic: Negative for adenopathy. No history of DVT/PE. Does not bruise/bleed easily. Psychiatric/Behavioral: Negative for agitation. Physical Exam:  Vital Signs:   Vitals:    09/14/21 0740   BP: (!) 140/78   Pulse: 77   Resp: 16   Temp: 97 °F (36.1 °C)   SpO2: 98%     General appearance: Pt Alert and oriented, in no apparent acute distress. HEENT:  JOSE, EOMI, No JVDs, Bruits, Megalies. Lungs: Clear to auscultation bilaterally. Heart: Regular rate and rhythm, S1, S2 normal, no murmur, rub or gallop. Abdomen: Non tender. Non distended. Positive bowel sounds. No hernias noted, no masses palpable. Extremities: Warm, well perfused, no cyanosis or edema. Skin: Skin color, texture, turgor normal.  Neurologic: Grossly normal, Cranial nerves from II to XII intact.        Radiologic / Imaging / TESTING  VL DUP LOWER EXTREMITY VENOUS BILATERAL    Result Date: 8/18/2021  Vascular Lower Extremities Venous Insufficiency Procedure Demographics   Patient Name       Giovana [facility] IRAM PEREZ Date of study       08/18/2021   Date of Birth      1985         Gender              Female   Age                38                 Race                   Patient Number     [id number]           Room Number   Visit Number       [id number]          Height   Corporate ID       [id number]           Weight   Accession Number   [id number]   Ordering Physician Halford Goodpasture, MD Jinny Canner,                                                            [facility]                                         Interpreting        Lj Anthony MD  Procedure Type of Study:   Veins:Lower Extremities Venous Insufficiency, VL LOWER EXTREMITY BILATERAL  VENOUS. Indications for Study:Leg swelling. Conclusions   Summary   No evidence of significant venous insufficiency noted in the bilateral lower  extremities . No evidence of DVT or SVT in the bilateral common femoral vein, femoral  vein, popliteal vein, greater saphenous vein or small saphenous vein. Signature   ------------------------------------------------------------------  Electronically signed by Lj Gaona MD (Interpreting  physician) on 08/18/2021 at 11:36 AM  ------------------------------------------------------------------  Impressions Right Impression No evidence of DVT or SVT in the right common femoral vein, femoral vein, popliteal vein, greater saphenous vein or small saphenous vein. Normal compressibility of veins visualized in the right lower extremity. Respirophasic venous flow of the veins visualized in the right leg. No significant reflux noted in the veins of the right lower extremity.  Diameters (cm): GSV at Junction: 0.66 GSV Mid Thigh: 0.40 GSV Knee: 0.40 GSV Mid Calf: 0.24 GSV Distal Calf: 0.27 SSV Prox: 0.24 SSV Mid: 0.17 SSV Distal: 0.14 Left Impression No evidence of DVT or SVT in the left common femoral vein, femoral vein, popliteal vein, greater saphenous vein or small saphenous vein. Respirophasic venous flow of the veins visualized in the left leg. Normal compressibility of veins visualized in the left lower extremity. No significant reflux noted in the veins of the left lower extremity. Diameters (cm): GSV at Junction: 0.57 GSV Mid Thigh: 0.20 GSV Knee: 0.15 GSV Mid Calf: 0.18 GSV Distal Calf: 0.18 SSV Prox: 0.15 SSV Mid: 0.14 SSV Distal: 0.14 Study Location:[facility]. Velocities are measured in cm/s ; Diameters are measured in cm Right Doppler Measurements +-----------+------+------+------------+ ! Location   ! Signal!Reflux! Reflux (sec)! +-----------+------+------+------------+ ! GSV Thigh  ! Phasic! No    !            ! +-----------+------+------+------------+ ! Femoral    !Phasic! No    !            ! +-----------+------+------+------------+ ! Mid Femoral!Phasic! No    !            ! +-----------+------+------+------------+ ! Popliteal  !Phasic! No    !            ! +-----------+------+------+------------+ Left Doppler Measurements +-----------+------+------+------------+ ! Location   ! Signal!Reflux! Reflux (sec)! +-----------+------+------+------------+ ! GSV Thigh  ! Phasic! No    !            ! +-----------+------+------+------------+ ! Femoral    !Phasic! No    !            ! +-----------+------+------+------------+ ! Mid Femoral!Phasic! No    !            ! +-----------+------+------+------------+ ! Popliteal  !Phasic! No    !            ! +-----------+------+------+------------+        Labs:    Recent Results (from the past 24 hour(s))   POC Pregnancy Urine Qual    Collection Time: 09/14/21  7:53 AM   Result Value Ref Range    Preg Test, Ur NEGATIVE NEGATIVE         Diagnosis:  Patient Active Problem List   Diagnosis    [facility]    Anxiety    Multiple lung nodules    Crohn's colitis, unspecified complication (HCC)    Exacerbation of Crohn's disease with complication (HCC)    IBS (irritable bowel syndrome)    C. difficile colitis    Crohn's disease of small and large intestines with complication (HCC)    Neck swelling    Leukocytosis    Non-intractable vomiting with nausea    Infection of venous access port    Acute deep vein thrombosis (DVT) of non-extremity vein    Class 3 severe obesity due to excess calories without serious comorbidity with body mass index (BMI) of 50.0 to 59.9 in adult (HCC)    Preeclampsia, third trimester    Pregnancy related condition    Status post  delivery    Bowen's disease of right shoulder    Patellofemoral arthritis of right knee    Infrapatellar bursitis of right knee    Knee mass, right    Acute respiratory failure ([facility] [address]    Pneumonia due to COVID-19 virus    Shortness of breath    Edema of lower extremity    Essential hypertension    Morbid obesity with BMI of 50.0-59.9, adult ([facility] [address]    Sleep apnea           Assessment & Plan:    Daryn Abdalla is a very pleasant 43 y.o. female who presents today for her preop EGD eval before her bariatric procedure. As noted her Body mass index is 55.75 kg/m². with significant co-morbidities as below. The patient has been complying with the pre-operative workup, lifestyle modifications and changes with the bariatric clinic, including:  Dietitian evaluation and counseling, psychologist evaluation and counseling, Exercise physiologist evaluation and counseling, cardiac preoperative clearance and optimization, pulmonology preoperative clearance and optimization, today will proceed with preoperative EGD for GERD, morbid obesity: Body mass index is 55.75 kg/m². , in preparation for a bariatric procedure; to r/o GERD, Hiatal Hernia, Peptic Ulcer Disease, Gastroparesis, Esophageal dysmotility; etc.    All risks and benefits, potential complications and possible alternatives discussed, and agreeable to proceed.     ____________________________________________    Laurie Holley MD, EDWARDO, FICS    9/14/2021  9:18 AM

## 2021-09-15 LAB — CLOTEST: NEGATIVE

## 2021-09-28 ENCOUNTER — TELEPHONE (OUTPATIENT)
Dept: CARDIOLOGY CLINIC | Age: 36
End: 2021-09-28

## 2021-09-28 ENCOUNTER — OFFICE VISIT (OUTPATIENT)
Dept: BARIATRICS/WEIGHT MGMT | Age: 36
End: 2021-09-28
Payer: COMMERCIAL

## 2021-09-28 VITALS
BODY MASS INDEX: 48.82 KG/M2 | WEIGHT: 293 LBS | DIASTOLIC BLOOD PRESSURE: 74 MMHG | TEMPERATURE: 97.9 F | SYSTOLIC BLOOD PRESSURE: 126 MMHG | HEART RATE: 95 BPM | OXYGEN SATURATION: 98 % | HEIGHT: 65 IN

## 2021-09-28 DIAGNOSIS — Z01.818 PRE-OPERATIVE CLEARANCE: Primary | ICD-10-CM

## 2021-09-28 PROCEDURE — G8427 DOCREV CUR MEDS BY ELIG CLIN: HCPCS | Performed by: NURSE PRACTITIONER

## 2021-09-28 PROCEDURE — G8417 CALC BMI ABV UP PARAM F/U: HCPCS | Performed by: NURSE PRACTITIONER

## 2021-09-28 PROCEDURE — 1036F TOBACCO NON-USER: CPT | Performed by: NURSE PRACTITIONER

## 2021-09-28 PROCEDURE — 99213 OFFICE O/P EST LOW 20 MIN: CPT | Performed by: NURSE PRACTITIONER

## 2021-09-28 RX ORDER — FUROSEMIDE 20 MG/1
TABLET ORAL
COMMUNITY
Start: 2021-09-15 | End: 2021-09-28

## 2021-09-28 ASSESSMENT — ENCOUNTER SYMPTOMS
WHEEZING: 0
NAUSEA: 0
SHORTNESS OF BREATH: 0
BACK PAIN: 0
CHEST TIGHTNESS: 0
SORE THROAT: 0
PHOTOPHOBIA: 0
COLOR CHANGE: 0
DIARRHEA: 0
APNEA: 0

## 2021-09-28 NOTE — PROGRESS NOTES
BARIATRIC SURGERY OFFICE PROGRESS NOTE    SUBJECTIVE:    Patient presenting today referred from SHAQUILLE Alvarez NP, for   Chief Complaint   Patient presents with    Follow-up     3rd surg wm visit egd 9/14/21    . Vitals:    09/28/21 1637   BP: 126/74   Pulse: 95   Temp: 97.9 °F (36.6 °C)   SpO2: 98%        BMI: Body mass index is 55.15 kg/m². Weight History: Wt Readings from Last 3 Encounters:   09/28/21 (!) 331 lb 6.4 oz (150.3 kg)   09/14/21 (!) 335 lb (152 kg)   08/31/21 (!) 338 lb (153.3 kg)         HPI:Allyson Mclaughlin is a 43 y.o. female presenting in third bariatric PRE-OP visit    Diet Recall: Total weight loss/gain: -15.1 lbs since last visit, and -11.5 lbs since starting program     Calories: 1200 a day  Water Intake: drinks roughly 4-5 bottles a day  Exercise: not as much lately. Bought a new house and has been very busy with it  New health problems: denies  New medications: denies  Clearances: Cardiology: cleared. Will need to call for letter. Pulm on 10/13. Will send psych referral. Heme consult? DVT in the past.     Thoroughly reviewed the patient's medical history, family history, social history and review of systems with the patient today in the office. Please see medical record for pertinent positives. Past Medical History:   Diagnosis Date    Acid reflux     Acute deep vein thrombosis (DVT) of non-extremity vein 04/25/2018    \"in my neck\"\"after mediport was put in\"    Anemia     Anxiety     Asthma     NO PULMONOLOGIST AT THIS TIME    Blood clot due to device, implant, or graft 04/2018    had blood clots in neck due to med port    Bowen's disease     Cancer ([facility] [address]     skin cancer shoulder 2019    Crohn's disease ([facility] [address] Dx 2010    Remicade Infusions Every Six Weeks, [place], [place]    Depression     ECHO 07/28/2021    EF is estimated at 55-60%. Mild left ventricular hypertrophy. Mild mitral regurgitation is present.     2012    \"Passed Out And Lorjune Degree On My Herminia Naseem"    Fatty liver     Gestational diabetes     \"2019- no medication - just diet controlled with pregnancy\"    Hyperlipidemia     Hypertension     \"yrs ago was on b/p medication- off medication since 2018- only took medication for 2 months\"    Lower back pain     \"Sometimes\"    MRSA (methicillin resistant staph aureus) culture positive 2018    Multiple pulmonary nodules 2013    Subcentimeter; needs repeat imaging in 3-6 months    Pancreatitis     Patellofemoral arthritis of right knee 2020    Shortness of breath on exertion     Teeth missing     Upper And Lower    VL DUP LOWER EXTREMITY VENOUS BILATERAL 2021    No evidence of DVT or SVT in the bilateral common femoral vein, No evidence of significant venous insufficiency    Wears glasses     Wears glasses         Patient Active Problem List   Diagnosis    [address]'s disease ([address]    Anxiety    Multiple lung nodules    Crohn's colitis, unspecified complication ([address]    Exacerbation of Crohn's disease with complication ([address]    IBS (irritable bowel syndrome)    C. difficile colitis    Crohn's disease of small and large intestines with complication ([address]    Neck swelling    Leukocytosis    Non-intractable vomiting with nausea    Infection of venous access port    Acute deep vein thrombosis (DVT) of non-extremity vein    Class 3 severe obesity due to excess calories without serious comorbidity with body mass index (BMI) of 50.0 to 59.9 in adult ([address]    Preeclampsia, third trimester    Pregnancy related condition    Status post  delivery    Bowen's disease of right shoulder    Patellofemoral arthritis of right knee    Infrapatellar bursitis of right knee    Knee mass, right    Acute respiratory failure ([address]    Pneumonia due to COVID-19 virus    Shortness of breath    Edema of lower extremity    Essential hypertension    Morbid obesity with BMI of 50.0-59.9, adult [facility])    Sleep apnea       Past Surgical History:   Procedure Laterality Date    ABDOMEN SURGERY      bowel resection     ADENOIDECTOMY      APPENDECTOMY      Done During Colon Resection For Crohn's  Disease    BREAST SURGERY Left     \"Infected Lymph Node Removed\"     SECTION N/A 2019     SECTION performed by Gely Mcgill MD at [facility]    CHOLECYSTECTOMY  2016    COLECTOMY  8-11    Crohn's Disease , Dr. Candice Wells, Appendectomy Also Done    COLONOSCOPY  2016    Polyps Removed In Past    COLONOSCOPY  2017    Hospitalized for c-diff    COLONOSCOPY  2018    normal, multiple biopsy, repeat 1 year    COLONOSCOPY N/A 2019    COLONOSCOPY POLYPECTOMY SNARE/COLD BIOPSY performed by Michelle Summers MD at [place] [address]      ENDOSCOPY, COLON, DIAGNOSTIC  Last Done     KNEE SURGERY Right 10/1/2020    EXCISION OF MASS ON RIGHT KNEE performed by Florentin Gutierrez DO at [address]      Dr Casimiro Cano, Removed Adhesions    OTHER SURGICAL HISTORY  2011    Mediport Insertion Left Chest Area/revision done 2016- \"removed at [address] [place] [address] 2017 after I got an infection    SKIN BIOPSY      \"skin cancer removed right shoulder\"2019    TUBAL LIGATION  2019    TUNNELED VENOUS PORT PLACEMENT Right 2018    smart port- [facility]-Dr Indra Santizo    UPPER GASTROINTESTINAL ENDOSCOPY N/A 2021    EGD BIOPSY performed by Yesy Pastrana MD at [address] [employer]      [place] Teeth Extracted In Past       Current Outpatient Medications   Medication Sig Dispense Refill    HUMIRA PEN-CD/UC/HS STARTER 80 MG/0.8ML PNKT       cyclobenzaprine (FLEXERIL) 10 MG tablet take 1 tablet by mouth three times a day      pantoprazole (PROTONIX) 40 MG tablet Take 1 tablet by mouth daily      furosemide (LASIX) 40 MG tablet Take 1 tablet by mouth daily 60 tablet 3    Misc.  Devices (FINGERTIP Negative for color change, rash and wound. Allergic/Immunologic: Negative for environmental allergies, food allergies and immunocompromised state. Neurological: Negative for dizziness, weakness, light-headedness and numbness. Hematological: Negative for adenopathy. Does not bruise/bleed easily. Psychiatric/Behavioral: Negative for behavioral problems, confusion, sleep disturbance and suicidal ideas. OBJECTIVE:    /74   Pulse 95   Temp 97.9 °F (36.6 °C)   Ht 5' 5\" (1.651 m)   Wt (!) 331 lb 6.4 oz (150.3 kg)   LMP 09/05/2021   SpO2 98%   BMI 55.15 kg/m²      Physical Exam  Vitals reviewed. Constitutional:       Appearance: She is obese. HENT:      Head: Normocephalic and atraumatic. Right Ear: External ear normal.      Left Ear: External ear normal.      Nose: Nose normal.      Mouth/Throat:      Mouth: Mucous membranes are moist.   Eyes:      Extraocular Movements: Extraocular movements intact. Pupils: Pupils are equal, round, and reactive to light. Cardiovascular:      Rate and Rhythm: Normal rate and regular rhythm. Pulses: Normal pulses. Heart sounds: Normal heart sounds. Pulmonary:      Effort: Pulmonary effort is normal.      Breath sounds: Normal breath sounds. Abdominal:      General: Bowel sounds are normal.   Musculoskeletal:         General: Normal range of motion. Cervical back: Normal range of motion and neck supple. Skin:     General: Skin is warm and dry. Neurological:      General: No focal deficit present. Mental Status: She is alert and oriented to person, place, and time. Mental status is at baseline. Psychiatric:         Mood and Affect: Mood normal.         Behavior: Behavior normal.         ASSESSMENT & PLAN:    1. Pre-operative clearance  - Amb External Referral To Psychology  - Ambulatory referral to Hematology Oncology  - Cardiology cleared- will obtained letter.  Pulm in process    -Patient was encouraged to journal all food intake.   -Keep calorie level at approximately 1200, per discussion / plan with registered dietician.  -Protein intake is to be a minimum of 60 grams per day. -Water drinking was encouraged with a goal of 64oz-128oz daily. Beverages are to be calorie free except for milk. Avoid soda. -Continue to increase level of physical activity. I spent 25 minutes with the patient face to face today and over 50% of the office visit today was spent in face to face counseling regarding diet and exercise, in preparation for her planned Robotic Sleeve Gastrectomy. Discussed in length complying with the dietary recommendations, complying with the preoperative workup including dietary counseling , exercise physiologist counseling , and pre-operative optimization of pulmonologist  and cardiologist .    The patient expressed understanding and willingness to comply nicely; all questions and concerns addressed. No orders of the defined types were placed in this encounter. Orders Placed This Encounter   Procedures    Amb External Referral To Psychology     Referral Priority:   Routine     Referral Reason:   Specialty Services Required     Referred to Provider:   Katrina Marques     Requested Specialty:   Psychology     Number of Visits Requested:   1    Ambulatory referral to Hematology Oncology     Referral Priority:   Routine     Referral Type:   Eval and Treat     Referral Reason:   Specialty Services Required     Referred to Provider:   Estrella Prado MD     Requested Specialty:   Hematology and Oncology     Number of Visits Requested:   1       Follow Up:  Return in about 1 month (around 10/28/2021) for with surgeon.     SHAQUILLE Lozano - CNP

## 2021-10-12 ENCOUNTER — HOSPITAL ENCOUNTER (OUTPATIENT)
Age: 36
Discharge: HOME | End: 2021-10-12
Payer: COMMERCIAL

## 2021-10-12 ENCOUNTER — OFFICE VISIT (OUTPATIENT)
Dept: BARIATRICS/WEIGHT MGMT | Age: 36
End: 2021-10-12
Payer: COMMERCIAL

## 2021-10-12 VITALS
HEART RATE: 91 BPM | HEIGHT: 65 IN | BODY MASS INDEX: 48.82 KG/M2 | SYSTOLIC BLOOD PRESSURE: 110 MMHG | DIASTOLIC BLOOD PRESSURE: 72 MMHG | OXYGEN SATURATION: 97 % | WEIGHT: 293 LBS

## 2021-10-12 DIAGNOSIS — E66.01 MORBID OBESITY WITH BMI OF 50.0-59.9, ADULT (HCC): Primary | ICD-10-CM

## 2021-10-12 DIAGNOSIS — Z01.818 PREOPERATIVE CLEARANCE: ICD-10-CM

## 2021-10-12 DIAGNOSIS — E66.01 MORBID OBESITY WITH BMI OF 50.0-59.9, ADULT (HCC): ICD-10-CM

## 2021-10-12 DIAGNOSIS — K52.9 IBD (INFLAMMATORY BOWEL DISEASE): ICD-10-CM

## 2021-10-12 LAB
ALBUMIN SERPL-MCNC: 4.3 GM/DL (ref 3.4–5)
ALP BLD-CCNC: 69 IU/L (ref 40–129)
ALT SERPL-CCNC: 48 U/L (ref 10–40)
ANION GAP SERPL CALCULATED.3IONS-SCNC: 16 MMOL/L (ref 4–16)
AST SERPL-CCNC: 53 IU/L (ref 15–37)
BASOPHILS ABSOLUTE: 0.1 K/CU MM
BASOPHILS RELATIVE PERCENT: 0.8 % (ref 0–1)
BILIRUB SERPL-MCNC: 0.2 MG/DL (ref 0–1)
BILIRUBIN DIRECT: 0.2 MG/DL (ref 0–0.3)
BILIRUBIN, INDIRECT: 0 MG/DL (ref 0–0.7)
BUN BLDV-MCNC: 15 MG/DL (ref 6–23)
CALCIUM SERPL-MCNC: 9.3 MG/DL (ref 8.3–10.6)
CHLORIDE BLD-SCNC: 101 MMOL/L (ref 99–110)
CHOLESTEROL: 144 MG/DL
CO2: 22 MMOL/L (ref 21–32)
CREAT SERPL-MCNC: 0.6 MG/DL (ref 0.6–1.1)
DIFFERENTIAL TYPE: ABNORMAL
EOSINOPHILS ABSOLUTE: 0.1 K/CU MM
EOSINOPHILS RELATIVE PERCENT: 2.2 % (ref 0–3)
ESTIMATED AVERAGE GLUCOSE: 160 MG/DL
GFR AFRICAN AMERICAN: >60 ML/MIN/1.73M2
GFR NON-AFRICAN AMERICAN: >60 ML/MIN/1.73M2
GLUCOSE BLD-MCNC: 136 MG/DL (ref 70–99)
HBA1C MFR BLD: 7.2 % (ref 4.2–6.3)
HCT VFR BLD CALC: 42.6 % (ref 37–47)
HDLC SERPL-MCNC: 45 MG/DL
HEMOGLOBIN: 13.2 GM/DL (ref 12.5–16)
IMMATURE NEUTROPHIL %: 0.2 % (ref 0–0.43)
IRON: 74 UG/DL (ref 37–145)
LDL CHOLESTEROL DIRECT: 77 MG/DL
LYMPHOCYTES ABSOLUTE: 2.3 K/CU MM
LYMPHOCYTES RELATIVE PERCENT: 36.7 % (ref 24–44)
MCH RBC QN AUTO: 28 PG (ref 27–31)
MCHC RBC AUTO-ENTMCNC: 31 % (ref 32–36)
MCV RBC AUTO: 90.3 FL (ref 78–100)
MONOCYTES ABSOLUTE: 0.6 K/CU MM
MONOCYTES RELATIVE PERCENT: 9.3 % (ref 0–4)
NUCLEATED RBC %: 0 %
PDW BLD-RTO: 12.6 % (ref 11.7–14.9)
PLATELET # BLD: 192 K/CU MM (ref 140–440)
PMV BLD AUTO: 12.9 FL (ref 7.5–11.1)
POTASSIUM SERPL-SCNC: 4.5 MMOL/L (ref 3.5–5.1)
RBC # BLD: 4.72 M/CU MM (ref 4.2–5.4)
SEGMENTED NEUTROPHILS ABSOLUTE COUNT: 3.2 K/CU MM
SEGMENTED NEUTROPHILS RELATIVE PERCENT: 50.8 % (ref 36–66)
SODIUM BLD-SCNC: 139 MMOL/L (ref 135–145)
TOTAL IMMATURE NEUTOROPHIL: 0.01 K/CU MM
TOTAL NUCLEATED RBC: 0 K/CU MM
TOTAL PROTEIN: 7.7 GM/DL (ref 6.4–8.2)
TRIGL SERPL-MCNC: 178 MG/DL
TSH HIGH SENSITIVITY: 0.98 UIU/ML (ref 0.27–4.2)
WBC # BLD: 6.3 K/CU MM (ref 4–10.5)

## 2021-10-12 PROCEDURE — 80053 COMPREHEN METABOLIC PANEL: CPT

## 2021-10-12 PROCEDURE — 83540 ASSAY OF IRON: CPT

## 2021-10-12 PROCEDURE — 83036 HEMOGLOBIN GLYCOSYLATED A1C: CPT

## 2021-10-12 PROCEDURE — 99214 OFFICE O/P EST MOD 30 MIN: CPT | Performed by: SURGERY

## 2021-10-12 PROCEDURE — 85025 COMPLETE CBC W/AUTO DIFF WBC: CPT

## 2021-10-12 PROCEDURE — 84443 ASSAY THYROID STIM HORMONE: CPT

## 2021-10-12 PROCEDURE — 80061 LIPID PANEL: CPT

## 2021-10-12 PROCEDURE — 83721 ASSAY OF BLOOD LIPOPROTEIN: CPT

## 2021-10-12 PROCEDURE — 36415 COLL VENOUS BLD VENIPUNCTURE: CPT

## 2021-10-12 PROCEDURE — 82248 BILIRUBIN DIRECT: CPT

## 2021-10-12 ASSESSMENT — ENCOUNTER SYMPTOMS
ALLERGIC/IMMUNOLOGIC NEGATIVE: 1
GASTROINTESTINAL NEGATIVE: 1
BACK PAIN: 1
RESPIRATORY NEGATIVE: 1
EYES NEGATIVE: 1

## 2021-10-12 NOTE — PROGRESS NOTES
BARIATRIC SURGERY OFFICE PROGRESS NOTE    SUBJECTIVE:    Patient presenting today referred from SHAQUILLE Clay NP, for   Chief Complaint   Patient presents with   [facility]ha Hoff[facility] Weight [facility]     4th surg wm visit   . Vitals:    10/12/21 1339   BP: 110/72   Pulse: 91   SpO2: 97%        BMI: Body mass index is 54.53 kg/m². Weight History: Wt Readings from Last 3 Encounters:   10/12/21 (!) 327 lb 11.2 oz (148.6 kg)   09/28/21 (!) 331 lb 6.4 oz (150.3 kg)   09/14/21 (!) 335 lb (152 kg)       If within 30 days of bariatric surgery date, have you been to the ED: Elisabeth Mack is a 43 y.o. female presenting in fourth bariatric PRE-OP visit. Total weight loss/gain: -3.7 lbs since last visit, n/a lbs since surgery, -15.2 lbs since starting program    Thoroughly reviewed the patient's medical history, family history, social history and review of systems with the patient today in the office. Please see medical record for pertinent positives. Changes in health since last visit: None. On Humira. Recently bought and moved into new house. Pt tracking calories: Yes, 1200 / day. Pt exercising: Yes. Past Medical History:   Diagnosis Date    Acid reflux     Acute deep vein thrombosis (DVT) of non-extremity vein 04/25/2018    \"in my neck\"\"after mediport was put in\"    Anemia     Anxiety     Asthma     NO PULMONOLOGIST AT THIS TIME    Blood clot due to device, implant, or graft 04/2018    had blood clots in neck due to med port    Bowen's disease     Cancer ([facility]     skin cancer shoulder 2019    Crohn's disease ([facility] Dx 2010    Remicade Infusions Every Six Weeks, Sees Dr. Juvencio Rios, [address] [place] [address] Dr    Depression     ECHO 07/28/2021    EF is estimated at 55-60%. Mild left ventricular hypertrophy. Mild mitral regurgitation is present.     Fall 2012    \"Passed Out And Halima Faes On My Tailbone\"    Fatty liver     Gestational diabetes     \"2019- no medication - just diet controlled with pregnancy\"    Hyperlipidemia     Hypertension     \"yrs ago was on b/p medication- off medication since 2018- only took medication for 2 months\"    Lower back pain     \"Sometimes\"    MRSA (methicillin resistant staph aureus) culture positive 2018    Multiple pulmonary nodules 2013    Subcentimeter; needs repeat imaging in 3-6 months    Pancreatitis     Patellofemoral arthritis of right knee 2020    Shortness of breath on exertion     Teeth missing     Upper And Lower    VL DUP LOWER EXTREMITY VENOUS BILATERAL 2021    No evidence of DVT or SVT in the bilateral common femoral vein, No evidence of significant venous insufficiency    Wears glasses     Wears glasses         Patient Active Problem List   Diagnosis    Crohn's disease ([address]    Anxiety    Multiple lung nodules    Crohn's colitis, unspecified complication ([address]    Exacerbation of Crohn's disease with complication ([address]    IBS (irritable bowel syndrome)    C. difficile colitis    Crohn's disease of small and large intestines with complication (HCC)    Neck swelling    Leukocytosis    Non-intractable vomiting with nausea    Infection of venous access port    Acute deep vein thrombosis (DVT) of non-extremity vein    Class 3 severe obesity due to excess calories without serious comorbidity with body mass index (BMI) of 50.0 to 59.9 in adult ([address]    Preeclampsia, third trimester    Pregnancy related condition    Status post  delivery    Bowen's disease of right shoulder    Patellofemoral arthritis of right knee    Infrapatellar bursitis of right knee    Knee mass, right    Acute respiratory failure ([address]    Pneumonia due to COVID-19 virus    Shortness of breath    Edema of lower extremity    Essential hypertension    Morbid obesity with BMI of 50.0-59.9, adult ([address]    Sleep apnea       Past Surgical History:   Procedure Laterality Date    ABDOMEN SURGERY      bowel resection     ADENOIDECTOMY      APPENDECTOMY      Done During Colon Resection For Crohn's  Disease    BREAST SURGERY Left     \"Infected Lymph Node Removed\"     SECTION N/A 2019     SECTION [facility]reras [facility] at [facility]    CHOLECYSTECTOMY  2016    COLECTOMY  8-    Crohn's Disease , Dr. Trinidad Fan, Appendectomy Also Done    COLONOSCOPY  2016    Polyps Removed In Past    COLONOSCOPY  2017    Hospitalized for c-diff    COLONOSCOPY  2018    normal, multiple biopsy, repeat 1 year    COLONOSCOPY N/A 2019    COLONOSCOPY POLYPECTOMY SNARE/COLD BIOPSY performed by Lisandro Matias MD at [facility]      ENDOSCOPY, COLON, DIAGNOSTIC  Last Done     KNEE SURGERY Right 10/1/2020    EXCISION OF MASS ON RIGHT KNEE performed by Paul Lockett DO at [address]      Dr Meredith Osuna, Removed Adhesions    OTHER SURGICAL HISTORY  2011    Mediport Insertion Left Chest Area/revision done 2016- \"removed at [facility] [place] [address] 2017 after I got an infection    SKIN BIOPSY      \"skin cancer removed right shoulder\"2019    TUBAL LIGATION  2019    TUNNELED VENOUS PORT PLACEMENT Right 2018    smart port- [facility]-Dr Amber Damon    UPPER GASTROINTESTINAL ENDOSCOPY N/A 2021    EGD BIOPSY performed by Lloyd Varma MD at [address]      [place] Teeth Extracted In Past       Current Outpatient Medications   Medication Sig Dispense Refill    HUMIRA PEN-CD/UC/HS STARTER 80 MG/0.8ML PNKT       cyclobenzaprine (FLEXERIL) 10 MG tablet take 1 tablet by mouth three times a day      pantoprazole (PROTONIX) 40 MG tablet Take 1 tablet by mouth daily      furosemide (LASIX) 40 MG tablet Take 1 tablet by mouth daily 60 tablet 3    Misc.  Devices (FINGERTIP PULSE OXIMETER) MISC Use as directed to check oxygen saturations as needed for shortness of breath 1 each 0    metFORMIN (GLUCOPHAGE) 1000 MG tablet Take 1,000 mg by mouth 2 times daily (with meals)       traZODone (DESYREL) 100 MG tablet take 1 tablet by mouth nightly BEFORE BEDTIME      sertraline (ZOLOFT) 50 MG tablet Take 50 mg by mouth daily       rizatriptan (MAXALT) 10 MG tablet       acetaminophen (AMINOFEN) 325 MG tablet Take 2 tablets by mouth every 6 hours as needed for Pain 20 tablet 0    ondansetron (ZOFRAN ODT) 4 MG disintegrating tablet Take 1 tablet by mouth every 6 hours 10 tablet 1    albuterol sulfate  (90 Base) MCG/ACT inhaler Inhale 2 puffs into the lungs every 6 hours as needed for Wheezing      loperamide (IMODIUM) 2 MG capsule Take 2 mg by mouth 4 times daily as needed for Diarrhea      Carboxymeth-Glycerin-Polysorb (REFRESH OPTIVE ADVANCED) 0.5-1-0.5 % SOLN Place 1 drop into both eyes 3 times daily       No current facility-administered medications for this visit. Allergies   Allergen Reactions    Morphine      Other reaction(s): Headache  Triggers migraine. \"Triggers My Migraines\"  Other reaction(s): Headache    Tramadol      Other reaction(s): Headache  States triggers migraine headache  \"Triggers My Migraines\"  Other reaction(s): Headache         Review of Systems   Constitutional: Positive for fatigue. HENT: Negative. Eyes: Negative. Respiratory: Negative. Cardiovascular: Negative. Gastrointestinal: Negative. Endocrine: Negative. Genitourinary: Negative. Musculoskeletal: Positive for arthralgias and back pain. Skin: Negative. Allergic/Immunologic: Negative. Neurological: Negative. Hematological: Negative. Psychiatric/Behavioral: Negative. All other systems reviewed and are negative.       OBJECTIVE:    /72 (Site: Right Upper Arm, Position: Sitting, Cuff Size: Medium Adult)   Pulse 91   Ht 5' 5\" (1.651 m)   Wt (!) 327 lb 11.2 oz (148.6 kg)   SpO2 97%   BMI 54.53 kg/m²      Physical Exam  Constitutional:       General: She is not in acute distress. Appearance: She is obese. She is not ill-appearing, toxic-appearing or diaphoretic. HENT:      Head: Normocephalic and atraumatic. Right Ear: External ear normal.      Left Ear: External ear normal.      Nose: Nose normal.   Eyes:      General:         Right eye: No discharge. Left eye: No discharge. Extraocular Movements: Extraocular movements intact. Cardiovascular:      Rate and Rhythm: Normal rate. Pulses: Normal pulses. Abdominal:      Palpations: Abdomen is soft. Tenderness: There is no abdominal tenderness. Musculoskeletal:         General: No swelling. Cervical back: Normal range of motion. Neurological:      General: No focal deficit present. Mental Status: She is alert. Psychiatric:         Mood and Affect: Mood normal.         ASSESSMENT & PLAN:    1. Morbid obesity with BMI of 50.0-59.9, adult ([address]  -Doing well. Down 15.2 lbs.   -1200 lynette / day. -Pt encouraged to continue exercising. Minimum goal of 150 minutes per week with ideal goal of 300 min per week. Exercise regimen should include at least 2-3 sessions per week of strength training. These recommendations are current with the most recent ASMBS guidelines. -F/u in one month. -Reviewed previous progress notes and EGD report / pathology. 2. Preoperative clearance  -Needs Cards, Pulm, and Psych clearances. All scheduled per pt. -Needs labs done. Reminded pt. Already ordered. 3. IBD (inflammatory bowel disease)  -On Humira. Will need held 2-4 weeks prior and after surgery RE: wound healing issues    4. DM  -Metformin 1000 mg BID. -Ideally will come off after weight loss / bariatric surgery     5. History of DVT  -Reportedly in neck after placement of Mediport. -Seems to be provoked. F/u Heme consult.      6. Extensive previous abdominal surgeries  -Will plan on scheduled pt's sleeve gastrectomy for slightly longer than typical case given high likelihood of intra-abdominal adhesions and scar tissues. Did d/w pt. As of current visit, regarding obesity-related co-morbid conditions:  GONZALO [] compliant [] no longer using [] resolved per sleep study; hypertension [] medications; hyperlipidemia [] medications; GERD [] medications; DM [] insulin [] non-insulin [] no meds       Patient was encouraged to journal all food intake. Keep calorie level at approximately 1200, per discussion / plan with registered dietician. Protein intake is to be a minimum of 40-50 grams per day. Water drinking was encouraged with a goal of 64oz-128oz daily. Beverages are to be calorie free except for milk. Avoid soda and other carbonated beverages. Continue to increase level of physical activity. I spent 25 minutes with the patient face to face today and over 50% of the office visit today was spent in face to face counseling regarding diet and exercise, in preparation for her planned robotic sleeve gastrectomy. Discussed in length complying with the dietary recommendations, complying with the preoperative workup including dietary counseling , exercise physiologist counseling , and pre-operative optimization of pulmonologist  and cardiologist .    The patient expressed understanding and willingness to comply nicely; all questions and concerns addressed. No orders of the defined types were placed in this encounter. No orders of the defined types were placed in this encounter. Follow Up:  No follow-ups on file.     Alexandru Moore MD

## 2021-10-13 PROBLEM — G47.33 SEVERE OBSTRUCTIVE SLEEP APNEA: Status: ACTIVE | Noted: 2021-08-31

## 2021-10-15 ENCOUNTER — PROCEDURE VISIT (OUTPATIENT)
Dept: CARDIOLOGY CLINIC | Age: 36
End: 2021-10-15
Payer: COMMERCIAL

## 2021-10-15 VITALS — BODY MASS INDEX: 48.82 KG/M2 | WEIGHT: 293 LBS | HEIGHT: 65 IN

## 2021-10-15 DIAGNOSIS — Z01.818 PRE-OP TESTING: ICD-10-CM

## 2021-10-15 DIAGNOSIS — Z01.818 PRE-OP TESTING: Primary | ICD-10-CM

## 2021-10-15 PROCEDURE — 93015 CV STRESS TEST SUPVJ I&R: CPT | Performed by: INTERNAL MEDICINE

## 2021-10-25 ENCOUNTER — TELEPHONE (OUTPATIENT)
Dept: CARDIOLOGY CLINIC | Age: 36
End: 2021-10-25

## 2021-11-08 ENCOUNTER — HOSPITAL ENCOUNTER (OUTPATIENT)
Dept: INFUSION THERAPY | Age: 36
Discharge: HOME | End: 2021-11-08
Payer: COMMERCIAL

## 2021-11-08 ENCOUNTER — INITIAL CONSULT (OUTPATIENT)
Dept: ONCOLOGY | Age: 36
End: 2021-11-08
Payer: COMMERCIAL

## 2021-11-08 VITALS
SYSTOLIC BLOOD PRESSURE: 134 MMHG | BODY MASS INDEX: 48.82 KG/M2 | WEIGHT: 293 LBS | HEART RATE: 103 BPM | DIASTOLIC BLOOD PRESSURE: 73 MMHG | OXYGEN SATURATION: 99 % | HEIGHT: 65 IN | TEMPERATURE: 98.3 F

## 2021-11-08 DIAGNOSIS — I82.90 ACUTE DEEP VEIN THROMBOSIS (DVT) OF NON-EXTREMITY VEIN: Primary | ICD-10-CM

## 2021-11-08 PROCEDURE — 99202 OFFICE O/P NEW SF 15 MIN: CPT

## 2021-11-08 PROCEDURE — G8417 CALC BMI ABV UP PARAM F/U: HCPCS | Performed by: INTERNAL MEDICINE

## 2021-11-08 PROCEDURE — G8427 DOCREV CUR MEDS BY ELIG CLIN: HCPCS | Performed by: INTERNAL MEDICINE

## 2021-11-08 PROCEDURE — 1036F TOBACCO NON-USER: CPT | Performed by: INTERNAL MEDICINE

## 2021-11-08 PROCEDURE — G8484 FLU IMMUNIZE NO ADMIN: HCPCS | Performed by: INTERNAL MEDICINE

## 2021-11-08 PROCEDURE — 99204 OFFICE O/P NEW MOD 45 MIN: CPT | Performed by: INTERNAL MEDICINE

## 2021-11-08 ASSESSMENT — PATIENT HEALTH QUESTIONNAIRE - PHQ9
1. LITTLE INTEREST OR PLEASURE IN DOING THINGS: 0
SUM OF ALL RESPONSES TO PHQ QUESTIONS 1-9: 0
2. FEELING DOWN, DEPRESSED OR HOPELESS: 0
SUM OF ALL RESPONSES TO PHQ9 QUESTIONS 1 & 2: 0

## 2021-11-08 NOTE — PROGRESS NOTES
MA Rooming Questions  Patient: Melony Santos  MRN: [id number]    Date: 11/8/2021        NEW PATIENT     5. Did the patient have a depression screening completed today?  Yes    PHQ-9 Total Score: 0 (11/8/2021  3:05 PM)       PHQ-9 Given to (if applicable):               PHQ-9 Score (if applicable):                     [] Positive     []  Negative              Does question #9 need addressed (if applicable)                     [] Yes    []  No               Jose Ratliff CMA

## 2021-11-08 NOTE — LETTER
[address] [place] [address]   [address] [place] [id number]  Phone: [phone]  Fax: [phone]           Nathalie Cisse MD      November 8, 2021     Patient: Marcos Trivedi   MR Number: [id number]   YOB: 1985   Date of Visit: 11/8/2021       Dear Dr. Coello Conception: Thank you for referring Walker Galicia to me for evaluation/treatment. Below are the relevant portions of my assessment and plan of care. If you have questions, please do not hesitate to call me. I look forward to following Allyson along with you.     Sincerely,        Nathalie Cisse MD    CC providers:  Lenny Barcenas MD  [address] [facility]

## 2021-11-08 NOTE — PROGRESS NOTES
for  1. Crohn's disease  2. GERD  3. Diabetes mellitus  4. Depression/anxiety  5. Migraine headache  6. Obesity  7. Bronchial asthma  8. Osteoarthritis  9. Bowen's disease of right shoulder    Past Surgery History:    Significant for  1. Appendicectomy in   2. Adenoidectomy in   3. Cholecystectomy in 2016  4. Colectomy in 2011  5.  section  6. Infected left axillary lymph node excision     Social History:   She denies smoking, alcohol drinking or illicit drug abuse. Family History:    Significant for diabetes in her father and grandparents. Allergies   Allergen Reactions    Morphine      Other reaction(s): Headache  Triggers migraine. \"Triggers My Migraines\"  Other reaction(s): Headache    Tramadol      Other reaction(s): Headache  States triggers migraine headache  \"Triggers My Migraines\"  Other reaction(s): Headache       Current Outpatient Medications on File Prior to Visit   Medication Sig Dispense Refill    HUMIRA PEN-CD/UC/HS STARTER 80 MG/0.8ML PNKT       cyclobenzaprine (FLEXERIL) 10 MG tablet take 1 tablet by mouth three times a day      pantoprazole (PROTONIX) 40 MG tablet Take 1 tablet by mouth daily      metFORMIN (GLUCOPHAGE) 1000 MG tablet Take 1,000 mg by mouth 2 times daily (with meals)       traZODone (DESYREL) 100 MG tablet take 1 tablet by mouth nightly BEFORE BEDTIME      sertraline (ZOLOFT) 50 MG tablet Take 50 mg by mouth daily       rizatriptan (MAXALT) 10 MG tablet       ondansetron (ZOFRAN ODT) 4 MG disintegrating tablet Take 1 tablet by mouth every 6 hours 10 tablet 1    albuterol sulfate  (90 Base) MCG/ACT inhaler Inhale 2 puffs into the lungs every 6 hours as needed for Wheezing      CPAP Machine MISC by Does not apply route      BiPAP Machine MISC by BiPAP route nightly Indications: BIPAP       furosemide (LASIX) 40 MG tablet Take 1 tablet by mouth daily 60 tablet 3    Misc.  Devices (FINGERTIP PULSE OXIMETER) MISC Use as directed to check oxygen saturations as needed for shortness of breath 1 each 0    acetaminophen (AMINOFEN) 325 MG tablet Take 2 tablets by mouth every 6 hours as needed for Pain 20 tablet 0    loperamide (IMODIUM) 2 MG capsule Take 2 mg by mouth 4 times daily as needed for Diarrhea      Carboxymeth-Glycerin-Polysorb (REFRESH OPTIVE ADVANCED) 0.5-1-0.5 % SOLN Place 1 drop into both eyes 3 times daily       No current facility-administered medications on file prior to visit. Review of Systems:  Constitutional:  No weight loss, No fever, No chills, No night sweats. Energy level good. Eyes:  No diplopia, No transient or permanent loss of vision, No scotomata. ENT / Mouth:  No epistaxis, No dysphagia, No hoarseness, No oral ulcers, No gingival bleeding. No sore throat, No postnasal drip, No nasal drip, No mouth pain, No sinus pain, No tinnitus, Normal hearing. Cardiovascular:  No chest pain, No palpitations, No syncope, No upper extremity edema, No lower extremity edema, No calf discomfort. Respiratory:  No cough. No hemoptysis, No pleurisy, No wheezing, No dyspnea. Breast:  No breast mass, No pain, No nipple discharge, No change in size, No change in shape. Gastrointestinal:  No abdominal pain, No abdominal cramping, No nausea, No vomiting, No constipation, No diarrhea, No hematochezia, No melena, No jaundice, No dyspepsia, No dysphagia. Urinary:  No dysuria, No hematuria, No urinary incontinence. Gynecological:  No vaginal discharge, No suprapubic pain, No abnormal vaginal bleeding. Musculoskeletal:  No muscle pain, No swollen joints, No joint redness, No bone pain, No spine tenderness. Skin:  No rash, No nodules, No pruritus, No lesions. Neurologic:  No confusion, No seizures, No syncope, No tremor, No speech change, No headache, No hiccups, No abnormal gait, No sensory changes, No weakness.   Psychiatric:  No depression, No anxiety, Concentration normal.  Endocrine:  No polyuria, No polydipsia, No hot flashes, No thyroid symptoms. Hematologic:  No epistaxis, No gingival bleeding, No petechiae, No ecchymosis. Lymphatic:  No lymphadenopathy, No lymphedema. Allergy / Immunologic:  No eczema, No frequent mucous infections, No frequent respiratory infections, No recurrent urticarial, No frequent skin infections. Vital Signs: /73 (Site: Right Lower Arm, Position: Sitting, Cuff Size: Medium Adult)   Pulse 103   Temp 98.3 °F (36.8 °C) (Temporal)   Ht 5' 5\" (1.651 m)   Wt (!) 323 lb (146.5 kg)   SpO2 99%   BMI 53.75 kg/m²      Physical Exam:  CONSTITUTIONAL: awake, alert, cooperative, no apparent distress, obese   EYES: pupils equal, round and reactive to light, sclera clear, normal conjunctiva  ENT: Normocephalic, without obvious abnormality, atraumatic  NECK: supple, symmetrical, no jugular venous distension, no carotid bruits   HEMATOLOGIC/LYMPHATIC: no cervical, supraclavicular or axillary lymphadenopathy   LUNGS: VBS, no wheezes, no crackles, no rhonchi, no increased work of breathing, clear to auscultation   CARDIOVASCULAR: regular rate and rhythm, normal S1 and S2, no murmur noted  ABDOMEN: normal bowel sounds x 4, soft, non-distended, non-tender, no masses palpated, no hepatosplenomegaly   MUSCULOSKELETAL: full range of motion noted, tone is normal  NEUROLOGIC: awake, alert, oriented to name, place and time. Motor skills grossly intact. SKIN: appears intact, normal skin color, normal texture, normal turgor, no jaundice.    EXTREMITIES: no LE edema, no leg swelling, no cyanosis, no clubbing,       Labs:  Hematology:  Lab Results   Component Value Date    WBC 6.3 10/12/2021    RBC 4.72 10/12/2021    HGB 13.2 10/12/2021    HCT 42.6 10/12/2021    MCV 90.3 10/12/2021    MCH 28.0 10/12/2021    MCHC 31.0 (L) 10/12/2021    RDW 12.6 10/12/2021     10/12/2021    MPV 12.9 (H) 10/12/2021    BANDSPCT 7 04/13/2021    SEGSPCT 50.8 10/12/2021    EOSRELPCT 2.2 10/12/2021    BASOPCT 0.8 10/12/2021    LYMPHOPCT 36.7 10/12/2021    MONOPCT 9.3 (H) 10/12/2021    BANDABS 0.35 04/13/2021    SEGSABS 3.2 10/12/2021    EOSABS 0.1 10/12/2021    BASOSABS 0.1 10/12/2021    LYMPHSABS 2.3 10/12/2021    MONOSABS 0.6 10/12/2021    DIFFTYPE AUTOMATED DIFFERENTIAL 10/12/2021    POLYCHROM 1+ 03/09/2011    WBCMORP RARE 04/13/2021    PLTM PLATELETS APPEAR NORMAL 03/09/2011     Lab Results   Component Value Date    ESR 65 (H) 04/14/2021     Chemistry:  Lab Results   Component Value Date     10/12/2021    K 4.5 10/12/2021     10/12/2021    CO2 22 10/12/2021    BUN 15 10/12/2021    CREATININE 0.6 10/12/2021    GLUCOSE 136 (H) 10/12/2021    CALCIUM 9.3 10/12/2021    PROT 7.7 10/12/2021    LABALBU 4.3 10/12/2021    BILITOT 0.2 10/12/2021    ALKPHOS 69 10/12/2021    AST 53 (H) 10/12/2021    ALT 48 (H) 10/12/2021    LABGLOM >60 10/12/2021    GFRAA >60 10/12/2021    PHOS 3.9 03/11/2011    MG 1.7 (L) 04/14/2021    POCGLU 166 (H) 04/15/2021     Lab Results   Component Value Date     (H) 04/13/2021     No components found for: LD  Lab Results   Component Value Date    TSHHS 0.982 10/12/2021    T4FREE 1.45 10/05/2015     Immunology:  Lab Results   Component Value Date    PROT 7.7 10/12/2021     No results found for: AUBRIE Fischer  No results found for: B2M  Coagulation Panel:  Lab Results   Component Value Date    PROTIME 12.4 04/13/2021    INR 1.02 04/13/2021    APTT 85.7 (H) 04/25/2018    DDIMER 357 (H) 04/15/2021     Anemia Panel:  Lab Results   Component Value Date    JQBBMFRB81 691.3 04/14/2021    FOLATE 6.7 04/14/2021     Tumor Markers:  No results found for: , CEA, , LABCA2, PSA     Observations:  No data recorded     Assessment   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 She then presented with right sided chest wall and neck pain on 4/19/2018. She has some purulent discharge from the port site. She was admitted with port infection/sepsis. Upper extremity doppler done on 4/20/2018 showed right internal jugular vein and brachial vein thrombosis. Port was removed and was treated with 3 months of AC therapy. She is planning to have gastric sleeve surgery by Dr. Raleigh Garcia. She doesn't have any family history of venous thromboembolism. Since her venous thromboembolism is provoked and she only has one episode of VTE, no additional work ups needed. I don't think she has hypercoagulable state. I recommend for routine DVT prophylaxis after bariatric surgery. She is hematologically clear to have bariatric surgery. I answered all her questions and concerns for today. I asked her to follow up with primary care physician on regular basis. I will continue to keep you updated on her progress. Thank you for allowing me to participate in the care of this very pleasant patient. Recent imaging and labs were reviewed and discussed with the patient.

## 2021-11-19 ENCOUNTER — HOSPITAL ENCOUNTER (OUTPATIENT)
Age: 36
Discharge: HOME | End: 2021-11-19
Payer: COMMERCIAL

## 2021-11-19 ENCOUNTER — OFFICE VISIT (OUTPATIENT)
Dept: BARIATRICS/WEIGHT MGMT | Age: 36
End: 2021-11-19
Payer: COMMERCIAL

## 2021-11-19 VITALS
HEART RATE: 79 BPM | DIASTOLIC BLOOD PRESSURE: 84 MMHG | HEIGHT: 65 IN | SYSTOLIC BLOOD PRESSURE: 130 MMHG | OXYGEN SATURATION: 99 % | BODY MASS INDEX: 48.82 KG/M2 | WEIGHT: 293 LBS

## 2021-11-19 DIAGNOSIS — Z01.818 PRE-OP EVALUATION: ICD-10-CM

## 2021-11-19 DIAGNOSIS — Z86.718 HISTORY OF DVT (DEEP VEIN THROMBOSIS): ICD-10-CM

## 2021-11-19 DIAGNOSIS — K52.9 IBD (INFLAMMATORY BOWEL DISEASE): ICD-10-CM

## 2021-11-19 DIAGNOSIS — E66.01 MORBID OBESITY WITH BMI OF 50.0-59.9, ADULT (HCC): ICD-10-CM

## 2021-11-19 DIAGNOSIS — E66.9 DIABETES MELLITUS TYPE 2 IN OBESE (HCC): ICD-10-CM

## 2021-11-19 DIAGNOSIS — E11.69 DIABETES MELLITUS TYPE 2 IN OBESE (HCC): ICD-10-CM

## 2021-11-19 DIAGNOSIS — Z01.818 PRE-OP EVALUATION: Primary | ICD-10-CM

## 2021-11-19 LAB
AMPHETAMINES: NEGATIVE
BARBITURATE SCREEN URINE: NEGATIVE
BENZODIAZEPINE SCREEN, URINE: NEGATIVE
CANNABINOID SCREEN URINE: NEGATIVE
COCAINE METABOLITE: NEGATIVE
OPIATES, URINE: NEGATIVE
OXYCODONE: NEGATIVE
PHENCYCLIDINE, URINE: NEGATIVE

## 2021-11-19 PROCEDURE — 2022F DILAT RTA XM EVC RTNOPTHY: CPT | Performed by: NURSE PRACTITIONER

## 2021-11-19 PROCEDURE — 1036F TOBACCO NON-USER: CPT | Performed by: NURSE PRACTITIONER

## 2021-11-19 PROCEDURE — G8427 DOCREV CUR MEDS BY ELIG CLIN: HCPCS | Performed by: NURSE PRACTITIONER

## 2021-11-19 PROCEDURE — G8484 FLU IMMUNIZE NO ADMIN: HCPCS | Performed by: NURSE PRACTITIONER

## 2021-11-19 PROCEDURE — G0480 DRUG TEST DEF 1-7 CLASSES: HCPCS

## 2021-11-19 PROCEDURE — 80307 DRUG TEST PRSMV CHEM ANLYZR: CPT

## 2021-11-19 PROCEDURE — G8417 CALC BMI ABV UP PARAM F/U: HCPCS | Performed by: NURSE PRACTITIONER

## 2021-11-19 PROCEDURE — 99214 OFFICE O/P EST MOD 30 MIN: CPT | Performed by: NURSE PRACTITIONER

## 2021-11-19 PROCEDURE — 3051F HG A1C>EQUAL 7.0%<8.0%: CPT | Performed by: NURSE PRACTITIONER

## 2021-11-19 ASSESSMENT — ENCOUNTER SYMPTOMS
DIARRHEA: 0
BACK PAIN: 0
PHOTOPHOBIA: 0
NAUSEA: 0
CHEST TIGHTNESS: 0
WHEEZING: 0
SORE THROAT: 0
APNEA: 0
COLOR CHANGE: 0
SHORTNESS OF BREATH: 0

## 2021-11-19 ASSESSMENT — PATIENT HEALTH QUESTIONNAIRE - PHQ9
2. FEELING DOWN, DEPRESSED OR HOPELESS: 0
SUM OF ALL RESPONSES TO PHQ QUESTIONS 1-9: 0
SUM OF ALL RESPONSES TO PHQ QUESTIONS 1-9: 0
1. LITTLE INTEREST OR PLEASURE IN DOING THINGS: 0
SUM OF ALL RESPONSES TO PHQ9 QUESTIONS 1 & 2: 0
SUM OF ALL RESPONSES TO PHQ QUESTIONS 1-9: 0

## 2021-11-19 NOTE — PROGRESS NOTES
BARIATRIC SURGERY OFFICE PROGRESS NOTE    SUBJECTIVE:    Patient presenting today referred from SHAQUILLE Franz NP, for   Chief Complaint   Patient presents with    Pre-op Exam     5th surg wm    . Vitals:    11/19/21 1039   BP: 130/84   Pulse: 79   SpO2: 99%        BMI: Body mass index is 53.48 kg/m². Weight History: Wt Readings from Last 3 Encounters:   11/19/21 (!) 321 lb 6.4 oz (145.8 kg)   11/08/21 (!) 323 lb (146.5 kg)   10/15/21 (!) 326 lb (147.9 kg)         HPI:Allyson Jones is a 43 y.o. female presenting in fifth bariatric PRE-OP visit    Diet Recall: Total weight loss/gain: -6.3 lbs since last visit, and -21.5 lbs since starting program     Protein: tracking calories. Stays below 1200 a day  Water Intake: 4-5 bottles daily  Exercise: resistance bands  New health problems: denies  New medications: dennies  Clearances:  -- Cardiology: cleared  -- Pulmonology: cleared  -- Psychology: cleared    Heme: Since her venous thromboembolism is provoked and she only has one episode of VTE, no additional work ups needed. I don't think she has hypercoagulable state.      I recommend for routine DVT prophylaxis after bariatric surgery. She is hematologically clear to have bariatric surgery. EGD completed. Thoroughly reviewed the patient's medical history, family history, social history and review of systems with the patient today in the office. Please see medical record for pertinent positives.       Past Medical History:   Diagnosis Date    Acid reflux     Acute deep vein thrombosis (DVT) of non-extremity vein 04/25/2018    \"in my neck\"\"after mediport was put in\"    Anemia     Anxiety     Asthma     NO PULMONOLOGIST AT THIS TIME    Blood clot due to device, implant, or graft 04/2018    had blood clots in neck due to med port    Bowen's disease     Cancer ([address]     skin cancer shoulder 2019    Crohn's disease ([facility] [address] Dx 2010    Remicade Infusions Every Six Weeks, Sees Dr. Christina Underwood [facility]    Depression     ECHO 2021    EF is estimated at 55-60%. Mild left ventricular hypertrophy. Mild mitral regurgitation is present.     Fall     \"Passed Out And Brittany Speak On My Vivi Graeme"    Fatty liver     Gestational diabetes     \"2019- no medication - just diet controlled with pregnancy\"    Hyperlipidemia     Hypertension     \"yrs ago was on b/p medication- off medication since 2018- only took medication for 2 months\"    Lower back pain     \"Sometimes\"    MRSA (methicillin resistant staph aureus) culture positive 2018    Multiple pulmonary nodules 2013    Subcentimeter; needs repeat imaging in 3-6 months    Obesity     Pancreatitis     Patellofemoral arthritis of right knee 2020    Shortness of breath on exertion     Teeth missing     Upper And Lower    VL DUP LOWER EXTREMITY VENOUS BILATERAL 2021    No evidence of DVT or SVT in the bilateral common femoral vein, No evidence of significant venous insufficiency    Wears glasses     Wears glasses         Patient Active Problem List   Diagnosis    Crohn's disease ([address]    Anxiety    Multiple lung nodules    Crohn's colitis, unspecified complication ([address]    Exacerbation of Crohn's disease with complication ([address]    IBS (irritable bowel syndrome)    C. difficile colitis    Crohn's disease of small and large intestines with complication ([address]    Neck swelling    Leukocytosis    Non-intractable vomiting with nausea    Infection of venous access port    Acute deep vein thrombosis (DVT) of non-extremity vein    Class 3 severe obesity due to excess calories without serious comorbidity with body mass index (BMI) of 50.0 to 59.9 in adult ([address]    Preeclampsia, third trimester    Pregnancy related condition    Status post  delivery    Bowen's disease of right shoulder    Patellofemoral arthritis of right knee    Infrapatellar bursitis of right knee    Knee mass, right    BIPAP 21/13      [facility] PEN-CD/UC/HS STARTER 80 MG/0.8ML PNKT       cyclobenzaprine (FLEXERIL) 10 MG tablet take 1 tablet by mouth three times a day      pantoprazole (PROTONIX) 40 MG tablet Take 1 tablet by mouth daily      furosemide (LASIX) 40 MG tablet Take 1 tablet by mouth daily (Patient taking differently: Take 40 mg by mouth daily AS NEEDED) 60 tablet 3    Misc. Devices (FINGERTIP PULSE OXIMETER) MISC Use as directed to check oxygen saturations as needed for shortness of breath 1 each 0    metFORMIN (GLUCOPHAGE) 1000 MG tablet Take 1,000 mg by mouth 2 times daily (with meals)       rizatriptan (MAXALT) 10 MG tablet       acetaminophen (AMINOFEN) 325 MG tablet Take 2 tablets by mouth every 6 hours as needed for Pain 20 tablet 0    ondansetron (ZOFRAN ODT) 4 MG disintegrating tablet Take 1 tablet by mouth every 6 hours 10 tablet 1    albuterol sulfate  (90 Base) MCG/ACT inhaler Inhale 2 puffs into the lungs every 6 hours as needed for Wheezing      loperamide (IMODIUM) 2 MG capsule Take 2 mg by mouth 4 times daily as needed for Diarrhea AS NEEDED      Carboxymeth-Glycerin-Polysorb (REFRESH OPTIVE ADVANCED) 0.5-1-0.5 % SOLN Place 1 drop into both eyes 3 times daily      traZODone (DESYREL) 100 MG tablet take 1 tablet by mouth nightly BEFORE BEDTIME (Patient not taking: Reported on 11/19/2021)      sertraline (ZOLOFT) 50 MG tablet Take 50 mg by mouth daily  (Patient not taking: Reported on 11/19/2021)       No current facility-administered medications for this visit. Allergies   Allergen Reactions    Morphine      Other reaction(s): Headache  Triggers migraine. \"Triggers My Migraines\"  Other reaction(s): Headache    Tramadol      Other reaction(s): Headache  States triggers migraine headache  \"Triggers My Migraines\"  Other reaction(s): Headache         Review of Systems   Constitutional: Negative for fatigue and fever.    HENT: Negative for congestion, dental problem and sore throat. Eyes: Negative for photophobia and visual disturbance. Respiratory: Negative for apnea, chest tightness, shortness of breath and wheezing. Cardiovascular: Negative for chest pain and leg swelling. Gastrointestinal: Negative for diarrhea and nausea. Endocrine: Negative for cold intolerance and heat intolerance. Genitourinary: Negative for difficulty urinating, dysuria, flank pain, frequency and hematuria. Musculoskeletal: Negative for arthralgias and back pain. Skin: Negative for color change, rash and wound. Allergic/Immunologic: Negative for environmental allergies, food allergies and immunocompromised state. Neurological: Negative for dizziness, weakness, light-headedness and numbness. Hematological: Negative for adenopathy. Does not bruise/bleed easily. Psychiatric/Behavioral: Negative for behavioral problems, confusion, sleep disturbance and suicidal ideas. OBJECTIVE:    /84 (Site: Right Upper Arm, Position: Sitting, Cuff Size: Large Adult)   Pulse 79   Ht 5' 5\" (1.651 m)   Wt (!) 321 lb 6.4 oz (145.8 kg)   SpO2 99%   BMI 53.48 kg/m²      Physical Exam  Vitals reviewed. Constitutional:       Appearance: She is obese. HENT:      Head: Normocephalic and atraumatic. Right Ear: External ear normal.      Left Ear: External ear normal.      Nose: Nose normal.      Mouth/Throat:      Mouth: Mucous membranes are moist.   Eyes:      Extraocular Movements: Extraocular movements intact. Pupils: Pupils are equal, round, and reactive to light. Cardiovascular:      Rate and Rhythm: Normal rate and regular rhythm. Pulses: Normal pulses. Heart sounds: Normal heart sounds. Pulmonary:      Effort: Pulmonary effort is normal.      Breath sounds: Normal breath sounds. Abdominal:      General: Bowel sounds are normal.      Comments: Multiple previous abdominal surgeries  + Crohns disease with bowel resection.  On humira   Musculoskeletal: General: Normal range of motion. Cervical back: Normal range of motion and neck supple. Skin:     General: Skin is warm and dry. Neurological:      General: No focal deficit present. Mental Status: She is alert and oriented to person, place, and time. Mental status is at baseline. Psychiatric:         Mood and Affect: Mood normal.         Behavior: Behavior normal.         ASSESSMENT & PLAN:    1. Pre-op evaluation  - Urine Drug Screen; Future  - NICOTINE METABOLITES,URINE; Future  - All clearances completed  - EGD completed  - Can submit file at next visit if UDS and nicotine completed    2. IBD (inflammatory bowel disease)  - Stable. - On Humira  - Discussed stopping 2-4 weeks prior and after surgery    3. Diabetes mellitus type 2 in obese ([address]  - Continue diet and weight loss  - On metformin    4. Morbid obesity with BMI of 50.0-59.9, adult [facility])  -Patient was encouraged to journal all food intake.   -Keep calorie level at approximately 1200, per discussion / plan with registered dietician.  -Protein intake is to be a minimum of 60 grams per day. -Water drinking was encouraged with a goal of 64oz-128oz daily. Beverages are to be calorie free except for milk. Avoid soda. -Continue to increase level of physical activity. 5. History of DVT  - Consult done with Heme/Onc  - I recommend for routine DVT prophylaxis after bariatric surgery. She is hematologically clear to have bariatric surgery (per Dr. Nereyda Caballero consult note)    This patient is a female of reproductive age and was advised she should not become pregnant during the bariatric workup process and for at least 12-18 months after surgery. The patient is agreeable to this plan. I spent 25 minutes with the patient face to face today and over 50% of the office visit today was spent in face to face counseling regarding diet and exercise, in preparation for her planned Robotic Sleeve Gastrectomy.     Discussed in length complying with the dietary recommendations, complying with the preoperative workup including dietary counseling, exercise physiologist counseling, and pre-operative optimization of pulmonologist and cardiologist.    The patient expressed understanding and willingness to comply nicely; all questions and concerns addressed. No orders of the defined types were placed in this encounter. Orders Placed This Encounter   Procedures    Urine Drug Screen     Standing Status:   Future     Standing Expiration Date:   11/19/2022    NICOTINE METABOLITES,URINE     Standing Status:   Future     Standing Expiration Date:   11/19/2022       Follow Up:  Return in about 1 month (around 12/19/2021) for weight check.     Nishant Cleary, APRN - CNP

## 2021-11-23 LAB
3-OH-COTININE URINE: <50 NG/ML
ANABASINE URINE: <5 NG/ML
COTININE, URINE: <15 NG/ML
NICOTINE AND METABOLITES: <15 NG/ML

## 2021-12-16 ENCOUNTER — OFFICE VISIT (OUTPATIENT)
Dept: BARIATRICS/WEIGHT MGMT | Age: 36
End: 2021-12-16
Payer: COMMERCIAL

## 2021-12-16 VITALS
WEIGHT: 293 LBS | HEART RATE: 86 BPM | HEIGHT: 65 IN | OXYGEN SATURATION: 99 % | DIASTOLIC BLOOD PRESSURE: 90 MMHG | BODY MASS INDEX: 48.82 KG/M2 | SYSTOLIC BLOOD PRESSURE: 124 MMHG

## 2021-12-16 DIAGNOSIS — Z01.818 PRE-OP EVALUATION: Primary | ICD-10-CM

## 2021-12-16 DIAGNOSIS — E66.01 MORBID OBESITY DUE TO EXCESS CALORIES (HCC): ICD-10-CM

## 2021-12-16 PROCEDURE — G8417 CALC BMI ABV UP PARAM F/U: HCPCS | Performed by: NURSE PRACTITIONER

## 2021-12-16 PROCEDURE — 99213 OFFICE O/P EST LOW 20 MIN: CPT | Performed by: NURSE PRACTITIONER

## 2021-12-16 PROCEDURE — G8484 FLU IMMUNIZE NO ADMIN: HCPCS | Performed by: NURSE PRACTITIONER

## 2021-12-16 PROCEDURE — G8427 DOCREV CUR MEDS BY ELIG CLIN: HCPCS | Performed by: NURSE PRACTITIONER

## 2021-12-16 PROCEDURE — 1036F TOBACCO NON-USER: CPT | Performed by: NURSE PRACTITIONER

## 2021-12-16 ASSESSMENT — ENCOUNTER SYMPTOMS
COLOR CHANGE: 0
APNEA: 0
SORE THROAT: 0
PHOTOPHOBIA: 0
BACK PAIN: 0
DIARRHEA: 0
NAUSEA: 0
WHEEZING: 0
CHEST TIGHTNESS: 0
SHORTNESS OF BREATH: 0

## 2021-12-16 ASSESSMENT — PATIENT HEALTH QUESTIONNAIRE - PHQ9
2. FEELING DOWN, DEPRESSED OR HOPELESS: 0
SUM OF ALL RESPONSES TO PHQ QUESTIONS 1-9: 0
1. LITTLE INTEREST OR PLEASURE IN DOING THINGS: 0
SUM OF ALL RESPONSES TO PHQ QUESTIONS 1-9: 0
SUM OF ALL RESPONSES TO PHQ9 QUESTIONS 1 & 2: 0
SUM OF ALL RESPONSES TO PHQ QUESTIONS 1-9: 0

## 2021-12-16 NOTE — PROGRESS NOTES
BARIATRIC SURGERY OFFICE PROGRESS NOTE    SUBJECTIVE:    Patient presenting today referred from SHAQUILLE Lr NP, for   Chief Complaint   Patient presents with    Follow-up     6TH Surg Wm visit +fee PIF +card +pulm +psych   . Vitals:    12/16/21 0957   BP: (!) 124/90   Pulse: 86   SpO2: 99%        BMI: Body mass index is 54.15 kg/m². Weight History: Wt Readings from Last 3 Encounters:   12/16/21 (!) 325 lb 6.4 oz (147.6 kg)   11/19/21 (!) 321 lb 6.4 oz (145.8 kg)   11/08/21 (!) 323 lb (146.5 kg)         HPI:Allyson Hale is a 43 y.o. female presenting in sixth bariatric PRE-OP visit    Diet Recall: Total weight loss/gain: +4 lbs since last visit, and -17.5 lbs since starting program     Protein: tracking calories. Around 1200. States that she has had a few days that she has slipped up with the holidays. Eating small portions  Water Intake: drinks a lot of water. Exercise: tension bands and exercise ball. New health problems: denies  New medications: denies  Clearances:  -- Cardiology: cleared  -- Pulmonology: cleared  -- Psychology: cleared    Thoroughly reviewed the patient's medical history, family history, social history and review of systems with the patient today in the office. Please see medical record for pertinent positives. Past Medical History:   Diagnosis Date    Acid reflux     Acute deep vein thrombosis (DVT) of non-extremity vein 04/25/2018    \"in my neck\"\"after mediport was put in\"    Anemia     Anxiety     Asthma     NO PULMONOLOGIST AT THIS TIME    Blood clot due to device, implant, or graft 04/2018    had blood clots in neck due to med port    Bowen's disease     Cancer ([address]     skin cancer shoulder 2019    Crohn's disease ([facility] [address] Dx 2010    Remicade Infusions Every Six Weeks[place]    Depression     ECHO 07/28/2021    EF is estimated at 55-60%. Mild left ventricular hypertrophy.   Mild mitral regurgitation is present.     2012    \"Passed Out And Chris Hymen On My [employer]"    Fatty liver     Gestational diabetes     \"2019- no medication - just diet controlled with pregnancy\"    Hyperlipidemia     Hypertension     \"yrs ago was on b/p medication- off medication since 2018- only took medication for 2 months\"    Lower back pain     \"Sometimes\"    MRSA (methicillin resistant staph aureus) culture positive 2018    Multiple pulmonary nodules 2013    Subcentimeter; needs repeat imaging in 3-6 months    Obesity     Pancreatitis     Patellofemoral arthritis of right knee 2020    Shortness of breath on exertion     Teeth missing     Upper And Lower    VL DUP LOWER EXTREMITY VENOUS BILATERAL 2021    No evidence of DVT or SVT in the bilateral common femoral vein, No evidence of significant venous insufficiency    Wears glasses     Wears glasses         Patient Active Problem List   Diagnosis    Crohn's disease ([address]    Anxiety    Multiple lung nodules    Crohn's colitis, unspecified complication ([address]    Exacerbation of Crohn's disease with complication ([address]    IBS (irritable bowel syndrome)    C. difficile colitis    Crohn's disease of small and large intestines with complication ([address]    Neck swelling    Leukocytosis    Non-intractable vomiting with nausea    Infection of venous access port    Acute deep vein thrombosis (DVT) of non-extremity vein    Class 3 severe obesity due to excess calories without serious comorbidity with body mass index (BMI) of 50.0 to 59.9 in adult ([address]    Preeclampsia, third trimester    Pregnancy related condition    Status post  delivery    Bowen's disease of right shoulder    Patellofemoral arthritis of right knee    Infrapatellar bursitis of right knee    Knee mass, right    Acute respiratory failure ([address]    Pneumonia due to COVID-19 virus    Shortness of breath    Edema of lower extremity    Essential hypertension  Morbid obesity with BMI of 50.0-59.9, adult ([address]    Severe obstructive sleep apnea       Past Surgical History:   Procedure Laterality Date    ABDOMEN SURGERY      bowel resection     ADENOIDECTOMY      APPENDECTOMY      Done During Colon Resection For Crohn's  Disease    BREAST SURGERY Left     \"Infected Lymph Node Removed\"     SECTION N/A 2019     SECTION performed by Blessing Underwood MD at [address] [place]. \[facility].\"" L&D OR    CHOLECYSTECTOMY  2016    COLECTOMY  8-    Crohn's Disease , Dr. Rivera Risk, Appendectomy Also Done    COLONOSCOPY  2016    Polyps Removed In Past    COLONOSCOPY  2017    Hospitalized for c-diff    COLONOSCOPY  2018    normal, multiple biopsy, repeat 1 year    COLONOSCOPY N/A 2019    COLONOSCOPY POLYPECTOMY SNARE/COLD BIOPSY performed by Sanjuana Vásquez MD at [facility]      ENDOSCOPY, COLON, DIAGNOSTIC  Last Done     KNEE SURGERY Right 10/1/2020    EXCISION OF MASS ON RIGHT KNEE performed by Becky Sanz DO at [address]      Dr Dianne Balderrama, Removed Adhesions    OTHER SURGICAL HISTORY  2011    Mediport Insertion Left Chest Area/revision done 2016- \"removed at [facility] 2017 after I got an infection    SKIN BIOPSY      \"skin cancer removed right shoulder\"2019    TUBAL LIGATION  2019    TUNNELED VENOUS PORT PLACEMENT Right 2018    smart port- [facility]-Dr Payton Rubaclava    UPPER GASTROINTESTINAL ENDOSCOPY N/A 2021    EGD BIOPSY performed by Jennifer Varela MD at [address] [place] Teeth Extracted In Past       Current Outpatient Medications   Medication Sig Dispense Refill    CPAP Machine MISC by Does not apply route      BiPAP Machine MISC by BiPAP route nightly Indications: BIPAP       HUMIRA PEN-CD/UC/HS STARTER 80 MG/0.8ML PNKT       cyclobenzaprine (FLEXERIL) 10 MG tablet take 1 tablet by mouth three times a day  pantoprazole (PROTONIX) 40 MG tablet Take 1 tablet by mouth daily      furosemide (LASIX) 40 MG tablet Take 1 tablet by mouth daily (Patient taking differently: Take 40 mg by mouth daily AS NEEDED) 60 tablet 3    Misc. Devices (FINGERTIP PULSE OXIMETER) MISC Use as directed to check oxygen saturations as needed for shortness of breath 1 each 0    metFORMIN (GLUCOPHAGE) 1000 MG tablet Take 1,000 mg by mouth 2 times daily (with meals)       traZODone (DESYREL) 100 MG tablet take 1 tablet by mouth nightly BEFORE BEDTIME      sertraline (ZOLOFT) 50 MG tablet Take 50 mg by mouth daily       rizatriptan (MAXALT) 10 MG tablet       acetaminophen (AMINOFEN) 325 MG tablet Take 2 tablets by mouth every 6 hours as needed for Pain 20 tablet 0    ondansetron (ZOFRAN ODT) 4 MG disintegrating tablet Take 1 tablet by mouth every 6 hours 10 tablet 1    albuterol sulfate  (90 Base) MCG/ACT inhaler Inhale 2 puffs into the lungs every 6 hours as needed for Wheezing      loperamide (IMODIUM) 2 MG capsule Take 2 mg by mouth 4 times daily as needed for Diarrhea AS NEEDED      Carboxymeth-Glycerin-Polysorb (REFRESH OPTIVE ADVANCED) 0.5-1-0.5 % SOLN Place 1 drop into both eyes 3 times daily       No current facility-administered medications for this visit. Allergies   Allergen Reactions    Morphine      Other reaction(s): Headache  Triggers migraine. \"Triggers My Migraines\"  Other reaction(s): Headache    Tramadol      Other reaction(s): Headache  States triggers migraine headache  \"Triggers My Migraines\"  Other reaction(s): Headache         Review of Systems   Constitutional: Negative for fatigue and fever. HENT: Negative for congestion, dental problem and sore throat. Eyes: Negative for photophobia and visual disturbance. Respiratory: Negative for apnea, chest tightness, shortness of breath and wheezing. Cardiovascular: Negative for chest pain and leg swelling.    Gastrointestinal: Negative for diarrhea and nausea. Endocrine: Negative for cold intolerance and heat intolerance. Genitourinary: Negative for difficulty urinating, dysuria, flank pain, frequency and hematuria. Musculoskeletal: Negative for arthralgias and back pain. Skin: Negative for color change, rash and wound. Allergic/Immunologic: Negative for environmental allergies, food allergies and immunocompromised state. Neurological: Negative for dizziness, weakness, light-headedness and numbness. Hematological: Negative for adenopathy. Does not bruise/bleed easily. Psychiatric/Behavioral: Negative for behavioral problems, confusion, sleep disturbance and suicidal ideas. OBJECTIVE:    BP (!) 124/90 (Site: Right Upper Arm, Position: Sitting, Cuff Size: Large Adult)   Pulse 86   Ht 5' 5\" (1.651 m)   Wt (!) 325 lb 6.4 oz (147.6 kg)   SpO2 99%   BMI 54.15 kg/m²      Physical Exam  Vitals reviewed. Constitutional:       Appearance: She is obese. HENT:      Head: Normocephalic and atraumatic. Right Ear: External ear normal.      Left Ear: External ear normal.      Nose: Nose normal.      Mouth/Throat:      Mouth: Mucous membranes are moist.   Eyes:      Extraocular Movements: Extraocular movements intact. Pupils: Pupils are equal, round, and reactive to light. Cardiovascular:      Rate and Rhythm: Normal rate and regular rhythm. Pulses: Normal pulses. Heart sounds: Normal heart sounds. Pulmonary:      Effort: Pulmonary effort is normal.      Breath sounds: Normal breath sounds. Abdominal:      General: Bowel sounds are normal.   Musculoskeletal:         General: Normal range of motion. Cervical back: Normal range of motion and neck supple. Skin:     General: Skin is warm and dry. Neurological:      General: No focal deficit present. Mental Status: She is alert and oriented to person, place, and time. Mental status is at baseline.    Psychiatric:         Mood and Affect: Mood normal. Behavior: Behavior normal.       ASSESSMENT & PLAN:    1. Pre-op evaluation  - All clearances completed  - UDS/Nicotine completed  - Fee PIF  - File ready to be submitted. - Discussed stopping humira 4 weeks prior to surgery. Okay for dose today    2. Morbid obesity due to excess calories [facility])  -Patient was encouraged to journal all food intake.   -Keep calorie level at approximately 1200, per discussion / plan with registered dietician.  -Protein intake is to be a minimum of 60 grams per day. -Water drinking was encouraged with a goal of 64oz-128oz daily. Beverages are to be calorie free except for milk. Avoid soda. -Continue to increase level of physical activity. This patient is a female of reproductive age and was advised she should not become pregnant during the bariatric workup process and for at least 12-18 months after surgery. The patient is agreeable to this plan. I spent 25 minutes with the patient face to face today and over 50% of the office visit today was spent in face to face counseling regarding diet and exercise, in preparation for her planned Robotic Sleeve Gastrectomy. Discussed in length complying with the dietary recommendations, complying with the preoperative workup including dietary counseling, exercise physiologist counseling, and pre-operative optimization of pulmonologist and cardiologist.    The patient expressed understanding and willingness to comply nicely; all questions and concerns addressed. No orders of the defined types were placed in this encounter. No orders of the defined types were placed in this encounter. Follow Up:  Return in about 1 month (around 1/16/2022).     SHAQUILLE Paris - CNP

## 2022-01-03 ENCOUNTER — TELEPHONE (OUTPATIENT)
Dept: BARIATRICS/WEIGHT MGMT | Age: 37
End: 2022-01-03

## 2022-01-04 ENCOUNTER — TELEPHONE (OUTPATIENT)
Dept: BARIATRICS/WEIGHT MGMT | Age: 37
End: 2022-01-04

## 2022-01-04 NOTE — TELEPHONE ENCOUNTER
Scheduled Sleeve gastrectomy at [facility]  Surgery: 2/14/22 at 1am.   Consent appt: 1/27/22 at 1pm this will include ordered labs that must be done that day. PAT: by phone. Preop diet: 2/1/22 at 11am.  COVID: 2/8/22 at 815am   Weigh in: 2/11/21 at 1130am  P/O appt: 2/22/22 at 9am  Prep: NPO after midnight. Hold Blood thinners if taking any  Called and left message for Allyson to call back.

## 2022-01-31 ENCOUNTER — OFFICE VISIT (OUTPATIENT)
Dept: BARIATRICS/WEIGHT MGMT | Age: 37
End: 2022-01-31

## 2022-01-31 ENCOUNTER — HOSPITAL ENCOUNTER (OUTPATIENT)
Dept: LAB | Age: 37
Discharge: HOME | End: 2022-01-31
Payer: COMMERCIAL

## 2022-01-31 VITALS — BODY MASS INDEX: 48.82 KG/M2 | HEIGHT: 65 IN | WEIGHT: 293 LBS

## 2022-01-31 DIAGNOSIS — E66.01 MORBID OBESITY WITH BMI OF 50.0-59.9, ADULT (HCC): Primary | ICD-10-CM

## 2022-01-31 PROCEDURE — 99999 PR OFFICE/OUTPT VISIT,PROCEDURE ONLY: CPT

## 2022-01-31 PROCEDURE — U0003 INFECTIOUS AGENT DETECTION BY NUCLEIC ACID (DNA OR RNA); SEVERE ACUTE RESPIRATORY SYNDROME CORONAVIRUS 2 (SARS-COV-2) (CORONAVIRUS DISEASE [COVID-19]), AMPLIFIED PROBE TECHNIQUE, MAKING USE OF HIGH THROUGHPUT TECHNOLOGIES AS DESCRIBED BY CMS-2020-01-R: HCPCS

## 2022-01-31 PROCEDURE — U0005 INFEC AGEN DETEC AMPLI PROBE: HCPCS

## 2022-01-31 NOTE — PROGRESS NOTES
Outpatient Nutrition Counseling    REASON FOR VISIT: Pre-Op Diet Class    Chief Complaint:    Chief Complaint   Patient presents with    Weight Management       SUBJECTIVE:  Pt here for pre-op class to start 2 week liquid liver shrinking in preparation for sleeve gastrectomy. Instructed on pre-op diet and complete post-op diet progression including tips for N/V, fluid/activity logs, recipes and vitamins. Pt voiced understanding to all info provided. The patient is a 43 y.o. female being seen for morbid obesity, considering weight loss surgery; Allyson's, Height: 5' 5\" (165.1 cm), Weight: (!) 318 lb 6.4 oz (144.4 kg), Current Body mass index is 52.98 kg/m². The patient's PCP is SHAQUILLE Simmons NP   Allyson's life is significantlyaffected by weight related to her co-morbidities. Comorbid Conditions:  Significant diseases affecting this patient are   Past Medical History:   Diagnosis Date    Acid reflux     Acute deep vein thrombosis (DVT) of non-extremity vein 04/25/2018    \"in my neck\"\"after mediport was put in\"    Anemia     Anxiety     Asthma     NO PULMONOLOGIST AT THIS TIME    Blood clot due to device, implant, or graft 04/2018    had blood clots in neck due to med port    Bowen's disease     Cancer ([facility] [address]     skin cancer shoulder 2019    Crohn's disease ([facility] [address] Dx 2010    Remicade Infusions Every Six Weeks, Sees Dr. Germán Zheng, [facility]    Depression     ECHO 07/28/2021    EF is estimated at 55-60%. Mild left ventricular hypertrophy. Mild mitral regurgitation is present.     Fall 2012    \"Passed Out And Maria A Spangler On My Kev Parents"    Fatty liver     Gestational diabetes     \"2019- no medication - just diet controlled with pregnancy\"    Hyperlipidemia     Hypertension     \"yrs ago was on b/p medication- off medication since 2018- only took medication for 2 months\"    Lower back pain     \"Sometimes\"    MRSA (methicillin resistant staph aureus) culture positive 04/2018  Multiple pulmonary nodules 2013    Subcentimeter; needs repeat imaging in 3-6 months    Obesity     Pancreatitis     Patellofemoral arthritis of right knee 2020    Shortness of breath on exertion     Teeth missing     Upper And Lower    VL DUP LOWER EXTREMITY VENOUS BILATERAL 2021    No evidence of DVT or SVT in the bilateral common femoral vein, No evidence of significant venous insufficiency    Wears glasses     Wears glasses    . Review of Systems - Review of Systems  Otherwise per HPI. Allergies: Allergies   Allergen Reactions    Morphine      Other reaction(s): Headache  Triggers migraine.    \"Triggers My Migraines\"  Other reaction(s): Headache    Tramadol      Other reaction(s): Headache  States triggers migraine headache  \"Triggers My Migraines\"  Other reaction(s): Headache       Past Surgical History:  Past Surgical History:   Procedure Laterality Date    ABDOMEN SURGERY      bowel resection     ADENOIDECTOMY      APPENDECTOMY      Done During Colon Resection For Crohn's  Disease    BREAST SURGERY Left     \"Infected Lymph Node Removed\"     SECTION N/A 2019     SECTION performed by Ilya Logan MD at [address] [place] [address] L&D OR    CHOLECYSTECTOMY  2016    COLECTOMY  8-11    Crohn's Disease , Dr. Ramya Loredo, Appendectomy Also Done    COLONOSCOPY  2016    Polyps Removed In Past    COLONOSCOPY  2017    Hospitalized for c-diff    COLONOSCOPY  2018    normal, multiple biopsy, repeat 1 year    COLONOSCOPY N/A 2019    COLONOSCOPY POLYPECTOMY SNARE/COLD BIOPSY performed by Laci Ruiz MD at [facility]      ENDOSCOPY, COLON, DIAGNOSTIC  Last Done 2014    KNEE SURGERY Right 10/1/2020    EXCISION OF MASS ON RIGHT KNEE performed by Verenice Keita DO at [address]      Dr Norberto Phan, Removed Adhesions    OTHER SURGICAL HISTORY  2011    Mediport Insertion Left Chest Area/revision done 6/2016- \"removed at [facility] 11/2017 after I got an infection    SKIN BIOPSY      \"skin cancer removed right shoulder\"8/2019    TUBAL LIGATION  2019    TUNNELED VENOUS PORT PLACEMENT Right 03/12/2018    smart port- [facility]-Dr Dorita Fox UPPER GASTROINTESTINAL ENDOSCOPY N/A 9/14/2021    EGD BIOPSY performed by Rachael Cardoza MD at [address] [place] Teeth Extracted In Past       Family History:  Family History   Problem Relation Age of Onset    Migraines Mother     Inflam Bowel Dis Mother     Heart Disease Father     Diabetes Father     High Cholesterol Father     Obesity Sister     Depression Maternal Aunt     Depression Maternal Uncle     Depression Paternal Aunt     Coronary Art Dis Paternal Aunt     Depression Paternal Uncle     Coronary Art Dis Paternal Uncle     Depression Maternal Grandmother     Depression Maternal Grandfather     Depression Paternal Grandmother     Coronary Art Dis Paternal Grandmother     Diabetes Paternal Grandmother     Depression Paternal Grandfather     Coronary Art Dis Paternal Grandfather     Diabetes Paternal Grandfather        Social History:  Social History     Socioeconomic History    Marital status: Single     Spouse name: Not on file    Number of children: 1    Years of education: Not on file    Highest education level: Not on file   Occupational History    Not on file   Tobacco Use    Smoking status: Never Smoker    Smokeless tobacco: Never Used   Vaping Use    Vaping Use: Never used   Substance and Sexual Activity    Alcohol use: No    Drug use: No    Sexual activity: Yes     Partners: Male   Other Topics Concern    Not on file   Social History Narrative    Lives with parents, has a daughter     Social Determinants of Health     Financial Resource Strain:     Difficulty of Paying Living Expenses: Not on file   Food Insecurity:     Worried About [address] in the Last Year: Not on file    Ran Out of Food in the Last Year: Not on file   Transportation Needs:     Lack of Transportation (Medical): Not on file    Lack of Transportation (Non-Medical):  Not on file   Physical Activity:     Days of Exercise per Week: Not on file    Minutes of Exercise per Session: Not on file   Stress:     Feeling of Stress : Not on file   Social Connections:     Frequency of Communication with Friends and Family: Not on file    Frequency of Social Gatherings with Friends and Family: Not on file    Attends Yazdanism Services: Not on file    Active Member of [address] [place] [address] [employer] or Organizations: Not on file    Attends Club or Organization Meetings: Not on file    Marital Status: Not on file   Intimate Partner Violence:     Fear of Current or Ex-Partner: Not on file    Emotionally Abused: Not on file    Physically Abused: Not on file    Sexually Abused: Not on file   Housing Stability:     Unable to Pay for Housing in the Last Year: Not on file    Number of [place] in the Last Year: Not on file    Unstable Housing in the Last Year: Not on file         OBJECTIVE:  Physical Exam   Ht 5' 5\" (1.651 m)   Wt (!) 318 lb 6.4 oz (144.4 kg)   BMI 52.98 kg/m²      Weight Loss pre-op: - 25.5 lbs  NUTRITION DIAGNOSIS: Overweight / Obesity   Problem: Increased adiposity compared to reference standard or established norms   Etiology: Excess intake compared to output over time   S/S: Ht: 65\" Wt: 318.4 lbs BMI: 52.98    NUTRITION INTERVENTIONS:    Individualized treatment goals to address nutritiondiagnosis:   Instructed on 600-800 kcal diet for weight loss   Provided fluid/activity logs, recipes and vitamins   Encouraged Physical activity as approved by physician    MONITORING/ EVALUATION/ PLAN:   Pt verbalized understanding of allmaterials covered   Pt asked pertinent questions throughout the session - expect compliance with nutrition guidelines presented   Provided pt with contact information should questions

## 2022-02-01 ENCOUNTER — OFFICE VISIT (OUTPATIENT)
Dept: BARIATRICS/WEIGHT MGMT | Age: 37
End: 2022-02-01
Payer: COMMERCIAL

## 2022-02-01 VITALS
WEIGHT: 293 LBS | HEART RATE: 76 BPM | SYSTOLIC BLOOD PRESSURE: 130 MMHG | HEIGHT: 65 IN | BODY MASS INDEX: 48.82 KG/M2 | OXYGEN SATURATION: 100 % | DIASTOLIC BLOOD PRESSURE: 80 MMHG

## 2022-02-01 DIAGNOSIS — E66.01 MORBID OBESITY DUE TO EXCESS CALORIES (HCC): Primary | ICD-10-CM

## 2022-02-01 LAB
SARS-COV-2: NOT DETECTED
SOURCE: NORMAL

## 2022-02-01 PROCEDURE — G8484 FLU IMMUNIZE NO ADMIN: HCPCS | Performed by: SURGERY

## 2022-02-01 PROCEDURE — 99214 OFFICE O/P EST MOD 30 MIN: CPT | Performed by: SURGERY

## 2022-02-01 PROCEDURE — 1036F TOBACCO NON-USER: CPT | Performed by: SURGERY

## 2022-02-01 PROCEDURE — G8427 DOCREV CUR MEDS BY ELIG CLIN: HCPCS | Performed by: SURGERY

## 2022-02-01 PROCEDURE — G8417 CALC BMI ABV UP PARAM F/U: HCPCS | Performed by: SURGERY

## 2022-02-01 RX ORDER — ONDANSETRON 2 MG/ML
4 INJECTION INTRAMUSCULAR; INTRAVENOUS ONCE
Status: CANCELLED | OUTPATIENT
Start: 2022-02-01 | End: 2022-02-01

## 2022-02-01 RX ORDER — SODIUM CHLORIDE 0.9 % (FLUSH) 0.9 %
5-40 SYRINGE (ML) INJECTION EVERY 12 HOURS SCHEDULED
Status: CANCELLED | OUTPATIENT
Start: 2022-02-01

## 2022-02-01 RX ORDER — SCOLOPAMINE TRANSDERMAL SYSTEM 1 MG/1
1 PATCH, EXTENDED RELEASE TRANSDERMAL ONCE
Status: CANCELLED | OUTPATIENT
Start: 2022-02-01 | End: 2022-02-01

## 2022-02-01 RX ORDER — SODIUM CHLORIDE 0.9 % (FLUSH) 0.9 %
5-40 SYRINGE (ML) INJECTION PRN
Status: CANCELLED | OUTPATIENT
Start: 2022-02-01

## 2022-02-01 RX ORDER — HEPARIN SODIUM 5000 [USP'U]/ML
5000 INJECTION, SOLUTION INTRAVENOUS; SUBCUTANEOUS ONCE
Status: CANCELLED | OUTPATIENT
Start: 2022-02-01 | End: 2022-02-01

## 2022-02-01 RX ORDER — SODIUM CHLORIDE, SODIUM LACTATE, POTASSIUM CHLORIDE, CALCIUM CHLORIDE 600; 310; 30; 20 MG/100ML; MG/100ML; MG/100ML; MG/100ML
INJECTION, SOLUTION INTRAVENOUS CONTINUOUS
Status: CANCELLED | OUTPATIENT
Start: 2022-02-01

## 2022-02-01 RX ORDER — SODIUM CHLORIDE 9 MG/ML
25 INJECTION, SOLUTION INTRAVENOUS PRN
Status: CANCELLED | OUTPATIENT
Start: 2022-02-01

## 2022-02-01 ASSESSMENT — ENCOUNTER SYMPTOMS: BACK PAIN: 1

## 2022-02-01 NOTE — PROGRESS NOTES
BARIATRIC SURGERY OFFICE PROGRESS NOTE    SUBJECTIVE:    Patient presenting today referred from SHAQUILLE Carroll NP, for   Chief Complaint   Patient presents with   [facility] Weight Management     consent   . Vitals:    02/01/22 1458   BP: 130/80   Pulse: 76   SpO2: 100%        BMI: Body mass index is 52.92 kg/m². Weight History: Wt Readings from Last 3 Encounters:   02/01/22 (!) 318 lb (144.2 kg)   01/31/22 (!) 318 lb 6.4 oz (144.4 kg)   12/16/21 (!) 325 lb 6.4 oz (147.6 kg)        If within 30 days of bariatric surgery date, have you been to the ED: mE Fraser is a 43 y.o. female presenting in consent bariatric PRE-OP visit. Total weight loss/gain:   -0.3 lbs since last visit  n/a lbs since surgery  -25.8 lbs since starting program    Changes in health since last visit: None, doing well. Pt tracking calories: Yes. Pt exercising: Yes. Pt taking vitamins: Yes. Fluid intake: Appropriate. Past Medical History:   Diagnosis Date    Acid reflux     Acute deep vein thrombosis (DVT) of non-extremity vein 04/25/2018    \"in my neck\"\"after mediport was put in\"    Anemia     Anxiety     Asthma     NO PULMONOLOGIST AT THIS TIME    Blood clot due to device, implant, or graft 04/2018    had blood clots in neck due to med port    Bowen's disease     Cancer ([facility] [address]     skin cancer shoulder 2019    Crohn's disease ([facility] [address] Dx 2010    Remicade Infusions Every Six Weeks, [place]    Depression     ECHO 07/28/2021    EF is estimated at 55-60%. Mild left ventricular hypertrophy. Mild mitral regurgitation is present.     Fall 2012    \"Passed Out And Jerolyn Bulls On My [place]"    Fatty liver     Gestational diabetes     \"2019- no medication - just diet controlled with pregnancy\"    Hyperlipidemia     Hypertension     \"yrs ago was on b/p medication- off medication since 2018- only took medication for 2 months\"    Lower back pain \"Sometimes\"    MRSA (methicillin resistant staph aureus) culture positive 2018    Multiple pulmonary nodules 2013    Subcentimeter; needs repeat imaging in 3-6 months    Obesity     Pancreatitis     Patellofemoral arthritis of right knee 2020    Shortness of breath on exertion     Teeth missing     Upper And Lower    VL DUP LOWER EXTREMITY VENOUS BILATERAL 2021    No evidence of DVT or SVT in the bilateral common femoral vein, No evidence of significant venous insufficiency    Wears glasses     Wears glasses         Patient Active Problem List   Diagnosis    Crohn's disease ([address]    Anxiety    Multiple lung nodules    Crohn's colitis, unspecified complication (HCC)    Exacerbation of Crohn's disease with complication ([address]    IBS (irritable bowel syndrome)    C. difficile colitis    Crohn's disease of small and large intestines with complication ([address]    Neck swelling    Leukocytosis    Non-intractable vomiting with nausea    Infection of venous access port    Acute deep vein thrombosis (DVT) of non-extremity vein    Class 3 severe obesity due to excess calories without serious comorbidity with body mass index (BMI) of 50.0 to 59.9 in adult (HCC)    Preeclampsia, third trimester    Pregnancy related condition    Status post  delivery    Bowen's disease of right shoulder    Patellofemoral arthritis of right knee    Infrapatellar bursitis of right knee    Knee mass, right    Acute respiratory failure ([address]    Pneumonia due to COVID-19 virus    Shortness of breath    Edema of lower extremity    Essential hypertension    Morbid obesity with BMI of 50.0-59.9, adult ([address]    Severe obstructive sleep apnea       Past Surgical History:   Procedure Laterality Date    ABDOMEN SURGERY      bowel resection     ADENOIDECTOMY      APPENDECTOMY      Done During Colon Resection For Crohn's  Disease    BREAST SURGERY Left     \"Infected Lymph Node Removed\"     SECTION N/A 2019     SECTION performed by Bee [facility] MD at [facility] L[facility]D [address]  2016    COLECTOMY  8-11    Crohn's Disease , Dr. Paula Degroot, Appendectomy Also Done    COLONOSCOPY  2016    Polyps Removed In Past    COLONOSCOPY  2017    Hospitalized for c-diff    COLONOSCOPY  2018    normal, multiple biopsy, repeat 1 year    COLONOSCOPY N/A 2019    COLONOSCOPY POLYPECTOMY SNARE/COLD BIOPSY performed by Desiree Clayton MD at [facility]  2010    ENDOSCOPY, COLON, DIAGNOSTIC  Last Done 2014    KNEE SURGERY Right 10/1/2020    EXCISION OF MASS ON RIGHT KNEE performed by Juan M Senior DO at [address]      Dr Marques Landis, Removed Adhesions    OTHER SURGICAL HISTORY  2011    Mediport Insertion Left Chest Area/revision done 2016- \"removed at [facility] 2017 after I got an infection    SKIN BIOPSY      \"skin cancer removed right shoulder\"2019    TUBAL LIGATION  2019    TUNNELED VENOUS PORT PLACEMENT Right 2018    smart port- [facility]-Dr Jamaal Myers    UPPER GASTROINTESTINAL ENDOSCOPY N/A 2021    EGD BIOPSY performed by Maxim Duran MD at [address]      [address] [place] Teeth Extracted In Past       Current Outpatient Medications   Medication Sig Dispense Refill    CPAP Machine MISC by Does not apply route      BiPAP Machine MISC by BiPAP route nightly Indications: BIPAP       HUMIRA PEN-CD/UC/HS STARTER 80 MG/0.8ML PNKT       cyclobenzaprine (FLEXERIL) 10 MG tablet take 1 tablet by mouth three times a day      pantoprazole (PROTONIX) 40 MG tablet Take 1 tablet by mouth daily      furosemide (LASIX) 40 MG tablet Take 1 tablet by mouth daily (Patient taking differently: Take 40 mg by mouth daily AS NEEDED) 60 tablet 3    Misc.  Devices (FINGERTIP PULSE OXIMETER) MISC Use as directed to check oxygen saturations as needed for shortness of breath 1 each 0    metFORMIN (GLUCOPHAGE) 1000 MG tablet Take 1,000 mg by mouth 2 times daily (with meals)       traZODone (DESYREL) 100 MG tablet take 1 tablet by mouth nightly BEFORE BEDTIME      sertraline (ZOLOFT) 50 MG tablet Take 50 mg by mouth daily       rizatriptan (MAXALT) 10 MG tablet       acetaminophen (AMINOFEN) 325 MG tablet Take 2 tablets by mouth every 6 hours as needed for Pain 20 tablet 0    ondansetron (ZOFRAN ODT) 4 MG disintegrating tablet Take 1 tablet by mouth every 6 hours 10 tablet 1    albuterol sulfate  (90 Base) MCG/ACT inhaler Inhale 2 puffs into the lungs every 6 hours as needed for Wheezing      loperamide (IMODIUM) 2 MG capsule Take 2 mg by mouth 4 times daily as needed for Diarrhea AS NEEDED      Carboxymeth-Glycerin-Polysorb (REFRESH OPTIVE ADVANCED) 0.5-1-0.5 % SOLN Place 1 drop into both eyes 3 times daily       No current facility-administered medications for this visit. Allergies   Allergen Reactions    Morphine      Other reaction(s): Headache  Triggers migraine. \"Triggers My Migraines\"  Other reaction(s): Headache    Tramadol      Other reaction(s): Headache  States triggers migraine headache  \"Triggers My Migraines\"  Other reaction(s): Headache         Review of Systems   Constitutional: Positive for fatigue. Musculoskeletal: Positive for arthralgias and back pain. All other systems reviewed and are negative. OBJECTIVE:    /80   Pulse 76   Ht 5' 5\" (1.651 m)   Wt (!) 318 lb (144.2 kg)   SpO2 100%   BMI 52.92 kg/m²      Physical Exam  Vitals reviewed. Constitutional:       General: She is not in acute distress. Appearance: She is obese. She is not ill-appearing, toxic-appearing or diaphoretic. HENT:      Head: Normocephalic and atraumatic. Right Ear: External ear normal.      Left Ear: External ear normal.      Nose: Nose normal.   Eyes:      General:         Right eye: No discharge. Left eye: No discharge. Extraocular Movements: Extraocular movements intact. Cardiovascular:      Rate and Rhythm: Normal rate. Pulmonary:      Effort: No respiratory distress. Abdominal:      Palpations: Abdomen is soft. Tenderness: There is no abdominal tenderness. Comments: Previous incisions well healed     Musculoskeletal:         General: No swelling. Cervical back: Normal range of motion. Skin:     General: Skin is warm. Neurological:      General: No focal deficit present. Mental Status: She is alert. Psychiatric:         Mood and Affect: Mood normal.         ASSESSMENT & PLAN:    1. Morbid obesity due to excess calories ([address]  -Cleared for surgery.  -Consent obtained. Reviewed in detail with the patient and/or family the expected pre-operative, operative, and post-operative courses including risks, benefits, and alternatives to the procedure. The patient's questions were answered in detail and agreed to proceed with the procedure. -PAT and pre op orders placed. -Reviewed Heme note. No additional DVT prophylaxis needed. Appreciate recs. -Stop humira. D/w pt. -Call with any questions, concerns, or issues whatsoever. 2. Reflux  -Protonic 40 mg daily  -Monitor after weight loss               As of current visit, regarding obesity-related co-morbid conditions:  GONZALO [] compliant [] no longer using [] resolved per sleep study; hypertension [] medications; hyperlipidemia [] medications; GERD [] medications; DM [] insulin [] non-insulin [] no meds       Patient was encouraged to track all PO intake. Calories should be approximately 1200, per discussion and plan with registered dietician. Protein intake is to be a minimum of 40-50 grams per day. Water drinking was encouraged with a goal of 64oz-128oz daily. Beverages are to be calorie free except for milk. Avoid soda and other carbonated beverages. Continue to increase level of physical activity.       I spent 25 minutes with the patient face to face today and over 50% of the office visit today was spent in face to face counseling regarding diet and exercise, in preparation for her planned robotic sleeve gastrectomy. Discussed in length complying with the dietary recommendations, complying with the preoperative workup including dietary counseling , exercise physiologist counseling , and pre-operative optimization of pulmonologist  and cardiologist .    The patient expressed understanding and willingness to comply nicely; all questions and concerns addressed. No orders of the defined types were placed in this encounter. Orders Placed This Encounter   Procedures    CBC Auto Differential     Standing Status:   Standing     Number of Occurrences:   1    Comprehensive Metabolic Panel     Standing Status:   Standing     Number of Occurrences:   1    Urine Drug Screen     Standing Status:   Standing     Number of Occurrences:   1    Hemoglobin A1C     Standing Status:   Standing     Number of Occurrences:   1    Pregnancy, Urine     Standing Status:   Standing     Number of Occurrences:   1    Initiate PAT Protocol     Standing Status:   Future     Standing Expiration Date:   4/2/2022       Follow Up:  No follow-ups on file.     Morgan Hill MD

## 2022-02-02 NOTE — PROGRESS NOTES
Patient will arrive at 1200 at [facility] for her procedue at 1400 on 2/10/2022. 1. Do not eat or drink anything after midnight - unless instructed by your doctor prior to surgery. This includes                   no water, chewing gum or mints. 2. Follow your directions as prescribed by the doctor for your procedure and medications. 3. Check with your Doctor regarding stopping vitamins, supplements, blood thinners (Plavix, Coumadin, Lovenox, Effient, Pradaxa, Xarelto, Fragmin or                   other blood thinners) and follow their instructions. 4. Do not smoke, and do not drink any alcoholic beverages 24 hours prior to surgery. This includes NA Beer. 5. You may brush your teeth and gargle the morning of surgery. DO NOT SWALLOW WATER   6. You MUST make arrangements for a responsible adult to take you home after your surgery and be able to check on you every couple                   hours for the day. You will not be allowed to leave alone or drive yourself home. It is strongly suggested someone stay with you the first 24                   hrs. Your surgery will be cancelled if you do not have a ride home. 7. Please wear simple, loose fitting clothing to the hospital.  Charlie Ledesma not bring valuables (money, credit cards, checkbooks, etc.) Do not wear any                   makeup (including no eye makeup) or nail polish on your fingers or toes. 8. DO NOT wear any jewelry or piercings on day of surgery. All body piercing jewelry must be removed. 9. If you have dentures, they will be removed before going to the OR; we will provide you a container. If you wear contact lenses or glasses,                  they will be removed; please bring a case for them. 10. If you  have a Living Will and Durable Power of  for Healthcare, please bring in a copy.            11. Please bring picture ID,  insurance card, paperwork from the doctors office    (H & P, Consent, & card for implantable devices). 12. Take a shower the night before or morning of your procedure, do not apply any lotion, oil or powder. 13. Wear a mask covering your nose & mouth when entering the hospital. Have your covid-19 test performed within 2-7 days of your                  surgery. Quarantine yourself after the test until after your surgery. Patient will take her metformin and protonix the morning of her procedure.

## 2022-02-04 NOTE — PROGRESS NOTES
Patient is coming in 2/8/22 for labs and to  soaps    Surgery @ [facility] on 2/14/22, you will be called  2/11/22  with times               1. Do not eat or drink anything after midnight - unless instructed by your doctor prior to surgery. This includes                   no water, chewing gum or mints. 2. Follow your directions as prescribed by the doctor for your procedure and medications. Take Metformin & Protonix in am with a sip. Bring your                   Inhaler and Bi-pap day of surgery. 3. Check with your Doctor regarding stopping vitamins, supplements, blood thinners (Plavix, Coumadin, Lovenox, Effient, Pradaxa, Xarelto, Fragmin or                   other blood thinners) and follow their instructions. 4. Do not smoke, and do not drink any alcoholic beverages 24 hours prior to surgery. This includes NA Beer. 5. You may brush your teeth and gargle the morning of surgery. DO NOT SWALLOW WATER   6. You MUST make arrangements for a responsible adult to take you home after your surgery and be able to check on you every couple                   hours for the day. You will not be allowed to leave alone or drive yourself home. It is strongly suggested someone stay with you the first 24                   hrs. Your surgery will be cancelled if you do not have a ride home. 7. Please wear simple, loose fitting clothing to the hospital.  Kofi Vania not bring valuables (money, credit cards, checkbooks, etc.) Do not wear any                   makeup (including no eye makeup) or nail polish on your fingers or toes. 8. DO NOT wear any jewelry or piercings on day of surgery. All body piercing jewelry must be removed. 9. If you have dentures, they will be removed before going to the OR; we will provide you a container. If you wear contact lenses or glasses,                  they will be removed; please bring a case for them.            10. If you  have a Living Will and Durable Power of  for Healthcare, please bring in a copy. 11. Please bring picture ID,  insurance card, paperwork from the doctors office    (H & P, Consent, & card for implantable devices). 12. Take a shower the night before or morning of your procedure, do not apply any make-up, deodorant, lotion, oil or powder. 13. Wear a mask covering your nose & mouth when entering the hospital. Have your covid-19 test performed within 2-7 days of your                  Surgery-scheduled 2/8/22 in the office. Quarantine yourself after the test until after your surgery.

## 2022-02-07 DIAGNOSIS — Z01.818 PRE-OP TESTING: Primary | ICD-10-CM

## 2022-02-08 ENCOUNTER — NURSE ONLY (OUTPATIENT)
Dept: SURGERY | Age: 37
End: 2022-02-08
Payer: COMMERCIAL

## 2022-02-08 ENCOUNTER — HOSPITAL ENCOUNTER (OUTPATIENT)
Age: 37
Setting detail: SPECIMEN
Discharge: HOME | End: 2022-02-08
Payer: COMMERCIAL

## 2022-02-08 ENCOUNTER — HOSPITAL ENCOUNTER (OUTPATIENT)
Age: 37
Discharge: HOME | End: 2022-02-08
Payer: COMMERCIAL

## 2022-02-08 DIAGNOSIS — Z01.818 PRE-OP TESTING: Primary | ICD-10-CM

## 2022-02-08 DIAGNOSIS — Z01.818 PRE-OP TESTING: ICD-10-CM

## 2022-02-08 LAB
ALBUMIN SERPL-MCNC: 3.9 GM/DL (ref 3.4–5)
ALP BLD-CCNC: 59 IU/L (ref 40–129)
ALT SERPL-CCNC: 63 U/L (ref 10–40)
AMPHETAMINES: NEGATIVE
ANION GAP SERPL CALCULATED.3IONS-SCNC: 11 MMOL/L (ref 4–16)
AST SERPL-CCNC: 84 IU/L (ref 15–37)
BARBITURATE SCREEN URINE: NEGATIVE
BASOPHILS ABSOLUTE: 0 K/CU MM
BASOPHILS RELATIVE PERCENT: 0.5 % (ref 0–1)
BENZODIAZEPINE SCREEN, URINE: NEGATIVE
BILIRUB SERPL-MCNC: 0.4 MG/DL (ref 0–1)
BUN BLDV-MCNC: 14 MG/DL (ref 6–23)
CALCIUM SERPL-MCNC: 9 MG/DL (ref 8.3–10.6)
CANNABINOID SCREEN URINE: NEGATIVE
CHLORIDE BLD-SCNC: 102 MMOL/L (ref 99–110)
CO2: 22 MMOL/L (ref 21–32)
COCAINE METABOLITE: NEGATIVE
CREAT SERPL-MCNC: 0.7 MG/DL (ref 0.6–1.1)
DIFFERENTIAL TYPE: ABNORMAL
EOSINOPHILS ABSOLUTE: 0.1 K/CU MM
EOSINOPHILS RELATIVE PERCENT: 1.6 % (ref 0–3)
ESTIMATED AVERAGE GLUCOSE: 137 MG/DL
GFR AFRICAN AMERICAN: >60 ML/MIN/1.73M2
GFR NON-AFRICAN AMERICAN: >60 ML/MIN/1.73M2
GLUCOSE BLD-MCNC: 128 MG/DL (ref 70–99)
GONADOTROPIN, CHORIONIC (HCG) QUANT: 0.5 UIU/ML
HBA1C MFR BLD: 6.4 % (ref 4.2–6.3)
HCT VFR BLD CALC: 38.9 % (ref 37–47)
HEMOGLOBIN: 12.6 GM/DL (ref 12.5–16)
IMMATURE NEUTROPHIL %: 0.5 % (ref 0–0.43)
LYMPHOCYTES ABSOLUTE: 1.5 K/CU MM
LYMPHOCYTES RELATIVE PERCENT: 26 % (ref 24–44)
MCH RBC QN AUTO: 29.1 PG (ref 27–31)
MCHC RBC AUTO-ENTMCNC: 32.4 % (ref 32–36)
MCV RBC AUTO: 89.8 FL (ref 78–100)
MONOCYTES ABSOLUTE: 0.4 K/CU MM
MONOCYTES RELATIVE PERCENT: 7 % (ref 0–4)
NUCLEATED RBC %: 0 %
OPIATES, URINE: NEGATIVE
OXYCODONE: NEGATIVE
PDW BLD-RTO: 12.9 % (ref 11.7–14.9)
PHENCYCLIDINE, URINE: NEGATIVE
PLATELET # BLD: 185 K/CU MM (ref 140–440)
PMV BLD AUTO: 12.3 FL (ref 7.5–11.1)
POTASSIUM SERPL-SCNC: 4.3 MMOL/L (ref 3.5–5.1)
RBC # BLD: 4.33 M/CU MM (ref 4.2–5.4)
SEGMENTED NEUTROPHILS ABSOLUTE COUNT: 3.7 K/CU MM
SEGMENTED NEUTROPHILS RELATIVE PERCENT: 64.4 % (ref 36–66)
SODIUM BLD-SCNC: 135 MMOL/L (ref 135–145)
TOTAL IMMATURE NEUTOROPHIL: 0.03 K/CU MM
TOTAL NUCLEATED RBC: 0 K/CU MM
TOTAL PROTEIN: 7.1 GM/DL (ref 6.4–8.2)
WBC # BLD: 5.7 K/CU MM (ref 4–10.5)

## 2022-02-08 PROCEDURE — 83036 HEMOGLOBIN GLYCOSYLATED A1C: CPT

## 2022-02-08 PROCEDURE — 80053 COMPREHEN METABOLIC PANEL: CPT

## 2022-02-08 PROCEDURE — 36415 COLL VENOUS BLD VENIPUNCTURE: CPT

## 2022-02-08 PROCEDURE — 84702 CHORIONIC GONADOTROPIN TEST: CPT

## 2022-02-08 PROCEDURE — U0003 INFECTIOUS AGENT DETECTION BY NUCLEIC ACID (DNA OR RNA); SEVERE ACUTE RESPIRATORY SYNDROME CORONAVIRUS 2 (SARS-COV-2) (CORONAVIRUS DISEASE [COVID-19]), AMPLIFIED PROBE TECHNIQUE, MAKING USE OF HIGH THROUGHPUT TECHNOLOGIES AS DESCRIBED BY CMS-2020-01-R: HCPCS

## 2022-02-08 PROCEDURE — U0005 INFEC AGEN DETEC AMPLI PROBE: HCPCS

## 2022-02-08 PROCEDURE — 80307 DRUG TEST PRSMV CHEM ANLYZR: CPT

## 2022-02-08 PROCEDURE — 99211 OFF/OP EST MAY X REQ PHY/QHP: CPT | Performed by: SURGERY

## 2022-02-08 PROCEDURE — 85025 COMPLETE CBC W/AUTO DIFF WBC: CPT

## 2022-02-08 NOTE — PROGRESS NOTES
Patient collected pre-op COVID-19 screening instruction and collection supplies given to patient accordingly. Patient denies fever/cough/sob or recent travels. Patient voiced understanding of collection/quarantine instructions. COVID screening lab ordered, collection completed without difficulty, identifiers placed on specimen. AVS given at discharge. Results will be given via mychart or telephone call [facility] Dept will manage any positive test results, the procedure will be rescheduled at a later date).

## 2022-02-08 NOTE — PATIENT INSTRUCTIONS
Pre-Procedure COVID-19 Self Testing  Quarantine Instructions  Day of Surgery Instructions         What to do before my surgery:    All patients scheduled for elective surgery must test for COVID19 72-96 hours prior to the surgery date.  Pre-Procedure COVID-19 Self-Test will be scheduled for you by your provider.  You can receive your Pre-Procedure COVID-19 Self-Test at:  [facility]. [address] [place] [address] Jaswinder Akins  [phone]   If you do not have the COVID-19 test we will cancel or reschedule your procedure   Once you test you must quarantine at home until after your procedure with only your immediate family members or whoever lives with you.  If you must work during your quarantine period, we ask that you continue to practice social distancing, wear a mask that covers your mouth and nose and perform all hand hygiene as recommended by the CDC.  If you must go to the grocery, etc. and cannot get someone to do this for you please wear a mask that covers your mouth and nose and perform all hand hygiene as recommended by the CDC.  Your surgeon's office will notify you with any concerns about your test result. What can I expect on the day of surgery?  Arrive at the time the office or hospital staff tell you on the day of your procedure.  Wear a mask when entering the hospital.     A member of the hospital staff will take your temperature and ask you a few questions as you enter the building.  In abundance of caution for the safety of all our patients and staff, please follow all hospital visitor guidelines in place at the time of your procedure. The staff caring for you will stay in close communication with your loved one and keep them updated on progress.  Please provide a phone number for us to use when communicating with your family or ride home.    When you are ready to discharge, we will notify your family/person with you to bring the car to the front entrance. We will take you to them after you receive all of your discharge instructions.

## 2022-02-08 NOTE — PROGRESS NOTES
Called patient with new arrival time of 1130 at [facility] on 2/10/2022 for her procedure at 1330.

## 2022-02-09 ENCOUNTER — ANESTHESIA EVENT (OUTPATIENT)
Dept: ENDOSCOPY | Age: 37
End: 2022-02-09
Payer: COMMERCIAL

## 2022-02-09 LAB
SARS-COV-2: NOT DETECTED
SOURCE: NORMAL

## 2022-02-09 NOTE — ANESTHESIA PRE PROCEDURE
Department of Anesthesiology  Preprocedure Note       Name:  Bigg Green   Age:  43 y.o.  :  1985                                          MRN:  [id number]         Date:  2022      Surgeon: Honorio Hu):  Celso Knight MD    Procedure: Procedure(s):  COLONOSCOPY DIAGNOSTIC    Medications prior to admission:   Prior to Admission medications    Medication Sig Start Date End Date Taking? Authorizing Provider   CPAP Machine MISC by Does not apply route    Historical Provider, MD   BiPAP Machine MISC by BiPAP route nightly Indications: BIPAP     Historical Provider, MD JADE PEN-CD/UC/HS STARTER 80 MG/0.8ML PNKT  8/3/21   Historical Provider, MD   pantoprazole (PROTONIX) 40 MG tablet Take 1 tablet by mouth daily 21   Historical Provider, MD   Misc. Devices (FINGERTIP PULSE OXIMETER) MISC Use as directed to check oxygen saturations as needed for shortness of breath 4/15/21   Zane Guzman MD   metFORMIN (GLUCOPHAGE) 1000 MG tablet Take 1,000 mg by mouth 2 times daily (with meals)     Historical Provider, MD   rizatriptan (MAXALT) 10 MG tablet  20   Historical Provider, MD   acetaminophen (AMINOFEN) 325 MG tablet Take 2 tablets by mouth every 6 hours as needed for Pain 20   Esha Coburn DO   ondansetron (ZOFRAN ODT) 4 MG disintegrating tablet Take 1 tablet by mouth every 6 hours 19   Pablito Calzada PA-C   albuterol sulfate  (90 Base) MCG/ACT inhaler Inhale 2 puffs into the lungs every 6 hours as needed for Wheezing    Historical Provider, MD   loperamide (IMODIUM) 2 MG capsule Take 2 mg by mouth 4 times daily as needed for Diarrhea AS NEEDED    Historical Provider, MD       Current medications:    No current facility-administered medications for this encounter.      Current Outpatient Medications   Medication Sig Dispense Refill    CPAP Machine MISC by Does not apply route      BiPAP Machine MISC by BiPAP route nightly Indications: BIPAP       HUMIRA Pt appears more reality oriented, less irritable and agitated, able to  Use c-pap with out help PEN-CD/UC/HS STARTER 80 MG/0.8ML PNKT       pantoprazole (PROTONIX) 40 MG tablet Take 1 tablet by mouth daily      Misc. Devices (FINGERTIP PULSE OXIMETER) MISC Use as directed to check oxygen saturations as needed for shortness of breath 1 each 0    metFORMIN (GLUCOPHAGE) 1000 MG tablet Take 1,000 mg by mouth 2 times daily (with meals)       rizatriptan (MAXALT) 10 MG tablet       acetaminophen (AMINOFEN) 325 MG tablet Take 2 tablets by mouth every 6 hours as needed for Pain 20 tablet 0    ondansetron (ZOFRAN ODT) 4 MG disintegrating tablet Take 1 tablet by mouth every 6 hours 10 tablet 1    albuterol sulfate  (90 Base) MCG/ACT inhaler Inhale 2 puffs into the lungs every 6 hours as needed for Wheezing      loperamide (IMODIUM) 2 MG capsule Take 2 mg by mouth 4 times daily as needed for Diarrhea AS NEEDED         Allergies: Allergies   Allergen Reactions    Morphine      Other reaction(s): Headache  Triggers migraine.    \"Triggers My Migraines\"  Other reaction(s): Headache    Tramadol      Other reaction(s): Headache  States triggers migraine headache  \"Triggers My Migraines\"  Other reaction(s): Headache       Problem List:    Patient Active Problem List   Diagnosis Code    Crohn's disease ([facility] K50.90    Anxiety F41.9    Multiple lung nodules R91.8    Crohn's colitis, unspecified complication ([facility] M43.352    Exacerbation of Crohn's disease with complication ([facility] K69.746    IBS (irritable bowel syndrome) K58.9    C. difficile colitis A04.72    Crohn's disease of small and large intestines with complication ([facility] D64.822    Neck swelling R22.1    Leukocytosis D72.829    Non-intractable vomiting with nausea R11.2    Infection of venous access port T80.219A    Acute deep vein thrombosis (DVT) of non-extremity vein I82.90    Class 3 severe obesity due to excess calories without serious comorbidity with body mass index (BMI) of 50.0 to 59.9 in adult ([facility] E66.01, Z68.43    Preeclampsia, third trimester O14.93    Pregnancy related condition O26.90    Status post  delivery Z98.891    Bowen's disease of right shoulder D04.61    Patellofemoral arthritis of right knee M17.11    Infrapatellar bursitis of right knee M70.51    Knee mass, right R22.41    Acute respiratory failure (HCC) J96.00    Pneumonia due to COVID-19 virus U07.1, J12.82    Shortness of breath R06.02    Edema of lower extremity R60.0    Essential hypertension I10    Morbid obesity with BMI of 50.0-59.9, adult ([facility] E66.01, Z68.43    Severe obstructive sleep apnea G47.33       Past Medical History:        Diagnosis Date    Acid reflux     Acute deep vein thrombosis (DVT) of non-extremity vein 2018    \"in my neck\"\"after mediport was put in\"    Anemia     Anxiety     Asthma     NO PULMONOLOGIST AT THIS TIME    Blood clot due to device, implant, or graft 2018    had blood clots in neck due to med port    Bowen's disease     Cancer ([facility]     skin cancer shoulder 2019    Crohn's disease ([facility] Dx 2010    Remicade Infusions Every Six Weeks, Sees Dr. Abigail Cho [facility]     ECHO 2021    EF is estimated at 55-60%. Mild left ventricular hypertrophy. Mild mitral regurgitation is present.     2012    \"Passed Out And [place] On My [place]"    Fatty liver     Gestational diabetes     \"2019- no medication - just diet controlled with pregnancy\"    Hx of blood clots     in neck    Hypertension     \"yrs ago was on b/p medication- off medication since 2018- only took medication for 2 months\"    Lower back pain     \"Sometimes\"    MRSA (methicillin resistant staph aureus) culture positive 2018    Multiple pulmonary nodules 2013    Subcentimeter; needs repeat imaging in 3-6 months    Obesity     Pancreatitis     Patellofemoral arthritis of right knee 2020    Shortness of breath on exertion     Teeth missing     Upper And Lower  VL DUP LOWER EXTREMITY VENOUS BILATERAL 2021    No evidence of DVT or SVT in the bilateral common femoral vein, No evidence of significant venous insufficiency    Wears glasses     Wears glasses        Past Surgical History:        Procedure Laterality Date    ABDOMEN SURGERY      bowel resection     ADENOIDECTOMY  1995    APPENDECTOMY      Done During Colon Resection For Crohn's  Disease    BREAST SURGERY Left     \"Infected Lymph Node Removed\"     SECTION N/A 2019     SECTION performed by Tiny Corona MD at [facility] L&D OR    CHOLECYSTECTOMY  2016    COLECTOMY  8-11    Crohn's Disease , Dr. Alexis Caceres, Appendectomy Also Done    COLONOSCOPY  2016    Polyps Removed In Past    COLONOSCOPY  2017    Hospitalized for c-diff    COLONOSCOPY  2018    normal, multiple biopsy, repeat 1 year    COLONOSCOPY N/A 2019    COLONOSCOPY POLYPECTOMY SNARE/COLD BIOPSY performed by Latonia Carmen MD at [facility]      ENDOSCOPY, COLON, DIAGNOSTIC  Last Done     KNEE SURGERY Right 10/1/2020    EXCISION OF MASS ON RIGHT KNEE performed by Christian Ceja DO at [address]      Dr Edi Brush, Removed Adhesions    OTHER SURGICAL HISTORY  2011    Mediport Insertion Left Chest Area/revision done 2016- \"removed at [facility] 2017 after I got an infection    SKIN BIOPSY      \"skin cancer removed right shoulder\"2019    TUBAL LIGATION  2019    TUNNELED VENOUS PORT PLACEMENT Right 2018    [facility]    UPPER GASTROINTESTINAL ENDOSCOPY N/A 2021    EGD BIOPSY performed by Virgil Costello MD at [address] [place] Teeth Extracted In Past       Social History:    Social History     Tobacco Use    Smoking status: Never Smoker    Smokeless tobacco: Never Used   Substance Use Topics    Alcohol use: Yes     Comment: rare Counseling given: Not Answered      Vital Signs (Current):   Vitals:    02/02/22 1336   Weight: (!) 318 lb (144.2 kg)   Height: 5' 5\" (1.651 m)                                              BP Readings from Last 3 Encounters:   02/01/22 130/80   12/16/21 (!) 124/90   11/19/21 130/84       NPO Status:                                                                                 BMI:   Wt Readings from Last 3 Encounters:   02/01/22 (!) 318 lb (144.2 kg)   01/31/22 (!) 318 lb 6.4 oz (144.4 kg)   12/16/21 (!) 325 lb 6.4 oz (147.6 kg)     Body mass index is 52.92 kg/m². CBC:   Lab Results   Component Value Date    WBC 5.7 02/08/2022    RBC 4.33 02/08/2022    HGB 12.6 02/08/2022    HCT 38.9 02/08/2022    MCV 89.8 02/08/2022    RDW 12.9 02/08/2022     02/08/2022       CMP:   Lab Results   Component Value Date     02/08/2022    K 4.3 02/08/2022    K 4.0 01/19/2018     02/08/2022    CO2 22 02/08/2022    BUN 14 02/08/2022    CREATININE 0.7 02/08/2022    GFRAA >60 02/08/2022    LABGLOM >60 02/08/2022    GLUCOSE 128 02/08/2022    PROT 7.1 02/08/2022    PROT 7.4 03/10/2013    CALCIUM 9.0 02/08/2022    BILITOT 0.4 02/08/2022    ALKPHOS 59 02/08/2022    AST 84 02/08/2022    ALT 63 02/08/2022       POC Tests: No results for input(s): POCGLU, POCNA, POCK, POCCL, POCBUN, POCHEMO, POCHCT in the last 72 hours.     Coags:   Lab Results   Component Value Date    PROTIME 12.4 04/13/2021    PROTIME 10.4 02/05/2012    INR 1.02 04/13/2021    APTT 85.7 04/25/2018       HCG (If Applicable):   Lab Results   Component Value Date    PREGTESTUR NEGATIVE 09/14/2021        ABGs: No results found for: PHART, PO2ART, VNU9MQJ, EKY1JJA, BEART, O2QYYXUK     Type & Screen (If Applicable):  No results found for: LABABO, LABRH    Drug/Infectious Status (If Applicable):  Lab Results   Component Value Date    HEPCAB NON REACTIVE 02/22/2018       COVID-19 Screening (If Applicable):   Lab Results   Component Value Date    COVID19 NOT DETECTED 02/08/2022           Anesthesia Evaluation  Patient summary reviewed  Airway: Mallampati: I  TM distance: >3 FB   Neck ROM: full  Mouth opening: > = 3 FB Dental:          Pulmonary:normal exam    (+) sleep apnea:  asthma:                            Cardiovascular:    (+) hypertension:,          Beta Blocker:  Not on Beta Blocker         Neuro/Psych:   (+) depression/anxiety             GI/Hepatic/Renal:   (+) GERD:, liver disease (LEMOS):, morbid obesity (super morbid BMI 52.9)         ROS comment: Crohn's. Endo/Other:    (+) DiabetesType II DM, , .                 Abdominal:   (+) obese,           Vascular:   + DVT, . Other Findings:           Anesthesia Plan      MAC     ASA 4       Induction: intravenous. Anesthetic plan and risks discussed with patient. Plan discussed with CRNA. Pre Anesthesia Assessment complete. Chart reviewed on 2/9/2022. This is a chart review only.       Leigha Coon, DO   2/9/2022

## 2022-02-10 ENCOUNTER — HOSPITAL ENCOUNTER (OUTPATIENT)
Age: 37
Setting detail: OUTPATIENT SURGERY
Discharge: HOME | End: 2022-02-10
Attending: INTERNAL MEDICINE | Admitting: INTERNAL MEDICINE
Payer: COMMERCIAL

## 2022-02-10 ENCOUNTER — ANESTHESIA (OUTPATIENT)
Dept: ENDOSCOPY | Age: 37
End: 2022-02-10
Payer: COMMERCIAL

## 2022-02-10 VITALS — SYSTOLIC BLOOD PRESSURE: 112 MMHG | DIASTOLIC BLOOD PRESSURE: 69 MMHG | OXYGEN SATURATION: 100 %

## 2022-02-10 VITALS
RESPIRATION RATE: 18 BRPM | TEMPERATURE: 97.6 F | HEART RATE: 80 BPM | OXYGEN SATURATION: 98 % | HEIGHT: 65 IN | DIASTOLIC BLOOD PRESSURE: 78 MMHG | BODY MASS INDEX: 48.82 KG/M2 | SYSTOLIC BLOOD PRESSURE: 143 MMHG | WEIGHT: 293 LBS

## 2022-02-10 DIAGNOSIS — K50.819 CROHN'S DISEASE OF SMALL AND LARGE INTESTINES WITH COMPLICATION (HCC): ICD-10-CM

## 2022-02-10 DIAGNOSIS — Z01.818 PRE-OP TESTING: Primary | ICD-10-CM

## 2022-02-10 LAB
GLUCOSE BLD-MCNC: 102 MG/DL (ref 70–99)
PREGNANCY TEST URINE, POC: NEGATIVE

## 2022-02-10 PROCEDURE — 6360000002 HC RX W HCPCS: Performed by: NURSE ANESTHETIST, CERTIFIED REGISTERED

## 2022-02-10 PROCEDURE — 2500000003 HC RX 250 WO HCPCS: Performed by: NURSE ANESTHETIST, CERTIFIED REGISTERED

## 2022-02-10 PROCEDURE — 7100000010 HC PHASE II RECOVERY - FIRST 15 MIN: Performed by: INTERNAL MEDICINE

## 2022-02-10 PROCEDURE — 3700000000 HC ANESTHESIA ATTENDED CARE: Performed by: INTERNAL MEDICINE

## 2022-02-10 PROCEDURE — 2580000003 HC RX 258: Performed by: ANESTHESIOLOGY

## 2022-02-10 PROCEDURE — 82962 GLUCOSE BLOOD TEST: CPT

## 2022-02-10 PROCEDURE — 81025 URINE PREGNANCY TEST: CPT

## 2022-02-10 PROCEDURE — 2709999900 HC NON-CHARGEABLE SUPPLY: Performed by: INTERNAL MEDICINE

## 2022-02-10 PROCEDURE — 7100000011 HC PHASE II RECOVERY - ADDTL 15 MIN: Performed by: INTERNAL MEDICINE

## 2022-02-10 PROCEDURE — 88305 TISSUE EXAM BY PATHOLOGIST: CPT

## 2022-02-10 PROCEDURE — 3609010300 HC COLONOSCOPY W/BIOPSY SINGLE/MULTIPLE: Performed by: INTERNAL MEDICINE

## 2022-02-10 PROCEDURE — 3700000001 HC ADD 15 MINUTES (ANESTHESIA): Performed by: INTERNAL MEDICINE

## 2022-02-10 RX ORDER — SODIUM CHLORIDE, SODIUM LACTATE, POTASSIUM CHLORIDE, CALCIUM CHLORIDE 600; 310; 30; 20 MG/100ML; MG/100ML; MG/100ML; MG/100ML
INJECTION, SOLUTION INTRAVENOUS CONTINUOUS
Status: DISCONTINUED | OUTPATIENT
Start: 2022-02-10 | End: 2022-02-10 | Stop reason: HOSPADM

## 2022-02-10 RX ORDER — LIDOCAINE HYDROCHLORIDE 20 MG/ML
INJECTION, SOLUTION EPIDURAL; INFILTRATION; INTRACAUDAL; PERINEURAL PRN
Status: DISCONTINUED | OUTPATIENT
Start: 2022-02-10 | End: 2022-02-10 | Stop reason: SDUPTHER

## 2022-02-10 RX ORDER — PROPOFOL 10 MG/ML
INJECTION, EMULSION INTRAVENOUS PRN
Status: DISCONTINUED | OUTPATIENT
Start: 2022-02-10 | End: 2022-02-10 | Stop reason: SDUPTHER

## 2022-02-10 RX ADMIN — LIDOCAINE HYDROCHLORIDE 100 MG: 20 INJECTION, SOLUTION EPIDURAL; INFILTRATION; INTRACAUDAL; PERINEURAL at 13:31

## 2022-02-10 RX ADMIN — SODIUM CHLORIDE, POTASSIUM CHLORIDE, SODIUM LACTATE AND CALCIUM CHLORIDE: 600; 310; 30; 20 INJECTION, SOLUTION INTRAVENOUS at 11:24

## 2022-02-10 RX ADMIN — PROPOFOL 300 MG: 10 INJECTION, EMULSION INTRAVENOUS at 13:31

## 2022-02-10 ASSESSMENT — PAIN - FUNCTIONAL ASSESSMENT: PAIN_FUNCTIONAL_ASSESSMENT: 0-10

## 2022-02-10 ASSESSMENT — PAIN SCALES - GENERAL
PAINLEVEL_OUTOF10: 0
PAINLEVEL_OUTOF10: 0

## 2022-02-10 NOTE — OP NOTE
Operative Note      Patient: Kendra Ernst  YOB: 1985  MRN: [id number]    Date of Procedure: 2/10/2022    Pre-Op Diagnosis: Crohn's disease of small and large intestines with complication ([facility]      BRIEF OP REPORT:    Impression:    1) grade 2 internal hemorrhoids, moderate left-sided diverticulosis   2) normal mucosa entire colon biopsy of the ascending transverse descending and sigmoid colon colitis done. 3) evidence of ileocolectomy in the ascending colon seen. Neoterminal ileum intubated nicely.  There were aphthous ulcerations multiple super the neoterminal ileum biopsy Crohn's none        Suggest:   1) continue Humira   2) follow-up in 2 weeks   3) avoid NSAIDs    Call colonoscopy in 1 year     Full EGD/COLONOSCOPY report available by going to \"chart review\" then \"procedures\" then  \"EGD/Colonoscopy\"  then \"View Endoscopy Report\"   Electronically signed by Alexandre Jain MD on 2/10/2022 at 1:35 PM

## 2022-02-10 NOTE — H&P
[address] [facility]   Pre-operative History and Physical    Patient: Margareth Hunter  : 1985      History Obtained From:  patient        HISTORY OF PRESENT ILLNESS:                The patient is a 43 y.o. female with significant past medical history as below who presents for C scope    Indication history of inflammatory bowel disease and abdominal pain    Past Medical History:        Diagnosis Date    Acid reflux     Acute deep vein thrombosis (DVT) of non-extremity vein 2018    \"in my neck\"\"after mediport was put in\"    Anemia     Anxiety     Asthma     NO PULMONOLOGIST AT THIS TIME    Blood clot due to device, implant, or graft 2018    had blood clots in neck due to med port    Bowen's disease     Cancer ([facility]     skin cancer shoulder 2019    Crohn's disease ([facility] Dx     Remicade Infusions Every Six Weeks, [place]    Depression     ECHO 2021    EF is estimated at 55-60%. Mild left ventricular hypertrophy. Mild mitral regurgitation is present.     2012    \"Passed Out And [place] On My [place]"    Fatty liver     Gestational diabetes     \"2019- no medication - just diet controlled with pregnancy\"    Hx of blood clots     in neck    Hypertension     \"yrs ago was on b/p medication- off medication since 2018- only took medication for 2 months\"    Lower back pain     \"Sometimes\"    MRSA (methicillin resistant staph aureus) culture positive 2018    Multiple pulmonary nodules 2013    Subcentimeter; needs repeat imaging in 3-6 months    Obesity     Pancreatitis     Patellofemoral arthritis of right knee 2020    Shortness of breath on exertion     Teeth missing     Upper And Lower    VL DUP LOWER EXTREMITY VENOUS BILATERAL 2021    No evidence of DVT or SVT in the bilateral common femoral vein, No evidence of significant venous insufficiency    Wears glasses     Wears glasses        Past Surgical History:        Procedure Laterality Date    ABDOMEN SURGERY      bowel resection     ADENOIDECTOMY      APPENDECTOMY      Done During Colon Resection For Crohn's  Disease    BREAST SURGERY Left     \"Infected Lymph Node Removed\"     SECTION N/A 2019     SECTION performed by Alex Calderon MD at [address] [place]. [facility]"" L&D OR    CHOLECYSTECTOMY  2016    COLECTOMY  8-11    Crohn's Disease , Dr. Sherif Russo, Appendectomy Also Done    COLONOSCOPY  2016    Polyps Removed In Past    COLONOSCOPY  2017    Hospitalized for c-diff    COLONOSCOPY  2018    normal, multiple biopsy, repeat 1 year    COLONOSCOPY N/A 2019    COLONOSCOPY POLYPECTOMY SNARE/COLD BIOPSY performed by Lionel Simms MD at [facility]      ENDOSCOPY, COLON, DIAGNOSTIC  Last Done     KNEE SURGERY Right 10/1/2020    EXCISION OF MASS ON RIGHT KNEE performed by Mary Castellano DO at [address]      Dr Jessica Jansen, Removed Adhesions    OTHER SURGICAL HISTORY  2011    Mediport Insertion Left Chest Area/revision done 2016- \"removed at [facility] 2017 after I got an infection    SKIN BIOPSY      \"skin cancer removed right shoulder\"2019    TUBAL LIGATION  2019    TUNNELED VENOUS PORT PLACEMENT Right 2018    smart port- [facility]-Dr Nasir Adams    UPPER GASTROINTESTINAL ENDOSCOPY N/A 2021    EGD BIOPSY performed by Marcello Madison MD at [address] [place] Teeth Extracted In Past         Current Medications:    Medications    Prior to Admission medications    Medication Sig Start Date End Date Taking?  Authorizing Provider   CPAP Machine MISC by Does not apply route    Historical MD Juani   BiPAP Machine MISC by BiPAP route nightly Indications: BIPAP     Historical MD YASMANY Gloria PEN-CD/UC/HS STARTER 80 MG/0.8ML PNKT  8/3/21   Historical Provider, MD   pantoprazole (PROTONIX) 40 MG tablet Take 1 tablet by mouth daily 7/21/21   Historical Provider, MD   Misc. Devices (FINGERTIP PULSE OXIMETER) MISC Use as directed to check oxygen saturations as needed for shortness of breath 4/15/21   Fritz Syed MD   metFORMIN (GLUCOPHAGE) 1000 MG tablet Take 1,000 mg by mouth 2 times daily (with meals)     Historical Provider, MD   rizatriptan (MAXALT) 10 MG tablet  9/8/20   Historical Provider, MD   acetaminophen (AMINOFEN) 325 MG tablet Take 2 tablets by mouth every 6 hours as needed for Pain 6/19/20   Alan East DO   ondansetron (ZOFRAN ODT) 4 MG disintegrating tablet Take 1 tablet by mouth every 6 hours 7/30/19   Seble Calzada PA-C   albuterol sulfate  (90 Base) MCG/ACT inhaler Inhale 2 puffs into the lungs every 6 hours as needed for Wheezing    Historical Provider, MD   loperamide (IMODIUM) 2 MG capsule Take 2 mg by mouth 4 times daily as needed for Diarrhea AS NEEDED    Historical Provider, MD      Scheduled Medications:   Infusions:   PRN Medications: Allergies: Allergies   Allergen Reactions    Morphine      Other reaction(s): Headache  Triggers migraine. \"Triggers My Migraines\"  Other reaction(s): Headache    Tramadol      Other reaction(s): Headache  States triggers migraine headache  \"Triggers My Migraines\"  Other reaction(s): Headache         Allergies:  Morphine and Tramadol    Social History:   TOBACCO:   reports that she has never smoked. She has never used smokeless tobacco.  ETOH:   reports current alcohol use.     Family History:       Problem Relation Age of Onset   Wisdom Migraines Mother     Inflam Bowel Dis Mother     Heart Disease Father     Diabetes Father     High Cholesterol Father     Obesity Sister     Depression Maternal Aunt     Depression Maternal Uncle     Depression Paternal Aunt     Coronary Art Dis Paternal Aunt     Depression Paternal Uncle     Coronary Art Dis Paternal Uncle    

## 2022-02-10 NOTE — PROGRESS NOTES
REPORT GIVEN TO NADINE ROSSI IN SDS. PT AWAKE AND RESPONSIVE. VS STABLE. DENIES C/O OR NEEDS AT THIS TIME.

## 2022-02-10 NOTE — PROGRESS NOTES
PREOP QUESTIONS, NPO STATUS AND ALLERGIES VERIFIED WITH PT. H/P AND CONSENT COMPLETED. DENIES TAKING BLOOD THINNERS OR BETA BLOCKERS.

## 2022-02-10 NOTE — ANESTHESIA POSTPROCEDURE EVALUATION
Department of Anesthesiology  Postprocedure Note    Patient: Miguel Ángel Navarro  MRN: [id number]  YOB: 1985  Date of evaluation: 2/10/2022  Time:  1:37 PM     Procedure Summary     Date: 02/10/22 Room / Location: [address]    Anesthesia Start: 1311 Anesthesia Stop: 6675    Procedure: COLONOSCOPY WITH BIOPSY (N/A ) Diagnosis:       Crohn's disease of small and large intestines with complication ([address]      (Crohn's disease of small and large intestines with complication ([facility] [X28.818])    Surgeons: Maria Luisa Angulo MD Responsible Provider: Bill Holt MD    Anesthesia Type: MAC ASA Status: 4          Anesthesia Type: MAC    Ezekiel Phase I:      Ezekiel Phase II:      Last vitals: Reviewed and per EMR flowsheets.        Anesthesia Post Evaluation    Patient location during evaluation: floor  Patient participation: complete - patient participated  Level of consciousness: awake and alert  Pain score: 0  Airway patency: patent  Nausea & Vomiting: no nausea and no vomiting  Complications: no  Cardiovascular status: blood pressure returned to baseline and hemodynamically stable  Respiratory status: spontaneous ventilation, nonlabored ventilation and nasal cannula  Hydration status: euvolemic

## 2022-02-11 ENCOUNTER — OFFICE VISIT (OUTPATIENT)
Dept: BARIATRICS/WEIGHT MGMT | Age: 37
End: 2022-02-11
Payer: COMMERCIAL

## 2022-02-11 ENCOUNTER — ANESTHESIA EVENT (OUTPATIENT)
Dept: OPERATING ROOM | Age: 37
DRG: 403 | End: 2022-02-11
Payer: COMMERCIAL

## 2022-02-11 VITALS
HEIGHT: 65 IN | HEART RATE: 85 BPM | SYSTOLIC BLOOD PRESSURE: 116 MMHG | WEIGHT: 293 LBS | DIASTOLIC BLOOD PRESSURE: 78 MMHG | OXYGEN SATURATION: 98 % | BODY MASS INDEX: 48.82 KG/M2

## 2022-02-11 DIAGNOSIS — E66.01 MORBID OBESITY WITH BMI OF 50.0-59.9, ADULT (HCC): Primary | ICD-10-CM

## 2022-02-11 DIAGNOSIS — Z01.818 PRE-OP EVALUATION: ICD-10-CM

## 2022-02-11 PROCEDURE — G8484 FLU IMMUNIZE NO ADMIN: HCPCS | Performed by: NURSE PRACTITIONER

## 2022-02-11 PROCEDURE — G8417 CALC BMI ABV UP PARAM F/U: HCPCS | Performed by: NURSE PRACTITIONER

## 2022-02-11 PROCEDURE — G8428 CUR MEDS NOT DOCUMENT: HCPCS | Performed by: NURSE PRACTITIONER

## 2022-02-11 PROCEDURE — 1036F TOBACCO NON-USER: CPT | Performed by: NURSE PRACTITIONER

## 2022-02-11 PROCEDURE — 99213 OFFICE O/P EST LOW 20 MIN: CPT | Performed by: NURSE PRACTITIONER

## 2022-02-11 ASSESSMENT — ENCOUNTER SYMPTOMS
EYES NEGATIVE: 1
SHORTNESS OF BREATH: 1
ALLERGIC/IMMUNOLOGIC NEGATIVE: 1
GASTROINTESTINAL NEGATIVE: 1
RESPIRATORY NEGATIVE: 1

## 2022-02-11 NOTE — ANESTHESIA PRE PROCEDURE
Department of Anesthesiology  Preprocedure Note       Name:  Jaziel Ellington   Age:  43 y.o.  :  1985                                          MRN:  [id number]         Date:  2022      Surgeon: Ok Floor):  Rissa Denis MD    Procedure: Procedure(s):  GASTRECTOMY SLEEVE LAPAROSCOPIC ROBOTIC POSS HIATAL HERNIA REPAIR  EGD ESOPHAGOGASTRODUODENOSCOPY    Medications prior to admission:   Prior to Admission medications    Medication Sig Start Date End Date Taking? Authorizing Provider   CPAP Machine MISC by Does not apply route    Historical Provider, MD   BiPAP Machine MISC by BiPAP route nightly Indications: BIPAP     Historical Provider, MD JADE PEN-CD/UC/HS STARTER 80 MG/0.8ML PNKT  8/3/21   Historical Provider, MD   pantoprazole (PROTONIX) 40 MG tablet Take 1 tablet by mouth daily 21   Historical Provider, MD   Misc. Devices (FINGERTIP PULSE OXIMETER) MISC Use as directed to check oxygen saturations as needed for shortness of breath 4/15/21   Jaqueline Hobbs MD   metFORMIN (GLUCOPHAGE) 1000 MG tablet Take 1,000 mg by mouth 2 times daily (with meals)     Historical Provider, MD   rizatriptan (MAXALT) 10 MG tablet  20   Historical Provider, MD   acetaminophen (AMINOFEN) 325 MG tablet Take 2 tablets by mouth every 6 hours as needed for Pain 20   Bibi Collins DO   ondansetron (ZOFRAN ODT) 4 MG disintegrating tablet Take 1 tablet by mouth every 6 hours 19   Allan Calzada PA-C   albuterol sulfate  (90 Base) MCG/ACT inhaler Inhale 2 puffs into the lungs every 6 hours as needed for Wheezing    Historical Provider, MD   loperamide (IMODIUM) 2 MG capsule Take 2 mg by mouth 4 times daily as needed for Diarrhea AS NEEDED    Historical Provider, MD       Current medications:    No current facility-administered medications for this encounter.      Current Outpatient Medications   Medication Sig Dispense Refill    CPAP Machine MISC by Does not apply route      BiPAP Machine MISC by BiPAP route nightly Indications: BIPAP 21/13      HUMIRA PEN-CD/UC/HS STARTER 80 MG/0.8ML PNKT       pantoprazole (PROTONIX) 40 MG tablet Take 1 tablet by mouth daily      Misc. Devices (FINGERTIP PULSE OXIMETER) MISC Use as directed to check oxygen saturations as needed for shortness of breath 1 each 0    metFORMIN (GLUCOPHAGE) 1000 MG tablet Take 1,000 mg by mouth 2 times daily (with meals)       rizatriptan (MAXALT) 10 MG tablet       acetaminophen (AMINOFEN) 325 MG tablet Take 2 tablets by mouth every 6 hours as needed for Pain 20 tablet 0    ondansetron (ZOFRAN ODT) 4 MG disintegrating tablet Take 1 tablet by mouth every 6 hours 10 tablet 1    albuterol sulfate  (90 Base) MCG/ACT inhaler Inhale 2 puffs into the lungs every 6 hours as needed for Wheezing      loperamide (IMODIUM) 2 MG capsule Take 2 mg by mouth 4 times daily as needed for Diarrhea AS NEEDED         Allergies: Allergies   Allergen Reactions    Morphine      Other reaction(s): Headache  Triggers migraine.    \"Triggers My Migraines\"  Other reaction(s): Headache    Tramadol      Other reaction(s): Headache  States triggers migraine headache  \"Triggers My Migraines\"  Other reaction(s): Headache       Problem List:    Patient Active Problem List   Diagnosis Code    Crohn's disease ([facility] [address] K50.90    Anxiety F41.9    Multiple lung nodules R91.8    Crohn's colitis, unspecified complication ([facility] [address] W16.331    Exacerbation of Crohn's disease with complication ([address] O75.908    IBS (irritable bowel syndrome) K58.9    C. difficile colitis A04.72    Crohn's disease of small and large intestines with complication ([address] I58.526    Neck swelling R22.1    Leukocytosis D72.829    Non-intractable vomiting with nausea R11.2    Infection of venous access port T80.219A    Acute deep vein thrombosis (DVT) of non-extremity vein I82.90    Class 3 severe obesity due to excess calories without serious comorbidity with body mass index (BMI) of 50.0 to 59.9 in adult ([facility]) E66.01, Z68.43    Preeclampsia, third trimester O14.93    Pregnancy related condition O26.90    Status post  delivery Z98.891    Bowen's disease of right shoulder D04.61    Patellofemoral arthritis of right knee M17.11    Infrapatellar bursitis of right knee M70.51    Knee mass, right R22.41    Acute respiratory failure ([facility]) J96.00    Pneumonia due to COVID-19 virus U07.1, J12.82    Shortness of breath R06.02    Edema of lower extremity R60.0    Essential hypertension I10    Morbid obesity with BMI of 50.0-59.9, adult ([facility]) E66.01, Z68.43    Severe obstructive sleep apnea G47.33       Past Medical History:        Diagnosis Date    Acid reflux     Acute deep vein thrombosis (DVT) of non-extremity vein 2018    \"in my neck\"\"after mediport was put in\"    Anemia     Anxiety     Asthma     NO PULMONOLOGIST AT THIS TIME    Blood clot due to device, implant, or graft 2018    had blood clots in neck due to med port    Bowen's disease     Cancer ([facility] [address]     skin cancer shoulder 2019    Crohn's disease ([facility] [address] Dx     Remicade Infusions Every Six Weeks, Sees Dr. Sharma Every [facility]     ECHO 2021    EF is estimated at 55-60%. Mild left ventricular hypertrophy. Mild mitral regurgitation is present.     2012    \"Passed Out And Clive Roryers On My [employer]"    Fatty liver     Gestational diabetes     \"2019- no medication - just diet controlled with pregnancy\"    Hx of blood clots     in neck    Hypertension     \"yrs ago was on b/p medication- off medication since 2018- only took medication for 2 months\"    Lower back pain     \"Sometimes\"    MRSA (methicillin resistant staph aureus) culture positive 2018    Multiple pulmonary nodules 2013    Subcentimeter; needs repeat imaging in 3-6 months    Obesity     Pancreatitis     Patellofemoral arthritis of right knee 2020    Shortness of breath on exertion     Teeth missing     Upper And Lower    VL DUP LOWER EXTREMITY VENOUS BILATERAL 2021    No evidence of DVT or SVT in the bilateral common femoral vein, No evidence of significant venous insufficiency    Wears glasses     Wears glasses        Past Surgical History:        Procedure Laterality Date    ABDOMEN SURGERY      bowel resection     ADENOIDECTOMY  1995    APPENDECTOMY      Done During Colon Resection For Crohn's  Disease    BREAST SURGERY Left     \"Infected Lymph Node Removed\"     SECTION N/A 2019     SECTION performed [facility] Raquel Mendez MD at [facility]    CHOLECYSTECTOMY  2016    COLECTOMY  8-    Crohn's Disease , Dr. Azalea Alcaraz, Appendectomy Also Done    COLONOSCOPY  2016    Polyps Removed In Past    COLONOSCOPY  2017    Hospitalized for c-diff    COLONOSCOPY  2018    normal, multiple biopsy, repeat 1 year    COLONOSCOPY N/A 2019    COLONOSCOPY POLYPECTOMY SNARE/COLD BIOPSY performed by Yulia Cunha MD at [place] [address] N/A 2/10/2022    COLONOSCOPY WITH BIOPSY performed by Yulia Cunha MD at [place] [address]      ENDOSCOPY, COLON, DIAGNOSTIC  Last Done 2014    KNEE SURGERY Right 10/1/2020    EXCISION OF MASS ON RIGHT KNEE performed by Jenny Mcknight DO at [address]      Dr Jeovanny Deras, Removed Adhesions    OTHER SURGICAL HISTORY  2011    Mediport Insertion Left Chest Area/revision done 2016- \"removed at [facility] 2017 after I got an infection    SKIN BIOPSY      \"skin cancer removed right shoulder\"2019    TUBAL LIGATION  2019    TUNNELED VENOUS PORT PLACEMENT Right 2018    smart port- [facility]-Dr Terry Manning    UPPER GASTROINTESTINAL ENDOSCOPY N/A 2021    EGD BIOPSY performed by Nohemy aBnegas MD at [address] [place] Teeth Extracted In Past       Social History: Social History     Tobacco Use    Smoking status: Never Smoker    Smokeless tobacco: Never Used   Substance Use Topics    Alcohol use: Yes     Comment: rare                                Counseling given: Not Answered      Vital Signs (Current): There were no vitals filed for this visit.                                            BP Readings from Last 3 Encounters:   02/10/22 112/69   02/10/22 (!) 143/78   02/01/22 130/80       NPO Status:                                                                                 BMI:   Wt Readings from Last 3 Encounters:   02/10/22 (!) 308 lb (139.7 kg)   02/01/22 (!) 318 lb (144.2 kg)   01/31/22 (!) 318 lb 6.4 oz (144.4 kg)     There is no height or weight on file to calculate BMI.    CBC:   Lab Results   Component Value Date    WBC 5.7 02/08/2022    RBC 4.33 02/08/2022    HGB 12.6 02/08/2022    HCT 38.9 02/08/2022    MCV 89.8 02/08/2022    RDW 12.9 02/08/2022     02/08/2022       CMP:   Lab Results   Component Value Date     02/08/2022    K 4.3 02/08/2022    K 4.0 01/19/2018     02/08/2022    CO2 22 02/08/2022    BUN 14 02/08/2022    CREATININE 0.7 02/08/2022    GFRAA >60 02/08/2022    LABGLOM >60 02/08/2022    GLUCOSE 128 02/08/2022    PROT 7.1 02/08/2022    PROT 7.4 03/10/2013    CALCIUM 9.0 02/08/2022    BILITOT 0.4 02/08/2022    ALKPHOS 59 02/08/2022    AST 84 02/08/2022    ALT 63 02/08/2022       POC Tests:   Recent Labs     02/10/22  1123   POCGLU 102*       Coags:   Lab Results   Component Value Date    PROTIME 12.4 04/13/2021    PROTIME 10.4 02/05/2012    INR 1.02 04/13/2021    APTT 85.7 04/25/2018       HCG (If Applicable):   Lab Results   Component Value Date    PREGTESTUR NEGATIVE 02/10/2022        ABGs: No results found for: PHART, PO2ART, TZK3FHN, VFK3LTT, BEART, E2CSXOAG     Type & Screen (If Applicable):  No results found for: LABABO, LABRH    Drug/Infectious Status (If Applicable):  Lab Results   Component Value Date    HEPCAB NON REACTIVE 02/22/2018       COVID-19 Screening (If Applicable):   Lab Results   Component Value Date    COVID19 NOT DETECTED 02/08/2022           Anesthesia Evaluation    Airway: Mallampati: II  TM distance: >3 FB   Neck ROM: full  Mouth opening: > = 3 FB Dental:          Pulmonary:normal exam    (+) pneumonia: no interval change,  shortness of breath:  sleep apnea: on CPAP,  asthma:                           ROS comment: Multiple pulmonary nodules   Cardiovascular:    (+) hypertension:, valvular problems/murmurs: MR, WILBURN:,       ECG reviewed      Echocardiogram reviewed         Beta Blocker:  Not on Beta Blocker      ROS comment: Summary   Limited study due to patients body habitus. Left ventricular function and size is normal, EF is estimated at 55-60%. Mild left ventricular hypertrophy. E/A reversal; indeterminate diastolic function. No regional wall motion abnormalities were detected. No significant valvular disease noted. Mild mitral regurgitation is present. RVSP is 12 mmHg. No evidence of pericardial effusion. Signature      ------------------------------------------------------------------   Electronically signed by Wayne Shaw MD (Interpreting   physician) on 07/28/2021 at 04:23 PM   ------------------------------------------------------------------     Neuro/Psych:   (+) psychiatric history:depression/anxiety             GI/Hepatic/Renal:   (+) GERD:, liver disease:, bowel prep, morbid obesity          Endo/Other:    (+) Diabetes, blood dyscrasia: anemia:., .                 Abdominal:   (+) obese,           Vascular:   + DVT, . Other Findings:           Anesthesia Plan      general     ASA 3     (Chart Review)  Induction: intravenous. MIPS: Postoperative opioids intended and Prophylactic antiemetics administered. Anesthetic plan and risks discussed with patient. Plan discussed with CRNA.     Attending anesthesiologist reviewed and agrees with Pre Eval content          Sky Broderick, SHAQUILLE - CRNA   2/11/2022    Pre Anesthesia Evaluation complete. Anesthesia plan, risks, benefits, alternatives, and personnel discussed with patient and/or legal guardian. Patient and/or legal guardian verbalized an understanding and agreed to proceed. Anesthesia plan discussed with care team members and agreed upon.   Dottie Turcios, SHAQUILLE - CRNA  2/14/2022

## 2022-02-11 NOTE — PROGRESS NOTES
[facility]  1985  43 y.o. Miguel Ángel Navarro is here for pre op weigh in. Patient was started on liver shrinking diet on 1/31/22 and lost -8.8 lbs over 2 week slim fast diet and -34.6 overall. Current Body mass index is Body mass index is 51.52 kg/m². Jose F Skaggs BMI started at 55.75    - All labs reviewed and questions answered. - Aware to continue diet until surgery and educated on post op diet stages. - Covid test on 2/8/22= negative    SUBJECT [place]:    Chief Complaint   Patient presents with    Other     Past Medical History:   Diagnosis Date    Acid reflux     Acute deep vein thrombosis (DVT) of non-extremity vein 04/25/2018    \"in my neck\"\"after mediport was put in\"    Anemia     Anxiety     Asthma     NO PULMONOLOGIST AT THIS TIME    Blood clot due to device, implant, or graft 04/2018    had blood clots in neck due to med port    Bowen's disease     Cancer ([facility] [address]     skin cancer shoulder 2019    Crohn's disease ([facility] [address] Dx 2010    Remicade Infusions Every Six Weeks, [place]    Depression     ECHO 07/28/2021    EF is estimated at 55-60%. Mild left ventricular hypertrophy. Mild mitral regurgitation is present.     Fall 2012    \"Passed Out And [employer] On My Erminio Brisker"    Fatty liver     Gestational diabetes     \"2019- no medication - just diet controlled with pregnancy\"    Hx of blood clots     in neck    Hypertension     \"yrs ago was on b/p medication- off medication since 2018- only took medication for 2 months\"    Lower back pain     \"Sometimes\"    MRSA (methicillin resistant staph aureus) culture positive 04/2018    Multiple pulmonary nodules 06/2013    Subcentimeter; needs repeat imaging in 3-6 months    Obesity     Pancreatitis     Patellofemoral arthritis of right knee 08/19/2020    Shortness of breath on exertion     Teeth missing     Upper And Lower    VL DUP LOWER EXTREMITY VENOUS BILATERAL 08/18/2021    No evidence of DVT or SVT in the bilateral common femoral vein, No evidence of significant venous insufficiency    Wears glasses     Wears glasses      Past Surgical History:   Procedure Laterality Date    ABDOMEN SURGERY      bowel resection     ADENOIDECTOMY  1995    APPENDECTOMY      Done During Colon Resection For Crohn's  Disease    BREAST SURGERY Left     \"Infected Lymph Node Removed\"     SECTION N/A 2019     SECTION performed [facility] [place] [place]    CHOLECYSTECTOMY  2016    COLECTOMY  8-    Crohn's Disease , Dr. Sigrid Elise, Appendectomy Also Done    COLONOSCOPY  2016    Polyps Removed In Past    COLONOSCOPY  2017    Hospitalized for c-diff    COLONOSCOPY  2018    normal, multiple biopsy, repeat 1 year    COLONOSCOPY N/A 2019    COLONOSCOPY POLYPECTOMY SNARE/COLD BIOPSY performed by [facility] Rai MD at [facility] N/A 2/10/2022    COLONOSCOPY WITH BIOPSY performed by [facility] R[facility] MD at [facility]      ENDOSCOPY, COLON, DIAGNOSTIC  Last Done     KNEE SURGERY Right 10/1/2020    EXCISION OF MASS ON RIGHT KNEE performed by Citlali Desai DO at [address]      Dr Danny Almanza, Removed Adhesions    OTHER SURGICAL HISTORY  2011    Mediport Insertion Left Chest Area/revision done 2016- \"removed at [facility] 2017 after I got an infection    SKIN BIOPSY      \"skin cancer removed right shoulder\"2019    TUBAL LIGATION  2019    TUNNELED VENOUS PORT PLACEMENT Right 2018    smart port- [facility]-Dr Chapin Meade    UPPER GASTROINTESTINAL ENDOSCOPY N/A 2021    EGD BIOPSY performed by Marcus Diego MD at [address] [place] Teeth Extracted In Past     Current Outpatient Medications   Medication Sig Dispense Refill    CPAP Machine MISC by Does not apply route      BiPAP Machine MISC by BiPAP route nightly Indications: BIPAP   HUMIRA PEN-CD/UC/HS STARTER 80 MG/0.8ML PNKT       pantoprazole (PROTONIX) 40 MG tablet Take 1 tablet by mouth daily      Misc. Devices (FINGERTIP PULSE OXIMETER) MISC Use as directed to check oxygen saturations as needed for shortness of breath 1 each 0    metFORMIN (GLUCOPHAGE) 1000 MG tablet Take 1,000 mg by mouth 2 times daily (with meals)       rizatriptan (MAXALT) 10 MG tablet       acetaminophen (AMINOFEN) 325 MG tablet Take 2 tablets by mouth every 6 hours as needed for Pain 20 tablet 0    ondansetron (ZOFRAN ODT) 4 MG disintegrating tablet Take 1 tablet by mouth every 6 hours 10 tablet 1    albuterol sulfate  (90 Base) MCG/ACT inhaler Inhale 2 puffs into the lungs every 6 hours as needed for Wheezing      loperamide (IMODIUM) 2 MG capsule Take 2 mg by mouth 4 times daily as needed for Diarrhea AS NEEDED       No current facility-administered medications for this visit. Family History   Problem Relation Age of Onset    Migraines Mother     Inflam Bowel Dis Mother     Heart Disease Father     Diabetes Father     High Cholesterol Father     Obesity Sister     Depression Maternal Aunt     Depression Maternal Uncle     Depression Paternal Aunt     Coronary Art Dis Paternal Aunt     Depression Paternal Uncle     Coronary Art Dis Paternal Uncle     Depression Maternal Grandmother     Depression Maternal Grandfather     Depression Paternal Grandmother     Coronary Art Dis Paternal Grandmother     Diabetes Paternal Grandmother     Depression Paternal Grandfather     Coronary Art Dis Paternal Grandfather     Diabetes Paternal Grandfather      Allergies   Allergen Reactions    Morphine      Other reaction(s): Headache  Triggers migraine.    \"Triggers My Migraines\"  Other reaction(s): Headache    Tramadol      Other reaction(s): Headache  States triggers migraine headache  \"Triggers My Migraines\"  Other reaction(s): Headache        Review of Systems   Constitutional: Negative. HENT: Negative. Eyes: Negative. Respiratory: Negative. Cardiovascular: Negative. Gastrointestinal: Negative. Endocrine: Negative. Genitourinary: Negative. Musculoskeletal: Negative. Skin: Negative. Allergic/Immunologic: Negative. Neurological: Negative. Hematological: Negative. Psychiatric/Behavioral: Negative. OBJECTIVE:     /78 (Site: Right Upper Arm, Position: Sitting, Cuff Size: Large Adult)   Pulse 85   Ht 5' 5\" (1.651 m)   Wt (!) 309 lb 9.6 oz (140.4 kg)   LMP 01/19/2022   SpO2 98%   BMI 51.52 kg/m²   Wt Readings from Last 3 Encounters:   02/11/22 (!) 309 lb 9.6 oz (140.4 kg)   02/10/22 (!) 308 lb (139.7 kg)   02/01/22 (!) 318 lb (144.2 kg)       Physical Exam  Constitutional:       Appearance: Normal appearance. HENT:      Head: Normocephalic. Nose: Nose normal.      Mouth/Throat:      Pharynx: Oropharynx is clear. Eyes:      Conjunctiva/sclera: Conjunctivae normal.      Pupils: Pupils are equal, round, and reactive to light. Cardiovascular:      Rate and Rhythm: Normal rate. Pulses: Normal pulses. Pulmonary:      Effort: Pulmonary effort is normal.      Breath sounds: Normal breath sounds. Abdominal:      General: Bowel sounds are normal.   Musculoskeletal:         General: Normal range of motion. Cervical back: Normal range of motion. Skin:     General: Skin is warm and dry. Capillary Refill: Capillary refill takes less than 2 seconds. Neurological:      General: No focal deficit present. Mental Status: She is alert and oriented to person, place, and time. Psychiatric:         Mood and Affect: Mood normal.         Behavior: Behavior normal.           ASSESSMENT/ PLAN:    1. Morbid obesity with BMI of 50.0-59.9, adult ([facility] [address]  -Continue planned procedure    2. Pre-op evaluation  - Scheduled for planned robot assisted sleeve gastrectomy on 2/14/22.   - Discussed pre surgical drink and when to take it.

## 2022-02-11 NOTE — PROGRESS NOTES
BARIATRIC SURGERY OFFICE PROGRESS NOTE    SUBJECTIVE:    Patient presenting today referred from SHAUQILLE Burroughs NP, for   Chief Complaint   Patient presents with    Other   . Vitals:    02/11/22 0934   BP: 116/78   Pulse: 85   SpO2: 98%        BMI: Body mass index is 51.52 kg/m². Weight History: Wt Readings from Last 3 Encounters:   02/11/22 (!) 309 lb 9.6 oz (140.4 kg)   02/10/22 (!) 308 lb (139.7 kg)   02/01/22 (!) 318 lb (144.2 kg)         HPI:Allyson Latif is a 43 y.o. female presenting in {FIRST SECOND [id number]:99686} bariatric PRE-OP visit    Diet Recall: Total weight loss/gain: *** lbs since last visit, and *** lbs since starting program     Protein: ***  Water Intake: ***  Exercise: ***  New health problems: ***  New medications: ***  Clearances:  -- Cardiology: ***  -- Pulmonology: ***  -- Psychology: ***    Thoroughly reviewed the patient's medical history, family history, social history and review of systems with the patient today in the office. Please see medical record for pertinent positives. Past Medical History:   Diagnosis Date    Acid reflux     Acute deep vein thrombosis (DVT) of non-extremity vein 04/25/2018    \"in my neck\"\"after mediport was put in\"    Anemia     Anxiety     Asthma     NO PULMONOLOGIST AT THIS TIME    Blood clot due to device, implant, or graft 04/2018    had blood clots in neck due to med port    Bowen's disease     Cancer ([facility]     skin cancer shoulder 2019    Crohn's disease ([facility] Dx 2010    Remicade Infusions Every Six Weeks, Sees Dr. Suzette Mccoy, [facility]    Depression     ECHO 07/28/2021    EF is estimated at 55-60%. Mild left ventricular hypertrophy. Mild mitral regurgitation is present.     Fall 2012    \"Passed Out And Debby Banco On My [place]"    Fatty liver     Gestational diabetes     \"2019- no medication - just diet controlled with pregnancy\"    Hx of blood clots     in neck    Hypertension     \"yrs ago was on b/p medication- off medication since - only took medication for 2 months\"    Lower back pain     \"Sometimes\"    MRSA (methicillin resistant staph aureus) culture positive 2018    Multiple pulmonary nodules 2013    Subcentimeter; needs repeat imaging in 3-6 months    Obesity     Pancreatitis     Patellofemoral arthritis of right knee 2020    Shortness of breath on exertion     Teeth missing     Upper And Lower    VL DUP LOWER EXTREMITY VENOUS BILATERAL 2021    No evidence of DVT or SVT in the bilateral common femoral vein, No evidence of significant venous insufficiency    Wears glasses     Wears glasses         Patient Active Problem List   Diagnosis    Crohn's disease ([address]    Anxiety    Multiple lung nodules    Crohn's colitis, unspecified complication ([address]    Exacerbation of Crohn's disease with complication ([address]    IBS (irritable bowel syndrome)    C. difficile colitis    Crohn's disease of small and large intestines with complication ([address]    Neck swelling    Leukocytosis    Non-intractable vomiting with nausea    Infection of venous access port    Acute deep vein thrombosis (DVT) of non-extremity vein    Class 3 severe obesity due to excess calories without serious comorbidity with body mass index (BMI) of 50.0 to 59.9 in adult ([address]    Preeclampsia, third trimester    Pregnancy related condition    Status post  delivery    Bowen's disease of right shoulder    Patellofemoral arthritis of right knee    Infrapatellar bursitis of right knee    Knee mass, right    Acute respiratory failure ([address]    Pneumonia due to COVID-19 virus    Shortness of breath    Edema of lower extremity    Essential hypertension    Morbid obesity with BMI of 50.0-59.9, adult ([address]    Severe obstructive sleep apnea       Past Surgical History:   Procedure Laterality Date    ABDOMEN SURGERY      bowel resection     ADENOIDECTOMY      APPENDECTOMY   Done During Colon Resection For Crohn's  Disease    BREAST SURGERY Left     \"Infected Lymph Node Removed\"     SECTION N/A 2019     SECTION performed by Janet Valenzuela MD at [facility] L&D OR    CHOLECYSTECTOMY  2016    COLECTOMY  -    Crohn's Disease , Dr. Claude Rattler, Appendectomy Also Done    COLONOSCOPY  2016    Polyps Removed In Past    COLONOSCOPY  2017    Hospitalized for c-diff    COLONOSCOPY  2018    normal, multiple biopsy, repeat 1 year    COLONOSCOPY N/A 2019    COLONOSCOPY POLYPECTOMY SNARE/COLD BIOPSY performed by Brittany Montalvo MD at [facility] N/A 2/10/2022    COLONOSCOPY WITH BIOPSY performed by Brittany Montalvo MD at [facility]      ENDOSCOPY, COLON, DIAGNOSTIC  Last Done     KNEE SURGERY Right 10/1/2020    EXCISION OF MASS ON RIGHT KNEE performed by Dolly Johnston DO at [address]      Dr Florecita Man, Removed Adhesions    OTHER SURGICAL HISTORY  2011    Mediport Insertion Left Chest Area/revision done 2016- \"removed at [facility] 2017 after I got an infection    SKIN BIOPSY      \"skin cancer removed right shoulder\"2019    TUBAL LIGATION  2019    TUNNELED VENOUS PORT PLACEMENT Right 2018    smart port- [facility]-Dr Sharmin Gomez    UPPER GASTROINTESTINAL ENDOSCOPY N/A 2021    EGD BIOPSY performed by Maddy Cooper MD at [address] [place] Teeth Extracted In Past       Current Outpatient Medications   Medication Sig Dispense Refill    CPAP Machine MISC by Does not apply route      BiPAP Machine MISC by BiPAP route nightly Indications: BIPAP       HUMIRA PEN-CD/UC/HS STARTER 80 MG/0.8ML PNKT       pantoprazole (PROTONIX) 40 MG tablet Take 1 tablet by mouth daily      Misc.  Devices (FINGERTIP PULSE OXIMETER) MISC Use as directed to check oxygen saturations as needed for shortness of breath 1 each 0    metFORMIN (GLUCOPHAGE) 1000 MG tablet Take 1,000 mg by mouth 2 times daily (with meals)       rizatriptan (MAXALT) 10 MG tablet       acetaminophen (AMINOFEN) 325 MG tablet Take 2 tablets by mouth every 6 hours as needed for Pain 20 tablet 0    ondansetron (ZOFRAN ODT) 4 MG disintegrating tablet Take 1 tablet by mouth every 6 hours 10 tablet 1    albuterol sulfate  (90 Base) MCG/ACT inhaler Inhale 2 puffs into the lungs every 6 hours as needed for Wheezing      loperamide (IMODIUM) 2 MG capsule Take 2 mg by mouth 4 times daily as needed for Diarrhea AS NEEDED       No current facility-administered medications for this visit. Allergies   Allergen Reactions    Morphine      Other reaction(s): Headache  Triggers migraine. \"Triggers My Migraines\"  Other reaction(s): Headache    Tramadol      Other reaction(s): Headache  States triggers migraine headache  \"Triggers My Migraines\"  Other reaction(s): Headache         Review of Systems    OBJECTIVE:    /78 (Site: Right Upper Arm, Position: Sitting, Cuff Size: Large Adult)   Pulse 85   Ht 5' 5\" (1.651 m)   Wt (!) 309 lb 9.6 oz (140.4 kg)   LMP 01/19/2022   SpO2 98%   BMI 51.52 kg/m²      Physical Exam    ASSESSMENT & PLAN:    There are no diagnoses linked to this encounter.        -Patient was encouraged to journal all food intake.   -Keep calorie level at approximately ***, per discussion / plan with registered dietician.  -Protein intake is to be a minimum of 60 grams per day. -Water drinking was encouraged with a goal of 64oz-128oz daily. Beverages are to be calorie free except for milk. Avoid soda. -Continue to increase level of physical activity. ***This patient is a female of reproductive age and was advised she should not become pregnant during the bariatric workup process and for at least 12-18 months after surgery. The patient is agreeable to this plan.     I spent 25 minutes with the patient face to face today and over 50% of the office visit today was spent in face to face counseling regarding diet and exercise, in preparation for her planned Robotic Sleeve Gastrectomy. Discussed in length complying with the dietary recommendations, complying with the preoperative workup including dietary counseling, exercise physiologist counseling, and pre-operative optimization of pulmonologist and cardiologist.    The patient expressed understanding and willingness to comply nicely; all questions and concerns addressed. No orders of the defined types were placed in this encounter. No orders of the defined types were placed in this encounter. Follow Up:  No follow-ups on file.     Traci Poe, APRN - CNP

## 2022-02-11 NOTE — PROGRESS NOTES
2/11/22 - Notified surgery @ [facility] on 2/14/22 @ 0700, arrival Nigel Lugo. Understanding verbalized.

## 2022-02-14 ENCOUNTER — ANESTHESIA (OUTPATIENT)
Dept: OPERATING ROOM | Age: 37
DRG: 403 | End: 2022-02-14
Payer: COMMERCIAL

## 2022-02-14 ENCOUNTER — HOSPITAL ENCOUNTER (INPATIENT)
Age: 37
LOS: 1 days | Discharge: HOME | DRG: 403 | End: 2022-02-15
Attending: SURGERY | Admitting: SURGERY
Payer: COMMERCIAL

## 2022-02-14 VITALS
RESPIRATION RATE: 16 BRPM | TEMPERATURE: 97.6 F | DIASTOLIC BLOOD PRESSURE: 119 MMHG | OXYGEN SATURATION: 99 % | SYSTOLIC BLOOD PRESSURE: 146 MMHG

## 2022-02-14 DIAGNOSIS — E66.01 MORBID OBESITY (HCC): Primary | ICD-10-CM

## 2022-02-14 DIAGNOSIS — Z01.818 PRE-OP TESTING: ICD-10-CM

## 2022-02-14 LAB
GLUCOSE BLD-MCNC: 118 MG/DL (ref 70–99)
GLUCOSE BLD-MCNC: 136 MG/DL (ref 70–99)
GLUCOSE BLD-MCNC: 144 MG/DL (ref 70–99)
GLUCOSE BLD-MCNC: 95 MG/DL (ref 70–99)
GLUCOSE BLD-MCNC: 99 MG/DL (ref 70–99)
PREGNANCY TEST URINE, POC: NEGATIVE

## 2022-02-14 PROCEDURE — 8E0W4CZ ROBOTIC ASSISTED PROCEDURE OF TRUNK REGION, PERCUTANEOUS ENDOSCOPIC APPROACH: ICD-10-PCS | Performed by: SURGERY

## 2022-02-14 PROCEDURE — 6360000002 HC RX W HCPCS: Performed by: NURSE ANESTHETIST, CERTIFIED REGISTERED

## 2022-02-14 PROCEDURE — 6360000002 HC RX W HCPCS: Performed by: ANESTHESIOLOGY

## 2022-02-14 PROCEDURE — 2580000003 HC RX 258: Performed by: SURGERY

## 2022-02-14 PROCEDURE — 3700000000 HC ANESTHESIA ATTENDED CARE: Performed by: SURGERY

## 2022-02-14 PROCEDURE — 82962 GLUCOSE BLOOD TEST: CPT

## 2022-02-14 PROCEDURE — 7100000010 HC PHASE II RECOVERY - FIRST 15 MIN: Performed by: SURGERY

## 2022-02-14 PROCEDURE — 2720000010 HC SURG SUPPLY STERILE: Performed by: SURGERY

## 2022-02-14 PROCEDURE — 0DB64Z3 EXCISION OF STOMACH, PERCUTANEOUS ENDOSCOPIC APPROACH, VERTICAL: ICD-10-PCS | Performed by: SURGERY

## 2022-02-14 PROCEDURE — 2500000003 HC RX 250 WO HCPCS: Performed by: SURGERY

## 2022-02-14 PROCEDURE — APPNB180 APP NON BILLABLE TIME > 60 MINS: Performed by: NURSE PRACTITIONER

## 2022-02-14 PROCEDURE — 7100000000 HC PACU RECOVERY - FIRST 15 MIN: Performed by: SURGERY

## 2022-02-14 PROCEDURE — 88307 TISSUE EXAM BY PATHOLOGIST: CPT

## 2022-02-14 PROCEDURE — 6360000002 HC RX W HCPCS: Performed by: SURGERY

## 2022-02-14 PROCEDURE — 43775 LAP SLEEVE GASTRECTOMY: CPT | Performed by: NURSE PRACTITIONER

## 2022-02-14 PROCEDURE — 7100000001 HC PACU RECOVERY - ADDTL 15 MIN: Performed by: SURGERY

## 2022-02-14 PROCEDURE — 6370000000 HC RX 637 (ALT 250 FOR IP): Performed by: SURGERY

## 2022-02-14 PROCEDURE — 43775 LAP SLEEVE GASTRECTOMY: CPT | Performed by: SURGERY

## 2022-02-14 PROCEDURE — 3700000001 HC ADD 15 MINUTES (ANESTHESIA): Performed by: SURGERY

## 2022-02-14 PROCEDURE — 3600000009 HC SURGERY ROBOT BASE: Performed by: SURGERY

## 2022-02-14 PROCEDURE — 3600000019 HC SURGERY ROBOT ADDTL 15MIN: Performed by: SURGERY

## 2022-02-14 PROCEDURE — 2580000003 HC RX 258: Performed by: NURSE ANESTHETIST, CERTIFIED REGISTERED

## 2022-02-14 PROCEDURE — S2900 ROBOTIC SURGICAL SYSTEM: HCPCS | Performed by: SURGERY

## 2022-02-14 PROCEDURE — 2709999900 HC NON-CHARGEABLE SUPPLY: Performed by: SURGERY

## 2022-02-14 PROCEDURE — 1200000000 HC SEMI PRIVATE

## 2022-02-14 PROCEDURE — 81025 URINE PREGNANCY TEST: CPT

## 2022-02-14 PROCEDURE — 7100000011 HC PHASE II RECOVERY - ADDTL 15 MIN: Performed by: SURGERY

## 2022-02-14 PROCEDURE — 2500000003 HC RX 250 WO HCPCS: Performed by: NURSE ANESTHETIST, CERTIFIED REGISTERED

## 2022-02-14 RX ORDER — KETOROLAC TROMETHAMINE 30 MG/ML
INJECTION, SOLUTION INTRAMUSCULAR; INTRAVENOUS PRN
Status: DISCONTINUED | OUTPATIENT
Start: 2022-02-14 | End: 2022-02-14 | Stop reason: SDUPTHER

## 2022-02-14 RX ORDER — DIPHENHYDRAMINE HYDROCHLORIDE 50 MG/ML
12.5 INJECTION INTRAMUSCULAR; INTRAVENOUS
Status: DISCONTINUED | OUTPATIENT
Start: 2022-02-14 | End: 2022-02-14

## 2022-02-14 RX ORDER — SODIUM CHLORIDE 0.9 % (FLUSH) 0.9 %
5-40 SYRINGE (ML) INJECTION EVERY 12 HOURS SCHEDULED
Status: DISCONTINUED | OUTPATIENT
Start: 2022-02-14 | End: 2022-02-14

## 2022-02-14 RX ORDER — POTASSIUM CHLORIDE 1.5 G/1.77G
40 POWDER, FOR SOLUTION ORAL PRN
Status: DISCONTINUED | OUTPATIENT
Start: 2022-02-14 | End: 2022-02-15 | Stop reason: HOSPADM

## 2022-02-14 RX ORDER — SODIUM CHLORIDE 0.9 % (FLUSH) 0.9 %
10 SYRINGE (ML) INJECTION PRN
Status: DISCONTINUED | OUTPATIENT
Start: 2022-02-14 | End: 2022-02-15 | Stop reason: HOSPADM

## 2022-02-14 RX ORDER — LIDOCAINE HYDROCHLORIDE 20 MG/ML
INJECTION, SOLUTION INTRAVENOUS PRN
Status: DISCONTINUED | OUTPATIENT
Start: 2022-02-14 | End: 2022-02-14 | Stop reason: SDUPTHER

## 2022-02-14 RX ORDER — KETOROLAC TROMETHAMINE 30 MG/ML
30 INJECTION, SOLUTION INTRAMUSCULAR; INTRAVENOUS EVERY 6 HOURS
Status: DISCONTINUED | OUTPATIENT
Start: 2022-02-14 | End: 2022-02-15 | Stop reason: HOSPADM

## 2022-02-14 RX ORDER — SODIUM CHLORIDE, SODIUM LACTATE, POTASSIUM CHLORIDE, CALCIUM CHLORIDE 600; 310; 30; 20 MG/100ML; MG/100ML; MG/100ML; MG/100ML
INJECTION, SOLUTION INTRAVENOUS CONTINUOUS
Status: DISCONTINUED | OUTPATIENT
Start: 2022-02-14 | End: 2022-02-15 | Stop reason: HOSPADM

## 2022-02-14 RX ORDER — HYDROCODONE BITARTRATE AND ACETAMINOPHEN 5; 325 MG/1; MG/1
1 TABLET ORAL EVERY 6 HOURS PRN
Status: DISCONTINUED | OUTPATIENT
Start: 2022-02-14 | End: 2022-02-15 | Stop reason: HOSPADM

## 2022-02-14 RX ORDER — METOCLOPRAMIDE HYDROCHLORIDE 5 MG/ML
10 INJECTION INTRAMUSCULAR; INTRAVENOUS EVERY 6 HOURS PRN
Status: DISCONTINUED | OUTPATIENT
Start: 2022-02-14 | End: 2022-02-15 | Stop reason: HOSPADM

## 2022-02-14 RX ORDER — HYDROCODONE BITARTRATE AND ACETAMINOPHEN 5; 325 MG/1; MG/1
2 TABLET ORAL EVERY 6 HOURS PRN
Status: DISCONTINUED | OUTPATIENT
Start: 2022-02-14 | End: 2022-02-14

## 2022-02-14 RX ORDER — HYDRALAZINE HYDROCHLORIDE 20 MG/ML
5 INJECTION INTRAMUSCULAR; INTRAVENOUS EVERY 10 MIN PRN
Status: DISCONTINUED | OUTPATIENT
Start: 2022-02-14 | End: 2022-02-14

## 2022-02-14 RX ORDER — DEXTROSE MONOHYDRATE 50 MG/ML
100 INJECTION, SOLUTION INTRAVENOUS PRN
Status: DISCONTINUED | OUTPATIENT
Start: 2022-02-14 | End: 2022-02-15 | Stop reason: HOSPADM

## 2022-02-14 RX ORDER — SODIUM CHLORIDE, SODIUM LACTATE, POTASSIUM CHLORIDE, CALCIUM CHLORIDE 600; 310; 30; 20 MG/100ML; MG/100ML; MG/100ML; MG/100ML
INJECTION, SOLUTION INTRAVENOUS CONTINUOUS
Status: DISCONTINUED | OUTPATIENT
Start: 2022-02-14 | End: 2022-02-14

## 2022-02-14 RX ORDER — CEFAZOLIN SODIUM 2 G/100ML
2000 INJECTION, SOLUTION INTRAVENOUS EVERY 8 HOURS
Status: COMPLETED | OUTPATIENT
Start: 2022-02-14 | End: 2022-02-15

## 2022-02-14 RX ORDER — ACETAMINOPHEN 325 MG/1
325 TABLET ORAL EVERY 4 HOURS
Status: DISCONTINUED | OUTPATIENT
Start: 2022-02-14 | End: 2022-02-15 | Stop reason: HOSPADM

## 2022-02-14 RX ORDER — LABETALOL HYDROCHLORIDE 5 MG/ML
5 INJECTION, SOLUTION INTRAVENOUS EVERY 10 MIN PRN
Status: DISCONTINUED | OUTPATIENT
Start: 2022-02-14 | End: 2022-02-14

## 2022-02-14 RX ORDER — MEPERIDINE HYDROCHLORIDE 25 MG/ML
12.5 INJECTION INTRAMUSCULAR; INTRAVENOUS; SUBCUTANEOUS EVERY 5 MIN PRN
Status: DISCONTINUED | OUTPATIENT
Start: 2022-02-14 | End: 2022-02-14

## 2022-02-14 RX ORDER — SODIUM CHLORIDE 0.9 % (FLUSH) 0.9 %
5-40 SYRINGE (ML) INJECTION PRN
Status: DISCONTINUED | OUTPATIENT
Start: 2022-02-14 | End: 2022-02-14

## 2022-02-14 RX ORDER — 0.9 % SODIUM CHLORIDE 0.9 %
500 INTRAVENOUS SOLUTION INTRAVENOUS
Status: DISCONTINUED | OUTPATIENT
Start: 2022-02-14 | End: 2022-02-14

## 2022-02-14 RX ORDER — PROMETHAZINE HYDROCHLORIDE 25 MG/ML
6.25 INJECTION, SOLUTION INTRAMUSCULAR; INTRAVENOUS
Status: DISCONTINUED | OUTPATIENT
Start: 2022-02-14 | End: 2022-02-14

## 2022-02-14 RX ORDER — ONDANSETRON 2 MG/ML
4 INJECTION INTRAMUSCULAR; INTRAVENOUS EVERY 6 HOURS PRN
Status: DISCONTINUED | OUTPATIENT
Start: 2022-02-14 | End: 2022-02-15 | Stop reason: HOSPADM

## 2022-02-14 RX ORDER — ONDANSETRON 2 MG/ML
INJECTION INTRAMUSCULAR; INTRAVENOUS PRN
Status: DISCONTINUED | OUTPATIENT
Start: 2022-02-14 | End: 2022-02-14 | Stop reason: SDUPTHER

## 2022-02-14 RX ORDER — SODIUM CHLORIDE 0.9 % (FLUSH) 0.9 %
5-40 SYRINGE (ML) INJECTION EVERY 12 HOURS SCHEDULED
Status: DISCONTINUED | OUTPATIENT
Start: 2022-02-14 | End: 2022-02-15 | Stop reason: HOSPADM

## 2022-02-14 RX ORDER — BUPIVACAINE HYDROCHLORIDE 5 MG/ML
INJECTION, SOLUTION EPIDURAL; INTRACAUDAL
Status: COMPLETED | OUTPATIENT
Start: 2022-02-14 | End: 2022-02-14

## 2022-02-14 RX ORDER — SODIUM CHLORIDE 9 MG/ML
25 INJECTION, SOLUTION INTRAVENOUS PRN
Status: DISCONTINUED | OUTPATIENT
Start: 2022-02-14 | End: 2022-02-15 | Stop reason: HOSPADM

## 2022-02-14 RX ORDER — MAGNESIUM SULFATE IN WATER 40 MG/ML
2000 INJECTION, SOLUTION INTRAVENOUS PRN
Status: DISCONTINUED | OUTPATIENT
Start: 2022-02-14 | End: 2022-02-15 | Stop reason: HOSPADM

## 2022-02-14 RX ORDER — SCOLOPAMINE TRANSDERMAL SYSTEM 1 MG/1
1 PATCH, EXTENDED RELEASE TRANSDERMAL ONCE
Status: DISCONTINUED | OUTPATIENT
Start: 2022-02-14 | End: 2022-02-14

## 2022-02-14 RX ORDER — POTASSIUM CHLORIDE 20 MEQ/1
40 TABLET, EXTENDED RELEASE ORAL PRN
Status: DISCONTINUED | OUTPATIENT
Start: 2022-02-14 | End: 2022-02-15 | Stop reason: HOSPADM

## 2022-02-14 RX ORDER — NICOTINE POLACRILEX 4 MG
15 LOZENGE BUCCAL PRN
Status: DISCONTINUED | OUTPATIENT
Start: 2022-02-14 | End: 2022-02-15 | Stop reason: HOSPADM

## 2022-02-14 RX ORDER — ONDANSETRON 2 MG/ML
4 INJECTION INTRAMUSCULAR; INTRAVENOUS
Status: COMPLETED | OUTPATIENT
Start: 2022-02-14 | End: 2022-02-14

## 2022-02-14 RX ORDER — MORPHINE SULFATE 2 MG/ML
2 INJECTION, SOLUTION INTRAMUSCULAR; INTRAVENOUS
Status: DISCONTINUED | OUTPATIENT
Start: 2022-02-14 | End: 2022-02-15 | Stop reason: HOSPADM

## 2022-02-14 RX ORDER — HEPARIN SODIUM 5000 [USP'U]/ML
5000 INJECTION, SOLUTION INTRAVENOUS; SUBCUTANEOUS ONCE
Status: COMPLETED | OUTPATIENT
Start: 2022-02-14 | End: 2022-02-14

## 2022-02-14 RX ORDER — MORPHINE SULFATE 4 MG/ML
4 INJECTION, SOLUTION INTRAMUSCULAR; INTRAVENOUS
Status: DISCONTINUED | OUTPATIENT
Start: 2022-02-14 | End: 2022-02-15 | Stop reason: HOSPADM

## 2022-02-14 RX ORDER — SODIUM CHLORIDE, SODIUM LACTATE, POTASSIUM CHLORIDE, CALCIUM CHLORIDE 600; 310; 30; 20 MG/100ML; MG/100ML; MG/100ML; MG/100ML
INJECTION, SOLUTION INTRAVENOUS CONTINUOUS PRN
Status: DISCONTINUED | OUTPATIENT
Start: 2022-02-14 | End: 2022-02-14 | Stop reason: SDUPTHER

## 2022-02-14 RX ORDER — ROCURONIUM BROMIDE 10 MG/ML
INJECTION, SOLUTION INTRAVENOUS PRN
Status: DISCONTINUED | OUTPATIENT
Start: 2022-02-14 | End: 2022-02-14 | Stop reason: SDUPTHER

## 2022-02-14 RX ORDER — HYDROMORPHONE HCL 110MG/55ML
0.5 PATIENT CONTROLLED ANALGESIA SYRINGE INTRAVENOUS EVERY 5 MIN PRN
Status: DISCONTINUED | OUTPATIENT
Start: 2022-02-14 | End: 2022-02-14

## 2022-02-14 RX ORDER — POTASSIUM CHLORIDE 7.45 MG/ML
10 INJECTION INTRAVENOUS PRN
Status: DISCONTINUED | OUTPATIENT
Start: 2022-02-14 | End: 2022-02-15 | Stop reason: HOSPADM

## 2022-02-14 RX ORDER — DEXAMETHASONE SODIUM PHOSPHATE 4 MG/ML
INJECTION, SOLUTION INTRA-ARTICULAR; INTRALESIONAL; INTRAMUSCULAR; INTRAVENOUS; SOFT TISSUE PRN
Status: DISCONTINUED | OUTPATIENT
Start: 2022-02-14 | End: 2022-02-14 | Stop reason: SDUPTHER

## 2022-02-14 RX ORDER — FENTANYL CITRATE 50 UG/ML
50 INJECTION, SOLUTION INTRAMUSCULAR; INTRAVENOUS EVERY 5 MIN PRN
Status: DISCONTINUED | OUTPATIENT
Start: 2022-02-14 | End: 2022-02-14

## 2022-02-14 RX ORDER — PROPOFOL 10 MG/ML
INJECTION, EMULSION INTRAVENOUS PRN
Status: DISCONTINUED | OUTPATIENT
Start: 2022-02-14 | End: 2022-02-14 | Stop reason: SDUPTHER

## 2022-02-14 RX ORDER — SODIUM CHLORIDE 9 MG/ML
25 INJECTION, SOLUTION INTRAVENOUS PRN
Status: DISCONTINUED | OUTPATIENT
Start: 2022-02-14 | End: 2022-02-14

## 2022-02-14 RX ORDER — HYDROCODONE BITARTRATE AND ACETAMINOPHEN 5; 325 MG/1; MG/1
1 TABLET ORAL PRN
Status: DISCONTINUED | OUTPATIENT
Start: 2022-02-14 | End: 2022-02-14

## 2022-02-14 RX ORDER — FENTANYL CITRATE 50 UG/ML
INJECTION, SOLUTION INTRAMUSCULAR; INTRAVENOUS PRN
Status: DISCONTINUED | OUTPATIENT
Start: 2022-02-14 | End: 2022-02-14 | Stop reason: SDUPTHER

## 2022-02-14 RX ORDER — DEXTROSE MONOHYDRATE 25 G/50ML
12.5 INJECTION, SOLUTION INTRAVENOUS PRN
Status: DISCONTINUED | OUTPATIENT
Start: 2022-02-14 | End: 2022-02-15 | Stop reason: HOSPADM

## 2022-02-14 RX ORDER — ONDANSETRON 2 MG/ML
4 INJECTION INTRAMUSCULAR; INTRAVENOUS ONCE
Status: COMPLETED | OUTPATIENT
Start: 2022-02-14 | End: 2022-02-14

## 2022-02-14 RX ORDER — GABAPENTIN 100 MG/1
100 CAPSULE ORAL 3 TIMES DAILY
Status: DISCONTINUED | OUTPATIENT
Start: 2022-02-14 | End: 2022-02-15 | Stop reason: HOSPADM

## 2022-02-14 RX ADMIN — DEXAMETHASONE SODIUM PHOSPHATE 4 MG: 4 INJECTION INTRA-ARTICULAR; INTRALESIONAL; INTRAMUSCULAR; INTRAVENOUS; SOFT TISSUE at 07:20

## 2022-02-14 RX ADMIN — HYDROCODONE BITARTRATE AND ACETAMINOPHEN 1 TABLET: 5; 325 TABLET ORAL at 18:41

## 2022-02-14 RX ADMIN — ACETAMINOPHEN 325 MG: 325 TABLET ORAL at 22:56

## 2022-02-14 RX ADMIN — PROMETHAZINE HYDROCHLORIDE 6.25 MG: 25 INJECTION INTRAMUSCULAR; INTRAVENOUS at 10:44

## 2022-02-14 RX ADMIN — ENOXAPARIN SODIUM 60 MG: 100 INJECTION SUBCUTANEOUS at 22:57

## 2022-02-14 RX ADMIN — ROCURONIUM BROMIDE 20 MG: 50 INJECTION, SOLUTION INTRAVENOUS at 07:43

## 2022-02-14 RX ADMIN — HYDROMORPHONE HYDROCHLORIDE 0.5 MG: 2 INJECTION INTRAMUSCULAR; INTRAVENOUS; SUBCUTANEOUS at 11:03

## 2022-02-14 RX ADMIN — KETOROLAC TROMETHAMINE 30 MG: 30 INJECTION, SOLUTION INTRAMUSCULAR at 13:44

## 2022-02-14 RX ADMIN — ROCURONIUM BROMIDE 50 MG: 50 INJECTION, SOLUTION INTRAVENOUS at 07:07

## 2022-02-14 RX ADMIN — SODIUM CHLORIDE, PRESERVATIVE FREE 10 ML: 5 INJECTION INTRAVENOUS at 22:53

## 2022-02-14 RX ADMIN — SODIUM CHLORIDE, POTASSIUM CHLORIDE, SODIUM LACTATE AND CALCIUM CHLORIDE: 600; 310; 30; 20 INJECTION, SOLUTION INTRAVENOUS at 06:38

## 2022-02-14 RX ADMIN — FENTANYL CITRATE 50 MCG: 50 INJECTION, SOLUTION INTRAMUSCULAR; INTRAVENOUS at 08:34

## 2022-02-14 RX ADMIN — FENTANYL CITRATE 50 MCG: 50 INJECTION, SOLUTION INTRAMUSCULAR; INTRAVENOUS at 09:26

## 2022-02-14 RX ADMIN — LIDOCAINE HYDROCHLORIDE 20 MG: 20 INJECTION, SOLUTION INTRAVENOUS at 08:23

## 2022-02-14 RX ADMIN — ROCURONIUM BROMIDE 10 MG: 50 INJECTION, SOLUTION INTRAVENOUS at 08:10

## 2022-02-14 RX ADMIN — FENTANYL CITRATE 100 MCG: 50 INJECTION, SOLUTION INTRAMUSCULAR; INTRAVENOUS at 07:05

## 2022-02-14 RX ADMIN — SODIUM CHLORIDE, POTASSIUM CHLORIDE, SODIUM LACTATE AND CALCIUM CHLORIDE: 600; 310; 30; 20 INJECTION, SOLUTION INTRAVENOUS at 06:59

## 2022-02-14 RX ADMIN — SUGAMMADEX 200 MG: 100 INJECTION, SOLUTION INTRAVENOUS at 08:23

## 2022-02-14 RX ADMIN — HYDROMORPHONE HYDROCHLORIDE 0.5 MG: 2 INJECTION INTRAMUSCULAR; INTRAVENOUS; SUBCUTANEOUS at 10:25

## 2022-02-14 RX ADMIN — KETOROLAC TROMETHAMINE 15 MG: 30 INJECTION, SOLUTION INTRAMUSCULAR; INTRAVENOUS at 08:23

## 2022-02-14 RX ADMIN — CEFAZOLIN SODIUM 2000 MG: 2 INJECTION, SOLUTION INTRAVENOUS at 14:56

## 2022-02-14 RX ADMIN — FAMOTIDINE 20 MG: 10 INJECTION, SOLUTION INTRAVENOUS at 13:59

## 2022-02-14 RX ADMIN — ONDANSETRON 4 MG: 2 INJECTION INTRAMUSCULAR; INTRAVENOUS at 09:14

## 2022-02-14 RX ADMIN — GABAPENTIN 100 MG: 100 CAPSULE ORAL at 22:56

## 2022-02-14 RX ADMIN — FENTANYL CITRATE 50 MCG: 50 INJECTION, SOLUTION INTRAMUSCULAR; INTRAVENOUS at 07:37

## 2022-02-14 RX ADMIN — HEPARIN SODIUM 5000 UNITS: 5000 INJECTION, SOLUTION INTRAVENOUS; SUBCUTANEOUS at 06:37

## 2022-02-14 RX ADMIN — KETOROLAC TROMETHAMINE 30 MG: 30 INJECTION, SOLUTION INTRAMUSCULAR at 22:53

## 2022-02-14 RX ADMIN — ONDANSETRON 4 MG: 2 INJECTION INTRAMUSCULAR; INTRAVENOUS at 06:37

## 2022-02-14 RX ADMIN — CEFAZOLIN 3000 MG: 1 INJECTION, POWDER, FOR SOLUTION INTRAMUSCULAR; INTRAVENOUS; PARENTERAL at 07:12

## 2022-02-14 RX ADMIN — LIDOCAINE HYDROCHLORIDE 80 MG: 20 INJECTION, SOLUTION INTRAVENOUS at 07:05

## 2022-02-14 RX ADMIN — FENTANYL CITRATE 50 MCG: 50 INJECTION, SOLUTION INTRAMUSCULAR; INTRAVENOUS at 08:57

## 2022-02-14 RX ADMIN — CEFAZOLIN SODIUM 2000 MG: 2 INJECTION, SOLUTION INTRAVENOUS at 23:07

## 2022-02-14 RX ADMIN — ONDANSETRON 4 MG: 2 INJECTION INTRAMUSCULAR; INTRAVENOUS at 08:20

## 2022-02-14 RX ADMIN — ACETAMINOPHEN 325 MG: 325 TABLET ORAL at 13:44

## 2022-02-14 RX ADMIN — HYDROCODONE BITARTRATE AND ACETAMINOPHEN 1 TABLET: 5; 325 TABLET ORAL at 12:26

## 2022-02-14 RX ADMIN — SODIUM CHLORIDE, POTASSIUM CHLORIDE, SODIUM LACTATE AND CALCIUM CHLORIDE: 600; 310; 30; 20 INJECTION, SOLUTION INTRAVENOUS at 09:31

## 2022-02-14 RX ADMIN — PROPOFOL 200 MG: 10 INJECTION, EMULSION INTRAVENOUS at 07:05

## 2022-02-14 RX ADMIN — FAMOTIDINE 20 MG: 10 INJECTION, SOLUTION INTRAVENOUS at 22:53

## 2022-02-14 RX ADMIN — GABAPENTIN 100 MG: 100 CAPSULE ORAL at 13:44

## 2022-02-14 RX ADMIN — SODIUM CHLORIDE, POTASSIUM CHLORIDE, SODIUM LACTATE AND CALCIUM CHLORIDE: 600; 310; 30; 20 INJECTION, SOLUTION INTRAVENOUS at 09:00

## 2022-02-14 ASSESSMENT — PAIN DESCRIPTION - LOCATION
LOCATION: ABDOMEN

## 2022-02-14 ASSESSMENT — PULMONARY FUNCTION TESTS
PIF_VALUE: 26
PIF_VALUE: 28
PIF_VALUE: 23
PIF_VALUE: 38
PIF_VALUE: 24
PIF_VALUE: 26
PIF_VALUE: 30
PIF_VALUE: 28
PIF_VALUE: 9
PIF_VALUE: 27
PIF_VALUE: 18
PIF_VALUE: 29
PIF_VALUE: 29
PIF_VALUE: 25
PIF_VALUE: 26
PIF_VALUE: 1
PIF_VALUE: 35
PIF_VALUE: 28
PIF_VALUE: 23
PIF_VALUE: 26
PIF_VALUE: 27
PIF_VALUE: 28
PIF_VALUE: 35
PIF_VALUE: 29
PIF_VALUE: 26
PIF_VALUE: 26
PIF_VALUE: 29
PIF_VALUE: 25
PIF_VALUE: 25
PIF_VALUE: 13
PIF_VALUE: 11
PIF_VALUE: 30
PIF_VALUE: 35
PIF_VALUE: 29
PIF_VALUE: 28
PIF_VALUE: 24
PIF_VALUE: 23
PIF_VALUE: 1
PIF_VALUE: 28
PIF_VALUE: 4
PIF_VALUE: 9
PIF_VALUE: 29
PIF_VALUE: 24
PIF_VALUE: 27
PIF_VALUE: 29
PIF_VALUE: 35
PIF_VALUE: 0
PIF_VALUE: 30
PIF_VALUE: 24
PIF_VALUE: 28
PIF_VALUE: 1
PIF_VALUE: 27
PIF_VALUE: 28
PIF_VALUE: 35
PIF_VALUE: 26
PIF_VALUE: 27
PIF_VALUE: 29
PIF_VALUE: 31
PIF_VALUE: 1
PIF_VALUE: 30
PIF_VALUE: 31
PIF_VALUE: 26
PIF_VALUE: 35
PIF_VALUE: 26
PIF_VALUE: 29
PIF_VALUE: 1
PIF_VALUE: 30
PIF_VALUE: 32
PIF_VALUE: 28
PIF_VALUE: 23
PIF_VALUE: 28
PIF_VALUE: 29
PIF_VALUE: 31
PIF_VALUE: 33
PIF_VALUE: 24
PIF_VALUE: 26
PIF_VALUE: 29
PIF_VALUE: 15
PIF_VALUE: 29
PIF_VALUE: 29
PIF_VALUE: 2
PIF_VALUE: 29
PIF_VALUE: 29
PIF_VALUE: 35
PIF_VALUE: 28
PIF_VALUE: 23
PIF_VALUE: 26
PIF_VALUE: 22
PIF_VALUE: 23
PIF_VALUE: 29
PIF_VALUE: 29
PIF_VALUE: 3
PIF_VALUE: 35
PIF_VALUE: 9
PIF_VALUE: 23
PIF_VALUE: 34
PIF_VALUE: 26
PIF_VALUE: 29
PIF_VALUE: 35
PIF_VALUE: 23
PIF_VALUE: 1
PIF_VALUE: 27
PIF_VALUE: 30
PIF_VALUE: 27
PIF_VALUE: 28
PIF_VALUE: 30
PIF_VALUE: 54

## 2022-02-14 ASSESSMENT — PAIN DESCRIPTION - DESCRIPTORS
DESCRIPTORS: DISCOMFORT
DESCRIPTORS: DULL
DESCRIPTORS: DISCOMFORT
DESCRIPTORS: DISCOMFORT

## 2022-02-14 ASSESSMENT — PAIN SCALES - GENERAL
PAINLEVEL_OUTOF10: 4
PAINLEVEL_OUTOF10: 7
PAINLEVEL_OUTOF10: 4
PAINLEVEL_OUTOF10: 7
PAINLEVEL_OUTOF10: 7
PAINLEVEL_OUTOF10: 4
PAINLEVEL_OUTOF10: 7
PAINLEVEL_OUTOF10: 4
PAINLEVEL_OUTOF10: 4
PAINLEVEL_OUTOF10: 5
PAINLEVEL_OUTOF10: 4

## 2022-02-14 ASSESSMENT — PAIN DESCRIPTION - ONSET
ONSET: GRADUAL
ONSET: AWAKENED FROM SLEEP
ONSET: GRADUAL
ONSET: GRADUAL
ONSET: ON-GOING

## 2022-02-14 ASSESSMENT — PAIN DESCRIPTION - PAIN TYPE
TYPE: SURGICAL PAIN

## 2022-02-14 ASSESSMENT — PAIN DESCRIPTION - ORIENTATION
ORIENTATION: MID

## 2022-02-14 ASSESSMENT — PAIN DESCRIPTION - PROGRESSION
CLINICAL_PROGRESSION: GRADUALLY IMPROVING

## 2022-02-14 ASSESSMENT — PAIN DESCRIPTION - FREQUENCY
FREQUENCY: CONTINUOUS
FREQUENCY: INTERMITTENT
FREQUENCY: CONTINUOUS

## 2022-02-14 ASSESSMENT — PAIN - FUNCTIONAL ASSESSMENT: PAIN_FUNCTIONAL_ASSESSMENT: 0-10

## 2022-02-14 NOTE — PROGRESS NOTES
1153 Pt. Brought back to SDS unit, vitals stable, pt. A&Ox4. Surgical sites remain C/D/I abd presents as soft. Education provided on use of IS, pt. Demonstrated proper use of IS, education provided on keeping track of Intake and ambulation, sleeve folder at bedside, pt expresses understanding. 1315 Pt ambulated around the unit and contiues to tolerate PO intake, bariatric tray ordered, Call light in reach. Pt denies further needs at this time.

## 2022-02-14 NOTE — PROGRESS NOTES
6151 Arrived to pacu, monitors placed and alarms on. Report received from [facility] RN/José GIVENS/Lobo BROWN. Patient arouses easily. 8197 Medicated for pain  0914 Medicated for nausea  0926 Patient states nausea has improved, abdomen pain remains. Medicated for pain. 8546 Patient with eyes closed, dozing. 1025 Patient turned and repositioned. 1025 Medicated for pain. Hospitalist consulted for medical management. Perfectserve read by Dr. Chalo Koo. [facility] complete at this time. Patient awaiting room assignment. 1044 Medicated for nausea  1050 Patient resting with eye closed, dozing. 1104 Patient moaning, states pain is now back and sharp. Relaxation and deep breathing encouraged. Patient states nausea is improved. Medicated for pain. 1139 Patient resting with eyes closed. No complaints at this time. Patient to transfer to [facility], awaiting hospital bed assignment at this time.     1148 Report given to Lynder Runner at bedside

## 2022-02-14 NOTE — H&P
History and Physical Update    Original H&P done in office on 2/11/22 (less than 30 days ago). Pt reports the following changes in health since being seen last:    None. Tolerated liver shrinking diet. Vitals:    02/14/22 0648   BP: 117/64   Pulse: 98   Resp: 18   Temp: 96 °F (35.6 °C)   SpO2: 96%       Alert and oriented x 3, no apparent distress at rest  Atraumatic, normocephalic. EOMI. Breathing unlabored. RRR. Soft, non-tender, non-distended. Moves all extremities. Warm, dry. 44 y/o F with morbid obesity here for sleeve gastrectomy    -Consent obtained in office. -Abx ordered in office.  -Reviewed expected pre-operative, operative, and post-operative courses. -Answered questions to patient's satisfaction.   -Reviewed risks, benefits, alternative to procedure.   -Proceed as scheduled. -The patient was counseled at length about the risks of yrn Covid-19 during their perioperative period and any recovery window from their procedure. The patient was made aware that yrn Covid-19  may worsen their prognosis for recovering from their procedure  and lend to a higher morbidity and/or mortality risk. All material risks, benefits, and reasonable alternatives including postponing the procedure were discussed. The patient does wish to proceed with the procedure at this time.         Barbara Landin MD

## 2022-02-14 NOTE — OP NOTE
Operative Report    Patient: Lynn Gupta  YOB: 1985  MRN: [id number]    Date of Surgery:  02/14/22    Surgeon:  Francesca Morales MD, FACS    Assistant(s):  Evangelist Arnold, [address] [place] [address] skilled assistance of the CNP was necessary for the successful completion of this case. She was essential for the proper positioning, manipulation of instruments, proper exposure, manipulation of tissue, and wound closure. Preoperative Diagnosis: 1. Morbid obesity      2. History of hypertension      3. Non-alcoholic fatty liver disease    Postoperative Diagnosis:  1. Same as above    Procedure(s) Performed:  1. Robotic-Assisted Laparoscopic Sleeve Gastrectomy    IVF: 900 mL crystalloid    Estimated Blood Loss: Less than 20 mL    Anesthesia: General endotracheal    Specimen(s):  1. Sleeve stomach    Drain(s): None    Findings: Consistent with postoperative diagnosis    Complications: None apparent    Indication for Procedure: This patient is a 43 y.o. female with a history of morbid obesity. The patient currently has a BMI of 50.15. The patient attended an informational seminar for bariatric surgery, was determined to be an appropriate candidate, and underwent an extensive workup prior to being approved for surgery. The patient made the decision to undergo the above listed procedure(s). All of the patient's questions related to the surgery--preoperative, operative, and postoperative--were answered previously. An informed consent was obtained after discussing in detail with the patient the risks, benefits, and alternatives to surgery. Description of Procedure: On 02/14/22, the patient was brought to the operating room from the preoperative holding area. In the preoperative holding area, the patient received heparin. The patient was placed on the operating room table in the supine position. The patient was secured to the operating room table using multiple straps and a foot board.  All pressure points were well-padded. The patient was intubated endotracheally after SCD boots were placed bilaterally. The patient received IV antibiotics in accordance with national protocol. The abdomen was then prepped and draped in a standard fashion. An approved timeout was held with all members of the operating room team present and in agreement. Using an #11 blade, a 5 mm incision was made in the left lower mid abdomen. The abdomen was entered under direct vision using a 5 mm 0 degree laparoscope housed in a 5 mm optical trocar. Pneumoperitoneum was established using CO2 to 15 mm Hg. Once inside the abdominal cavity, an inspection ensued. The remaining ports were placed under direct vision. The ports placed were the following sizes and locations: three 8 mm ports across the mid abdomen in a lateral orientation, the initial 5 mm port was upsized to an 8 mm port, a 12 mm assistant port in the right lower mid abdomen, and a 5 mm liver retractor port in the subxiphoid location. There was no injury to any intra-abdominal structures during port placement. The robot was brought to the operating room table on the patient's left side and docked. The patient was placed in the steep reverse Trendelenburg. A liver retractor was secured to the table and passed into the abdominal cavity. The liver was retracted cephalad and anteriorly, exposing the esophageal hiatus. The anterior gastric fat pad was identified. The angle of His was bluntly mobilized. The esophageal hiatus was inspected and it was determined that there was no evidence of an hiatal hernia. Attention was directed toward the pylorus. The vein of barker was used to confirm the position. Approximately six centimeters proximal to the pylorus, the vessel sealer was used to divide the gastrocolic omentum. The lesser sac was opened and the division of this omentum was carried up all the way to the angle of His.  The short gastrics were identified and carefully cauterized, using the vessel sealer. After completing the division of the greater curvature attachments, a 38 English bougie was passed toward the pylorus. Sequential 60mm firings of a da Alvin SureForm stapler were performed (2 green and 6 blue), taking care not to overly narrow the pouch, specifically at the incisura. At the angle of His, an outpuching of tissue was left to ensure that the staple line did not encroach on the esophagus. An intraoperative leak test was performed which demonstrated no leak. The robot was undocked and moved away from the operating room table. The 12 mm port site was then widened with a Blanca clamp and the gastric sleeve specimen was removed intact and passed off for permanent pathology. The extraction site was closed with several trans-fascial 0-Vicryl sutures. All skin incisions were closed using a 4-0 Vicryl in a subcuticular fashion. Dermabond was used to cover the incisions. The patient was extubated and moved to the recovery room in stable condition. At the end of the case, the needle, instrument, and sponge counts were correct x 2. Dr. Francesca Morales was present, scrubbed, and supervised the entire case. Dr. Francesca Morales personally informed the family of the outcome of the procedure.     Natividad Mcelroy MD

## 2022-02-14 NOTE — CONSULTS
Consult Note (Hospitalist, Internal Medicine)  IDENTIFYING INFORMATION   PATIENT:  Lynn Gupta  MRN:  [id number]  ADMIT DATE: 2/14/2022  TIME OF EVALUATION: 2/14/2022 2:47 PM    Harsha Breaux     Medical management  HISTORY OF PRESENT ILLNESS   Allyson Murrieta is a 43 y.o. female with a past medical history of DM, morbid obesity   She presents for sleeve gastrectomy, doing well after surgery, is having mild to moderate pain, but tolerating pain medication. Per nurse patient is on clear liquid diet at this time, and tolerating. Patient has no acute complaints, is diabetic, and taking Metformin. PAST MEDICAL, SURGICAL, FAMILY, and SOCIAL HISTORY     Past Medical History:   Diagnosis Date    Acid reflux     Acute deep vein thrombosis (DVT) of non-extremity vein 04/25/2018    \"in my neck\"\"after mediport was put in\"    Anemia     Anxiety     Asthma     NO PULMONOLOGIST AT THIS TIME    Blood clot due to device, implant, or graft 04/2018    had blood clots in neck due to med port    Bowen's disease     Cancer ([facility]     skin cancer shoulder 2019    Crohn's disease ([facility] [facility] Dx 2010    Remicade Infusions Every Six Weeks, Sees Dr. Germán Zheng, [facility]    Depression     ECHO 07/28/2021    EF is estimated at 55-60%. Mild left ventricular hypertrophy. Mild mitral regurgitation is present.     Fall 2012    \"Passed Out And Maria Ajennifer Munguiaen On My Kev Parents"    Fatty liver     Gestational diabetes     \"2019- no medication - just diet controlled with pregnancy\"    Hx of blood clots     in neck    Hypertension     \"yrs ago was on b/p medication- off medication since 2018- only took medication for 2 months\"    Lower back pain     \"Sometimes\"    MRSA (methicillin resistant staph aureus) culture positive 04/2018    Multiple pulmonary nodules 06/2013    Subcentimeter; needs repeat imaging in 3-6 months    Obesity     Pancreatitis     Patellofemoral arthritis of right knee 2020    Shortness of breath on exertion     Teeth missing     Upper And Lower    VL DUP LOWER EXTREMITY VENOUS BILATERAL 2021    No evidence of DVT or SVT in the bilateral common femoral vein, No evidence of significant venous insufficiency    Wears glasses     Wears glasses      Past Surgical History:   Procedure Laterality Date    ABDOMEN SURGERY      bowel resection     ADENOIDECTOMY  1995    APPENDECTOMY      Done During Colon Resection For Crohn's  Disease    BREAST SURGERY Left     \"Infected Lymph Node Removed\"     SECTION N/A 2019     SECTION performed by Madeleine Cummings MD at [facility] [address]  2016    COLECTOMY  8-11    Crohn's Disease , Dr. Megan Sanon, Appendectomy Also Done    COLONOSCOPY  2016    Polyps Removed In Past    COLONOSCOPY  2017    Hospitalized for c-diff    COLONOSCOPY  2018    normal, multiple biopsy, repeat 1 year    COLONOSCOPY N/A 2019    COLONOSCOPY POLYPECTOMY SNARE/COLD BIOPSY performed by Gurmeet Leo MD at [facility] N/A 2/10/2022    COLONOSCOPY WITH BIOPSY performed by Gurmeet Leo MD at [facility]      ENDOSCOPY, COLON, DIAGNOSTIC  Last Done     KNEE SURGERY Right 10/1/2020    EXCISION OF MASS ON RIGHT KNEE performed by Octaviano Stringer DO at [address]      Dr Jorge A Thompson, Removed Adhesions    OTHER SURGICAL HISTORY  2011    Mediport Insertion Left Chest Area/revision done 2016- \"removed [facility] 2017 after I got an infection    SKIN BIOPSY      \"skin cancer removed right shoulder\"2019    TUBAL LIGATION  2019    TUNNELED VENOUS PORT PLACEMENT Right 2018    [facility]    UPPER GASTROINTESTINAL ENDOSCOPY N/A 2021    EGD BIOPSY performed by Toney Purvis MD at [address] [place] Teeth Extracted In Past     Family History Problem Relation Age of Onset    Migraines Mother     Inflam Bowel Dis Mother     Heart Disease Father     Diabetes Father     High Cholesterol Father     Obesity Sister     Depression Maternal Aunt     Depression Maternal Uncle     Depression Paternal Aunt     Coronary Art Dis Paternal Aunt     Depression Paternal Uncle     Coronary Art Dis Paternal Uncle     Depression Maternal Grandmother     Depression Maternal Grandfather     Depression Paternal Grandmother     Coronary Art Dis Paternal Grandmother     Diabetes Paternal Grandmother     Depression Paternal Grandfather     Coronary Art Dis Paternal Grandfather     Diabetes Paternal Grandfather      Social History     Socioeconomic History    Marital status: Single     Spouse name: None    Number of children: 1    Years of education: None    Highest education level: None   Occupational History    None   Tobacco Use    Smoking status: Never Smoker    Smokeless tobacco: Never Used   Vaping Use    Vaping Use: Never used   Substance and Sexual Activity    Alcohol use: Yes     Comment: rare    Drug use: No    Sexual activity: Yes     Partners: Male   Other Topics Concern    None   Social History Narrative    Lives with parents, has a daughter     Social Determinants of Health     Financial Resource Strain:     Difficulty of Paying Living Expenses: Not on file   Food Insecurity:     Worried About Running Out of Food in the Last Year: Not on file    Giancarlo of Food in the Last Year: Not on file   Transportation Needs:     Lack of Transportation (Medical): Not on file    Lack of Transportation (Non-Medical):  Not on file   Physical Activity:     Days of Exercise per Week: Not on file    Minutes of Exercise per Session: Not on file   Stress:     Feeling of Stress : Not on file   Social Connections:     Frequency of Communication with Friends and Family: Not on file    Frequency of Social Gatherings with Friends and Family: Not on file    Attends Taoist Services: Not on file    Active Member of Clubs or Organizations: Not on file    Attends Club or Organization Meetings: Not on file    Marital Status: Not on file   Intimate Partner Violence:     Fear of Current or Ex-Partner: Not on file    Emotionally Abused: Not on file    Physically Abused: Not on file    Sexually Abused: Not on file   Housing Stability:     Unable to Pay for Housing in the Last Year: Not on file    Number of [place] in the Last Year: Not on file    Unstable Housing in the Last Year: Not on file       MEDICATIONS   Medications Prior to Admission  Medications Prior to Admission: BiPAP Machine MISC, by BiPAP route nightly Indications: BIPAP 21/13  HUMIRA PEN-CD/UC/HS STARTER 80 MG/0.8ML PNKT,   pantoprazole (PROTONIX) 40 MG tablet, Take 1 tablet by mouth daily  metFORMIN (GLUCOPHAGE) 1000 MG tablet, Take 1,000 mg by mouth 2 times daily (with meals)   acetaminophen (AMINOFEN) 325 MG tablet, Take 2 tablets by mouth every 6 hours as needed for Pain  CPAP Machine MISC, by Does not apply route  Misc.  Devices (FINGERTIP PULSE OXIMETER) MISC, Use as directed to check oxygen saturations as needed for shortness of breath  rizatriptan (MAXALT) 10 MG tablet,   ondansetron (ZOFRAN ODT) 4 MG disintegrating tablet, Take 1 tablet by mouth every 6 hours  albuterol sulfate  (90 Base) MCG/ACT inhaler, Inhale 2 puffs into the lungs every 6 hours as needed for Wheezing  loperamide (IMODIUM) 2 MG capsule, Take 2 mg by mouth 4 times daily as needed for Diarrhea AS NEEDED    Current Medications  Current Facility-Administered Medications   Medication Dose Route Frequency Provider Last Rate Last Admin    HYDROcodone-acetaminophen (NORCO) 5-325 MG per tablet 1 tablet  1 tablet Oral Q6H PRN Johanna Jimenez II, MD   1 tablet at 02/14/22 1226    ketorolac (TORADOL) injection 30 mg  30 mg IntraVENous Q6H Johanna Jimenez II, MD   30 mg at 02/14/22 1344    gabapentin (NEURONTIN) capsule 100 mg  100 mg Oral TID Chris Price II, MD   100 mg at 02/14/22 1344    sodium chloride flush 0.9 % injection 5-40 mL  5-40 mL IntraVENous 2 times per day Chris Price II, MD        sodium chloride flush 0.9 % injection 10 mL  10 mL IntraVENous PRN Chris Price II, MD        0.9 % sodium chloride infusion  25 mL IntraVENous PRN Chris Price II, MD        acetaminophen (TYLENOL) tablet 325 mg  325 mg Oral Q4H Chris Price II, MD   325 mg at 02/14/22 1344    lactated ringers infusion   IntraVENous Continuous Chris Price II,  mL/hr at 02/14/22 0900 New Bag at 02/14/22 0900    potassium chloride (KLOR-CON M) extended release tablet 40 mEq  40 mEq Oral PRN Chris Price II, MD        Or   Rin Fickle potassium chloride (KLOR-CON) packet 40 mEq  40 mEq Oral PRN Chris Price II, MD        Or    potassium chloride 10 mEq/100 mL IVPB (Peripheral Line)  10 mEq IntraVENous PRN Chris Price II, MD        magnesium sulfate 2000 mg in 50 mL IVPB premix  2,000 mg IntraVENous PRN Chris Price II, MD        ceFAZolin (ANCEF) 2000 mg in dextrose 4 % 100 mL IVPB (premix)  2,000 mg IntraVENous Q8H Chris Price II, MD        morphine (PF) injection 2 mg  2 mg IntraVENous Q2H PRN Chris Price II, MD        Or    morphine sulfate (PF) injection 4 mg  4 mg IntraVENous Q2H PRN Chris Price II, MD        ondansetron [facility]) injection 4 mg  4 mg IntraVENous Q6H PRN Chris Price II, MD        metoclopramide (REGLAN) injection 10 mg  10 mg IntraVENous Q6H PRN Chris Price II, MD        famotidine (PEPCID) injection 20 mg  20 mg IntraVENous BID Chris Price II, MD   20 mg at 02/14/22 1359    enoxaparin (LOVENOX) injection 60 mg  60 mg SubCUTAneous 2 times per day Chris Price II, MD        promethazine (PHENERGAN) 6.25 mg in sodium chloride 0.9 % 50 mL IVPB  6.25 mg IntraVENous Once PRN Chris Nielson MD        glucose (GLUTOSE) 40 % oral gel 15 g  15 g Oral PRN MD iRn Bryan dextrose 50 % IV solution  12.5 g IntraVENous PRN Sandoval Garsia MD        glucagon (rDNA) injection 1 mg  1 mg IntraMUSCular PRN Sandoval Garsia MD        dextrose 5 % solution  100 mL/hr IntraVENous PRN Sandoval Garsia MD        insulin lispro (HUMALOG) injection vial 0-6 Units  0-6 Units SubCUTAneous TID WC Sandoval Garsia MD        insulin lispro (HUMALOG) injection vial 0-3 Units  0-3 Units SubCUTAneous Nightly Sandoval Garsia MD        insulin lispro (HUMALOG) injection vial 0-6 Units  0-6 Units SubCUTAneous Q4H Sandoval Garsia MD             Allergies  Allergies   Allergen Reactions    Morphine      Other reaction(s): Headache  Triggers migraine. \"Triggers My Migraines\"  Other reaction(s): Headache    Tramadol      Other reaction(s): Headache  States triggers migraine headache  \"Triggers My Migraines\"  Other reaction(s): Headache       REVIEW OF SYSTEMS     ROS  14 point review of systems reviewed. Pertinent positive or negative as per HPI or otherwise reviewed as negative    PHYSICAL EXAM     Wt Readings from Last 3 Encounters:   02/14/22 (!) 307 lb 6.4 oz (139.4 kg)   02/11/22 (!) 309 lb 9.6 oz (140.4 kg)   02/10/22 (!) 308 lb (139.7 kg)       Blood pressure 135/80, pulse 88, temperature 97.4 °F (36.3 °C), temperature source Temporal, resp. rate 18, height 5' 5\" (1.651 m), weight (!) 307 lb 6.4 oz (139.4 kg), last menstrual period 01/19/2022, SpO2 97 %, not currently breastfeeding. General - AAO x 3  Psych - Appropriate affect/speech. No agitation  Eyes - Eye lids intact. No scleral icterus  ENT - Lips wnl. External ear clear/dry/intact. No thyromegaly on inspection  Neuro - No gross peripheral or central neuro deficits on inspection  Heart - Sinus. RRR. S1 and S2 present. No added HS/murmurs appreciated. No elevated JVD appreciated  Lung - Adequate air entry b/l, No crackes/wheezes appreciated  GI - Soft. No guarding/rigidity. No hepatosplenomegaly/ascites.  BS+   - No CVA/suprapubic tenderness or palpable bladder distension  Skin - Intact. No rash/petechiae/ecchymosis. Warm extremities  MSK - Joints with normal ROM.  No joint swellings      Lines/Drains/Airways/Wounds:  [unfilled]    LABS AND IMAGING   CBC  [unfilled]    Last 3 Hemoglobin  Lab Results   Component Value Date    HGB 12.6 02/08/2022    HGB 13.2 10/12/2021    HGB 11.5 04/15/2021     Last 3 WBC/ANC  Lab Results   Component Value Date    WBC 5.7 02/08/2022    WBC 6.3 10/12/2021    WBC 10.2 04/15/2021     No components found for: GRNLOCTYABS  Last 3 Platelets  No results found for: PLATELET  Chemistry  [unfilled]  [unfilled]  Lab Results   Component Value Date     04/13/2021     Coagulation Studies  Lab Results   Component Value Date    INR 1.02 04/13/2021     Liver Function Studies  Lab Results   Component Value Date    ALT 63 02/08/2022    AST 84 02/08/2022    ALKPHOS 59 02/08/2022       Recent Imaging        Relevant labs and imaging reviewed    ASSESSMENT AND PLAN     Sleeve gastrectomy  -Management per primary team, advance diet per primary team, anticoagulation per primary team    Type 2 diabetes- uncontrolled  hold metformin  ISS, TIDAC finger stick  Hypoglycemic protocol  F/u A1c    History of Crohn's  Currently not on Humira given surgery    [facility], Internal Medicine  2/14/2022 at 2:47 PM

## 2022-02-15 VITALS
DIASTOLIC BLOOD PRESSURE: 84 MMHG | SYSTOLIC BLOOD PRESSURE: 127 MMHG | HEIGHT: 65 IN | RESPIRATION RATE: 16 BRPM | HEART RATE: 63 BPM | TEMPERATURE: 97.3 F | BODY MASS INDEX: 48.82 KG/M2 | OXYGEN SATURATION: 100 % | WEIGHT: 293 LBS

## 2022-02-15 LAB
ALBUMIN SERPL-MCNC: 3.5 GM/DL (ref 3.4–5)
ALP BLD-CCNC: 49 IU/L (ref 40–129)
ALT SERPL-CCNC: 64 U/L (ref 10–40)
ANION GAP SERPL CALCULATED.3IONS-SCNC: 12 MMOL/L (ref 4–16)
AST SERPL-CCNC: 65 IU/L (ref 15–37)
BILIRUB SERPL-MCNC: 0.2 MG/DL (ref 0–1)
BUN BLDV-MCNC: 14 MG/DL (ref 6–23)
CALCIUM SERPL-MCNC: 8.4 MG/DL (ref 8.3–10.6)
CHLORIDE BLD-SCNC: 100 MMOL/L (ref 99–110)
CO2: 24 MMOL/L (ref 21–32)
CREAT SERPL-MCNC: 0.8 MG/DL (ref 0.6–1.1)
GFR AFRICAN AMERICAN: >60 ML/MIN/1.73M2
GFR NON-AFRICAN AMERICAN: >60 ML/MIN/1.73M2
GLUCOSE BLD-MCNC: 94 MG/DL (ref 70–99)
GLUCOSE BLD-MCNC: 97 MG/DL (ref 70–99)
GLUCOSE BLD-MCNC: 99 MG/DL (ref 70–99)
HCT VFR BLD CALC: 36.9 % (ref 37–47)
HEMOGLOBIN: 11.5 GM/DL (ref 12.5–16)
MCH RBC QN AUTO: 29 PG (ref 27–31)
MCHC RBC AUTO-ENTMCNC: 31.2 % (ref 32–36)
MCV RBC AUTO: 93.2 FL (ref 78–100)
PDW BLD-RTO: 12.8 % (ref 11.7–14.9)
PLATELET # BLD: 189 K/CU MM (ref 140–440)
PMV BLD AUTO: 11.9 FL (ref 7.5–11.1)
POTASSIUM SERPL-SCNC: 3.7 MMOL/L (ref 3.5–5.1)
RBC # BLD: 3.96 M/CU MM (ref 4.2–5.4)
SODIUM BLD-SCNC: 136 MMOL/L (ref 135–145)
TOTAL PROTEIN: 6.4 GM/DL (ref 6.4–8.2)
WBC # BLD: 7.7 K/CU MM (ref 4–10.5)

## 2022-02-15 PROCEDURE — APPNB30 APP NON BILLABLE TIME 0-30 MINS: Performed by: NURSE PRACTITIONER

## 2022-02-15 PROCEDURE — 2580000003 HC RX 258: Performed by: SURGERY

## 2022-02-15 PROCEDURE — 2500000003 HC RX 250 WO HCPCS: Performed by: SURGERY

## 2022-02-15 PROCEDURE — 36415 COLL VENOUS BLD VENIPUNCTURE: CPT

## 2022-02-15 PROCEDURE — 82962 GLUCOSE BLOOD TEST: CPT

## 2022-02-15 PROCEDURE — 80053 COMPREHEN METABOLIC PANEL: CPT

## 2022-02-15 PROCEDURE — 94761 N-INVAS EAR/PLS OXIMETRY MLT: CPT

## 2022-02-15 PROCEDURE — 85027 COMPLETE CBC AUTOMATED: CPT

## 2022-02-15 PROCEDURE — 6370000000 HC RX 637 (ALT 250 FOR IP): Performed by: SURGERY

## 2022-02-15 PROCEDURE — 6360000002 HC RX W HCPCS: Performed by: SURGERY

## 2022-02-15 PROCEDURE — 99024 POSTOP FOLLOW-UP VISIT: CPT | Performed by: NURSE PRACTITIONER

## 2022-02-15 RX ORDER — ONDANSETRON 4 MG/1
4 TABLET, ORALLY DISINTEGRATING ORAL 3 TIMES DAILY PRN
Qty: 21 TABLET | Refills: 2 | Status: SHIPPED | OUTPATIENT
Start: 2022-02-15

## 2022-02-15 RX ORDER — HYDROCODONE BITARTRATE AND ACETAMINOPHEN 5; 325 MG/1; MG/1
1 TABLET ORAL EVERY 6 HOURS PRN
Qty: 20 TABLET | Refills: 0 | Status: SHIPPED | OUTPATIENT
Start: 2022-02-15 | End: 2022-02-20

## 2022-02-15 RX ORDER — PANTOPRAZOLE SODIUM 20 MG/1
20 TABLET, DELAYED RELEASE ORAL 2 TIMES DAILY
Qty: 60 TABLET | Refills: 3 | Status: SHIPPED | OUTPATIENT
Start: 2022-02-15

## 2022-02-15 RX ORDER — AMOXICILLIN 250 MG
1 CAPSULE ORAL DAILY
Qty: 14 TABLET | Refills: 0 | Status: SHIPPED | OUTPATIENT
Start: 2022-02-15 | End: 2022-03-01

## 2022-02-15 RX ADMIN — SODIUM CHLORIDE, POTASSIUM CHLORIDE, SODIUM LACTATE AND CALCIUM CHLORIDE: 600; 310; 30; 20 INJECTION, SOLUTION INTRAVENOUS at 11:30

## 2022-02-15 RX ADMIN — ENOXAPARIN SODIUM 60 MG: 100 INJECTION SUBCUTANEOUS at 08:48

## 2022-02-15 RX ADMIN — ACETAMINOPHEN 325 MG: 325 TABLET ORAL at 06:59

## 2022-02-15 RX ADMIN — FAMOTIDINE 20 MG: 10 INJECTION, SOLUTION INTRAVENOUS at 07:09

## 2022-02-15 RX ADMIN — ACETAMINOPHEN 325 MG: 325 TABLET ORAL at 12:42

## 2022-02-15 RX ADMIN — HYDROCODONE BITARTRATE AND ACETAMINOPHEN 1 TABLET: 5; 325 TABLET ORAL at 14:52

## 2022-02-15 RX ADMIN — GABAPENTIN 100 MG: 100 CAPSULE ORAL at 14:49

## 2022-02-15 RX ADMIN — HYDROCODONE BITARTRATE AND ACETAMINOPHEN 1 TABLET: 5; 325 TABLET ORAL at 06:59

## 2022-02-15 RX ADMIN — GABAPENTIN 100 MG: 100 CAPSULE ORAL at 08:47

## 2022-02-15 RX ADMIN — HYDROCODONE BITARTRATE AND ACETAMINOPHEN 1 TABLET: 5; 325 TABLET ORAL at 01:09

## 2022-02-15 RX ADMIN — KETOROLAC TROMETHAMINE 30 MG: 30 INJECTION, SOLUTION INTRAMUSCULAR at 07:09

## 2022-02-15 RX ADMIN — KETOROLAC TROMETHAMINE 30 MG: 30 INJECTION, SOLUTION INTRAMUSCULAR at 12:38

## 2022-02-15 RX ADMIN — CEFAZOLIN SODIUM 2000 MG: 2 INJECTION, SOLUTION INTRAVENOUS at 07:04

## 2022-02-15 ASSESSMENT — PAIN SCALES - GENERAL
PAINLEVEL_OUTOF10: 0
PAINLEVEL_OUTOF10: 4
PAINLEVEL_OUTOF10: 4
PAINLEVEL_OUTOF10: 3
PAINLEVEL_OUTOF10: 4
PAINLEVEL_OUTOF10: 1
PAINLEVEL_OUTOF10: 4
PAINLEVEL_OUTOF10: 4

## 2022-02-15 ASSESSMENT — PAIN DESCRIPTION - PROGRESSION: CLINICAL_PROGRESSION: GRADUALLY WORSENING

## 2022-02-15 ASSESSMENT — PAIN DESCRIPTION - PAIN TYPE: TYPE: SURGICAL PAIN

## 2022-02-15 ASSESSMENT — PAIN DESCRIPTION - ORIENTATION: ORIENTATION: LOWER;MID

## 2022-02-15 ASSESSMENT — PAIN DESCRIPTION - LOCATION: LOCATION: ABDOMEN

## 2022-02-15 ASSESSMENT — PAIN DESCRIPTION - ONSET: ONSET: ON-GOING

## 2022-02-15 ASSESSMENT — PAIN DESCRIPTION - FREQUENCY: FREQUENCY: CONTINUOUS

## 2022-02-15 ASSESSMENT — PAIN DESCRIPTION - DESCRIPTORS: DESCRIPTORS: ACHING

## 2022-02-15 NOTE — DISCHARGE SUMMARY
Discharge Summary     Patient ID:  Tasia Modi  [id number]  41 y.o.  1985    Admit date: 2022    Discharge date: 2/15/2022     Admitting Physician: Oksana Malagon MD     Admission Diagnoses:  Morbid obesity ([facility] [address] [E66.01]  Patient Active Problem List   Diagnosis    Crohn's disease ([facility] [address]    Anxiety    Multiple lung nodules    Crohn's colitis, unspecified complication ([facility] [address]    Exacerbation of Crohn's disease with complication ([address]    IBS (irritable bowel syndrome)    C. difficile colitis    Crohn's disease of small and large intestines with complication ([facility] [address]    Neck swelling    Leukocytosis    Non-intractable vomiting with nausea    Infection of venous access port    Acute deep vein thrombosis (DVT) of non-extremity vein    Class 3 severe obesity due to excess calories without serious comorbidity with body mass index (BMI) of 50.0 to 59.9 in adult ([facility] [address]    Preeclampsia, third trimester    Pregnancy related condition    Status post  delivery    Bowen's disease of right shoulder    Patellofemoral arthritis of right knee    Infrapatellar bursitis of right knee    Knee mass, right    Acute respiratory failure ([facility] [address]    Pneumonia due to COVID-19 virus    Shortness of breath    Edema of lower extremity    Essential hypertension    Morbid obesity with BMI of 50.0-59.9, adult (HCC)    Severe obstructive sleep apnea    Morbid obesity (HCC)     Past Medical History:   Diagnosis Date    Acid reflux     Acute deep vein thrombosis (DVT) of non-extremity vein 2018    \"in my neck\"\"after mediport was put in\"    Anemia     Anxiety     Asthma     NO PULMONOLOGIST AT THIS TIME    Blood clot due to device, implant, or graft 2018    had blood clots in neck due to med port    Bowen's disease     Cancer ([address]     skin cancer shoulder 2019    Crohn's disease ([address] Dx     Remicade Infusions Every Six Weeks, Sees Dr. Meredith Garcia At [facility]     ECHO 07/28/2021    EF is estimated at 55-60%. Mild left ventricular hypertrophy. Mild mitral regurgitation is present.  Fall 2012    \"Passed Out And Rudolph Hinton On My Nick Guardian"    Fatty liver     Gestational diabetes     \"2019- no medication - just diet controlled with pregnancy\"    Hx of blood clots     in neck    Hypertension     \"yrs ago was on b/p medication- off medication since 2018- only took medication for 2 months\"    Lower back pain     \"Sometimes\"    MRSA (methicillin resistant staph aureus) culture positive 04/2018    Multiple pulmonary nodules 06/2013    Subcentimeter; needs repeat imaging in 3-6 months    Obesity     Pancreatitis     Patellofemoral arthritis of right knee 08/19/2020    Shortness of breath on exertion     Teeth missing     Upper And Lower    VL DUP LOWER EXTREMITY VENOUS BILATERAL 08/18/2021    No evidence of DVT or SVT in the bilateral common femoral vein, No evidence of significant venous insufficiency    Wears glasses     Wears glasses        Discharge Diagnoses: same    Admission Condition: Good. Discharged Condition: Good. Indication for Admission: Elective bariatric surgery. Hospital Course: Patient had an uneventful hospital course after her bariatric procedure that went uneventfully: robot assisted sleeve gastrectomy and was tolerating bariatric stage II diet and ambulating without difficulty at the time of discharge. Consults: Hospitalist / PCP. Treatments: IV hydration, antibiotics, analgesia, LMW heparin, respiratory therapy: O2 and incentive spirometry and surgery: robot assisted sleeve gastrectomy. Discharge Exam:  - See daily progress note    Discharge vitals:    Wt Readings from Last 3 Encounters:   02/14/22 (!) 307 lb 6.4 oz (139.4 kg)   02/11/22 (!) 309 lb 9.6 oz (140.4 kg)   02/10/22 (!) 308 lb (139.7 kg)   ,  Temp Readings from Last 3 Encounters:   02/15/22 97.3 °F (36.3 °C) (Oral)   02/14/22 97.6 °F (36.4 °C)   02/10/22 97.6 °F (36.4 °C) (Tympanic)   ,  BP Readings from Last 3 Encounters:   02/15/22 127/84   02/14/22 (!) 146/119   02/11/22 116/78   ,  Pulse Readings from Last 3 Encounters:   02/15/22 63   02/11/22 85   02/10/22 80        Disposition: home. Patient Instructions:   Current Discharge Medication List      START taking these medications    Details   !! ondansetron (ZOFRAN-ODT) 4 MG disintegrating tablet Take 1 tablet by mouth 3 times daily as needed for Nausea or Vomiting  Qty: 21 tablet, Refills: 2      HYDROcodone-acetaminophen (NORCO) 5-325 MG per tablet Take 1 tablet by mouth every 6 hours as needed for Pain for up to 5 days. Intended supply: 5 days. Take lowest dose possible to manage pain  Qty: 20 tablet, Refills: 0    Comments: Reduce doses taken as pain becomes manageable  Associated Diagnoses: Morbid obesity ([address]      senna-docusate (SENOKOT S) 8.6-50 MG per tablet Take 1 tablet by mouth daily for 14 days  Qty: 14 tablet, Refills: 0       !! - Potential duplicate medications found. Please discuss with provider. CONTINUE these medications which have CHANGED    Details   pantoprazole (PROTONIX) 20 MG tablet Take 1 tablet by mouth 2 times daily  Qty: 60 tablet, Refills: 3         CONTINUE these medications which have NOT CHANGED    Details   BiPAP Machine MISC by BiPAP route nightly Indications: BIPAP 21/13      acetaminophen (AMINOFEN) 325 MG tablet Take 2 tablets by mouth every 6 hours as needed for Pain  Qty: 20 tablet, Refills: 0      CPAP Machine MISC by Does not apply route      Misc.  Devices (FINGERTIP PULSE OXIMETER) MISC Use as directed to check oxygen saturations as needed for shortness of breath  Qty: 1 each, Refills: 0      rizatriptan (MAXALT) 10 MG tablet       !! ondansetron (ZOFRAN ODT) 4 MG disintegrating tablet Take 1 tablet by mouth every 6 hours  Qty: 10 tablet, Refills: 1      albuterol sulfate  (90 Base) MCG/ACT inhaler Inhale 2 puffs into the lungs every 6 hours as needed for Wheezing      loperamide (IMODIUM) 2 MG capsule Take 2 mg by mouth 4 times daily as needed for Diarrhea AS NEEDED       !! - Potential duplicate medications found. Please discuss with provider. STOP taking these medications       HUMIRA PEN-CD/UC/HS STARTER 80 MG/0.8ML PNKT Comments:   Reason for Stopping:         metFORMIN (GLUCOPHAGE) 1000 MG tablet Comments:   Reason for Stopping:             Activity: no lifting > 20 Lbs, or Strenuous exercise for 3 weeks. Diet: encourage fluids and bariatric stage II diet for 2 wks.   Wound Care: keep wound clean and dry    Call  [phone] to make a follow-up appointment  with Dr Mariela Burch in 1 week.    ____________________________________________    Signed:    SHAQUILLE Cunningham CNP, APRN-CNP    2/15/2022  3:02 PM

## 2022-02-15 NOTE — PROGRESS NOTES
Hospitalist Progress Note      Name:  Irene Harp /Age/Sex: 1985  (43 y.o. female)   MRN & CSN:  [id number] & [id number] Admission Date/Time: 2022  6:09 AM   [facility]  [facility] [place]CP: Chris Tripp[place] [address] Day: 2      Assessment and Plan:   Patient is a 64-year-old female past medical history of diabetes, morbid obesity who is now status post hysterectomy. #.  Morbid obesity now status post sleeve gastrectomy  #. Diabetes mellitus, type II with hyperglycemia    Plan:  Status post sleeve gastrectomy with surgery on 2022  Postop care per surgery  Continue current pain medication per primary  Antibiotic was replaced per primary  Advancement of diet per primary  Continue current insulin regimen  We will continue to follow        DVT prophylaxis: Lovenox  CODE STATUS: Total support        Subjective. :     Patient was seen and examined this morning. Denies any new complaints. Remains afebrile with stable vital signs. Objective:   Vitals:   Vitals:    02/15/22 0830   BP: 127/84   Pulse: 63   Resp: 16   Temp: 97.3 °F (36.3 °C)   SpO2: 100%         Physical Exam:   GEN: Awake, alert, in no apparent distress awake female, sitting upright in bed in no apparent distress. Appears given age. HEENT: Atraumatic, normocephalic, PERRLA, EOMI  NECK: Supple, no apparent thyromegaly or masses. RESP: Clear lungs to auscultation  CARDIO/VASC: Regular rate and rhythm, normal S1, S2, no murmurs  GI: Abdomen is soft, nontender, no significant organomegaly noted, bowel sounds present  MSK: No gross joint deformities.   Skin: No significant rashes, skin lesions noted  Neuro: AOx3, cranial nerves grossly intact, no focal motor or sensory deficits   PSYCH: Affect appropriate    Medications:   Medications:    ketorolac  30 mg IntraVENous Q6H    gabapentin  100 mg Oral TID    sodium chloride flush  5-40 mL IntraVENous 2 times per day    acetaminophen  325 mg Oral Q4H    famotidine (PEPCID) injection  20 mg IntraVENous BID    enoxaparin  60 mg SubCUTAneous 2 times per day    insulin lispro  0-6 Units SubCUTAneous TID WC    insulin lispro  0-3 Units SubCUTAneous Nightly    insulin lispro  0-6 Units SubCUTAneous Q4H      Infusions:    sodium chloride      lactated ringers 125 mL/hr at 02/14/22 0900    dextrose       PRN Meds: HYDROcodone-acetaminophen, 1 tablet, Q6H PRN  sodium chloride flush, 10 mL, PRN  sodium chloride, 25 mL, PRN  potassium chloride, 40 mEq, PRN   Or  potassium alternative oral replacement, 40 mEq, PRN   Or  potassium chloride, 10 mEq, PRN  magnesium sulfate, 2,000 mg, PRN  morphine, 2 mg, Q2H PRN   Or  morphine, 4 mg, Q2H PRN  ondansetron, 4 mg, Q6H PRN  metoclopramide, 10 mg, Q6H PRN  glucose, 15 g, PRN  dextrose, 12.5 g, PRN  glucagon (rDNA), 1 mg, PRN  dextrose, 100 mL/hr, PRN          Electronically signed by Leola Marie MD on 2/15/2022 at 9:43 AM

## 2022-02-15 NOTE — PROGRESS NOTES
Went over AVS with [employer]. Questions answered. Pt verbalized understanding to notify staff when her ride arrives. She understands she will be taken to front door in wheel chair when she is ready.

## 2022-02-15 NOTE — PROGRESS NOTES
4 Eyes Skin Assessment     NAME:  Allyson Pinto  YOB: 1985  MEDICAL RECORD NUMBER:  [id number]    The patient is being assess for  Admission/Skin assessment     I agree that 2 RN's have performed a thorough Head to Toe Skin Assessment on the patient. ALL assessment sites listed below have been assessed. Areas assessed by both nurses:    Head, Face, Ears, Shoulders, Back, Chest, Arms, Elbows, Hands, Sacrum. Buttock, Coccyx, Ischium and Legs. Feet and Heels        Does the Patient have a Wound?  No noted wound(s)       Elieso Prevention initiated:  No   Wound Care Orders initiated:  No    Pressure Injury (Stage 3,4, Unstageable, DTI, NWPT, and Complex wounds) if present place consult order under [de-identified] No    New and Established Ostomies if present place consult order under : No      Nurse 1 eSignature: Electronically signed by Luis F Dias RN on 2/14/22 at 7:06 PM EST    **SHARE this note so that the co-signing nurse is able to place an eSignature**    Nurse 2 eSignature: Electronically signed by Staci Conner RN on 2/14/22 at 7:07 PM EST

## 2022-02-15 NOTE — CARE COORDINATION
CM reviewed notes. Pt is Post op day 1. Screened pt has a PCP. Pt has insurance. CM called room and no answer. CM will continue to follow.

## 2022-02-15 NOTE — PROGRESS NOTES
BARIATRIC SURGERY PROGRESS NOTE    HPI: Vika Bennett is a 42 yo female POD #1 s/p robot assisted sleeve gastrectomy. Doing well this morning. Denies nausea and vomiting. + OOB. + IS use. Pain is controlled. Vitals:    02/14/22 1739 02/14/22 2253 02/15/22 0323 02/15/22 0830   BP: 125/68 137/88 121/70 127/84   Pulse: 70 62 71 63   Resp: 18 14 18 16   Temp: 98.4 °F (36.9 °C) 98.1 °F (36.7 °C) 97.9 °F (36.6 °C) 97.3 °F (36.3 °C)   TempSrc: Oral Oral Axillary Oral   SpO2: 98% 100% 98% 100%   Weight:       Height:         I/O last 3 completed shifts: In: 1100 [I.V.:1000; IV Piggyback:100]  Out: 20 [Blood:20]  No intake/output data recorded. BARIATRIC DIET;  Bariatric Clear Liquid    Recent Results (from the past 48 hour(s))   POCT Glucose    Collection Time: 02/14/22  6:36 AM   Result Value Ref Range    POC Glucose 136 (H) 70 - 99 MG/DL   POC Pregnancy Urine Qual    Collection Time: 02/14/22  6:43 AM   Result Value Ref Range    Preg Test, Ur NEGATIVE NEGATIVE   POCT Glucose    Collection Time: 02/14/22  1:49 PM   Result Value Ref Range    POC Glucose 144 (H) 70 - 99 MG/DL   POCT Glucose    Collection Time: 02/14/22  5:36 PM   Result Value Ref Range    POC Glucose 118 (H) 70 - 99 MG/DL   POCT Glucose    Collection Time: 02/14/22  8:34 PM   Result Value Ref Range    POC Glucose 95 70 - 99 MG/DL   POCT Glucose    Collection Time: 02/14/22 10:54 PM   Result Value Ref Range    POC Glucose 99 70 - 99 MG/DL   POCT Glucose    Collection Time: 02/15/22  7:13 AM   Result Value Ref Range    POC Glucose 94 70 - 99 MG/DL       Scheduled Meds:   ketorolac  30 mg IntraVENous Q6H    gabapentin  100 mg Oral TID    sodium chloride flush  5-40 mL IntraVENous 2 times per day    acetaminophen  325 mg Oral Q4H    famotidine (PEPCID) injection  20 mg IntraVENous BID    enoxaparin  60 mg SubCUTAneous 2 times per day    insulin lispro  0-6 Units SubCUTAneous TID WC    insulin lispro  0-3 Units SubCUTAneous Nightly    insulin lispro  0-6 Units SubCUTAneous Q4H     Continuous Infusions:   sodium chloride      lactated ringers 125 mL/hr at 02/14/22 0900    dextrose         Physical Exam:  HEENT: Anicteric sclerae, Oropharyngeal mucosae moist, pink and intact. Heart:  Normal S1 and S2, RRR  Lungs: Clear to auscultation bilaterally, No audible Wheezes or Rales. Extremities: No edema. Neuro: Alert and Oriented x 3, Non focal.  Abdomen: Soft, appropriately tender, Non distended, Positive bowel sounds. Incision: Nicely healing: No erythema, No discharge, glue intact. Minimal bruising      Active Problems: Morbid obesity ([address]  Resolved Problems:    * No resolved hospital problems. *      Assessment and Plan:  Larance Severe is a 43 y.o. female who is POD # 1 status post robot assisted sleeve gastrectomy. - Pain and nausea under adequate control.  - Labs need to be drawn. Will follow up  - Will advance to bariatric full liquids  - Increase Ambulation to at least 4x/day walk in the hallways with assistance. - Respiratory alok-operative care: encourage incentive Spirometry / deep breathing and coughing 10x/hr while awake. - Continue DVT prophylaxis with Teds and SCDs and SC Lovenox. - Continue GI prophylaxis with Protonix IV till able to tolerate PO.  - Will plan for discharge later this afternoon once she nears 32oz fluid goal.  Discussed discharge, medications, restrictions, postop care, and diet stages.  Patients verbalizes understanding.  - Rounded with and above plan discussed with Dr. Manda Rivera, APRN - CNP, CNP    2/15/2022  11:18 AM  ___________________________________________

## 2022-02-18 ENCOUNTER — TELEPHONE (OUTPATIENT)
Dept: BARIATRICS/WEIGHT MGMT | Age: 37
End: 2022-02-18

## 2022-02-18 NOTE — TELEPHONE ENCOUNTER
Called Allyson Vargas to see how they were doing post-operatively. Protein intake is around 40-50 grams per day. Reports good hydration 50 oz and urine is clear. Denies issues with incisions. Skin glue is intact. Educated on post operative care. Pain is controlled with current medications? Denies much pain or Norco use    Nausea or vomiting? denies    Bowel movement since surgery? Yes, twice. Some diarrhea stool. Will decrease stool softner use. Encouraged exercise and getting up and moving around at least every hour to prevent pneumonia/DVT's. Confirmed post op appointment with Dr. Mariela Burch and aware to call with any questions or concerns.      Sudha Liriano, APRN - CNP

## 2022-02-22 ENCOUNTER — OFFICE VISIT (OUTPATIENT)
Dept: BARIATRICS/WEIGHT MGMT | Age: 37
End: 2022-02-22

## 2022-02-22 VITALS
OXYGEN SATURATION: 97 % | SYSTOLIC BLOOD PRESSURE: 128 MMHG | HEIGHT: 65 IN | HEART RATE: 105 BPM | DIASTOLIC BLOOD PRESSURE: 76 MMHG | BODY MASS INDEX: 48.82 KG/M2 | WEIGHT: 293 LBS

## 2022-02-22 DIAGNOSIS — Z90.3 S/P GASTRIC SLEEVE PROCEDURE: Primary | ICD-10-CM

## 2022-02-22 PROCEDURE — 99024 POSTOP FOLLOW-UP VISIT: CPT | Performed by: SURGERY

## 2022-02-22 ASSESSMENT — PATIENT HEALTH QUESTIONNAIRE - PHQ9
SUM OF ALL RESPONSES TO PHQ QUESTIONS 1-9: 0
SUM OF ALL RESPONSES TO PHQ QUESTIONS 1-9: 0
1. LITTLE INTEREST OR PLEASURE IN DOING THINGS: 0
SUM OF ALL RESPONSES TO PHQ QUESTIONS 1-9: 0
SUM OF ALL RESPONSES TO PHQ QUESTIONS 1-9: 0
SUM OF ALL RESPONSES TO PHQ9 QUESTIONS 1 & 2: 0
2. FEELING DOWN, DEPRESSED OR HOPELESS: 0

## 2022-02-22 NOTE — PROGRESS NOTES
Post-Operative Clinic Note    Chief Complaint   Patient presents with    Post-Op Check     1ST P/O-SLEEVE GASTRECTOMY @[facility] 22         SUBJECTIVE:  Patient is here today for a post-operative visit. Patient is s/p sleeve gastrectomy robotic on 22. No complaints, doing well. Down 13 lbs since surgery. Taking in 40-50 g protein / day. Taking in 400 lynette.    No F/C. No N/V. Walking.        Past Surgical History:   Procedure Laterality Date    ABDOMEN SURGERY      bowel resection     ADENOIDECTOMY      APPENDECTOMY      Done During Colon Resection For Crohn's  Disease    BREAST SURGERY Left     \"Infected Lymph Node Removed\"     SECTION N/A 2019     SECTION performed by Carroll Rodas MD at [facility] L[facility]D OR    CHOLECYSTECTOMY  2016    COLECTOMY  8-    Crohn's Disease , Dr. Bernie Jansen, Appendectomy Also Done    COLONOSCOPY  2016    Polyps Removed In Past    COLONOSCOPY  2017    Hospitalized for c-diff    COLONOSCOPY  2018    normal, multiple biopsy, repeat 1 year    COLONOSCOPY N/A 2019    COLONOSCOPY POLYPECTOMY SNARE/COLD BIOPSY performed by Elease Denver, MD at [facility] N/A 2/10/2022    COLONOSCOPY WITH BIOPSY performed by Elease Denver, MD at [facility]      ENDOSCOPY, COLON, DIAGNOSTIC  Last Done     KNEE SURGERY Right 10/1/2020    EXCISION OF MASS ON RIGHT KNEE performed by Delfin Heart DO at [address]      Dr Germán Medina, Removed Adhesions    OTHER SURGICAL HISTORY  2011    Mediport Insertion Left Chest Area/revision done 2016- \"removed at [facility] 2017 after I got an infection    SKIN BIOPSY      \"skin cancer removed right shoulder\"2019    SLEEVE GASTRECTOMY N/A 2022    GASTRECTOMY SLEEVE LAPAROSCOPIC ROBOTIC performed by Orin Cowart MD at [address] [place] [address]  2019    TUNNELED VENOUS PORT PLACEMENT Right 03/12/2018    [facility]-Dr Oriana Jorge    UPPER GASTROINTESTINAL ENDOSCOPY N/A 9/14/2021    EGD BIOPSY performed by Javier Biswas MD at [address]      All [place] Teeth Extracted In Past     Past Medical History:   Diagnosis Date    Acid reflux     Acute deep vein thrombosis (DVT) of non-extremity vein 04/25/2018    \"in my neck\"\"after mediport was put in\"    Anemia     Anxiety     Asthma     NO PULMONOLOGIST AT THIS TIME    Blood clot due to device, implant, or graft 04/2018    had blood clots in neck due to med port    Bowen's disease     [facility] ([facility]     skin cancer shoulder 2019    Crohn's disease ([facility] Dx 2010    Remicade Infusions Every Six Weeks, Se[place] Dr. Ledy Feliz, [place]    Depression     ECHO 07/28/2021    EF is estimated at 55-60%. Mild left ventricular hypertrophy. Mild mitral regurgitation is present.     Fall 2012    \"Passed Out And Lucendia Clink On My Terry Sites"    Fatty liver     Gestational diabetes     \"2019- no medication - just diet controlled with pregnancy\"    Hx of blood clots     in neck    Hypertension     \"yrs ago was on b/p medication- off medication since 2018- only took medication for 2 months\"    Lower back pain     \"Sometimes\"    MRSA (methicillin resistant staph aureus) culture positive 04/2018    Multiple pulmonary nodules 06/2013    Subcentimeter; needs repeat imaging in 3-6 months    Obesity     Pancreatitis     Patellofemoral arthritis of right knee 08/19/2020    Shortness of breath on exertion     Teeth missing     Upper And Lower    VL DUP LOWER EXTREMITY VENOUS BILATERAL 08/18/2021    No evidence of DVT or SVT in the bilateral common femoral vein, No evidence of significant venous insufficiency    Wears glasses     Wears glasses      Family History   Problem Relation Age of Onset    Migraines Mother     Inflam Bowel Dis Mother     Heart Disease Father     Diabetes Father     High file    Attends Club or Organization Meetings: Not on file    Marital Status: Not on file   Intimate Partner Violence:     Fear of Current or Ex-Partner: Not on file    Emotionally Abused: Not on file    Physically Abused: Not on file    Sexually Abused: Not on file   Housing Stability:     Unable to Pay for Housing in the Last Year: Not on file    Number of [place] in the Last Year: Not on file    Unstable Housing in the Last Year: Not on file       OBJECTIVE:  Physical Exam   A&Ox3, NAD at rest.  AT. NC. Breathing unlabored. RRR. S, ND, ND, no PS, incisions C/D/I with some bruising. CANO. Warm, dry. Pathology report reveals:   Final Pathologic Diagnosis:   Resection of portion of gastric fundus:  No   histopathological abnormality. ASSESSMENT:      1. S/P gastric sleeve procedure        PLAN:  1. Reviewed pathology report  2. Diet - bariatric fulls for 2 weeks post op then advance per protocol. 3. Activity - increase. Full in 1-2 more weeks. 4. Follow up 2 weeks with CNP  5. Start MVI  6. RTW note given  7. Call with any questions, concerns, or issues whatsoever. No orders of the defined types were placed in this encounter. No orders of the defined types were placed in this encounter. Follow Up: No follow-ups on file.     Laurel Ureña MD

## 2022-03-22 ENCOUNTER — OFFICE VISIT (OUTPATIENT)
Dept: BARIATRICS/WEIGHT MGMT | Age: 37
End: 2022-03-22

## 2022-03-22 VITALS
WEIGHT: 285.3 LBS | BODY MASS INDEX: 47.53 KG/M2 | DIASTOLIC BLOOD PRESSURE: 82 MMHG | HEIGHT: 65 IN | SYSTOLIC BLOOD PRESSURE: 126 MMHG | HEART RATE: 86 BPM

## 2022-03-22 DIAGNOSIS — Z98.84 S/P LAPAROSCOPIC SLEEVE GASTRECTOMY: Primary | ICD-10-CM

## 2022-03-22 PROCEDURE — 99024 POSTOP FOLLOW-UP VISIT: CPT | Performed by: NURSE PRACTITIONER

## 2022-03-22 ASSESSMENT — ENCOUNTER SYMPTOMS
SHORTNESS OF BREATH: 0
CHEST TIGHTNESS: 0
DIARRHEA: 0
WHEEZING: 0
SORE THROAT: 0
APNEA: 0
BACK PAIN: 0
COLOR CHANGE: 0
PHOTOPHOBIA: 0
NAUSEA: 0
ROS SKIN COMMENTS: INCISIONS WELL HEALED

## 2022-03-22 NOTE — PROGRESS NOTES
BARIATRIC SURGERY OFFICE PROGRESS NOTE    SUBJECTIVE:    Patient presenting today referred from SHAQUILLE Jimenez NP, for   Chief Complaint   Patient presents with    Weight Management     2ND PO SG 2/14/22    . Vitals:    03/22/22 0916   BP: 126/82   Pulse: 86        BMI: Body mass index is 47.48 kg/m². Weight History: Wt Readings from Last 3 Encounters:   03/22/22 285 lb 4.8 oz (129.4 kg)   02/22/22 296 lb 9.6 oz (134.5 kg)   02/14/22 (!) 307 lb 6.4 oz (139.4 kg)        If within 30 days of bariatric surgery date, have you been to the ED: N/A, No, Yes - no      HPI:Amber R L Delos Goldberg is a 43 y.o. female presenting in second bariatric POST-OP visit. Total weight loss/gain:   -11.3 lbs since last visit  -24.3 lbs since surgery  -58.9 lbs since starting program    Changes in health since last visit: denies    Pt tracking calories: tracking calories. 600 a day. 61 G protein a day    Pt exercising: not started yet    Pt taking vitamins: supplementing    Fluid intake: 5-6 bottles a day    Incisions well healed    Denies nausea/vomting    BM normal    On soft foods    Past Medical History:   Diagnosis Date    Acid reflux     Acute deep vein thrombosis (DVT) of non-extremity vein 04/25/2018    \"in my neck\"\"after mediport was put in\"    Anemia     Anxiety     Asthma     NO PULMONOLOGIST AT THIS TIME    Blood clot due to device, implant, or graft 04/2018    had blood clots in neck due to med port    Bowen's disease     [facility]     skin cancer shoulder 2019    Crohn's disease ([facility] [facility] Dx 2010    Remicade Infusions Every Six Weeks, Sees Dr. Taylor Curry, [address] [place] [address] Dr    Depression     ECHO 07/28/2021    EF is estimated at 55-60%. Mild left ventricular hypertrophy. Mild mitral regurgitation is present.     Fall 2012    \"Passed Out And Maggie Glatter On My Severiano Rusty"    Fatty liver     Gestational diabetes     \"2019- no medication - just diet controlled with pregnancy\"    Hx of blood clots     in neck    Hypertension     \"yrs ago was on b/p medication- off medication since 2018- only took medication for 2 months\"    Lower back pain     \"Sometimes\"    MRSA (methicillin resistant staph aureus) culture positive 2018    Multiple pulmonary nodules 2013    Subcentimeter; needs repeat imaging in 3-6 months    Obesity     Pancreatitis     Patellofemoral arthritis of right knee 2020    Shortness of breath on exertion     Teeth missing     Upper And Lower    VL DUP LOWER EXTREMITY VENOUS BILATERAL 2021    No evidence of DVT or SVT in the bilateral common femoral vein, No evidence of significant venous insufficiency    Wears glasses     Wears glasses         Patient Active Problem List   Diagnosis    Crohn's disease ([address]    Anxiety    Multiple lung nodules    Crohn's colitis, unspecified complication (HCC)    Exacerbation of Crohn's disease with complication ([address]    IBS (irritable bowel syndrome)    C. difficile colitis    Crohn's disease of small and large intestines with complication (HCC)    Neck swelling    Leukocytosis    Non-intractable vomiting with nausea    Infection of venous access port    Acute deep vein thrombosis (DVT) of non-extremity vein    Class 3 severe obesity due to excess calories without serious comorbidity with body mass index (BMI) of 50.0 to 59.9 in adult ([address]    Preeclampsia, third trimester    Pregnancy related condition    Status post  delivery    Bowen's disease of right shoulder    Patellofemoral arthritis of right knee    Infrapatellar bursitis of right knee    Knee mass, right    Acute respiratory failure ([address]    Pneumonia due to COVID-19 virus    Shortness of breath    Edema of lower extremity    Essential hypertension    Morbid obesity with BMI of 50.0-59.9, adult ([address]    Severe obstructive sleep apnea    Morbid obesity ([address]       Past Surgical History:   Procedure Laterality Date    ABDOMEN SURGERY for Nausea or Vomiting 21 tablet 2    CPAP Machine MISC by Does not apply route      BiPAP Machine MISC by BiPAP route nightly Indications: BIPAP 21/13      Misc. Devices (FINGERTIP PULSE OXIMETER) MISC Use as directed to check oxygen saturations as needed for shortness of breath 1 each 0    rizatriptan (MAXALT) 10 MG tablet       acetaminophen (AMINOFEN) 325 MG tablet Take 2 tablets by mouth every 6 hours as needed for Pain 20 tablet 0    ondansetron (ZOFRAN ODT) 4 MG disintegrating tablet Take 1 tablet by mouth every 6 hours 10 tablet 1    albuterol sulfate  (90 Base) MCG/ACT inhaler Inhale 2 puffs into the lungs every 6 hours as needed for Wheezing      loperamide (IMODIUM) 2 MG capsule Take 2 mg by mouth 4 times daily as needed for Diarrhea AS NEEDED       No current facility-administered medications for this visit. Allergies   Allergen Reactions    Morphine      Other reaction(s): Headache  Triggers migraine. \"Triggers My Migraines\"  Other reaction(s): Headache    Tramadol      Other reaction(s): Headache  States triggers migraine headache  \"Triggers My Migraines\"  Other reaction(s): Headache         Review of Systems   Constitutional: Negative for fatigue and fever. HENT: Negative for congestion, dental problem and sore throat. Eyes: Negative for photophobia and visual disturbance. Respiratory: Negative for apnea, chest tightness, shortness of breath and wheezing. Cardiovascular: Negative for chest pain and leg swelling. Gastrointestinal: Negative for diarrhea and nausea. Endocrine: Negative for cold intolerance and heat intolerance. Genitourinary: Negative for difficulty urinating, dysuria, flank pain, frequency and hematuria. Musculoskeletal: Negative for arthralgias and back pain. Skin: Negative for color change, rash and wound. Incisions well healed   Allergic/Immunologic: Negative for environmental allergies, food allergies and immunocompromised state. Neurological: Negative for dizziness, weakness, light-headedness and numbness. Hematological: Negative for adenopathy. Does not bruise/bleed easily. Psychiatric/Behavioral: Negative for behavioral problems, confusion, sleep disturbance and suicidal ideas. OBJECTIVE:    /82   Pulse 86   Ht 5' 5\" (1.651 m)   Wt 285 lb 4.8 oz (129.4 kg)   BMI 47.48 kg/m²      Physical Exam  Vitals reviewed. Constitutional:       Appearance: She is obese. HENT:      Head: Normocephalic and atraumatic. Right Ear: External ear normal.      Left Ear: External ear normal.      Nose: Nose normal.      Mouth/Throat:      Mouth: Mucous membranes are moist.   Eyes:      Extraocular Movements: Extraocular movements intact. Pupils: Pupils are equal, round, and reactive to light. Cardiovascular:      Rate and Rhythm: Normal rate and regular rhythm. Pulses: Normal pulses. Heart sounds: Normal heart sounds. Pulmonary:      Effort: Pulmonary effort is normal.      Breath sounds: Normal breath sounds. Abdominal:      General: Bowel sounds are normal.      Tenderness: There is no abdominal tenderness. Comments: Incisions well healed   Musculoskeletal:         General: Normal range of motion. Cervical back: Normal range of motion and neck supple. Skin:     General: Skin is warm and dry. Neurological:      General: No focal deficit present. Mental Status: She is alert and oriented to person, place, and time. Mental status is at baseline. Psychiatric:         Mood and Affect: Mood normal.         Behavior: Behavior normal.       ASSESSMENT & PLAN:    1. S/P laparoscopic sleeve gastrectomy  - CBC with Auto Differential; Future  - Comprehensive Metabolic Panel; Future  - Check one month labs  - Continue soft diet then advance to regular diet per protocol  - Increase activity.  No restrictions at this time  - Track calories 600 / day and 62 G protein / day  - RTC 1 month       As of current visit, regarding obesity-related co-morbid conditions:  GONZALO [] compliant [] no longer using [] resolved per sleep study; hypertension [] medications; hyperlipidemia [] medications; GERD [] medications; DM [] insulin [] non-insulin [] no meds      The patient expressed understanding and willingness to comply nicely; all questions and concerns addressed. No orders of the defined types were placed in this encounter. Orders Placed This Encounter   Procedures    CBC with Auto Differential     Standing Status:   Future     Standing Expiration Date:   3/22/2023    Comprehensive Metabolic Panel     Standing Status:   Future     Standing Expiration Date:   3/22/2023       Follow Up:  Return in about 1 month (around 4/22/2022).     SHAQUILLE Ruiz - CNP

## 2022-04-06 ENCOUNTER — HOSPITAL ENCOUNTER (OUTPATIENT)
Age: 37
Discharge: HOME | End: 2022-04-06
Payer: COMMERCIAL

## 2022-04-06 LAB
ALBUMIN SERPL-MCNC: 4.3 GM/DL (ref 3.4–5)
ALP BLD-CCNC: 57 IU/L (ref 40–129)
ALT SERPL-CCNC: 34 U/L (ref 10–40)
ANION GAP SERPL CALCULATED.3IONS-SCNC: 10 MMOL/L (ref 4–16)
AST SERPL-CCNC: 34 IU/L (ref 15–37)
BASOPHILS ABSOLUTE: 0 K/CU MM
BASOPHILS RELATIVE PERCENT: 0.5 % (ref 0–1)
BILIRUB SERPL-MCNC: 0.2 MG/DL (ref 0–1)
BUN BLDV-MCNC: 13 MG/DL (ref 6–23)
CALCIUM SERPL-MCNC: 9.2 MG/DL (ref 8.3–10.6)
CHLORIDE BLD-SCNC: 103 MMOL/L (ref 99–110)
CO2: 28 MMOL/L (ref 21–32)
CREAT SERPL-MCNC: 0.6 MG/DL (ref 0.6–1.1)
DIFFERENTIAL TYPE: ABNORMAL
EOSINOPHILS ABSOLUTE: 0.1 K/CU MM
EOSINOPHILS RELATIVE PERCENT: 1.5 % (ref 0–3)
GFR AFRICAN AMERICAN: >60 ML/MIN/1.73M2
GFR NON-AFRICAN AMERICAN: >60 ML/MIN/1.73M2
GLUCOSE BLD-MCNC: 90 MG/DL (ref 70–99)
HCT VFR BLD CALC: 41.6 % (ref 37–47)
HEMOGLOBIN: 12.8 GM/DL (ref 12.5–16)
IMMATURE NEUTROPHIL %: 0.3 % (ref 0–0.43)
LYMPHOCYTES ABSOLUTE: 2.2 K/CU MM
LYMPHOCYTES RELATIVE PERCENT: 36 % (ref 24–44)
MCH RBC QN AUTO: 28.3 PG (ref 27–31)
MCHC RBC AUTO-ENTMCNC: 30.8 % (ref 32–36)
MCV RBC AUTO: 92 FL (ref 78–100)
MONOCYTES ABSOLUTE: 0.6 K/CU MM
MONOCYTES RELATIVE PERCENT: 9 % (ref 0–4)
NUCLEATED RBC %: 0 %
PDW BLD-RTO: 12.7 % (ref 11.7–14.9)
PLATELET # BLD: 183 K/CU MM (ref 140–440)
PMV BLD AUTO: 13.9 FL (ref 7.5–11.1)
POTASSIUM SERPL-SCNC: 4.2 MMOL/L (ref 3.5–5.1)
RBC # BLD: 4.52 M/CU MM (ref 4.2–5.4)
SEGMENTED NEUTROPHILS ABSOLUTE COUNT: 3.3 K/CU MM
SEGMENTED NEUTROPHILS RELATIVE PERCENT: 52.7 % (ref 36–66)
SODIUM BLD-SCNC: 141 MMOL/L (ref 135–145)
TOTAL IMMATURE NEUTOROPHIL: 0.02 K/CU MM
TOTAL NUCLEATED RBC: 0 K/CU MM
TOTAL PROTEIN: 7.2 GM/DL (ref 6.4–8.2)
WBC # BLD: 6.2 K/CU MM (ref 4–10.5)

## 2022-04-06 PROCEDURE — 85025 COMPLETE CBC W/AUTO DIFF WBC: CPT

## 2022-04-06 PROCEDURE — 80053 COMPREHEN METABOLIC PANEL: CPT

## 2022-04-06 PROCEDURE — 36415 COLL VENOUS BLD VENIPUNCTURE: CPT

## 2022-04-14 ENCOUNTER — TELEPHONE (OUTPATIENT)
Dept: CARDIOLOGY CLINIC | Age: 37
End: 2022-04-14

## 2022-04-26 ENCOUNTER — OFFICE VISIT (OUTPATIENT)
Dept: BARIATRICS/WEIGHT MGMT | Age: 37
End: 2022-04-26

## 2022-04-26 VITALS
BODY MASS INDEX: 45.42 KG/M2 | OXYGEN SATURATION: 100 % | WEIGHT: 272.6 LBS | HEIGHT: 65 IN | SYSTOLIC BLOOD PRESSURE: 120 MMHG | DIASTOLIC BLOOD PRESSURE: 80 MMHG | HEART RATE: 88 BPM

## 2022-04-26 DIAGNOSIS — Z98.84 STATUS POST LAPAROSCOPIC SLEEVE GASTRECTOMY: Primary | ICD-10-CM

## 2022-04-26 DIAGNOSIS — E66.01 MORBID OBESITY DUE TO EXCESS CALORIES (HCC): ICD-10-CM

## 2022-04-26 PROCEDURE — 99999 PR OFFICE/OUTPT VISIT,PROCEDURE ONLY: CPT | Performed by: SURGERY

## 2022-04-26 NOTE — PROGRESS NOTES
on b/p medication- off medication since 2018- only took medication for 2 months\"    Lower back pain     \"Sometimes\"    MRSA (methicillin resistant staph aureus) culture positive 2018    Multiple pulmonary nodules 2013    Subcentimeter; needs repeat imaging in 3-6 months    Obesity     Pancreatitis     Patellofemoral arthritis of right knee 2020    Shortness of breath on exertion     Teeth missing     Upper And Lower    VL DUP LOWER EXTREMITY VENOUS BILATERAL 2021    No evidence of DVT or SVT in the bilateral common femoral vein, No evidence of significant venous insufficiency    Wears glasses     Wears glasses         Patient Active Problem List   Diagnosis    Crohn's disease ([address]    Anxiety    Multiple lung nodules    Crohn's colitis, unspecified complication ([address]    Exacerbation of Crohn's disease with complication ([address]    IBS (irritable bowel syndrome)    C. difficile colitis    Crohn's disease of small and large intestines with complication (HCC)    Neck swelling    Leukocytosis    Non-intractable vomiting with nausea    Infection of venous access port    Acute deep vein thrombosis (DVT) of non-extremity vein    Class 3 severe obesity due to excess calories without serious comorbidity with body mass index (BMI) of 50.0 to 59.9 in adult ([address]    Preeclampsia, third trimester    Pregnancy related condition    Status post  delivery    Bowen's disease of right shoulder    Patellofemoral arthritis of right knee    Infrapatellar bursitis of right knee    Knee mass, right    Acute respiratory failure ([address]    Pneumonia due to COVID-19 virus    Shortness of breath    Edema of lower extremity    Essential hypertension    Morbid obesity with BMI of 50.0-59.9, adult ([address]    Severe obstructive sleep apnea    Morbid obesity ([address]       Past Surgical History:   Procedure Laterality Date    ABDOMEN SURGERY      bowel resection     ADENOIDECTOMY    APPENDECTOMY      Done During Colon Resection For Crohn's  Disease    BREAST SURGERY Left     \"Infected Lymph Node Removed\"     SECTION N/A 2019     SECTION performed [facility] at [facility]    CHOLECYSTECTOMY  2016    COLECTOMY      Crohn's Disease , Dr. Bharathi Reddy, Appendectomy Also Done    COLONOSCOPY  2016    Polyps Removed In Past    COLONOSCOPY  2017    Hospitalized for c-diff    COLONOSCOPY  2018    normal, multiple biopsy, repeat 1 year    COLONOSCOPY N/A 2019    COLONOSCOPY POLYPECTOMY SNARE/COLD BIOPSY performed by [facility]edi [facility] N/A 2/10/2022    COLONOSCOPY WITH BIOPSY performed by Albin Trivedi MD at [place] [address]      ENDOSCOPY, COLON, DIAGNOSTIC  Last Done     KNEE SURGERY Right 10/1/2020    EXCISION OF MASS ON RIGHT KNEE performed by Emma Nelson DO at [address] [place] [address]      Dr Tim Valadez, Removed Adhesions    OTHER SURGICAL HISTORY  2011    Mediport Insertion Left Chest Area/revision done 2016- \"[facility] after I got an infection    SKIN BIOPSY      \"skin cancer removed right shoulder\"2019    SLEEVE GASTRECTOMY N/A 2022    GASTRECTOMY SLEEVE LAPAROSCOPIC ROBOTIC performed by Kem Rome MD at [address] [place] [address]  2019    TUNNELED VENOUS PORT PLACEMENT Right 2018    smart port- [facility]-Dr Brianne Kim    UPPER GASTROINTESTINAL ENDOSCOPY N/A 2021    EGD BIOPSY performed by Melquiades Dias MD at [address] [facility]      All [place] Teeth Extracted In Past       Current Outpatient Medications   Medication Sig Dispense Refill    Adalimumab (HUMIRA PEN-PS/UV/ADOL HS START SC) Inject into the skin      pantoprazole (PROTONIX) 20 MG tablet Take 1 tablet by mouth 2 times daily 60 tablet 3    ondansetron (ZOFRAN-ODT) 4 MG disintegrating tablet Take 1 tablet by mouth 3 times daily as needed for Nausea or Vomiting 21 tablet 2    CPAP Machine MISC by Does not apply route      BiPAP Machine MISC by BiPAP route nightly Indications: BIPAP 21/13      Misc. Devices (FINGERTIP PULSE OXIMETER) MISC Use as directed to check oxygen saturations as needed for shortness of breath 1 each 0    rizatriptan (MAXALT) 10 MG tablet       acetaminophen (AMINOFEN) 325 MG tablet Take 2 tablets by mouth every 6 hours as needed for Pain 20 tablet 0    ondansetron (ZOFRAN ODT) 4 MG disintegrating tablet Take 1 tablet by mouth every 6 hours 10 tablet 1    albuterol sulfate  (90 Base) MCG/ACT inhaler Inhale 2 puffs into the lungs every 6 hours as needed for Wheezing      loperamide (IMODIUM) 2 MG capsule Take 2 mg by mouth 4 times daily as needed for Diarrhea AS NEEDED       No current facility-administered medications for this visit. Allergies   Allergen Reactions    Morphine      Other reaction(s): Headache  Triggers migraine. \"Triggers My Migraines\"  Other reaction(s): Headache    Tramadol      Other reaction(s): Headache  States triggers migraine headache  \"Triggers My Migraines\"  Other reaction(s): Headache         Review of Systems   All other systems reviewed and are negative. OBJECTIVE:    /80   Pulse 88   Ht 5' 5\" (1.651 m)   Wt 272 lb 9.6 oz (123.7 kg)   SpO2 100%   BMI 45.36 kg/m²      Physical Exam  Vitals reviewed. Constitutional:       General: She is not in acute distress. Appearance: She is obese. She is not ill-appearing, toxic-appearing or diaphoretic. HENT:      Head: Normocephalic and atraumatic. Right Ear: External ear normal.      Left Ear: External ear normal.      Nose: Nose normal.   Eyes:      General:         Right eye: No discharge. Left eye: No discharge. Extraocular Movements: Extraocular movements intact. Cardiovascular:      Rate and Rhythm: Normal rate. Pulses: Normal pulses.    Abdominal: Palpations: Abdomen is soft. Tenderness: There is no guarding or rebound. Comments: Previous incisions are well-healed     Musculoskeletal:         General: No swelling. Cervical back: Normal range of motion. Skin:     General: Skin is warm. Neurological:      General: No focal deficit present. Mental Status: She is alert. Psychiatric:         Mood and Affect: Mood normal.         ASSESSMENT & PLAN:    1. Status post laparoscopic sleeve gastrectomy  -BMI 45 from 57. Down 37 lbs since surgery, down 70 lbs since starting program.   -Needs to increase exercise.   -Otherwise doing well. -F/u 6 weeks.   -Check labs after next visit. ~ 3 month labs. -Continue MVI. -Call with any questions, concerns, or issues whatsoever. 2. Morbid obesity due to excess calories ([address]  -As above. As of current visit, regarding obesity-related co-morbid conditions:  GONZALO [] compliant [] no longer using [] resolved per sleep study; hypertension [] medications; hyperlipidemia [] medications; GERD [] medications; DM [] insulin [] non-insulin [] no meds        No orders of the defined types were placed in this encounter. No orders of the defined types were placed in this encounter. Follow Up:  No follow-ups on file.     Coreen Pillai MD

## 2022-12-14 RX ORDER — PANTOPRAZOLE SODIUM 20 MG/1
TABLET, DELAYED RELEASE ORAL
Qty: 60 TABLET | Refills: 3 | OUTPATIENT
Start: 2022-12-14

## 2023-09-11 ENCOUNTER — OFFICE VISIT (OUTPATIENT)
Dept: ORTHOPEDIC SURGERY | Age: 38
End: 2023-09-11
Payer: COMMERCIAL

## 2023-09-11 VITALS — OXYGEN SATURATION: 98 % | RESPIRATION RATE: 16 BRPM | HEART RATE: 82 BPM

## 2023-09-11 DIAGNOSIS — M17.0 PRIMARY OSTEOARTHRITIS OF BOTH KNEES: Primary | ICD-10-CM

## 2023-09-11 PROCEDURE — G8427 DOCREV CUR MEDS BY ELIG CLIN: HCPCS | Performed by: PHYSICIAN ASSISTANT

## 2023-09-11 PROCEDURE — 1036F TOBACCO NON-USER: CPT | Performed by: PHYSICIAN ASSISTANT

## 2023-09-11 PROCEDURE — G8417 CALC BMI ABV UP PARAM F/U: HCPCS | Performed by: PHYSICIAN ASSISTANT

## 2023-09-11 PROCEDURE — 20610 DRAIN/INJ JOINT/BURSA W/O US: CPT | Performed by: PHYSICIAN ASSISTANT

## 2023-09-11 PROCEDURE — 99213 OFFICE O/P EST LOW 20 MIN: CPT | Performed by: PHYSICIAN ASSISTANT

## 2023-09-11 RX ORDER — TRIAMCINOLONE ACETONIDE 40 MG/ML
40 INJECTION, SUSPENSION INTRA-ARTICULAR; INTRAMUSCULAR ONCE
Status: COMPLETED | OUTPATIENT
Start: 2023-09-11 | End: 2023-09-11

## 2023-09-11 RX ADMIN — TRIAMCINOLONE ACETONIDE 40 MG: 40 INJECTION, SUSPENSION INTRA-ARTICULAR; INTRAMUSCULAR at 16:22

## 2023-09-11 NOTE — PROGRESS NOTES
Review of Systems   Constitutional: Negative. HENT: Negative. Eyes: Negative. Respiratory: Negative. Cardiovascular: Negative. Gastrointestinal: Negative. Genitourinary: Negative. Musculoskeletal:  Positive for arthralgias and myalgias. Skin: Negative. Neurological: Negative. Psychiatric/Behavioral: Negative. HPI:  Tommie Mathews is a 34 y.o. female who comes into the office today complaining of bilateral knee pain. She is also complaining of recurrence of a mass over her anterior knee that she had excised by Dr. Tova Easley. She has been seen in the office before about 2-3 years ago. Patient states that she received a steroid injection into the left knee about 10/15/2020 and right knee did have surgery of excision mass removed from the right knee. Patient has been having increased pain within the bilateral knees in the past 1-1.5 years. Patient is wanting possible injections in bilateral knees. Past Medical History:   Diagnosis Date    Acid reflux     Acute deep vein thrombosis (DVT) of non-extremity vein 04/25/2018    \"in my neck\"\"after mediport was put in\"    Anemia     Anxiety     Asthma     NO PULMONOLOGIST AT THIS TIME    Blood clot due to device, implant, or graft 04/2018    had blood clots in neck due to med port    Bowen's disease     Cancer ([address])     skin cancer shoulder 2019    Crohn's disease ([address]) Dx 2010    Remicade Infusions Every Six Weeks, Sees Dr. Morgan Peguero, [place]    Depression     ECHO 07/28/2021    EF is estimated at 55-60%. Mild left ventricular hypertrophy. Mild mitral regurgitation is present.     Fall 2012    \"Passed Out And Karen Au On My Justyn Chetan"    Fatty liver     Gestational diabetes     \"2019- no medication - just diet controlled with pregnancy\"    Hx of blood clots     in neck    Hypertension     \"yrs ago was on b/p medication- off medication since 2018- only took medication for 2 months\"    Lower back pain

## 2023-09-11 NOTE — PATIENT INSTRUCTIONS
Continue to weight bear as tolerated  Continue range of motion  Ice and elevate as needed  Tylenol or Motrin for pain  Injection given into the bilateral knees  No high impact activities for a least 24-48 hours  Follow up as needed    We are committed to providing you the best care possible. If you receive a survey after visiting one of our offices, please take time to share your experience concerning your physician office visit. These surveys are confidential and no health information about you is shared. We are eager to improve for you and we are counting on your feedback to help make that happen.

## 2023-09-18 ASSESSMENT — ENCOUNTER SYMPTOMS
RESPIRATORY NEGATIVE: 1
EYES NEGATIVE: 1
GASTROINTESTINAL NEGATIVE: 1

## 2023-09-19 ENCOUNTER — APPOINTMENT (OUTPATIENT)
Dept: CT IMAGING | Age: 38
End: 2023-09-19
Payer: COMMERCIAL

## 2023-09-19 ENCOUNTER — APPOINTMENT (OUTPATIENT)
Dept: ULTRASOUND IMAGING | Age: 38
End: 2023-09-19
Payer: COMMERCIAL

## 2023-09-19 ENCOUNTER — HOSPITAL ENCOUNTER (EMERGENCY)
Age: 38
Discharge: HOME | End: 2023-09-20
Payer: COMMERCIAL

## 2023-09-19 DIAGNOSIS — E87.1 HYPONATREMIA: ICD-10-CM

## 2023-09-19 DIAGNOSIS — R10.32 ABDOMINAL PAIN, LEFT LOWER QUADRANT: Primary | ICD-10-CM

## 2023-09-19 LAB
BASOPHILS ABSOLUTE: 0.1 K/CU MM
BASOPHILS RELATIVE PERCENT: 0.5 % (ref 0–1)
BILIRUBIN URINE: NEGATIVE MG/DL
BLOOD, URINE: NEGATIVE
CLARITY: CLEAR
COLOR: YELLOW
COMMENT UA: ABNORMAL
DIFFERENTIAL TYPE: ABNORMAL
EOSINOPHILS ABSOLUTE: 0.1 K/CU MM
EOSINOPHILS RELATIVE PERCENT: 1.2 % (ref 0–3)
GLUCOSE, URINE: NEGATIVE MG/DL
HCT VFR BLD CALC: 38.8 % (ref 37–47)
HEMOGLOBIN: 12.1 GM/DL (ref 12.5–16)
IMMATURE NEUTROPHIL %: 0.2 % (ref 0–0.43)
INTERPRETATION: NORMAL
KETONES, URINE: ABNORMAL MG/DL
LEUKOCYTE ESTERASE, URINE: NEGATIVE
LIPASE: 36 IU/L (ref 13–60)
LYMPHOCYTES ABSOLUTE: 2.3 K/CU MM
LYMPHOCYTES RELATIVE PERCENT: 25.4 % (ref 24–44)
MAGNESIUM: 1.7 MG/DL (ref 1.8–2.4)
MCH RBC QN AUTO: 27.9 PG (ref 27–31)
MCHC RBC AUTO-ENTMCNC: 31.2 % (ref 32–36)
MCV RBC AUTO: 89.6 FL (ref 78–100)
MONOCYTES ABSOLUTE: 0.8 K/CU MM
MONOCYTES RELATIVE PERCENT: 8.4 % (ref 0–4)
NITRITE URINE, QUANTITATIVE: NEGATIVE
NUCLEATED RBC %: 0 %
PDW BLD-RTO: 12.1 % (ref 11.7–14.9)
PH, URINE: 5.5 (ref 5–8)
PLATELET # BLD: 235 K/CU MM (ref 140–440)
PMV BLD AUTO: 11.8 FL (ref 7.5–11.1)
PREGNANCY, URINE: NEGATIVE
PROTEIN UA: NEGATIVE MG/DL
RBC # BLD: 4.33 M/CU MM (ref 4.2–5.4)
REASON FOR REJECTION: NORMAL
REJECTED TEST: NORMAL
SEGMENTED NEUTROPHILS ABSOLUTE COUNT: 5.9 K/CU MM
SEGMENTED NEUTROPHILS RELATIVE PERCENT: 64.3 % (ref 36–66)
SPECIFIC GRAVITY UA: >1.03 (ref 1–1.03)
TOTAL IMMATURE NEUTOROPHIL: 0.02 K/CU MM
TOTAL NUCLEATED RBC: 0 K/CU MM
UROBILINOGEN, URINE: 0.2 MG/DL (ref 0.2–1)
WBC # BLD: 9.1 K/CU MM (ref 4–10.5)

## 2023-09-19 PROCEDURE — 80053 COMPREHEN METABOLIC PANEL: CPT

## 2023-09-19 PROCEDURE — 99285 EMERGENCY DEPT VISIT HI MDM: CPT

## 2023-09-19 PROCEDURE — 6360000002 HC RX W HCPCS: Performed by: PHYSICIAN ASSISTANT

## 2023-09-19 PROCEDURE — 85025 COMPLETE CBC W/AUTO DIFF WBC: CPT

## 2023-09-19 PROCEDURE — 83735 ASSAY OF MAGNESIUM: CPT

## 2023-09-19 PROCEDURE — 96375 TX/PRO/DX INJ NEW DRUG ADDON: CPT

## 2023-09-19 PROCEDURE — 93975 VASCULAR STUDY: CPT

## 2023-09-19 PROCEDURE — 81025 URINE PREGNANCY TEST: CPT

## 2023-09-19 PROCEDURE — 76830 TRANSVAGINAL US NON-OB: CPT

## 2023-09-19 PROCEDURE — 74177 CT ABD & PELVIS W/CONTRAST: CPT

## 2023-09-19 PROCEDURE — 81003 URINALYSIS AUTO W/O SCOPE: CPT

## 2023-09-19 PROCEDURE — 96374 THER/PROPH/DIAG INJ IV PUSH: CPT

## 2023-09-19 PROCEDURE — 6360000004 HC RX CONTRAST MEDICATION: Performed by: PHYSICIAN ASSISTANT

## 2023-09-19 PROCEDURE — 83690 ASSAY OF LIPASE: CPT

## 2023-09-19 RX ORDER — ONDANSETRON 2 MG/ML
4 INJECTION INTRAMUSCULAR; INTRAVENOUS EVERY 6 HOURS PRN
Status: DISCONTINUED | OUTPATIENT
Start: 2023-09-19 | End: 2023-09-20 | Stop reason: HOSPADM

## 2023-09-19 RX ORDER — FENTANYL CITRATE 50 UG/ML
50 INJECTION, SOLUTION INTRAMUSCULAR; INTRAVENOUS ONCE
Status: COMPLETED | OUTPATIENT
Start: 2023-09-19 | End: 2023-09-19

## 2023-09-19 RX ORDER — KETOROLAC TROMETHAMINE 30 MG/ML
15 INJECTION, SOLUTION INTRAMUSCULAR; INTRAVENOUS ONCE
Status: COMPLETED | OUTPATIENT
Start: 2023-09-19 | End: 2023-09-19

## 2023-09-19 RX ADMIN — IOPAMIDOL 75 ML: 755 INJECTION, SOLUTION INTRAVENOUS at 22:22

## 2023-09-19 RX ADMIN — KETOROLAC TROMETHAMINE 15 MG: 30 INJECTION, SOLUTION INTRAMUSCULAR; INTRAVENOUS at 21:39

## 2023-09-19 RX ADMIN — FENTANYL CITRATE 50 MCG: 50 INJECTION, SOLUTION INTRAMUSCULAR; INTRAVENOUS at 23:20

## 2023-09-19 RX ADMIN — ONDANSETRON 4 MG: 2 INJECTION INTRAMUSCULAR; INTRAVENOUS at 21:39

## 2023-09-19 ASSESSMENT — LIFESTYLE VARIABLES
HOW OFTEN DO YOU HAVE A DRINK CONTAINING ALCOHOL: NEVER
HOW MANY STANDARD DRINKS CONTAINING ALCOHOL DO YOU HAVE ON A TYPICAL DAY: PATIENT DOES NOT DRINK

## 2023-09-19 ASSESSMENT — PAIN DESCRIPTION - LOCATION
LOCATION: ABDOMEN
LOCATION: ABDOMEN

## 2023-09-19 ASSESSMENT — PAIN SCALES - GENERAL
PAINLEVEL_OUTOF10: 6
PAINLEVEL_OUTOF10: 6

## 2023-09-19 NOTE — ACP (ADVANCE CARE PLANNING)
Patient does not have any ACP documents/Medical Power of . LSW notes hospital will follow [place] Next of Kin hierarchy in the following descending order for priority:    Guardian  Spouse  Majority of adult Children  Parents  Majority of adult Siblings  Nearest Relative not described above    Per [place] Next of Kin hierarchy: Patients' parents will be [address]

## 2023-09-20 VITALS
WEIGHT: 246 LBS | SYSTOLIC BLOOD PRESSURE: 123 MMHG | RESPIRATION RATE: 16 BRPM | HEIGHT: 65 IN | OXYGEN SATURATION: 98 % | TEMPERATURE: 97.9 F | HEART RATE: 73 BPM | DIASTOLIC BLOOD PRESSURE: 68 MMHG | BODY MASS INDEX: 40.98 KG/M2

## 2023-09-20 LAB
ALBUMIN SERPL-MCNC: 3.8 GM/DL (ref 3.4–5)
ALP BLD-CCNC: 62 IU/L (ref 40–129)
ALT SERPL-CCNC: 9 U/L (ref 10–40)
ANION GAP SERPL CALCULATED.3IONS-SCNC: 9 MMOL/L (ref 4–16)
AST SERPL-CCNC: 12 IU/L (ref 15–37)
BILIRUB SERPL-MCNC: 0.1 MG/DL (ref 0–1)
BUN SERPL-MCNC: 15 MG/DL (ref 6–23)
CALCIUM SERPL-MCNC: 8.1 MG/DL (ref 8.3–10.6)
CHLORIDE BLD-SCNC: 94 MMOL/L (ref 99–110)
CO2: 24 MMOL/L (ref 21–32)
CREAT SERPL-MCNC: 0.7 MG/DL (ref 0.6–1.1)
GFR SERPL CREATININE-BSD FRML MDRD: >60 ML/MIN/1.73M2
GLUCOSE SERPL-MCNC: 82 MG/DL (ref 70–99)
POTASSIUM SERPL-SCNC: 4 MMOL/L (ref 3.5–5.1)
SODIUM BLD-SCNC: 127 MMOL/L (ref 135–145)
TOTAL PROTEIN: 6.6 GM/DL (ref 6.4–8.2)

## 2023-09-20 PROCEDURE — 2580000003 HC RX 258: Performed by: PHYSICIAN ASSISTANT

## 2023-09-20 RX ORDER — ONDANSETRON 4 MG/1
4 TABLET, ORALLY DISINTEGRATING ORAL 3 TIMES DAILY PRN
Qty: 21 TABLET | Refills: 0 | Status: SHIPPED | OUTPATIENT
Start: 2023-09-20

## 2023-09-20 RX ORDER — DICYCLOMINE HCL 20 MG
20 TABLET ORAL 4 TIMES DAILY
Qty: 40 TABLET | Refills: 0 | Status: SHIPPED | OUTPATIENT
Start: 2023-09-20

## 2023-09-20 RX ORDER — 0.9 % SODIUM CHLORIDE 0.9 %
1000 INTRAVENOUS SOLUTION INTRAVENOUS ONCE
Status: COMPLETED | OUTPATIENT
Start: 2023-09-20 | End: 2023-09-20

## 2023-09-20 RX ADMIN — SODIUM CHLORIDE 1000 ML: 9 INJECTION, SOLUTION INTRAVENOUS at 00:34

## 2023-09-20 NOTE — ED PROVIDER NOTES
EMERGENCY DEPARTMENT ENCOUNTER        Pt Name: Starr Escalante  MRN: [id number]  [address] [facility] [place] 1985  Date of evaluation: 9/19/2023  Provider: Kevin Jacob PA-C  PCP: SHAQUILLE Rodriguez - [facility]    MARGARITA. I have evaluated this patient. Triage CHIEF COMPLAINT       Chief Complaint   Patient presents with    Abdominal Pain     Pt states that she is having lower abdominal pain. States that she does have a hx of Crohn's diease but is unsure on if this is related to that or her uterus. HISTORY OF PRESENT ILLNESS      Chief Complaint: Abdominal pain    Starr Escalante is a 34 y.o. female who presents with abdominal pain. Onset was about 2 weeks ago. Initially intermittent, constant for the last 4 days. Localized to the LLQ with radiation to the left lower back. Sharp. No aggravating or alleviating factors. Denies any fever, chills, n/v/d/c. Denies urinary symptoms. LMP was about a week ago. She has a history of Crohn's but states this pain seems different. Tolerating PO intake. She has also had ovarian cysts and thinks maybe that is what is causing the pain. She states she called her OBGYN but couldn't get in until November. Nursing Notes were all reviewed and agreed with or any disagreements were addressed in the HPI. REVIEW OF SYSTEMS     CONSTITUTIONAL:  Denies fever. EYES:  Denies visual changes. HEAD:  Denies headache. ENT:  Denies earache, nasal congestion, sore throat. NECK:  Denies neck pain. RESPIRATORY:  Denies any shortness of breath. CARDIOVASCULAR:  Denies chest pain. GI:  + LLQ pain. :  Denies urinary symptoms. MUSCULOSKELETAL:  Denies extremity pain or swelling. BACK:  Denies back pain. INTEGUMENT:  Denies skin changes. LYMPHATIC:  Denies lymphadenopathy. NEUROLOGIC:  Denies any numbness/tingling.     PAST MEDICAL HISTORY     Past Medical History:   Diagnosis Date    Acid reflux     Acute deep vein thrombosis (DVT) of non-extremity vein 04/25/2018 (50 mcg IntraVENous Given 9/19/23 2320)   sodium chloride 0.9 % bolus 1,000 mL (0 mLs IntraVENous Stopped 9/20/23 0204)         Is this patient to be included in the SEP-1 Core Measure due to severe sepsis or septic shock? No   Exclusion criteria - the patient is NOT to be included for SEP-1 Core Measure due to: Infection is not suspected    MDM:    Chief Complaint/HPI Summary/Differential Diagnosis:  Patient presents to the ED with chief complaint of LLQ pain. Patient seen and evaluated. Triage and nursing notes reviewed and incorporated. Differential diagnosis includes but is not limited to colitis, diverticulitis, ovarian cyst, ovarian torsion, UTI, others. History from : Patient    Limitations to history : None    Patient was given the following medications:  Medications   ondansetron (ZOFRAN) injection 4 mg (4 mg IntraVENous Given 9/19/23 2139)   ketorolac (TORADOL) injection 15 mg (15 mg IntraVENous Given 9/19/23 2139)   iopamidol (ISOVUE-370) 76 % injection 75 mL (75 mLs IntraVENous Given 9/19/23 2222)   fentaNYL (SUBLIMAZE) injection 50 mcg (50 mcg IntraVENous Given 9/19/23 2320)   sodium chloride 0.9 % bolus 1,000 mL (0 mLs IntraVENous Stopped 9/20/23 0204)       Independent Imaging Interpretation by me:  None. I did visualize imaging studies but interpretation performed by radiologist.      EKG (if obtained):  Please see supervising physician's note for interpretation.     Chronic conditions affecting care:   Past Medical History:   Diagnosis Date    Acid reflux     Acute deep vein thrombosis (DVT) of non-extremity vein 04/25/2018    \"in my neck\"\"after mediport was put in\"    Anemia     Anxiety     Asthma     NO PULMONOLOGIST AT THIS TIME    Blood clot due to device, implant, or graft 04/2018    had blood clots in neck due to med port    Bowen's disease     Cancer ([address])     skin cancer shoulder 2019    Crohn's disease ([address]) Dx 2010    Remicade Infusions Every Six Weeks, Sees Dr. Tricia Silva

## 2023-09-24 ENCOUNTER — HOSPITAL ENCOUNTER (EMERGENCY)
Age: 38
Discharge: HOME | End: 2023-09-24
Attending: STUDENT IN AN ORGANIZED HEALTH CARE EDUCATION/TRAINING PROGRAM
Payer: COMMERCIAL

## 2023-09-24 ENCOUNTER — APPOINTMENT (OUTPATIENT)
Dept: CT IMAGING | Age: 38
End: 2023-09-24
Payer: COMMERCIAL

## 2023-09-24 VITALS
SYSTOLIC BLOOD PRESSURE: 140 MMHG | BODY MASS INDEX: 40.98 KG/M2 | HEART RATE: 73 BPM | TEMPERATURE: 97.8 F | WEIGHT: 246 LBS | RESPIRATION RATE: 16 BRPM | DIASTOLIC BLOOD PRESSURE: 68 MMHG | OXYGEN SATURATION: 100 % | HEIGHT: 65 IN

## 2023-09-24 DIAGNOSIS — N30.01 ACUTE CYSTITIS WITH HEMATURIA: ICD-10-CM

## 2023-09-24 DIAGNOSIS — N83.202 CYST OF LEFT OVARY: Primary | ICD-10-CM

## 2023-09-24 LAB
ALBUMIN SERPL-MCNC: 4.1 GM/DL (ref 3.4–5)
ALP BLD-CCNC: 67 IU/L (ref 40–129)
ALT SERPL-CCNC: 8 U/L (ref 10–40)
ANION GAP SERPL CALCULATED.3IONS-SCNC: 11 MMOL/L (ref 4–16)
AST SERPL-CCNC: 22 IU/L (ref 15–37)
BACTERIA: ABNORMAL /HPF
BASOPHILS ABSOLUTE: 0.1 K/CU MM
BASOPHILS RELATIVE PERCENT: 0.5 % (ref 0–1)
BILIRUB SERPL-MCNC: 0.4 MG/DL (ref 0–1)
BILIRUBIN URINE: NEGATIVE MG/DL
BLOOD, URINE: ABNORMAL
BUN SERPL-MCNC: 17 MG/DL (ref 6–23)
CALCIUM SERPL-MCNC: 9.3 MG/DL (ref 8.3–10.6)
CAST TYPE: ABNORMAL /HPF
CHLORIDE BLD-SCNC: 102 MMOL/L (ref 99–110)
CLARITY: CLEAR
CO2: 23 MMOL/L (ref 21–32)
COLOR: YELLOW
CREAT SERPL-MCNC: 0.6 MG/DL (ref 0.6–1.1)
CRYSTAL TYPE: NEGATIVE /HPF
DIFFERENTIAL TYPE: ABNORMAL
EOSINOPHILS ABSOLUTE: 0.1 K/CU MM
EOSINOPHILS RELATIVE PERCENT: 1.1 % (ref 0–3)
EPITHELIAL CELLS, UA: 7 /HPF
GFR SERPL CREATININE-BSD FRML MDRD: >60 ML/MIN/1.73M2
GLUCOSE SERPL-MCNC: 85 MG/DL (ref 70–99)
GLUCOSE, URINE: NEGATIVE MG/DL
HCT VFR BLD CALC: 41.4 % (ref 37–47)
HEMOGLOBIN: 13 GM/DL (ref 12.5–16)
IMMATURE NEUTROPHIL %: 0.2 % (ref 0–0.43)
INTERPRETATION: NORMAL
KETONES, URINE: NEGATIVE MG/DL
LEUKOCYTE ESTERASE, URINE: NEGATIVE
LIPASE: 24 IU/L (ref 13–60)
LYMPHOCYTES ABSOLUTE: 2.3 K/CU MM
LYMPHOCYTES RELATIVE PERCENT: 22.8 % (ref 24–44)
MCH RBC QN AUTO: 28 PG (ref 27–31)
MCHC RBC AUTO-ENTMCNC: 31.4 % (ref 32–36)
MCV RBC AUTO: 89 FL (ref 78–100)
MONOCYTES ABSOLUTE: 0.7 K/CU MM
MONOCYTES RELATIVE PERCENT: 6.8 % (ref 0–4)
NITRITE URINE, QUANTITATIVE: POSITIVE
PDW BLD-RTO: 12.5 % (ref 11.7–14.9)
PH, URINE: 6 (ref 5–8)
PLATELET # BLD: 270 K/CU MM (ref 140–440)
PMV BLD AUTO: 11.9 FL (ref 7.5–11.1)
POTASSIUM SERPL-SCNC: 4.8 MMOL/L (ref 3.5–5.1)
PREGNANCY, URINE: NEGATIVE
PROTEIN UA: NEGATIVE MG/DL
RBC # BLD: 4.65 M/CU MM (ref 4.2–5.4)
RBC URINE: 2 /HPF (ref 0–6)
SEGMENTED NEUTROPHILS ABSOLUTE COUNT: 6.8 K/CU MM
SEGMENTED NEUTROPHILS RELATIVE PERCENT: 68.6 % (ref 36–66)
SODIUM BLD-SCNC: 136 MMOL/L (ref 135–145)
SPECIFIC GRAVITY UA: 1.02 (ref 1–1.03)
TOTAL IMMATURE NEUTOROPHIL: 0.02 K/CU MM
TOTAL PROTEIN: 7.9 GM/DL (ref 6.4–8.2)
UROBILINOGEN, URINE: 0.2 MG/DL (ref 0.2–1)
WBC # BLD: 9.9 K/CU MM (ref 4–10.5)
WBC UA: 5 /HPF (ref 0–5)

## 2023-09-24 PROCEDURE — 81001 URINALYSIS AUTO W/SCOPE: CPT

## 2023-09-24 PROCEDURE — 80053 COMPREHEN METABOLIC PANEL: CPT

## 2023-09-24 PROCEDURE — 83690 ASSAY OF LIPASE: CPT

## 2023-09-24 PROCEDURE — 99285 EMERGENCY DEPT VISIT HI MDM: CPT

## 2023-09-24 PROCEDURE — 81025 URINE PREGNANCY TEST: CPT

## 2023-09-24 PROCEDURE — 74177 CT ABD & PELVIS W/CONTRAST: CPT

## 2023-09-24 PROCEDURE — 85025 COMPLETE CBC W/AUTO DIFF WBC: CPT

## 2023-09-24 PROCEDURE — 6370000000 HC RX 637 (ALT 250 FOR IP): Performed by: STUDENT IN AN ORGANIZED HEALTH CARE EDUCATION/TRAINING PROGRAM

## 2023-09-24 PROCEDURE — 6360000004 HC RX CONTRAST MEDICATION: Performed by: STUDENT IN AN ORGANIZED HEALTH CARE EDUCATION/TRAINING PROGRAM

## 2023-09-24 RX ORDER — HYDROCODONE BITARTRATE AND ACETAMINOPHEN 5; 325 MG/1; MG/1
1 TABLET ORAL ONCE
Status: COMPLETED | OUTPATIENT
Start: 2023-09-24 | End: 2023-09-24

## 2023-09-24 RX ORDER — CEPHALEXIN 250 MG/1
500 CAPSULE ORAL ONCE
Status: COMPLETED | OUTPATIENT
Start: 2023-09-24 | End: 2023-09-24

## 2023-09-24 RX ORDER — HYDROCODONE BITARTRATE AND ACETAMINOPHEN 5; 325 MG/1; MG/1
1 TABLET ORAL EVERY 8 HOURS PRN
Qty: 6 TABLET | Refills: 0 | Status: SHIPPED | OUTPATIENT
Start: 2023-09-24 | End: 2023-09-26

## 2023-09-24 RX ORDER — CEPHALEXIN 500 MG/1
500 CAPSULE ORAL 2 TIMES DAILY
Qty: 14 CAPSULE | Refills: 0 | Status: SHIPPED | OUTPATIENT
Start: 2023-09-24 | End: 2023-10-01

## 2023-09-24 RX ADMIN — IOPAMIDOL 75 ML: 755 INJECTION, SOLUTION INTRAVENOUS at 12:13

## 2023-09-24 RX ADMIN — HYDROCODONE BITARTRATE AND ACETAMINOPHEN 1 TABLET: 5; 325 TABLET ORAL at 13:09

## 2023-09-24 RX ADMIN — CEPHALEXIN 500 MG: 250 CAPSULE ORAL at 13:09

## 2023-09-24 ASSESSMENT — PAIN DESCRIPTION - LOCATION
LOCATION: PELVIS
LOCATION: PELVIS

## 2023-09-24 ASSESSMENT — PAIN SCALES - GENERAL
PAINLEVEL_OUTOF10: 6
PAINLEVEL_OUTOF10: 7

## 2023-09-24 ASSESSMENT — PAIN DESCRIPTION - DESCRIPTORS: DESCRIPTORS: SORE

## 2023-09-24 ASSESSMENT — LIFESTYLE VARIABLES
HOW MANY STANDARD DRINKS CONTAINING ALCOHOL DO YOU HAVE ON A TYPICAL DAY: PATIENT DOES NOT DRINK
HOW OFTEN DO YOU HAVE A DRINK CONTAINING ALCOHOL: NEVER

## 2023-09-24 ASSESSMENT — PAIN - FUNCTIONAL ASSESSMENT: PAIN_FUNCTIONAL_ASSESSMENT: 0-10

## 2023-09-24 NOTE — DISCHARGE INSTRUCTIONS
Follow-up with OB/GYN per referral  Take OTC medications any for pain  Take antibiotics as prescribed

## 2023-09-24 NOTE — ED NOTES
Discharge instructions were reviewed and she will call the OB in the morning to schedule and appointment for follow up this week.                     Preet Israel RN  09/24/23 9547

## 2023-09-24 NOTE — ED PROVIDER NOTES
Emergency Department Encounter    Patient: Alex Leigh  MRN: [id number]  : 1985  Date of Evaluation: 2023  ED Provider:  Luis Solano DO    Triage Chief Complaint:   Pelvic Pain (Reports 2.5 weeks on and off)    Crow:  Alex Leigh is a 34 y.o. female who presented to the ED with a history of left lower quadrant pain. Patient has had the pain for a couple of days. Patient went to the ED about 5 days ago for evaluation and had negative labs and negative CT scan of the abdomen and pelvis as well as a negative ultrasound. Patient was discharged with medications and advised to follow-up with OB/GYN. Patient states her OB/GYN appointment is . Patient states her symptoms have been getting worse and she cannot keep it any longer needed can she wait for the appointment on the . Patient presents to the ED for evaluation. No history of fever, nausea/vomiting, back pain, lightheadedness, dysuria, vaginal discharge or bleeding. ROS - see HPI, below listed is current ROS at time of my eval:  Review of Systems    ROS is an in history; otherwise unremarkable    Past Medical History:   Diagnosis Date    Acid reflux     Acute deep vein thrombosis (DVT) of non-extremity vein 2018    \"in my neck\"\"after mediport was put in\"    Anemia     Anxiety     Asthma     NO PULMONOLOGIST AT THIS TIME    Blood clot due to device, implant, or graft 2018    had blood clots in neck due to med port    Bowen's disease     Cancer ([address])     skin cancer shoulder 2019    Crohn's disease ([address]) Dx 2010    Remicade Infusions Every Six Weeks, Sees Dr. Krystle Tucker, [place]    Depression     ECHO 2021    EF is estimated at 55-60%. Mild left ventricular hypertrophy. Mild mitral regurgitation is present.     2012    \"Passed Out And Patel John On My Atif Has"    Fatty liver     Gestational diabetes     \"2019- no medication - just diet controlled with system including errors in grammar, punctuation, and spelling, as well as words and phrases that may be inappropriate. Efforts were made to edit the dictations.        [facility],   09/28/23 5904

## 2023-10-02 ENCOUNTER — OFFICE VISIT (OUTPATIENT)
Dept: ORTHOPEDIC SURGERY | Age: 38
End: 2023-10-02
Payer: COMMERCIAL

## 2023-10-02 VITALS — HEART RATE: 88 BPM | RESPIRATION RATE: 17 BRPM | TEMPERATURE: 98.6 F | OXYGEN SATURATION: 98 %

## 2023-10-02 DIAGNOSIS — S83.281A OTHER TEAR OF LATERAL MENISCUS OF RIGHT KNEE AS CURRENT INJURY, INITIAL ENCOUNTER: Primary | ICD-10-CM

## 2023-10-02 PROCEDURE — G8417 CALC BMI ABV UP PARAM F/U: HCPCS | Performed by: ORTHOPAEDIC SURGERY

## 2023-10-02 PROCEDURE — G8428 CUR MEDS NOT DOCUMENT: HCPCS | Performed by: ORTHOPAEDIC SURGERY

## 2023-10-02 PROCEDURE — 99203 OFFICE O/P NEW LOW 30 MIN: CPT | Performed by: ORTHOPAEDIC SURGERY

## 2023-10-02 PROCEDURE — 1036F TOBACCO NON-USER: CPT | Performed by: ORTHOPAEDIC SURGERY

## 2023-10-02 PROCEDURE — G8484 FLU IMMUNIZE NO ADMIN: HCPCS | Performed by: ORTHOPAEDIC SURGERY

## 2023-10-02 ASSESSMENT — ENCOUNTER SYMPTOMS
COLOR CHANGE: 0
CHEST TIGHTNESS: 0
BACK PAIN: 0

## 2023-10-02 NOTE — PATIENT INSTRUCTIONS
Continue to weight bear as tolerated  Continue range of motion  Ice and elevate as needed  Tylenol or Motrin for pain  Central scheduling [phone] will be calling you to schedule your MRI. If you do not hear from them with in a week give them a call. Once you have scheduled an MRI, call our office back to schedule follow up appointment. We are committed to providing you the best care possible. If you receive a survey after visiting one of our offices, please take time to share your experience concerning your physician office visit. These surveys are confidential and no health information about you is shared. We are eager to improve for you and we are counting on your feedback to help make that happen.

## 2023-10-02 NOTE — PROGRESS NOTES
Patient seen in office today for:  Right knee mass    Date of last injection: 9/11/2023    Patient reports 4/10 pain. RICE and medication are effective to alleviate pain and reduce swelling. Pain worsened by: Patient reports painful ROM, standing, sitting, walking and kneeling.     Profession: Jean-Claude Cary

## 2023-10-02 NOTE — PROGRESS NOTES
Subjective:      Patient ID: Starr Escalante is a 34 y.o. female. Patient seen in office today for:  Right knee mass     Date of last injection: 9/11/2023     Patient reports 4/10 pain. RICE and medication are effective to alleviate pain and reduce swelling. Pain worsened by: Patient reports painful ROM, standing, sitting, walking and kneeling. Profession: Rakesh Ryan        She comes in today for a recheck of her right knee pain and swelling. She states that since her surgery a few years ago she had been doing well however recently she has been having worsening pain in the right knee. She states that now she is dealing with a sharp, stabbing pain in the lateral aspect of the right knee which is worse when walking or rising from a seated position. She did not have any improvement after the cortisone injection for the right knee. She has also noticed a return of some of the swelling along the front of her right knee. She states that this is no longer enlarging but she continues to have a dull and aching pain along the front of her right knee. Patient denies any new injury to the involved extremity/ joint, denies numbness or tingling in the involved extremity and denies fever or chills. She has been working on weight loss and is meeting with a nutritionist.            Review of Systems   Constitutional:  Negative for activity change, chills and fever. Respiratory:  Negative for chest tightness. Cardiovascular:  Negative for chest pain. Musculoskeletal:  Positive for arthralgias and joint swelling. Negative for back pain, gait problem and myalgias. Skin:  Negative for color change, pallor, rash and wound. Neurological:  Negative for weakness and numbness.        Past Medical History:   Diagnosis Date    Acid reflux     Acute deep vein thrombosis (DVT) of non-extremity vein 04/25/2018    \"in my neck\"\"after mediport was put in\"    Anemia     Anxiety     Asthma     NO PULMONOLOGIST AT

## 2023-10-03 ENCOUNTER — TELEPHONE (OUTPATIENT)
Dept: BARIATRICS/WEIGHT MGMT | Age: 38
End: 2023-10-03

## 2023-10-13 ENCOUNTER — HOSPITAL ENCOUNTER (OUTPATIENT)
Dept: MRI IMAGING | Age: 38
Discharge: HOME | End: 2023-10-13
Attending: ORTHOPAEDIC SURGERY
Payer: COMMERCIAL

## 2023-10-13 DIAGNOSIS — S83.281A OTHER TEAR OF LATERAL MENISCUS OF RIGHT KNEE AS CURRENT INJURY, INITIAL ENCOUNTER: ICD-10-CM

## 2023-10-13 PROCEDURE — 73721 MRI JNT OF LWR EXTRE W/O DYE: CPT

## 2023-11-06 ENCOUNTER — OFFICE VISIT (OUTPATIENT)
Dept: ORTHOPEDIC SURGERY | Age: 38
End: 2023-11-06
Payer: COMMERCIAL

## 2023-11-06 VITALS
HEIGHT: 65 IN | BODY MASS INDEX: 40.98 KG/M2 | WEIGHT: 246 LBS | OXYGEN SATURATION: 98 % | RESPIRATION RATE: 14 BRPM | HEART RATE: 72 BPM

## 2023-11-06 DIAGNOSIS — M17.11 PRIMARY OSTEOARTHRITIS OF RIGHT KNEE: Primary | ICD-10-CM

## 2023-11-06 PROCEDURE — G8427 DOCREV CUR MEDS BY ELIG CLIN: HCPCS | Performed by: ORTHOPAEDIC SURGERY

## 2023-11-06 PROCEDURE — G8484 FLU IMMUNIZE NO ADMIN: HCPCS | Performed by: ORTHOPAEDIC SURGERY

## 2023-11-06 PROCEDURE — G8417 CALC BMI ABV UP PARAM F/U: HCPCS | Performed by: ORTHOPAEDIC SURGERY

## 2023-11-06 PROCEDURE — 99213 OFFICE O/P EST LOW 20 MIN: CPT | Performed by: ORTHOPAEDIC SURGERY

## 2023-11-06 PROCEDURE — 1036F TOBACCO NON-USER: CPT | Performed by: ORTHOPAEDIC SURGERY

## 2023-11-06 RX ORDER — MECLIZINE HYDROCHLORIDE 25 MG/1
TABLET ORAL
COMMUNITY
Start: 2023-10-05

## 2023-11-06 ASSESSMENT — ENCOUNTER SYMPTOMS
CHEST TIGHTNESS: 0
COLOR CHANGE: 0
BACK PAIN: 0

## 2023-11-06 NOTE — PATIENT INSTRUCTIONS
We will schedule surgery at soonest convenience.  If you have any questions regarding your surgery please call our office and ask to speak with Mayda Mcdonnell [phone]

## 2023-11-06 NOTE — PROGRESS NOTES
Patient returns to the office with right knee pain and to discuss MRI results. Pt stated that her pain has continued since the last visit and will have sharp stabbing pains often throughout the day. Pt stated she has tried the Voltaren creams with not significant relief. Pt has had a steroid injection in the past with no relief. MRI completed today: 10/13/23    IMPRESSION:  Moderate chondromalacia patella and developing patellofemoral compartment  arthrosis. There is mild to moderate medial compartment arthrosis. Small effusion. No meniscal tear. Study slightly limited by motion and body habitus.
expectations with the patient today. The patient understands and consents to the procedure. Patient will follow up with their primary care physician prior to surgical treatment for preoperative clearance. We will schedule surgery at soonest convenience. If her pain persists, the next step would be to proceed with Orthovisc injections. Continue weight-bearing as tolerated. Continue range of motion exercises as instructed. Ice and elevate as needed. Tylenol or Motrin for pain. Follow up 2 weeks postop.             [address] [place] [address], DO

## 2023-11-07 ENCOUNTER — TELEPHONE (OUTPATIENT)
Dept: ORTHOPEDIC SURGERY | Age: 38
End: 2023-11-07

## 2023-11-07 NOTE — TELEPHONE ENCOUNTER
Patient scheduled for    Right Knee Arthroscopy with Chondroplasty  Date: 11/30/23  Facility[facility]  Surgeon: Roxi Underwood DO    Surgery Request created/emailed 11/7/23  PCP Clearance routed via [employer] to [facility] APRN-NP 11/7/23  Pulmon Clearance routed via [employer] to Dr. Remedios Wong 11/7/23    Pre Op Appt 11/6/23    Leeroy Vaughn  Contacted 11/7/23 via [address] [place] [address] with clinicals uploaded  Status: Pending CPT 08922 ICD M22.41 for outpatient  Auth# [id number]    Phone PAT

## 2023-11-09 ENCOUNTER — TELEPHONE (OUTPATIENT)
Dept: ORTHOPEDIC SURGERY | Age: 38
End: 2023-11-09

## 2023-11-09 NOTE — TELEPHONE ENCOUNTER
Received response from [facility] regarding RT Knee Arthrscopy on 11/30/23    Status: Approved CPT 25335  Auth# [id number]  Date Span: 11/30/23-12/29/23  Letter scanned into media.

## 2023-11-10 ENCOUNTER — HOSPITAL ENCOUNTER (OUTPATIENT)
Dept: WOMENS IMAGING | Age: 38
Discharge: HOME | End: 2023-11-10
Payer: COMMERCIAL

## 2023-11-10 DIAGNOSIS — Z12.31 SCREENING MAMMOGRAM, ENCOUNTER FOR: ICD-10-CM

## 2023-11-10 PROCEDURE — 77063 BREAST TOMOSYNTHESIS BI: CPT

## 2023-11-21 ENCOUNTER — HOSPITAL ENCOUNTER (OUTPATIENT)
Dept: ULTRASOUND IMAGING | Age: 38
Discharge: HOME | End: 2023-11-21
Payer: COMMERCIAL

## 2023-11-21 ENCOUNTER — HOSPITAL ENCOUNTER (OUTPATIENT)
Dept: WOMENS IMAGING | Age: 38
Discharge: HOME | End: 2023-11-21
Payer: COMMERCIAL

## 2023-11-21 DIAGNOSIS — R92.8 ABNORMAL MAMMOGRAM OF RIGHT BREAST: ICD-10-CM

## 2023-11-21 PROCEDURE — G0279 TOMOSYNTHESIS, MAMMO: HCPCS

## 2023-11-21 PROCEDURE — 76642 ULTRASOUND BREAST LIMITED: CPT

## 2023-11-27 RX ORDER — CHOLECALCIFEROL (VITAMIN D3) 1250 MCG
CAPSULE ORAL
COMMUNITY

## 2023-11-27 RX ORDER — LANOLIN ALCOHOL/MO/W.PET/CERES
1000 CREAM (GRAM) TOPICAL DAILY
COMMUNITY

## 2023-11-29 ENCOUNTER — ANESTHESIA EVENT (OUTPATIENT)
Dept: OPERATING ROOM | Age: 38
End: 2023-11-29
Payer: COMMERCIAL

## 2023-11-29 NOTE — PROGRESS NOTES
Patient notified of surgery time at [facility] 11/30/23 1045 arrival 0745, NPO instructions and DOS meds reviewed

## 2023-11-30 ENCOUNTER — HOSPITAL ENCOUNTER (OUTPATIENT)
Age: 38
Setting detail: OUTPATIENT SURGERY
Discharge: HOME | End: 2023-11-30
Attending: ORTHOPAEDIC SURGERY | Admitting: ORTHOPAEDIC SURGERY
Payer: COMMERCIAL

## 2023-11-30 ENCOUNTER — ANESTHESIA (OUTPATIENT)
Dept: OPERATING ROOM | Age: 38
End: 2023-11-30
Payer: COMMERCIAL

## 2023-11-30 VITALS
BODY MASS INDEX: 43.32 KG/M2 | HEIGHT: 65 IN | SYSTOLIC BLOOD PRESSURE: 138 MMHG | RESPIRATION RATE: 16 BRPM | OXYGEN SATURATION: 98 % | TEMPERATURE: 97.9 F | DIASTOLIC BLOOD PRESSURE: 85 MMHG | WEIGHT: 260 LBS | HEART RATE: 78 BPM

## 2023-11-30 DIAGNOSIS — Z98.890 S/P RIGHT KNEE ARTHROSCOPY: Primary | ICD-10-CM

## 2023-11-30 LAB — PREGNANCY TEST URINE, POC: NEGATIVE

## 2023-11-30 PROCEDURE — 2709999900 HC NON-CHARGEABLE SUPPLY: Performed by: ORTHOPAEDIC SURGERY

## 2023-11-30 PROCEDURE — 6360000002 HC RX W HCPCS: Performed by: ORTHOPAEDIC SURGERY

## 2023-11-30 PROCEDURE — 6360000002 HC RX W HCPCS: Performed by: NURSE ANESTHETIST, CERTIFIED REGISTERED

## 2023-11-30 PROCEDURE — 81025 URINE PREGNANCY TEST: CPT

## 2023-11-30 PROCEDURE — 6360000002 HC RX W HCPCS: Performed by: ANESTHESIOLOGY

## 2023-11-30 PROCEDURE — 7100000011 HC PHASE II RECOVERY - ADDTL 15 MIN: Performed by: ORTHOPAEDIC SURGERY

## 2023-11-30 PROCEDURE — 3600000004 HC SURGERY LEVEL 4 BASE: Performed by: ORTHOPAEDIC SURGERY

## 2023-11-30 PROCEDURE — 3700000001 HC ADD 15 MINUTES (ANESTHESIA): Performed by: ORTHOPAEDIC SURGERY

## 2023-11-30 PROCEDURE — 3600000014 HC SURGERY LEVEL 4 ADDTL 15MIN: Performed by: ORTHOPAEDIC SURGERY

## 2023-11-30 PROCEDURE — 2720000010 HC SURG SUPPLY STERILE: Performed by: ORTHOPAEDIC SURGERY

## 2023-11-30 PROCEDURE — 3700000000 HC ANESTHESIA ATTENDED CARE: Performed by: ORTHOPAEDIC SURGERY

## 2023-11-30 PROCEDURE — 7100000000 HC PACU RECOVERY - FIRST 15 MIN: Performed by: ORTHOPAEDIC SURGERY

## 2023-11-30 PROCEDURE — 2500000003 HC RX 250 WO HCPCS: Performed by: NURSE ANESTHETIST, CERTIFIED REGISTERED

## 2023-11-30 PROCEDURE — 6370000000 HC RX 637 (ALT 250 FOR IP): Performed by: ORTHOPAEDIC SURGERY

## 2023-11-30 PROCEDURE — 7100000001 HC PACU RECOVERY - ADDTL 15 MIN: Performed by: ORTHOPAEDIC SURGERY

## 2023-11-30 PROCEDURE — 2580000003 HC RX 258: Performed by: ORTHOPAEDIC SURGERY

## 2023-11-30 PROCEDURE — 6370000000 HC RX 637 (ALT 250 FOR IP): Performed by: ANESTHESIOLOGY

## 2023-11-30 PROCEDURE — 7100000010 HC PHASE II RECOVERY - FIRST 15 MIN: Performed by: ORTHOPAEDIC SURGERY

## 2023-11-30 RX ORDER — HYDRALAZINE HYDROCHLORIDE 20 MG/ML
10 INJECTION INTRAMUSCULAR; INTRAVENOUS
Status: DISCONTINUED | OUTPATIENT
Start: 2023-11-30 | End: 2023-11-30 | Stop reason: HOSPADM

## 2023-11-30 RX ORDER — PROMETHAZINE HYDROCHLORIDE 25 MG/ML
12.5 INJECTION, SOLUTION INTRAMUSCULAR; INTRAVENOUS ONCE
Status: DISCONTINUED | OUTPATIENT
Start: 2023-11-30 | End: 2023-11-30 | Stop reason: HOSPADM

## 2023-11-30 RX ORDER — ONDANSETRON 2 MG/ML
4 INJECTION INTRAMUSCULAR; INTRAVENOUS
Status: COMPLETED | OUTPATIENT
Start: 2023-11-30 | End: 2023-11-30

## 2023-11-30 RX ORDER — KETOROLAC TROMETHAMINE 30 MG/ML
INJECTION, SOLUTION INTRAMUSCULAR; INTRAVENOUS PRN
Status: DISCONTINUED | OUTPATIENT
Start: 2023-11-30 | End: 2023-11-30 | Stop reason: SDUPTHER

## 2023-11-30 RX ORDER — SODIUM CHLORIDE, SODIUM LACTATE, POTASSIUM CHLORIDE, CALCIUM CHLORIDE 600; 310; 30; 20 MG/100ML; MG/100ML; MG/100ML; MG/100ML
INJECTION, SOLUTION INTRAVENOUS CONTINUOUS
Status: CANCELLED | OUTPATIENT
Start: 2023-11-30

## 2023-11-30 RX ORDER — SODIUM CHLORIDE 0.9 % (FLUSH) 0.9 %
5-40 SYRINGE (ML) INJECTION EVERY 12 HOURS SCHEDULED
Status: CANCELLED | OUTPATIENT
Start: 2023-11-30

## 2023-11-30 RX ORDER — SODIUM CHLORIDE 0.9 % (FLUSH) 0.9 %
5-40 SYRINGE (ML) INJECTION PRN
Status: DISCONTINUED | OUTPATIENT
Start: 2023-11-30 | End: 2023-11-30 | Stop reason: HOSPADM

## 2023-11-30 RX ORDER — ROPIVACAINE HYDROCHLORIDE 5 MG/ML
INJECTION, SOLUTION EPIDURAL; INFILTRATION; PERINEURAL
Status: COMPLETED | OUTPATIENT
Start: 2023-11-30 | End: 2023-11-30

## 2023-11-30 RX ORDER — ONDANSETRON 2 MG/ML
4 INJECTION INTRAMUSCULAR; INTRAVENOUS EVERY 6 HOURS PRN
Status: CANCELLED | OUTPATIENT
Start: 2023-11-30

## 2023-11-30 RX ORDER — PROPOFOL 10 MG/ML
INJECTION, EMULSION INTRAVENOUS PRN
Status: DISCONTINUED | OUTPATIENT
Start: 2023-11-30 | End: 2023-11-30 | Stop reason: SDUPTHER

## 2023-11-30 RX ORDER — DEXAMETHASONE SODIUM PHOSPHATE 4 MG/ML
INJECTION, SOLUTION INTRA-ARTICULAR; INTRALESIONAL; INTRAMUSCULAR; INTRAVENOUS; SOFT TISSUE PRN
Status: DISCONTINUED | OUTPATIENT
Start: 2023-11-30 | End: 2023-11-30 | Stop reason: SDUPTHER

## 2023-11-30 RX ORDER — ONDANSETRON 2 MG/ML
INJECTION INTRAMUSCULAR; INTRAVENOUS PRN
Status: DISCONTINUED | OUTPATIENT
Start: 2023-11-30 | End: 2023-11-30 | Stop reason: SDUPTHER

## 2023-11-30 RX ORDER — HYDROCODONE BITARTRATE AND ACETAMINOPHEN 5; 325 MG/1; MG/1
1 TABLET ORAL ONCE
Status: COMPLETED | OUTPATIENT
Start: 2023-11-30 | End: 2023-11-30

## 2023-11-30 RX ORDER — HYDROCODONE BITARTRATE AND ACETAMINOPHEN 5; 325 MG/1; MG/1
1 TABLET ORAL EVERY 6 HOURS PRN
Qty: 18 TABLET | Refills: 0 | Status: SHIPPED | OUTPATIENT
Start: 2023-11-30 | End: 2023-12-07

## 2023-11-30 RX ORDER — OXYCODONE HYDROCHLORIDE 5 MG/1
10 TABLET ORAL EVERY 4 HOURS PRN
Status: CANCELLED | OUTPATIENT
Start: 2023-11-30

## 2023-11-30 RX ORDER — LABETALOL HYDROCHLORIDE 5 MG/ML
10 INJECTION, SOLUTION INTRAVENOUS
Status: DISCONTINUED | OUTPATIENT
Start: 2023-11-30 | End: 2023-11-30 | Stop reason: HOSPADM

## 2023-11-30 RX ORDER — ACETAMINOPHEN 500 MG
1000 TABLET ORAL ONCE
Status: COMPLETED | OUTPATIENT
Start: 2023-11-30 | End: 2023-11-30

## 2023-11-30 RX ORDER — SODIUM CHLORIDE 9 MG/ML
INJECTION, SOLUTION INTRAVENOUS PRN
Status: CANCELLED | OUTPATIENT
Start: 2023-11-30

## 2023-11-30 RX ORDER — SODIUM CHLORIDE, SODIUM LACTATE, POTASSIUM CHLORIDE, CALCIUM CHLORIDE 600; 310; 30; 20 MG/100ML; MG/100ML; MG/100ML; MG/100ML
INJECTION, SOLUTION INTRAVENOUS CONTINUOUS
Status: DISCONTINUED | OUTPATIENT
Start: 2023-11-30 | End: 2023-11-30 | Stop reason: HOSPADM

## 2023-11-30 RX ORDER — SODIUM CHLORIDE 0.9 % (FLUSH) 0.9 %
5-40 SYRINGE (ML) INJECTION PRN
Status: CANCELLED | OUTPATIENT
Start: 2023-11-30

## 2023-11-30 RX ORDER — FENTANYL CITRATE 50 UG/ML
50 INJECTION, SOLUTION INTRAMUSCULAR; INTRAVENOUS EVERY 5 MIN PRN
Status: DISCONTINUED | OUTPATIENT
Start: 2023-11-30 | End: 2023-11-30 | Stop reason: HOSPADM

## 2023-11-30 RX ORDER — ROCURONIUM BROMIDE 10 MG/ML
INJECTION, SOLUTION INTRAVENOUS PRN
Status: DISCONTINUED | OUTPATIENT
Start: 2023-11-30 | End: 2023-11-30 | Stop reason: SDUPTHER

## 2023-11-30 RX ORDER — FENTANYL CITRATE 50 UG/ML
INJECTION, SOLUTION INTRAMUSCULAR; INTRAVENOUS PRN
Status: DISCONTINUED | OUTPATIENT
Start: 2023-11-30 | End: 2023-11-30 | Stop reason: SDUPTHER

## 2023-11-30 RX ORDER — SODIUM CHLORIDE 0.9 % (FLUSH) 0.9 %
5-40 SYRINGE (ML) INJECTION EVERY 12 HOURS SCHEDULED
Status: DISCONTINUED | OUTPATIENT
Start: 2023-11-30 | End: 2023-11-30 | Stop reason: HOSPADM

## 2023-11-30 RX ORDER — KETOROLAC TROMETHAMINE 30 MG/ML
30 INJECTION, SOLUTION INTRAMUSCULAR; INTRAVENOUS EVERY 6 HOURS
Status: CANCELLED | OUTPATIENT
Start: 2023-11-30 | End: 2023-12-03

## 2023-11-30 RX ORDER — ONDANSETRON 4 MG/1
4 TABLET, ORALLY DISINTEGRATING ORAL EVERY 8 HOURS PRN
Status: CANCELLED | OUTPATIENT
Start: 2023-11-30

## 2023-11-30 RX ORDER — CEPHALEXIN 250 MG/1
250 CAPSULE ORAL 4 TIMES DAILY
Qty: 4 CAPSULE | Refills: 0 | Status: SHIPPED | OUTPATIENT
Start: 2023-11-30 | End: 2023-12-01

## 2023-11-30 RX ORDER — SUCCINYLCHOLINE/SOD CL,ISO/PF 100 MG/5ML
SYRINGE (ML) INTRAVENOUS PRN
Status: DISCONTINUED | OUTPATIENT
Start: 2023-11-30 | End: 2023-11-30 | Stop reason: SDUPTHER

## 2023-11-30 RX ORDER — PROMETHAZINE HYDROCHLORIDE 25 MG/ML
INJECTION, SOLUTION INTRAMUSCULAR; INTRAVENOUS PRN
Status: DISCONTINUED | OUTPATIENT
Start: 2023-11-30 | End: 2023-11-30 | Stop reason: SDUPTHER

## 2023-11-30 RX ORDER — SODIUM CHLORIDE 9 MG/ML
INJECTION, SOLUTION INTRAVENOUS PRN
Status: DISCONTINUED | OUTPATIENT
Start: 2023-11-30 | End: 2023-11-30 | Stop reason: HOSPADM

## 2023-11-30 RX ORDER — OXYCODONE HYDROCHLORIDE 5 MG/1
5 TABLET ORAL EVERY 4 HOURS PRN
Status: CANCELLED | OUTPATIENT
Start: 2023-11-30

## 2023-11-30 RX ORDER — FENTANYL CITRATE 50 UG/ML
25 INJECTION, SOLUTION INTRAMUSCULAR; INTRAVENOUS EVERY 5 MIN PRN
Status: DISCONTINUED | OUTPATIENT
Start: 2023-11-30 | End: 2023-11-30 | Stop reason: HOSPADM

## 2023-11-30 RX ADMIN — ONDANSETRON 4 MG: 2 INJECTION INTRAMUSCULAR; INTRAVENOUS at 12:53

## 2023-11-30 RX ADMIN — ROCURONIUM BROMIDE 50 MG: 10 INJECTION INTRAVENOUS at 11:19

## 2023-11-30 RX ADMIN — FENTANYL CITRATE 50 MCG: 50 INJECTION, SOLUTION INTRAMUSCULAR; INTRAVENOUS at 11:16

## 2023-11-30 RX ADMIN — DEXAMETHASONE SODIUM PHOSPHATE 4 MG: 4 INJECTION, SOLUTION INTRAMUSCULAR; INTRAVENOUS at 11:16

## 2023-11-30 RX ADMIN — HYDROMORPHONE HYDROCHLORIDE 0.5 MG: 1 INJECTION, SOLUTION INTRAMUSCULAR; INTRAVENOUS; SUBCUTANEOUS at 11:52

## 2023-11-30 RX ADMIN — PROMETHAZINE HYDROCHLORIDE 12.5 MG: 25 INJECTION INTRAMUSCULAR; INTRAVENOUS at 13:38

## 2023-11-30 RX ADMIN — Medication 100 MG: at 11:17

## 2023-11-30 RX ADMIN — FENTANYL CITRATE 25 MCG: 50 INJECTION, SOLUTION INTRAMUSCULAR; INTRAVENOUS at 13:53

## 2023-11-30 RX ADMIN — ACETAMINOPHEN 1000 MG: 500 TABLET ORAL at 08:17

## 2023-11-30 RX ADMIN — HYDROCODONE BITARTRATE AND ACETAMINOPHEN 1 TABLET: 5; 325 TABLET ORAL at 14:55

## 2023-11-30 RX ADMIN — SODIUM CHLORIDE, SODIUM LACTATE, POTASSIUM CHLORIDE, CALCIUM CHLORIDE: 600; 310; 30; 20 INJECTION, SOLUTION INTRAVENOUS at 08:00

## 2023-11-30 RX ADMIN — PROPOFOL 200 MG: 10 INJECTION, EMULSION INTRAVENOUS at 11:16

## 2023-11-30 RX ADMIN — ONDANSETRON 4 MG: 2 INJECTION INTRAMUSCULAR; INTRAVENOUS at 12:31

## 2023-11-30 RX ADMIN — HYDROMORPHONE HYDROCHLORIDE 0.5 MG: 1 INJECTION, SOLUTION INTRAMUSCULAR; INTRAVENOUS; SUBCUTANEOUS at 11:55

## 2023-11-30 RX ADMIN — ONDANSETRON 4 MG: 2 INJECTION INTRAMUSCULAR; INTRAVENOUS at 11:16

## 2023-11-30 RX ADMIN — SUGAMMADEX 200 MG: 100 INJECTION, SOLUTION INTRAVENOUS at 12:06

## 2023-11-30 RX ADMIN — FENTANYL CITRATE 50 MCG: 50 INJECTION, SOLUTION INTRAMUSCULAR; INTRAVENOUS at 12:32

## 2023-11-30 RX ADMIN — FENTANYL CITRATE 50 MCG: 50 INJECTION, SOLUTION INTRAMUSCULAR; INTRAVENOUS at 11:21

## 2023-11-30 RX ADMIN — KETOROLAC TROMETHAMINE 30 MG: 30 INJECTION, SOLUTION INTRAMUSCULAR; INTRAVENOUS at 11:59

## 2023-11-30 RX ADMIN — HYDROMORPHONE HYDROCHLORIDE 1 MG: 1 INJECTION, SOLUTION INTRAMUSCULAR; INTRAVENOUS; SUBCUTANEOUS at 12:14

## 2023-11-30 RX ADMIN — CEFAZOLIN 2000 MG: 2 INJECTION, POWDER, FOR SOLUTION INTRAMUSCULAR; INTRAVENOUS at 11:11

## 2023-11-30 ASSESSMENT — PAIN DESCRIPTION - ONSET
ONSET: ON-GOING

## 2023-11-30 ASSESSMENT — ENCOUNTER SYMPTOMS: SHORTNESS OF BREATH: 1

## 2023-11-30 ASSESSMENT — PAIN DESCRIPTION - FREQUENCY
FREQUENCY: CONTINUOUS

## 2023-11-30 ASSESSMENT — PAIN DESCRIPTION - LOCATION
LOCATION: KNEE

## 2023-11-30 ASSESSMENT — PAIN SCALES - GENERAL
PAINLEVEL_OUTOF10: 3
PAINLEVEL_OUTOF10: 7
PAINLEVEL_OUTOF10: 0
PAINLEVEL_OUTOF10: 5
PAINLEVEL_OUTOF10: 4
PAINLEVEL_OUTOF10: 5
PAINLEVEL_OUTOF10: 6

## 2023-11-30 ASSESSMENT — PAIN - FUNCTIONAL ASSESSMENT
PAIN_FUNCTIONAL_ASSESSMENT: PREVENTS OR INTERFERES SOME ACTIVE ACTIVITIES AND ADLS
PAIN_FUNCTIONAL_ASSESSMENT: 0-10
PAIN_FUNCTIONAL_ASSESSMENT: PREVENTS OR INTERFERES SOME ACTIVE ACTIVITIES AND ADLS

## 2023-11-30 ASSESSMENT — PAIN DESCRIPTION - DESCRIPTORS
DESCRIPTORS: ACHING
DESCRIPTORS: ACHING
DESCRIPTORS: ACHING;SORE
DESCRIPTORS: ACHING;THROBBING
DESCRIPTORS: ACHING
DESCRIPTORS: ACHING

## 2023-11-30 ASSESSMENT — PAIN DESCRIPTION - PAIN TYPE
TYPE: SURGICAL PAIN

## 2023-11-30 ASSESSMENT — PAIN DESCRIPTION - ORIENTATION
ORIENTATION: RIGHT

## 2023-11-30 NOTE — ANESTHESIA POSTPROCEDURE EVALUATION
Department of Anesthesiology  Postprocedure Note    Patient: Allen Howell  MRN: [id number]  YOB: 1985  Date of evaluation: 11/30/2023      Procedure Summary     Date: 11/30/23 Room / Location: [address] [place] [address]    Anesthesia Start: 1111 Anesthesia Stop: 1219    Procedure: RIGHT KNEE ARTHROSCOPY WITH CHONDROPLASTY (Right: Knee) Diagnosis:       Chondromalacia of right patella      (Chondromalacia of right patella [M22.41])    Surgeons: Carl Berg DO Responsible Provider: Lidia Fatima MD    Anesthesia Type: general ASA Status: 3          Anesthesia Type: No value filed.     Ezekiel Phase I: Ezekiel Score: 10    Eezkiel Phase II:        Anesthesia Post Evaluation    Patient location during evaluation: bedside  Patient participation: complete - patient participated  Level of consciousness: awake  Pain score: 3  Airway patency: patent  Nausea & Vomiting: no vomiting and no nausea  Complications: no  Cardiovascular status: blood pressure returned to baseline and hemodynamically stable  Respiratory status: acceptable, spontaneous ventilation and face mask  Hydration status: euvolemic  Pain management: adequate

## 2023-11-30 NOTE — BRIEF OP NOTE
Brief Postoperative Note      Patient: Deandre Yoder  YOB: 1985  MRN: [id number]    Date of Procedure: 11/30/2023    Pre-Op Diagnosis Codes:     * Chondromalacia of right patella [M22.41]    Post-Op Diagnosis: Same       Procedure(s):  RIGHT KNEE ARTHROSCOPY WITH CHONDROPLASTY    Surgeon(s):  Lisa Mata DO    Assistant:  * No surgical staff found *    Anesthesia: General    Estimated Blood Loss (mL): Minimal    Complications: None    Specimens:   * No specimens in log *    Implants:  * No implants in log *      Drains: * No LDAs found *    Findings: R knee OA      Electronically signed by Lisa Mata DO on 11/30/2023 at 12:03 PM

## 2023-11-30 NOTE — DISCHARGE INSTRUCTIONS
[facility]  [phone]    Do not drive, work around [address] or use equipment. Do not drink any alcoholic beverages. Do not smoke while alone. Avoid making important decisions. Plan to spend a quiet, relaxed evening @ home. Resume normal activities as you begin to feel better. Eat lightly for your first meal, then gradually increase your diet to what is normal for you. In case of nausea, avoid food and drink only clear liquids. Resume food as nausea ceases. Notify your surgeon if you experience fever, chills, large amount of bleeding, difficulty breathing, persistent nausea and vomiting or any other disturbing problem. Call for a follow-up appointment with your surgeon.       FOLLOW ALL PRINTED INSTRUCTIONS IN FOLDER FROM DR Jaime Coburn

## 2023-11-30 NOTE — H&P
Maternal Grandmother     Depression Maternal Grandfather     Depression Paternal Grandmother     Coronary Art Dis Paternal Grandmother     Diabetes Paternal Grandmother     Depression Paternal Grandfather     Coronary Art Dis Paternal Grandfather     Diabetes Paternal Grandfather     Breast Cancer Maternal Aunt     Depression Maternal Aunt     Depression Maternal Uncle     Depression Paternal Aunt     Coronary Art Dis Paternal Aunt     Depression Paternal Uncle     Coronary Art Dis Paternal Uncle        Objective:   Physical Exam  Constitutional:       Appearance: She is well-developed. HENT:      Head: Normocephalic. Eyes:      Pupils: Pupils are equal, round, and reactive to light. Pulmonary:      Effort: Pulmonary effort is normal.   Musculoskeletal:         General: Swelling and tenderness present. No deformity. Normal range of motion. Cervical back: Normal range of motion. Right hip: Normal.      Left hip: Normal.      Right knee: Swelling and bony tenderness present. No deformity, effusion, erythema, ecchymosis or lacerations. Normal range of motion. Tenderness present over the lateral joint line and patellar tendon. No medial joint line, MCL or LCL tenderness. No LCL laxity or MCL laxity. Normal alignment and normal patellar mobility. Left knee: Normal. No swelling, deformity, effusion, erythema, ecchymosis, lacerations or bony tenderness. Normal range of motion. No tenderness. No medial joint line, lateral joint line, MCL, LCL or patellar tendon tenderness. No LCL laxity or MCL laxity. Normal alignment and normal patellar mobility. Skin:     General: Skin is warm and dry. Capillary Refill: Capillary refill takes less than 2 seconds. Coloration: Skin is not pale. Findings: No erythema or rash. Neurological:      Mental Status: She is alert and oriented to person, place, and time.       Right knee-Incision clean, dry, intact, with no erythema, no drainage, and no signs of

## 2023-11-30 NOTE — PROGRESS NOTES
1215- pt. Arrived to pacu via cart from OR. Pt. Attached to monitor and alarms are on. Received report from [place], [address] and [employer], CRNA. Pt. Is drowsy from anesthesia but responds to verbal stimuli and able to follow commands. Pt. Initially denies pain or n/v upon arrival. She is wearing a simple mask at 6L. IV infusing without any issues. SCDs are turned on. Dressing to right knee is clean dry and intact. 1231- pt. Medicated for nausea per PRN medication  1232- pt. Medicated for pain per PRN pain medication  1253-pt. Medicated for nausea per PRN medication by Yasir Connell  1330- pt. Had episode of emesis, emesis was small amount and appeared to be bile. 1338- 12.5mg of Phenergan was given to pt. By Dr. Ursula Saxena. 1355- pt. States no longer has nausea and medication has helped. Pt. Stating pain is now back and uncomfortable. Pt. Medicated per [facility] for pain. 1356- pt. Is aox4. Dressing to right knee is clean dry and intact. Pt. Is on RA sating 100%. SR on the monitor. Pt. Updated on plan of care and denies any other questions or concerns.

## 2023-11-30 NOTE — OP NOTE
DATE OF PROCEDURE:  11/30/2023     PREOPERATIVE DIAGNOSIS:  Right knee OA. POSTOPERATIVE DIAGNOSES:  1. 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 PROCEDURES:  1. Diagnostic and operative arthroscopy of right knee with chondroplasty of the patellofemoral compartment, medial compartment and lateral compartment. ATTENDING SURGEON:  Dave Lomax DO.     ANESTHESIA:  General.     ESTIMATED BLOOD LOSS:  5 mL. TOTAL TOURNIQUET TIME:  26 minutes. FLUIDS:  800 mL of crystalloids. INDICATION FOR PROCEDURE:  The patient is a 42-year-old female with  long-standing history of right knee pain. She underwent conservative treatment   with no relief of her symptoms. MRI was subsequently obtained, which showed   evidence of a arthritic changes. Given her persistent   symptoms despite conservative treatment and with her MRI findings, I recommended surgical treatment. I  explained the risks, benefits, and possible complications of the  procedure to the patient, and after answering all of her questions, she  consented to undergo the above procedure. DESCRIPTION OF PROCEDURE:  The patient was seen and evaluated in the  preoperative holding area where the right lower extremity was signed in  her presence. At this point, care of the patient was turned over to  Anesthesia Team and transported back to the operative suite. She was  placed supine on the operating table with the right lower extremity in  the leg king. General anesthesia was applied, and once adequate  anesthesia was obtained, the right lower extremity was prepped and  draped in usual sterile fashion. Preoperative antibiotics were  administered. At this point, a timeout was performed and all in  attendance were in agreement. I exsanguinated the right lower extremity with the use of an Esmarch and  tourniquet was inflated to 280 mmHg.   I then made anterior

## 2023-11-30 NOTE — PROGRESS NOTES
1415  Pt received back to Same Day from PACU. Pt is awake and alert--skin W&D. States nausea is much improved from earlier and pain level is approx 3/10. Has drsg with ace wrap intact to Rt knee--no signs of drainage. Ice pack behind knee at this time. 1420 Given water and crackers to trial at this time. Visitor at bedside. 1455  VS rechecked and stable. Pt states is ready to go home now after getting pain pill. IV dc'd for pt to get dressed. 1 Pt instructed for discharge care and follow-up and use of all Rx with understanding voiced. Ice pack refilled for use at home. Sent home with crutches to use at home.  To car at exit per wheelchair

## 2023-12-13 ENCOUNTER — OFFICE VISIT (OUTPATIENT)
Dept: ORTHOPEDIC SURGERY | Age: 38
End: 2023-12-13

## 2023-12-13 VITALS
HEART RATE: 86 BPM | WEIGHT: 260 LBS | TEMPERATURE: 97.2 F | BODY MASS INDEX: 43.32 KG/M2 | OXYGEN SATURATION: 94 % | RESPIRATION RATE: 16 BRPM | HEIGHT: 65 IN

## 2023-12-13 DIAGNOSIS — Z98.890 S/P ARTHROSCOPY OF RIGHT KNEE: Primary | ICD-10-CM

## 2023-12-13 PROCEDURE — 99024 POSTOP FOLLOW-UP VISIT: CPT | Performed by: PHYSICIAN ASSISTANT

## 2023-12-13 NOTE — PATIENT INSTRUCTIONS
Still avoid any high impact activities with the right knee  Continue weight bear as tolerated  Ice and elevate as needed  Tylenol or Motrin for pain  Follow up in 4 weeks with Dr. Lien Choudhury. May discuss gel injection at the next visit    If you have any question post operatively. Please contact office to speak with [facility]. We are committed to providing you the best care possible. If you receive a survey after visiting one of our offices, please take time to share your experience concerning your physician office visit. These surveys are confidential and no health information about you is shared. We are eager to improve for you and we are counting on your feedback to help make that happen.

## 2024-01-29 ENCOUNTER — TELEPHONE (OUTPATIENT)
Dept: ORTHOPEDIC SURGERY | Age: 39
End: 2024-01-29

## 2024-01-29 ENCOUNTER — OFFICE VISIT (OUTPATIENT)
Dept: ORTHOPEDIC SURGERY | Age: 39
End: 2024-01-29

## 2024-01-29 VITALS — HEIGHT: 65 IN | BODY MASS INDEX: 43.27 KG/M2 | RESPIRATION RATE: 16 BRPM | OXYGEN SATURATION: 99 % | HEART RATE: 67 BPM

## 2024-01-29 DIAGNOSIS — M17.11 LOCALIZED OSTEOARTHRITIS OF RIGHT KNEE: Primary | ICD-10-CM

## 2024-01-29 PROCEDURE — 99024 POSTOP FOLLOW-UP VISIT: CPT | Performed by: ORTHOPAEDIC SURGERY

## 2024-01-29 ASSESSMENT — ENCOUNTER SYMPTOMS
COLOR CHANGE: 0
CHEST TIGHTNESS: 0
BACK PAIN: 0

## 2024-01-29 NOTE — PROGRESS NOTES
Subjective:      Patient ID: Allyson Pinto is a 38 y.o. female.    Patient seen in office today for 6wk post op RT KS,Chondro DOS 11/30/23. Stated she is still having pain on top part of knee when walking or getting up from surfaces. Reported she will have popping with burning sharp pain in knee joint. 0/10 pain level. Would like to discuss gel injections this visit.    She has been working on weight loss and is meeting with a nutritionist.    She comes in today for her 6-week postop recheck.  Overall she states that she continues to feel diffuse aching grinding and popping pain particularly in the anterior aspect of her right knee which is worse with flexion and extension.  Patient denies any new injury to the involved extremity/ joint, denies numbness or tingling in the involved extremity and denies fever or chills.    She continues to work on weight loss and is working with a nutritionist.        Review of Systems   Constitutional:  Negative for activity change, chills and fever.   Respiratory:  Negative for chest tightness.    Cardiovascular:  Negative for chest pain.   Musculoskeletal:  Positive for arthralgias and joint swelling. Negative for back pain, gait problem and myalgias.   Skin:  Negative for color change, pallor, rash and wound.   Neurological:  Negative for weakness and numbness.       Past Medical History:   Diagnosis Date    Acid reflux     Acute deep vein thrombosis (DVT) of non-extremity vein 04/25/2018    \"in my neck\"\"after mediport was put in\"    Anemia     Anxiety     Asthma     NO PULMONOLOGIST AT THIS TIME    Blood clot due to device, implant, or graft 04/2018    had blood clots in neck due to med port    Bowen's disease     Cancer (HCC)     skin cancer shoulder 2019    Crohn's disease (HCC) Dx 2010    Remicade Infusions Every Six Weeks, Sees Dr. Burns At [facility] In [place], [place]    Depression     ECHO 07/28/2021    EF is estimated at 55-60%.  Mild left ventricular

## 2024-01-29 NOTE — PROGRESS NOTES
Patient seen in office today for 6wk post op RT KS,Chondro DOS 11/30/23. Stated she is still having pain on top part of knee when walking or getting up from surfaces. Reported she will have popping with burning sharp pain in knee joint. 0/10 pain level. Would like to discuss gel injections this visit.

## 2024-01-29 NOTE — PATIENT INSTRUCTIONS
Continue weight-bearing as tolerated.  Continue range of motion exercises as instructed.  Ice and elevate as needed.  Tylenol or Motrin for pain.  We will get gel injection approval through your insurance and call you to once received to set up appointments.    We are committed to providing you the best care possible.     If you receive a survey after visiting one of our offices, please take time to share your experience concerning your physician office visit.  These surveys are confidential and no health information about you is shared.     We are eager to improve for you and we are counting on your feedback to help make that happen.

## 2024-01-30 NOTE — TELEPHONE ENCOUNTER
Precert form completed and faxed to [facility] with supporting office notes.

## 2024-03-11 NOTE — PROGRESS NOTES
2/13/2021  sleep study  for [employer]  1985 is complete. Results are pending physician review.     Electronically signed by Aidee Choi on 2/13/2021 at 6:35 AM
n/a

## 2024-04-24 ENCOUNTER — HOSPITAL ENCOUNTER (OUTPATIENT)
Dept: PHYSICAL THERAPY | Age: 39
Setting detail: THERAPIES SERIES
Discharge: HOME | End: 2024-04-24

## 2024-04-24 NOTE — PROGRESS NOTES
Outpatient Physical Therapy  [place]           [x] Phone: [phone]   Fax: [phone]  [place]           [] Phone: [phone]   Fax: [phone]        Physical Therapy Daily Treatment Note  Date:  2024    Patient Name:  Allyson Pinto    :  1985  MRN: [id number]      Pt cancelled PT eval due to being sick.            Electronically signed by:  Ruddy Casas, PT, DPT, OCS  2024, 9:51 AM    2024 9:51 AM

## 2024-05-08 ENCOUNTER — HOSPITAL ENCOUNTER (OUTPATIENT)
Dept: PHYSICAL THERAPY | Age: 39
Setting detail: THERAPIES SERIES
Discharge: HOME | End: 2024-05-08
Payer: COMMERCIAL

## 2024-05-08 PROCEDURE — 97161 PT EVAL LOW COMPLEX 20 MIN: CPT

## 2024-05-08 NOTE — PROGRESS NOTES
Physical Therapy: Initial Evaluation    Patient: Allyson Pinto (38 y.o. female)   Examination Date: 2024  Plan of Care Certification Period: 2024 to        :  1985 ;    Confirmed: Yes MRN: [id number]  CSN: [id number]   Insurance: Payor: [facility] / Plan: [facility] MEDICAID / Product Type: *No Product type* /   Insurance ID: [id number] - (Medicaid Managed) Secondary Insurance (if applicable):    Referring Physician: Med Ayon DO     PCP: Chinyere Dolan APRN - NP Visits to Date/Visits Approved:   /      No Show/Cancelled Appts:   /       Medical Diagnosis: Unilateral primary osteoarthritis, right knee [M17.11]    Treatment Diagnosis: chronic R knee pain     PERTINENT MEDICAL HISTORY   Patient Assessed for Rehabilitation Services: Yes       Medical History: Chart Reviewed: Yes   Past Medical History:   Diagnosis Date    Acid reflux     Acute deep vein thrombosis (DVT) of non-extremity vein 2018    \"in my neck\"\"after mediport was put in\"    Anemia     Anxiety     Asthma     NO PULMONOLOGIST AT THIS TIME    Blood clot due to device, implant, or graft 2018    had blood clots in neck due to med port    Bowen's disease     Cancer (HCC)     skin cancer shoulder 2019    Crohn's disease (HCC) Dx     Remicade Infusions Every Six Weeks, Sees Dr. Burns At [facility] In [place], [place]    Depression     ECHO 2021    EF is estimated at 55-60%.  Mild left ventricular hypertrophy.  Mild mitral regurgitation is present.    2012    \"Passed Out And Fell On My Tailbone\"    Fatty liver     Gestational diabetes     \"2019- no medication - just diet controlled with pregnancy\"    Hx of blood clots     in neck    Hypertension     \"yrs ago was on b/p medication- off medication since 2018- only took medication for 2 months\"    Lower back pain     \"Sometimes\"    MRSA (methicillin resistant staph aureus) culture positive 2018    Multiple pulmonary nodules 2013

## 2024-05-08 NOTE — PLAN OF CARE
Outpatient Physical Therapy           [place]           [x] Phone: [phone]   Fax: [phone]  [place]           [] Phone: [phone]   Fax: [phone]     To: Med Ayon DO     From: Susan Rendon, PT, DPT, Cert. DN      Patient: Allyson Pinto       : 1985  Diagnosis: Unilateral primary osteoarthritis, right knee [M17.11]    Treatment Diagnosis: chronic R knee pain  Date: 2024    Physical Therapy Certification/Re-Certification Form  Dear Dr. Ayon,   The following patient has been evaluated for physical therapy services and for therapy to continue, insurance requires physician review of the treatment plan initially and every 90 days. Please review the attached evaluation and/or summary of the patient's plan of care, and verify that you agree therapy should continue by signing the attached document and sending it back to our office.    Assessment:    Assessment: Pt is a 39 yo female that presents to therapy with complaints of R knee pain. Pt underwent Diagnostic and operative arthroscopy of right knee with chondroplasty of the patellofemoral compartment, medial compartment and lateral compartment on 23. Per imaging: Moderate chondromalacia patella and developing patellofemoral compartment     arthrosis. There is mild to moderate medial compartment arthrosis.Small effusion.No meniscal tear.      Study slightly limited by motion and body habitus. Pt works from home and sits a lot. Sitting, walking and stair negotiation can increase her pain. She reports that her walking tolerance is 3 minutes before her knee starts to bother her. Pt was getting cortisone injections, and was planning on getting gel injections but insurance wont cover it unless she does therapy first. If gel injections dont help, plan is to move forward with knee replacement. Pt demo impaired BLE strength/ROM, stair negotiation, STS, walking tolerance, activity tolerance, functional mobility and

## 2024-05-08 NOTE — FLOWSHEET NOTE
Outpatient Physical Therapy  [place]           [x] Phone: [phone]   Fax: [phone]  [place]           [] Phone: [phone]   Fax: [phone]        Physical Therapy Daily Treatment Note  Date:  2024    Patient Name:  Allyson Pinto    :  1985  MRN: [id number]  Restrictions/Precautions: Restrictions/Precautions: General Precautions        Diagnosis:   Unilateral primary osteoarthritis, right knee [M17.11]    Date of Injury/Surgery:   Treatment Diagnosis:  chronic R knee pain  Insurance/Certification information: [facility]  Referring Physician:  Med Ayon DO     PCP: Chinyere Dolan APRN - NP  Next Doctor Visit:    Plan of care signed (Y/N):  sent   Outcome Measure:   LEFS:43/80  5x STS: 20.24 sec  TU.52 sec   Visit# / total visits:    Pain level: 0/10   Goals:     Patient goals: get back to walking with minimal to no pain  Short term goals  Time Frame for Short term goals: refer to [facility]                 Long Term Goals  Time Frame for Long Term Goals: 6 visits  Pt will report overall improvement in condition by 50% or more  pt will improve LEFS to 52/80 or more to show MDC and subjective improvement  pt will improve 5x STS to 15 sec or less for improved getting out of chairs  pt will improve bilateral hip flexion strength to 5/5 for improved stair negotiation  pt will report that she can walk for 5-10 minutes before having an increase in pain so that she can return to the gym      Summary of Evaluation:  Assessment: Pt is a 39 yo female that presents to therapy with complaints of R knee pain. Pt underwent Diagnostic and operative arthroscopy of right knee with chondroplasty of the patellofemoral compartment, medial compartment and lateral compartment on 23. Per imaging: Moderate chondromalacia patella and developing patellofemoral compartment     arthrosis. There is mild to moderate medial compartment arthrosis.Small effusion.No meniscal tear.      Study

## 2024-05-15 ENCOUNTER — HOSPITAL ENCOUNTER (OUTPATIENT)
Dept: PHYSICAL THERAPY | Age: 39
Setting detail: THERAPIES SERIES
Discharge: HOME | End: 2024-05-15
Payer: COMMERCIAL

## 2024-05-15 PROCEDURE — 97110 THERAPEUTIC EXERCISES: CPT

## 2024-05-15 NOTE — FLOWSHEET NOTE
Outpatient Physical Therapy  [place]           [x] Phone: [phone]   Fax: [phone]  [place]           [] Phone: [phone]   Fax: [phone]        Physical Therapy Daily Treatment Note  Date:  5/15/2024    Patient Name:  Allyson Pinto    :  1985  MRN: [id number]  Restrictions/Precautions: Restrictions/Precautions: General Precautions        Diagnosis:   Unilateral primary osteoarthritis, right knee [M17.11]    Date of Injury/Surgery:   Treatment Diagnosis:  chronic R knee pain  Insurance/Certification information: [facility]  Referring Physician:  Med Ayon DO     PCP: Chinyere Dolan APRN - NP  Next Doctor Visit:    Plan of care signed (Y/N):  yes  Outcome Measure:   LEFS:43/80  5x STS: 20.24 sec  TU.52 sec   Visit# / total visits:  2 /  Pain level: 3/10   Goals:     Patient goals: get back to walking with minimal to no pain  Short term goals  Time Frame for Short term goals: refer to [facility]                 Long Term Goals  Time Frame for Long Term Goals: 6 visits  Pt will report overall improvement in condition by 50% or more  pt will improve LEFS to 52/80 or more to show MDC and subjective improvement  pt will improve 5x STS to 15 sec or less for improved getting out of chairs  pt will improve bilateral hip flexion strength to 5/5 for improved stair negotiation  pt will report that she can walk for 5-10 minutes before having an increase in pain so that she can return to the gym      Summary of Evaluation:  Assessment: Pt is a 39 yo female that presents to therapy with complaints of R knee pain. Pt underwent Diagnostic and operative arthroscopy of right knee with chondroplasty of the patellofemoral compartment, medial compartment and lateral compartment on 23. Per imaging: Moderate chondromalacia patella and developing patellofemoral compartment     arthrosis. There is mild to moderate medial compartment arthrosis.Small effusion.No meniscal tear.      Study

## 2024-05-22 ENCOUNTER — HOSPITAL ENCOUNTER (OUTPATIENT)
Dept: PHYSICAL THERAPY | Age: 39
Discharge: HOME | End: 2024-05-22

## 2024-05-22 NOTE — FLOWSHEET NOTE
Physical Therapy  Cancellation/No-show Note  Patient Name:  Allyson Pinto  :  1985   Date:  2024  Cancelled visits to date: 1  No-shows to date: 0    For today's appointment patient:  [x]  Cancelled  []  Rescheduled appointment  []  No-show     Reason given by patient:  [x]  Patient ill  []  Conflicting appointment  []  No transportation    []  Conflict with work  []  No reason given  []  Other:     Comments:      Electronically signed by:  Susan Rendon, PT, DPT, Cert. DN

## 2024-05-24 ENCOUNTER — HOSPITAL ENCOUNTER (OUTPATIENT)
Dept: ULTRASOUND IMAGING | Age: 39
Discharge: HOME | End: 2024-05-24
Payer: COMMERCIAL

## 2024-05-24 DIAGNOSIS — R92.8 ABNORMAL FINDING ON BREAST IMAGING: ICD-10-CM

## 2024-05-24 PROCEDURE — 76642 ULTRASOUND BREAST LIMITED: CPT

## 2024-06-01 ENCOUNTER — HOSPITAL ENCOUNTER (OUTPATIENT)
Dept: PHYSICAL THERAPY | Age: 39
Setting detail: THERAPIES SERIES
Discharge: HOME | End: 2024-06-01

## 2024-06-01 NOTE — FLOWSHEET NOTE
Physical Therapy  Cancellation/No-show Note  Patient Name:  Allyson Pinto  :  1985   Date:  2024  Cancelled visits to date: 1  No-shows to date: 1    For today's appointment patient:  []  Cancelled  []  Rescheduled appointment  [x]  No-show     Reason given by patient:  []  Patient ill  []  Conflicting appointment  []  No transportation    []  Conflict with work  []  No reason given  [x]  Other:     Comments:  Last visit was a call in due to ill on 24.      Electronically signed by:  Samira June PTA

## 2024-06-05 ENCOUNTER — HOSPITAL ENCOUNTER (OUTPATIENT)
Dept: PHYSICAL THERAPY | Age: 39
Setting detail: THERAPIES SERIES
Discharge: HOME | End: 2024-06-05
Payer: COMMERCIAL

## 2024-06-05 PROCEDURE — 97110 THERAPEUTIC EXERCISES: CPT

## 2024-06-05 PROCEDURE — 97535 SELF CARE MNGMENT TRAINING: CPT

## 2024-06-05 NOTE — FLOWSHEET NOTE
Outpatient Physical Therapy  [place]           [x] Phone: [phone]   Fax: [phone]  [place]           [] Phone: [phone]   Fax: [phone]        Physical Therapy Daily Treatment Note  Date:  2024    Patient Name:  Allyson Pinto    :  1985  MRN: [id number]  Restrictions/Precautions: Restrictions/Precautions: General Precautions        Diagnosis:   Unilateral primary osteoarthritis, right knee [M17.11]    Date of Injury/Surgery:   Treatment Diagnosis:  chronic R knee pain  Insurance/Certification information: [facility]  Referring Physician:  Med Ayon DO     PCP: Chinyere Dolan APRN - NP  Next Doctor Visit:    Plan of care signed (Y/N):  yes  Outcome Measure:   LEFS:43/80  5x STS: 20.24 sec  TU.52 sec   Visit# / total visits:  3 /  Pain level: 3-4/10   Goals:     Patient goals: get back to walking with minimal to no pain  Short term goals  Time Frame for Short term goals: refer to [facility]                 Long Term Goals  Time Frame for Long Term Goals: 6 visits  Pt will report overall improvement in condition by 50% or more  pt will improve LEFS to 52/80 or more to show MDC and subjective improvement  pt will improve 5x STS to 15 sec or less for improved getting out of chairs  pt will improve bilateral hip flexion strength to 5/5 for improved stair negotiation  pt will report that she can walk for 5-10 minutes before having an increase in pain so that she can return to the gym      Summary of Evaluation:  Assessment: Pt is a 37 yo female that presents to therapy with complaints of R knee pain. Pt underwent Diagnostic and operative arthroscopy of right knee with chondroplasty of the patellofemoral compartment, medial compartment and lateral compartment on 23. Per imaging: Moderate chondromalacia patella and developing patellofemoral compartment     arthrosis. There is mild to moderate medial compartment arthrosis.Small effusion.No meniscal tear.      Study

## 2024-06-16 ENCOUNTER — APPOINTMENT (OUTPATIENT)
Dept: GENERAL RADIOLOGY | Age: 39
End: 2024-06-16
Payer: COMMERCIAL

## 2024-06-16 ENCOUNTER — HOSPITAL ENCOUNTER (EMERGENCY)
Age: 39
Discharge: HOME | End: 2024-06-16
Attending: EMERGENCY MEDICINE
Payer: COMMERCIAL

## 2024-06-16 VITALS
RESPIRATION RATE: 18 BRPM | DIASTOLIC BLOOD PRESSURE: 91 MMHG | OXYGEN SATURATION: 99 % | HEIGHT: 65 IN | WEIGHT: 260 LBS | SYSTOLIC BLOOD PRESSURE: 138 MMHG | HEART RATE: 85 BPM | BODY MASS INDEX: 43.32 KG/M2 | TEMPERATURE: 97.7 F

## 2024-06-16 DIAGNOSIS — M77.50 TENDINITIS OF FOOT: Primary | ICD-10-CM

## 2024-06-16 PROCEDURE — 6370000000 HC RX 637 (ALT 250 FOR IP): Performed by: EMERGENCY MEDICINE

## 2024-06-16 PROCEDURE — 99283 EMERGENCY DEPT VISIT LOW MDM: CPT

## 2024-06-16 PROCEDURE — 73630 X-RAY EXAM OF FOOT: CPT

## 2024-06-16 RX ORDER — NAPROXEN 250 MG/1
500 TABLET ORAL ONCE
Status: DISCONTINUED | OUTPATIENT
Start: 2024-06-16 | End: 2024-06-16

## 2024-06-16 RX ORDER — IBUPROFEN 400 MG/1
800 TABLET ORAL ONCE
Status: COMPLETED | OUTPATIENT
Start: 2024-06-16 | End: 2024-06-16

## 2024-06-16 RX ORDER — NAPROXEN 375 MG/1
375 TABLET ORAL 2 TIMES DAILY PRN
Qty: 20 TABLET | Refills: 0 | Status: SHIPPED | OUTPATIENT
Start: 2024-06-16

## 2024-06-16 RX ADMIN — IBUPROFEN 800 MG: 400 TABLET, FILM COATED ORAL at 20:49

## 2024-06-16 ASSESSMENT — PAIN DESCRIPTION - LOCATION: LOCATION: ANKLE;FOOT

## 2024-06-16 ASSESSMENT — PAIN DESCRIPTION - ORIENTATION: ORIENTATION: LEFT

## 2024-06-16 ASSESSMENT — PAIN DESCRIPTION - DESCRIPTORS: DESCRIPTORS: THROBBING

## 2024-06-16 ASSESSMENT — PAIN DESCRIPTION - PAIN TYPE: TYPE: ACUTE PAIN

## 2024-06-16 ASSESSMENT — LIFESTYLE VARIABLES
HOW OFTEN DO YOU HAVE A DRINK CONTAINING ALCOHOL: NEVER
HOW MANY STANDARD DRINKS CONTAINING ALCOHOL DO YOU HAVE ON A TYPICAL DAY: PATIENT DOES NOT DRINK
HOW MANY STANDARD DRINKS CONTAINING ALCOHOL DO YOU HAVE ON A TYPICAL DAY: PATIENT DOES NOT DRINK
HOW OFTEN DO YOU HAVE A DRINK CONTAINING ALCOHOL: NEVER

## 2024-06-16 ASSESSMENT — PAIN SCALES - GENERAL: PAINLEVEL_OUTOF10: 4

## 2024-06-16 ASSESSMENT — PAIN - FUNCTIONAL ASSESSMENT: PAIN_FUNCTIONAL_ASSESSMENT: 0-10

## 2024-06-17 NOTE — ED PROVIDER NOTES
Migraines\"  Other reaction(s): Headache       Nursing notes reviewed by myself for past medical history, family history, social history, surgical history, current medications, and allergies.    PHYSICAL EXAM  VITAL SIGNS: Triage VS:    ED Triage Vitals   Enc Vitals Group      BP       Pulse       Resp       Temp       Temp src       SpO2       Weight       Height       Head Circumference       Peak Flow       Pain Score       Pain Loc       Pain Edu?       Excl. in GC?      Constitutional: Well developed, Well nourished, nontoxic-appearing  HENT: Normocephalic, Atraumatic, Bilateral external ears normal,  Nose normal.   Eyes: Conjunctiva normal, No discharge. No scleral icterus.  Neck: Normal range of motion, No tenderness, Supple.  Lymphatic: No lymphadenopathy noted.   Cardiovascular: Normal heart rate  Thorax & Lungs: No respiratory distress  Skin: Warm, Dry, Pink, No mottling, No erythema, No rash.   Extremities:  No cyanosis, Normal perfusion, No clubbing.   Musculoskeletal: Tenderness to palpation to medial aspect of left foot along first metatarsal, no overlying skin changes or edema noted to left foot, pain with flexion and extension of hallux to medial aspect of first metatarsal, good range of motion in all major joints as observed. No major deformities noted.   Neurologic: Alert & oriented x 3, No focal deficits noted.     RADIOLOGY  Labs Reviewed - No data to display  I personally reviewed the images. The radiologist's interpretation reveals:  Last Imaging results   XR FOOT LEFT (MIN 3 VIEWS)    (Results Pending)       MEDS GIVEN IN ED:  Medications   ibuprofen (ADVIL;MOTRIN) tablet 800 mg (has no administration in time range)     COURSE & MEDICAL DECISION MAKING  This is a 38-year-old female who presents emergency department with complaints of pain to medial aspect of left foot starting 3 to 4 days ago with no known injury.  Initial vital signs reassuring.  Patient is nontoxic-appearing on exam.  She has

## 2024-06-19 ENCOUNTER — HOSPITAL ENCOUNTER (OUTPATIENT)
Dept: PHYSICAL THERAPY | Age: 39
Setting detail: THERAPIES SERIES
Discharge: HOME | End: 2024-06-19
Payer: COMMERCIAL

## 2024-06-19 PROCEDURE — 97530 THERAPEUTIC ACTIVITIES: CPT

## 2024-06-19 PROCEDURE — 97110 THERAPEUTIC EXERCISES: CPT

## 2024-06-19 NOTE — FLOWSHEET NOTE
Outpatient Physical Therapy  [place]           [x] Phone: [phone]   Fax: [phone]  [place]           [] Phone: [phone]   Fax: [phone]        Physical Therapy Daily Treatment Note  Date:  2024    Patient Name:  Allyson Pinto    :  1985  MRN: [id number]  Restrictions/Precautions: Restrictions/Precautions: General Precautions        Diagnosis:   Unilateral primary osteoarthritis, right knee [M17.11]    Date of Injury/Surgery:   Treatment Diagnosis:  chronic R knee pain  Insurance/Certification information: [facility]  Referring Physician:  Med Ayon DO     PCP: Chinyere Dolan APRN - NP  Next Doctor Visit:    Plan of care signed (Y/N):  yes  Outcome Measure:   LEFS:43/80, 37/80 on   5x STS: 16.42 sec   TU.52 sec   Visit# / total visits:    Pain level: 2/10   Goals:     Patient goals: get back to walking with minimal to no pain not met   Short term goals  Time Frame for Short term goals: refer to [facility]                 Long Term Goals  Time Frame for Long Term Goals: 6 visits  Pt will report overall improvement in condition by 50% or more not met   pt will improve LEFS to 52/80 or more to show MDC and subjective improvement not met   pt will improve 5x STS to 15 sec or less for improved getting out of chairs almost met   pt will improve bilateral hip flexion strength to 5/5 for improved stair negotiation almost met   pt will report that she can walk for 5-10 minutes before having an increase in pain so that she can return to the gym met       Summary of Evaluation:  Assessment: Pt is a 37 yo female that presents to therapy with complaints of R knee pain. Pt underwent Diagnostic and operative arthroscopy of right knee with chondroplasty of the patellofemoral compartment, medial compartment and lateral compartment on 23. Per imaging: Moderate chondromalacia patella and developing patellofemoral compartment     arthrosis. There is

## 2024-06-28 ENCOUNTER — OFFICE VISIT (OUTPATIENT)
Dept: ORTHOPEDIC SURGERY | Age: 39
End: 2024-06-28
Payer: COMMERCIAL

## 2024-06-28 VITALS — TEMPERATURE: 97.6 F

## 2024-06-28 DIAGNOSIS — M17.11 PRIMARY OSTEOARTHRITIS OF RIGHT KNEE: Primary | ICD-10-CM

## 2024-06-28 PROCEDURE — G8427 DOCREV CUR MEDS BY ELIG CLIN: HCPCS | Performed by: PHYSICIAN ASSISTANT

## 2024-06-28 PROCEDURE — G8417 CALC BMI ABV UP PARAM F/U: HCPCS | Performed by: PHYSICIAN ASSISTANT

## 2024-06-28 PROCEDURE — 1036F TOBACCO NON-USER: CPT | Performed by: PHYSICIAN ASSISTANT

## 2024-06-28 PROCEDURE — 99213 OFFICE O/P EST LOW 20 MIN: CPT | Performed by: PHYSICIAN ASSISTANT

## 2024-06-28 PROCEDURE — 20610 DRAIN/INJ JOINT/BURSA W/O US: CPT | Performed by: PHYSICIAN ASSISTANT

## 2024-06-28 RX ORDER — TRIAMCINOLONE ACETONIDE 40 MG/ML
40 INJECTION, SUSPENSION INTRA-ARTICULAR; INTRAMUSCULAR ONCE
Status: COMPLETED | OUTPATIENT
Start: 2024-06-28 | End: 2024-06-28

## 2024-06-28 RX ADMIN — TRIAMCINOLONE ACETONIDE 40 MG: 40 INJECTION, SUSPENSION INTRA-ARTICULAR; INTRAMUSCULAR at 09:32

## 2024-06-28 ASSESSMENT — ENCOUNTER SYMPTOMS
EYES NEGATIVE: 1
GASTROINTESTINAL NEGATIVE: 1
RESPIRATORY NEGATIVE: 1

## 2024-06-28 NOTE — PATIENT INSTRUCTIONS
Continue weight-bearing as tolerated.  Continue range of motion exercises as instructed.  Ice and elevate as needed.  Tylenol or Motrin for pain.  Follow up when injection wears off and then we will submit again for Gelsyn approval   R knee injection given 6/28/24

## 2024-06-28 NOTE — PROGRESS NOTES
She is here for another appt to be able to send to insurance for Gel approval she was denied once not sure if she can have an injection today but she would like one ... Dr Ayon told her if gel didn't work she would need a knee replacement      Pain score 4/10 today   No falls or injuries      
(ANTIVERT) 25 MG tablet       ondansetron (ZOFRAN-ODT) 4 MG disintegrating tablet Take 1 tablet by mouth 3 times daily as needed for Nausea or Vomiting 21 tablet 0    omeprazole (PRILOSEC OTC) 20 MG tablet Take 2 tablets by mouth 2 times daily      pantoprazole (PROTONIX) 20 MG tablet Take 1 tablet by mouth 2 times daily 60 tablet 3    rizatriptan (MAXALT) 10 MG tablet       dicyclomine (BENTYL) 20 MG tablet Take 1 tablet by mouth 4 times daily 40 tablet 0    CPAP Machine MISC by Does not apply route      Misc. Devices (FINGERTIP PULSE OXIMETER) MISC Use as directed to check oxygen saturations as needed for shortness of breath 1 each 0    acetaminophen (AMINOFEN) 325 MG tablet Take 2 tablets by mouth every 6 hours as needed for Pain (Patient not taking: Reported on 6/12/2023) 20 tablet 0    albuterol sulfate  (90 Base) MCG/ACT inhaler Inhale 2 puffs into the lungs every 6 hours as needed for Wheezing (Patient not taking: Reported on 6/12/2023)      loperamide (IMODIUM) 2 MG capsule Take 2 mg by mouth 4 times daily as needed for Diarrhea AS NEEDED (Patient not taking: Reported on 6/12/2023)       No current facility-administered medications for this visit.       Allergies   Allergen Reactions    Morphine      Other reaction(s): Headache  Triggers migraine.   \"Triggers My Migraines\"  Other reaction(s): Headache    Tramadol      Other reaction(s): Headache  States triggers migraine headache  \"Triggers My Migraines\"  Other reaction(s): Headache       Review of Systems:  See above      Physical Exam:   Temp 97.6 °F (36.4 °C)   LMP  (LMP Unknown)        Gait is Antalgic. The patient can bear weight on the injured extremity.       Gen/Psych:Examination reveals a pleasant individual in no acute distress. The patient is oriented to time, place and person.  The patient's mood and affect are appropriate.  Patient appears well nourished. Body habitus is obese     Lymph:  no lymphedema in bilateral lower extremities

## 2024-07-25 ENCOUNTER — TELEPHONE (OUTPATIENT)
Dept: ORTHOPEDIC SURGERY | Age: 39
End: 2024-07-25

## 2024-09-04 ENCOUNTER — TELEPHONE (OUTPATIENT)
Dept: ORTHOPEDIC SURGERY | Age: 39
End: 2024-09-04

## 2024-09-04 ENCOUNTER — OFFICE VISIT (OUTPATIENT)
Dept: ORTHOPEDIC SURGERY | Age: 39
End: 2024-09-04
Payer: COMMERCIAL

## 2024-09-04 VITALS — TEMPERATURE: 97.2 F | HEART RATE: 77 BPM | OXYGEN SATURATION: 96 %

## 2024-09-04 DIAGNOSIS — M17.12 ARTHRITIS OF KNEE, LEFT: ICD-10-CM

## 2024-09-04 DIAGNOSIS — M17.11 PRIMARY OSTEOARTHRITIS OF RIGHT KNEE: Primary | ICD-10-CM

## 2024-09-04 PROCEDURE — G8427 DOCREV CUR MEDS BY ELIG CLIN: HCPCS | Performed by: PHYSICIAN ASSISTANT

## 2024-09-04 PROCEDURE — 99211 OFF/OP EST MAY X REQ PHY/QHP: CPT | Performed by: PHYSICIAN ASSISTANT

## 2024-09-04 PROCEDURE — G8417 CALC BMI ABV UP PARAM F/U: HCPCS | Performed by: PHYSICIAN ASSISTANT

## 2024-09-04 ASSESSMENT — ENCOUNTER SYMPTOMS
GASTROINTESTINAL NEGATIVE: 1
RESPIRATORY NEGATIVE: 1
EYES NEGATIVE: 1

## 2024-09-04 NOTE — PROGRESS NOTES
Patient seen in office today for: - FU-right knee gel injection denied [employer] does not cover gel  Is asking to do cortizone injections until the end of the year .. Her insurance will be changing and maybe then she would get accepted for gel injections.   She has done PT, Cortizone injections ,wore knee brace and still was denied Gel   Denial paper states for her to try Gelsyn 3 first  per comer -BL knee   Date of last injection: 6/28/24    Patient reports 3/10 pain.pain is worse when walking   RICE and medication are effective to alleviate pain and reduce swelling.   Pain also alleviated by: OTC meds as needed  Pain worsened by: Patient reports painful ROM & weight bearing.     Patient is interested in injection today    Patient interested in speaking to provider about surgical option.

## 2024-09-04 NOTE — PROGRESS NOTES
Review of Systems   Constitutional: Negative.    HENT: Negative.     Eyes: Negative.    Respiratory: Negative.     Cardiovascular: Negative.    Gastrointestinal: Negative.    Genitourinary: Negative.    Musculoskeletal:  Positive for arthralgias and joint swelling.   Skin: Negative.    Neurological: Negative.    Psychiatric/Behavioral: Negative.         Current HPI 9/4/24: Allyson Pinto is a 62-aweb-ecbb-old female that returns to the office today with complaints of bilateral knee pain.  She has had arthroscopy of the right knee in the past and steroid injections in both the right and the left knee in the past.  She has also done home exercise programs with the knee arthritic exercises given to her.  She has done physical therapy after her knee scope most recently in May 2024.  She has also lost over 100 pounds from weight loss surgery done 2 years ago.  She continues to have bilateral knee pain and a feeling that the knee just wants to give out on her especially on the left side.  She would like to do viscosupplementation injections for both knees.          Previous HPI:  Allyson Pinto is a 39 y.o. female who presents to the office today complaining of persistent right knee pain.  She has had an arthroscopy of the right knee, steroid injection in the right knee, 6 weeks of physical therapy, NSAIDs and she continues to have right knee pain which is only minimally better.  Steroid injection does seem to help but only for couple of weeks.  She would like to do the viscosupplementation injections but she was denied for those.          Past Medical History:   Diagnosis Date    Acid reflux     Acute deep vein thrombosis (DVT) of non-extremity vein 04/25/2018    \"in my neck\"\"after mediport was put in\"    Anemia     Anxiety     Asthma     NO PULMONOLOGIST AT THIS TIME    Blood clot due to device, implant, or graft 04/2018    had blood clots in neck due to med port    Bowen's disease     Cancer (HCC)     skin

## 2024-09-04 NOTE — PATIENT INSTRUCTIONS
Follow up when approved for Gelsyn 3 injections in her Right knee - with those scheduled appointments

## 2024-10-07 ENCOUNTER — TELEPHONE (OUTPATIENT)
Dept: ORTHOPEDIC SURGERY | Age: 39
End: 2024-10-07

## 2024-10-09 ENCOUNTER — TELEPHONE (OUTPATIENT)
Dept: BARIATRICS/WEIGHT MGMT | Age: 39
End: 2024-10-09

## 2024-10-09 DIAGNOSIS — E66.01 MORBID OBESITY: Primary | ICD-10-CM

## 2025-01-10 ENCOUNTER — HOSPITAL ENCOUNTER (OUTPATIENT)
Age: 40
Discharge: HOME | End: 2025-01-10
Payer: COMMERCIAL

## 2025-01-10 ENCOUNTER — OFFICE VISIT (OUTPATIENT)
Age: 40
End: 2025-01-10
Payer: COMMERCIAL

## 2025-01-10 ENCOUNTER — OFFICE VISIT (OUTPATIENT)
Dept: ORTHOPEDIC SURGERY | Age: 40
End: 2025-01-10
Payer: COMMERCIAL

## 2025-01-10 VITALS
SYSTOLIC BLOOD PRESSURE: 128 MMHG | HEIGHT: 65 IN | HEART RATE: 84 BPM | OXYGEN SATURATION: 99 % | WEIGHT: 261 LBS | BODY MASS INDEX: 43.49 KG/M2 | RESPIRATION RATE: 16 BRPM | DIASTOLIC BLOOD PRESSURE: 74 MMHG

## 2025-01-10 VITALS
WEIGHT: 261 LBS | OXYGEN SATURATION: 98 % | HEIGHT: 65 IN | RESPIRATION RATE: 14 BRPM | BODY MASS INDEX: 43.49 KG/M2 | HEART RATE: 76 BPM

## 2025-01-10 DIAGNOSIS — Z01.89 ENCOUNTER FOR OTHER SPECIFIED SPECIAL EXAMINATIONS: ICD-10-CM

## 2025-01-10 DIAGNOSIS — M25.50 POLYARTHRALGIA: ICD-10-CM

## 2025-01-10 DIAGNOSIS — M25.50 POLYARTHRALGIA: Primary | ICD-10-CM

## 2025-01-10 DIAGNOSIS — M17.12 ARTHRITIS OF LEFT KNEE: Primary | ICD-10-CM

## 2025-01-10 DIAGNOSIS — M17.11 ARTHRITIS OF RIGHT KNEE: ICD-10-CM

## 2025-01-10 LAB
25(OH)D3 SERPL-MCNC: 14.9 NG/ML (ref 30–150)
ALBUMIN SERPL-MCNC: 4.3 G/DL (ref 3.4–5)
ALBUMIN/GLOB SERPL: 1.2 {RATIO} (ref 1.1–2.2)
ALBUMIN: 4.3 G/DL (ref 3.4–5)
ALP SERPL-CCNC: 78 U/L (ref 40–129)
ALT SERPL-CCNC: 23 U/L (ref 10–40)
ANION GAP SERPL CALCULATED.3IONS-SCNC: 13 MMOL/L (ref 9–17)
AST SERPL-CCNC: 30 U/L (ref 15–37)
BILIRUB DIRECT SERPL-MCNC: <0.2 MG/DL (ref 0–0.3)
BILIRUB INDIRECT SERPL-MCNC: NORMAL MG/DL (ref 0–0.7)
BILIRUB SERPL-MCNC: 0.4 MG/DL (ref 0–1)
BUN SERPL-MCNC: 15 MG/DL (ref 7–20)
CALCIUM SERPL-MCNC: 9.4 MG/DL (ref 8.3–10.6)
CHLORIDE SERPL-SCNC: 99 MMOL/L (ref 99–110)
CO2 SERPL-SCNC: 26 MMOL/L (ref 21–32)
CREAT SERPL-MCNC: 0.7 MG/DL (ref 0.6–1.1)
CRP SERPL HS-MCNC: 6.8 MG/L (ref 0–5)
ERYTHROCYTE [DISTWIDTH] IN BLOOD BY AUTOMATED COUNT: 12.7 % (ref 11.7–14.9)
ERYTHROCYTE [SEDIMENTATION RATE] IN BLOOD BY WESTERGREN METHOD: 13 MM/HR (ref 0–20)
GFR, ESTIMATED: 88 ML/MIN/1.73M2
GLUCOSE SERPL-MCNC: 83 MG/DL (ref 74–99)
HAV IGM SERPL QL IA: NONREACTIVE
HBV CORE IGM SERPL QL IA: NONREACTIVE
HBV SURFACE AG SERPL QL IA: NONREACTIVE
HCT VFR BLD AUTO: 40.8 % (ref 37–47)
HCV AB SERPL QL IA: NONREACTIVE
HGB BLD-MCNC: 12.8 G/DL (ref 12.5–16)
MCH RBC QN AUTO: 28 PG (ref 27–31)
MCHC RBC AUTO-ENTMCNC: 31.4 G/DL (ref 32–36)
MCV RBC AUTO: 89.3 FL (ref 78–100)
PHOSPHATE SERPL-MCNC: 2.9 MG/DL (ref 2.5–4.9)
PLATELET # BLD AUTO: 267 K/UL (ref 140–440)
PMV BLD AUTO: 11.9 FL (ref 7.5–11.1)
POTASSIUM SERPL-SCNC: 4.2 MMOL/L (ref 3.5–5.1)
PROT SERPL-MCNC: 7.7 G/DL (ref 6.4–8.2)
RBC # BLD AUTO: 4.57 M/UL (ref 4.2–5.4)
SODIUM SERPL-SCNC: 138 MMOL/L (ref 136–145)
URATE SERPL-MCNC: 7.1 MG/DL (ref 2.6–6)
WBC OTHER # BLD: 7.2 K/UL (ref 4–10.5)

## 2025-01-10 PROCEDURE — 80074 ACUTE HEPATITIS PANEL: CPT

## 2025-01-10 PROCEDURE — 80076 HEPATIC FUNCTION PANEL: CPT

## 2025-01-10 PROCEDURE — 99205 OFFICE O/P NEW HI 60 MIN: CPT | Performed by: STUDENT IN AN ORGANIZED HEALTH CARE EDUCATION/TRAINING PROGRAM

## 2025-01-10 PROCEDURE — 85652 RBC SED RATE AUTOMATED: CPT

## 2025-01-10 PROCEDURE — 86140 C-REACTIVE PROTEIN: CPT

## 2025-01-10 PROCEDURE — 82306 VITAMIN D 25 HYDROXY: CPT

## 2025-01-10 PROCEDURE — 86039 ANTINUCLEAR ANTIBODIES (ANA): CPT

## 2025-01-10 PROCEDURE — 85027 COMPLETE CBC AUTOMATED: CPT

## 2025-01-10 PROCEDURE — 84550 ASSAY OF BLOOD/URIC ACID: CPT

## 2025-01-10 PROCEDURE — 3078F DIAST BP <80 MM HG: CPT | Performed by: STUDENT IN AN ORGANIZED HEALTH CARE EDUCATION/TRAINING PROGRAM

## 2025-01-10 PROCEDURE — 1036F TOBACCO NON-USER: CPT | Performed by: STUDENT IN AN ORGANIZED HEALTH CARE EDUCATION/TRAINING PROGRAM

## 2025-01-10 PROCEDURE — 86200 CCP ANTIBODY: CPT

## 2025-01-10 PROCEDURE — 80069 RENAL FUNCTION PANEL: CPT

## 2025-01-10 PROCEDURE — G8417 CALC BMI ABV UP PARAM F/U: HCPCS | Performed by: STUDENT IN AN ORGANIZED HEALTH CARE EDUCATION/TRAINING PROGRAM

## 2025-01-10 PROCEDURE — 86480 TB TEST CELL IMMUN MEASURE: CPT

## 2025-01-10 PROCEDURE — 36415 COLL VENOUS BLD VENIPUNCTURE: CPT

## 2025-01-10 PROCEDURE — 20610 DRAIN/INJ JOINT/BURSA W/O US: CPT | Performed by: PHYSICIAN ASSISTANT

## 2025-01-10 PROCEDURE — 86431 RHEUMATOID FACTOR QUANT: CPT

## 2025-01-10 PROCEDURE — 3074F SYST BP LT 130 MM HG: CPT | Performed by: STUDENT IN AN ORGANIZED HEALTH CARE EDUCATION/TRAINING PROGRAM

## 2025-01-10 PROCEDURE — 99204 OFFICE O/P NEW MOD 45 MIN: CPT | Performed by: STUDENT IN AN ORGANIZED HEALTH CARE EDUCATION/TRAINING PROGRAM

## 2025-01-10 PROCEDURE — G8427 DOCREV CUR MEDS BY ELIG CLIN: HCPCS | Performed by: STUDENT IN AN ORGANIZED HEALTH CARE EDUCATION/TRAINING PROGRAM

## 2025-01-10 NOTE — PATIENT INSTRUCTIONS
Gelsyn 3 # 1  Rest both knees for 24-48 hours  Work on ROM and strengthening of knees and legs   May take NSAIDS or anti-inflammatories as needed  Weightbearing as tolerated  Follow up in one week for the 2nd injection.    We are committed to providing you the best care possible.  If you receive a survey after visiting one of our offices, please take time to share your experience concerning your physician office visit.  These surveys are confidential and no health information about you is shared.  We are eager to improve for you and we are counting on your feedback to help make that happen.

## 2025-01-10 NOTE — PROGRESS NOTES
VISCO-SUPPLEMENTATION INJECTION (Number 1)    HISTORY OF PRESENT ILLNESS (HPI):   Allyson Pinto is a 39 y.o. year old female who is here for follow up for visco-supplementation injection number 1 for   1. Arthritis of left knee    2. Arthritis of right knee    No changes in the patient's knee pain today    PAST HISTORY:   Unless otherwise specified in this note, the past history is reviewed today with the patient and is as follows-    Allergies   Allergen Reactions    Morphine      Other reaction(s): Headache  Triggers migraine.   \"Triggers My Migraines\"  Other reaction(s): Headache    Tramadol      Other reaction(s): Headache  States triggers migraine headache  \"Triggers My Migraines\"  Other reaction(s): Headache       Current Outpatient Medications   Medication Sig Dispense Refill    naproxen (NAPROSYN) 375 MG tablet Take 1 tablet by mouth 2 times daily as needed for Pain (Pain) 20 tablet 0    Multiple Vitamins-Calcium (ONE-A-DAY WOMENS FORMULA PO) Take by mouth      vitamin B-12 (CYANOCOBALAMIN) 1000 MCG tablet Take 1 tablet by mouth daily      Cholecalciferol (VITAMIN D3) 1.25 MG (61163 UT) CAPS Take by mouth      meclizine (ANTIVERT) 25 MG tablet       dicyclomine (BENTYL) 20 MG tablet Take 1 tablet by mouth 4 times daily 40 tablet 0    ondansetron (ZOFRAN-ODT) 4 MG disintegrating tablet Take 1 tablet by mouth 3 times daily as needed for Nausea or Vomiting 21 tablet 0    omeprazole (PRILOSEC OTC) 20 MG tablet Take 2 tablets by mouth 2 times daily      pantoprazole (PROTONIX) 20 MG tablet Take 1 tablet by mouth 2 times daily 60 tablet 3    CPAP Machine MISC by Does not apply route      Misc. Devices (FINGERTIP PULSE OXIMETER) MISC Use as directed to check oxygen saturations as needed for shortness of breath 1 each 0    rizatriptan (MAXALT) 10 MG tablet       acetaminophen (AMINOFEN) 325 MG tablet Take 2 tablets by mouth every 6 hours as needed for Pain (Patient not taking: Reported on 6/12/2023) 20 tablet 0

## 2025-01-10 NOTE — PROGRESS NOTES
Value Ref Range Status    Preg Test, Ur 11/30/2023 NEGATIVE  NEGATIVE Final       Assessment   Patient is a pleasant 39-year-old female with history of Crohn's disease, family history of lupus, myasthenia gravis presenting with polyarthralgia worse in bilateral knees.  She is currently being managed for osteoarthritis in the orthopedic clinic.  Examination findings today reveal bilateral knee tenderness.  We will evaluate for inflammatory arthritis.    1. Polyarthralgia  - Quantiferon, Incubated; Future  - Uric Acid; Future  - Rheumatoid Factor; Future  - Hepatitis Panel, Acute; Future  - Cyclic Citrul Peptide Antibody, IgG; Future  - C-Reactive Protein; Future  - Sedimentation Rate; Future  - Renal Function Panel; Future  - CBC; Future  - Vitamin D 25 Hydroxy; Future  - Hepatic Function Panel; Future  - HLA-B27 ANTIGEN; Future  - BEAU with HEp-2 IgG by IFA; Future    2. Encounter for other specified special examinations  - Hepatitis Panel, Acute; Future       Patient Instructions  Complete ordered labs  We will discuss results at next visit  RTC in 3 weeks      -  The patient indicates understanding of these issues and agrees with the plan.    I spent  60 minutes on the date of service, preparing to see the patient (eg, review of tests), obtaining and/or reviewing separately obtained history, counseling, ordering medications, tests, or procedures, documenting clinical information in the electronic or other health record and in care coordination.This note was dictated with voice recognition software        Tejal Tate MD

## 2025-01-11 ENCOUNTER — HOSPITAL ENCOUNTER (OUTPATIENT)
Age: 40
Setting detail: SPECIMEN
Discharge: HOME | End: 2025-01-11
Payer: COMMERCIAL

## 2025-01-11 LAB — RHEUMATOID FACTOR: <10 IU/ML

## 2025-01-11 PROCEDURE — 86812 HLA TYPING A B OR C: CPT

## 2025-01-14 LAB
QUANTI TB GOLD PLUS: NEGATIVE
QUANTI TB1 MINUS NIL: 0 IU/ML
QUANTI TB2 MINUS NIL: 0 IU/ML
QUANTIFERON MITOGEN: 9.99 IU/ML
QUANTIFERON NIL: 0.01 IU/ML

## 2025-01-15 LAB — CYCLIC CITRULLIN PEPTIDE AB: 4 UNITS (ref 0–19)

## 2025-01-16 LAB
ANA PAT SER IF-IMP: NORMAL
NUCLEAR IGG SER QL IF: NORMAL

## 2025-01-17 ENCOUNTER — NURSE ONLY (OUTPATIENT)
Dept: SURGERY | Age: 40
End: 2025-01-17
Payer: COMMERCIAL

## 2025-01-17 DIAGNOSIS — E66.01 CLASS 3 SEVERE OBESITY DUE TO EXCESS CALORIES WITHOUT SERIOUS COMORBIDITY WITH BODY MASS INDEX (BMI) OF 50.0 TO 59.9 IN ADULT: ICD-10-CM

## 2025-01-17 DIAGNOSIS — E66.813 CLASS 3 SEVERE OBESITY DUE TO EXCESS CALORIES WITHOUT SERIOUS COMORBIDITY WITH BODY MASS INDEX (BMI) OF 50.0 TO 59.9 IN ADULT: ICD-10-CM

## 2025-01-17 PROCEDURE — 36415 COLL VENOUS BLD VENIPUNCTURE: CPT | Performed by: NURSE PRACTITIONER

## 2025-01-18 LAB
25(OH)D3 SERPL-MCNC: 12.5 NG/ML
ALBUMIN SERPL-MCNC: 4.2 G/DL (ref 3.4–5)
ALBUMIN/GLOB SERPL: 1.4 {RATIO} (ref 1.1–2.2)
ALP SERPL-CCNC: 63 U/L (ref 40–129)
ALT SERPL-CCNC: 18 U/L (ref 10–40)
ANION GAP SERPL CALCULATED.3IONS-SCNC: 12 MMOL/L (ref 3–16)
AST SERPL-CCNC: 19 U/L (ref 15–37)
BASOPHILS # BLD: 0 K/UL (ref 0–0.2)
BASOPHILS NFR BLD: 0.7 %
BILIRUB SERPL-MCNC: 0.3 MG/DL (ref 0–1)
BUN SERPL-MCNC: 15 MG/DL (ref 7–20)
CALCIUM SERPL-MCNC: 9.6 MG/DL (ref 8.3–10.6)
CHLORIDE SERPL-SCNC: 104 MMOL/L (ref 99–110)
CHOLEST SERPL-MCNC: 171 MG/DL (ref 0–199)
CO2 SERPL-SCNC: 24 MMOL/L (ref 21–32)
CREAT SERPL-MCNC: 0.7 MG/DL (ref 0.6–1.1)
DEPRECATED RDW RBC AUTO: 13.4 % (ref 12.4–15.4)
EOSINOPHIL # BLD: 0.1 K/UL (ref 0–0.6)
EOSINOPHIL NFR BLD: 2 %
EST. AVERAGE GLUCOSE BLD GHB EST-MCNC: 108.3 MG/DL
FOLATE SERPL-MCNC: 7.43 NG/ML (ref 4.78–24.2)
GFR SERPLBLD CREATININE-BSD FMLA CKD-EPI: >90 ML/MIN/{1.73_M2}
GLUCOSE SERPL-MCNC: 109 MG/DL (ref 70–99)
HBA1C MFR BLD: 5.4 %
HCT VFR BLD AUTO: 37.1 % (ref 36–48)
HDLC SERPL-MCNC: 54 MG/DL (ref 40–60)
HGB BLD-MCNC: 12.3 G/DL (ref 12–16)
IRON SATN MFR SERPL: 16 % (ref 15–50)
IRON SERPL-MCNC: 65 UG/DL (ref 37–145)
LDLC SERPL CALC-MCNC: 92 MG/DL
LYMPHOCYTES # BLD: 1.8 K/UL (ref 1–5.1)
LYMPHOCYTES NFR BLD: 29.5 %
MAGNESIUM SERPL-MCNC: 1.78 MG/DL (ref 1.8–2.4)
MCH RBC QN AUTO: 28.3 PG (ref 26–34)
MCHC RBC AUTO-ENTMCNC: 33.2 G/DL (ref 31–36)
MCV RBC AUTO: 85.1 FL (ref 80–100)
MONOCYTES # BLD: 0.5 K/UL (ref 0–1.3)
MONOCYTES NFR BLD: 7.9 %
NEUTROPHILS # BLD: 3.7 K/UL (ref 1.7–7.7)
NEUTROPHILS NFR BLD: 59.9 %
PLATELET # BLD AUTO: 218 K/UL (ref 135–450)
PMV BLD AUTO: 11 FL (ref 5–10.5)
POTASSIUM SERPL-SCNC: 4.2 MMOL/L (ref 3.5–5.1)
PROT SERPL-MCNC: 7.3 G/DL (ref 6.4–8.2)
RBC # BLD AUTO: 4.36 M/UL (ref 4–5.2)
SODIUM SERPL-SCNC: 140 MMOL/L (ref 136–145)
TIBC SERPL-MCNC: 401 UG/DL (ref 260–445)
TRIGL SERPL-MCNC: 126 MG/DL (ref 0–150)
TSH SERPL DL<=0.005 MIU/L-ACNC: 1.91 UIU/ML (ref 0.27–4.2)
VIT B12 SERPL-MCNC: 319 PG/ML (ref 211–911)
VLDLC SERPL CALC-MCNC: 25 MG/DL
WBC # BLD AUTO: 6.2 K/UL (ref 4–11)

## 2025-01-20 LAB — HLA B27: NEGATIVE

## 2025-01-21 DIAGNOSIS — E55.9 VITAMIN D DEFICIENCY: Primary | ICD-10-CM

## 2025-01-21 LAB — ZINC SERPL-MCNC: 73.9 UG/DL (ref 60–120)

## 2025-01-21 RX ORDER — ERGOCALCIFEROL 1.25 MG/1
50000 CAPSULE, LIQUID FILLED ORAL WEEKLY
Qty: 12 CAPSULE | Refills: 1 | Status: SHIPPED | OUTPATIENT
Start: 2025-01-21

## 2025-01-22 LAB
ANNOTATION COMMENT IMP: NORMAL
RETINYL PALMITATE SERPL-MCNC: <0.02 MG/L (ref 0–0.1)
VIT A SERPL-MCNC: 0.48 MG/L (ref 0.3–1.2)
VIT B1 SERPL-MCNC: 4 NMOL/L (ref 4–15)

## 2025-02-11 ENCOUNTER — TELEPHONE (OUTPATIENT)
Dept: ORTHOPEDIC SURGERY | Age: 40
End: 2025-02-11

## 2025-02-11 NOTE — TELEPHONE ENCOUNTER
Pt called said she is up loading her new insurance card through my chart . I advised her to call tomorrow to make sure we get ..

## 2025-02-15 NOTE — PROGRESS NOTES
trial sulfasalazine monitor her symptoms.      Enteropathic arthritis  -     sulfaSALAzine (AZULFIDINE) 500 MG tablet; Take 1 tablet twice daily for 2 weeks then, increase to  2 tablets twice daily if well tolerated    Elevated C-reactive protein (CRP)  -     sulfaSALAzine (AZULFIDINE) 500 MG tablet; Take 1 tablet twice daily for 2 weeks then, increase to  2 tablets twice daily if well tolerated  -     C-Reactive Protein; Future    High risk medication use  Sulfasalazine   I explained the rationale for this medication in this disease process. I also reviewed potential SSZ side effects. These include but not limited to rash, anorexia, headache, nausea, vomiting, diarrhea, gastric distress, leukopenia, hemolytic anemia, macrocytosis, liver failure and, rarely, megaloblastic anemia.This will require q4 week monitoring of labs x 3 months, then q12 weeks thereafter for potential toxicity.    -     C-Reactive Protein; Future  -     CBC; Future  -     Comprehensive Metabolic Panel; Future      Patient Instructions  Start sulfasalazine tablets take 1 tablet twice daily for 2 weeks then, increase to 2 tablets twice daily if well tolerated   Get labs 1 week prior to your next appointment  RTC in 2 months      -  The patient indicates understanding of these issues and agrees with the plan.    I spent  40 minutes on the date of service, preparing to see the patient (eg, review of tests), obtaining and/or reviewing separately obtained history, counseling, ordering medications, tests, or procedures, documenting clinical information in the electronic or other health record and in care coordination.This note was dictated with voice recognition software        Tejal Tate MD      Portions of this note was copied forward from the note written by me on 1/10/2025.  I have reviewed and updated the history, physical exam, data, assessment and plan of the note so that it reflects the current evaluation and management of the patient.

## 2025-02-17 ENCOUNTER — TELEPHONE (OUTPATIENT)
Dept: ORTHOPEDIC SURGERY | Age: 40
End: 2025-02-17

## 2025-02-17 NOTE — TELEPHONE ENCOUNTER
Pt called to advise that she has [employer] insurance now. She will be going upstairs to [facility] on Wednesday and will have them update her insurance as she tid to do it through [employer] an it would not work.     She is wanting gel injections.

## 2025-02-19 ENCOUNTER — OFFICE VISIT (OUTPATIENT)
Age: 40
End: 2025-02-19
Payer: COMMERCIAL

## 2025-02-19 VITALS
HEART RATE: 77 BPM | HEIGHT: 65 IN | WEIGHT: 284 LBS | OXYGEN SATURATION: 99 % | SYSTOLIC BLOOD PRESSURE: 120 MMHG | DIASTOLIC BLOOD PRESSURE: 68 MMHG | RESPIRATION RATE: 18 BRPM | BODY MASS INDEX: 47.32 KG/M2

## 2025-02-19 DIAGNOSIS — M07.60 ENTEROPATHIC ARTHRITIS: Primary | ICD-10-CM

## 2025-02-19 DIAGNOSIS — R79.82 ELEVATED C-REACTIVE PROTEIN (CRP): ICD-10-CM

## 2025-02-19 DIAGNOSIS — Z79.899 HIGH RISK MEDICATION USE: ICD-10-CM

## 2025-02-19 PROCEDURE — 3078F DIAST BP <80 MM HG: CPT | Performed by: STUDENT IN AN ORGANIZED HEALTH CARE EDUCATION/TRAINING PROGRAM

## 2025-02-19 PROCEDURE — 3074F SYST BP LT 130 MM HG: CPT | Performed by: STUDENT IN AN ORGANIZED HEALTH CARE EDUCATION/TRAINING PROGRAM

## 2025-02-19 PROCEDURE — 99215 OFFICE O/P EST HI 40 MIN: CPT | Performed by: STUDENT IN AN ORGANIZED HEALTH CARE EDUCATION/TRAINING PROGRAM

## 2025-02-19 RX ORDER — SULFASALAZINE 500 MG/1
TABLET ORAL
Qty: 120 TABLET | Refills: 1 | Status: SHIPPED | OUTPATIENT
Start: 2025-02-19

## 2025-02-19 NOTE — PATIENT INSTRUCTIONS
Patient Instructions  Start sulfasalazine tablets take 1 tablet twice daily for 2 weeks then, increase to 2 tablets twice daily if well tolerated   Get labs 1 week prior to your next appointment  RTC in 2 months

## 2025-02-20 ENCOUNTER — OFFICE VISIT (OUTPATIENT)
Dept: BARIATRICS/WEIGHT MGMT | Age: 40
End: 2025-02-20
Payer: COMMERCIAL

## 2025-02-20 VITALS
HEART RATE: 92 BPM | DIASTOLIC BLOOD PRESSURE: 80 MMHG | WEIGHT: 283.3 LBS | HEIGHT: 65 IN | SYSTOLIC BLOOD PRESSURE: 110 MMHG | BODY MASS INDEX: 47.2 KG/M2 | OXYGEN SATURATION: 98 %

## 2025-02-20 DIAGNOSIS — Z79.899 MEDICATION MANAGEMENT: ICD-10-CM

## 2025-02-20 DIAGNOSIS — R63.5 WEIGHT GAIN: ICD-10-CM

## 2025-02-20 DIAGNOSIS — E55.9 VITAMIN D DEFICIENCY: ICD-10-CM

## 2025-02-20 DIAGNOSIS — Z98.84 STATUS POST BARIATRIC SURGERY: Primary | ICD-10-CM

## 2025-02-20 DIAGNOSIS — E66.01 MORBID OBESITY WITH BMI OF 45.0-49.9, ADULT: ICD-10-CM

## 2025-02-20 PROCEDURE — 3074F SYST BP LT 130 MM HG: CPT | Performed by: SURGERY

## 2025-02-20 PROCEDURE — 3079F DIAST BP 80-89 MM HG: CPT | Performed by: SURGERY

## 2025-02-20 PROCEDURE — 99215 OFFICE O/P EST HI 40 MIN: CPT | Performed by: SURGERY

## 2025-02-20 ASSESSMENT — ENCOUNTER SYMPTOMS
GASTROINTESTINAL NEGATIVE: 1
RESPIRATORY NEGATIVE: 1

## 2025-02-20 NOTE — PROGRESS NOTES
Not on file   Tobacco Use    Smoking status: Never    Smokeless tobacco: Never   Vaping Use    Vaping status: Never Used   Substance and Sexual Activity    Alcohol use: Yes     Comment: rare    Drug use: No    Sexual activity: Yes     Partners: Male   Other Topics Concern    Not on file   Social History Narrative    Lives with parents, has a daughter     Social Determinants of Health     Financial Resource Strain: Not on file   Food Insecurity: Not on file   Transportation Needs: Not on file   Physical Activity: Not on file   Stress: Not on file   Social Connections: Not on file   Intimate Partner Violence: Not on file   Housing Stability: Not on file       Current Outpatient Medications   Medication Sig Dispense Refill    sulfaSALAzine (AZULFIDINE) 500 MG tablet Take 1 tablet twice daily for 2 weeks then, increase to  2 tablets twice daily if well tolerated 120 tablet 1    vitamin D (ERGOCALCIFEROL) 1.25 MG (15449 UT) CAPS capsule Take 1 capsule by mouth once a week 12 capsule 1    naproxen (NAPROSYN) 375 MG tablet Take 1 tablet by mouth 2 times daily as needed for Pain (Pain) (Patient not taking: Reported on 1/10/2025) 20 tablet 0    Multiple Vitamins-Calcium (ONE-A-DAY WOMENS FORMULA PO) Take by mouth (Patient not taking: Reported on 1/10/2025)      vitamin B-12 (CYANOCOBALAMIN) 1000 MCG tablet Take 1 tablet by mouth daily (Patient not taking: Reported on 1/10/2025)      meclizine (ANTIVERT) 25 MG tablet  (Patient not taking: Reported on 1/10/2025)      ondansetron (ZOFRAN-ODT) 4 MG disintegrating tablet Take 1 tablet by mouth 3 times daily as needed for Nausea or Vomiting (Patient not taking: Reported on 1/10/2025) 21 tablet 0    omeprazole (PRILOSEC OTC) 20 MG tablet Take 2 tablets by mouth 2 times daily (Patient not taking: Reported on 1/10/2025)      pantoprazole (PROTONIX) 20 MG tablet Take 1 tablet by mouth 2 times daily (Patient not taking: Reported on 1/10/2025) 60 tablet 3    loperamide (IMODIUM) 2 MG

## 2025-02-26 ENCOUNTER — OFFICE VISIT (OUTPATIENT)
Dept: ORTHOPEDIC SURGERY | Age: 40
End: 2025-02-26

## 2025-02-26 VITALS
HEART RATE: 76 BPM | BODY MASS INDEX: 46.65 KG/M2 | RESPIRATION RATE: 14 BRPM | HEIGHT: 65 IN | WEIGHT: 280 LBS | OXYGEN SATURATION: 97 %

## 2025-02-26 DIAGNOSIS — M17.12 ARTHRITIS OF LEFT KNEE: ICD-10-CM

## 2025-02-26 DIAGNOSIS — M17.11 ARTHRITIS OF RIGHT KNEE: Primary | ICD-10-CM

## 2025-02-26 NOTE — PROGRESS NOTES
VISCO-SUPPLEMENTATION INJECTION (Number 1)    HISTORY OF PRESENT ILLNESS (HPI):   Allyson Pinto is a 39 y.o. year old female who is here for follow up for visco-supplementation injection number 1 for   1. Arthritis of right knee    2. Arthritis of left knee    No injuries since she was last seen.    PAST HISTORY:   Unless otherwise specified in this note, the past history is reviewed today with the patient and is as follows-    Allergies   Allergen Reactions    Morphine      Other reaction(s): Headache  Triggers migraine.   \"Triggers My Migraines\"  Other reaction(s): Headache    Tramadol      Other reaction(s): Headache  States triggers migraine headache  \"Triggers My Migraines\"  Other reaction(s): Headache       Current Outpatient Medications   Medication Sig Dispense Refill    tirzepatide-weight management (ZEPBOUND) 2.5 MG/0.5ML SOAJ subCUTAneous auto-injector pen Inject 2.5 mg into the skin every 7 days 2 mL 0    sulfaSALAzine (AZULFIDINE) 500 MG tablet Take 1 tablet twice daily for 2 weeks then, increase to  2 tablets twice daily if well tolerated 120 tablet 1    vitamin D (ERGOCALCIFEROL) 1.25 MG (73271 UT) CAPS capsule Take 1 capsule by mouth once a week 12 capsule 1    naproxen (NAPROSYN) 375 MG tablet Take 1 tablet by mouth 2 times daily as needed for Pain (Pain) (Patient not taking: Reported on 1/10/2025) 20 tablet 0    Multiple Vitamins-Calcium (ONE-A-DAY WOMENS FORMULA PO) Take by mouth (Patient not taking: Reported on 1/10/2025)      vitamin B-12 (CYANOCOBALAMIN) 1000 MCG tablet Take 1 tablet by mouth daily (Patient not taking: Reported on 1/10/2025)      meclizine (ANTIVERT) 25 MG tablet  (Patient not taking: Reported on 1/10/2025)      ondansetron (ZOFRAN-ODT) 4 MG disintegrating tablet Take 1 tablet by mouth 3 times daily as needed for Nausea or Vomiting (Patient not taking: Reported on 1/10/2025) 21 tablet 0    omeprazole (PRILOSEC OTC) 20 MG tablet Take 2 tablets by mouth 2 times daily (Patient

## 2025-02-26 NOTE — PATIENT INSTRUCTIONS
Gelsyn 3 # 1  Rest both knees for 24-48 hours  Work on ROM and strengthening of knees and legs   May take NSAIDS or anti-inflammatories as needed  Weightbearing as tolerated  Follow up in one week for 2nd injection    We are committed to providing you the best care possible.  If you receive a survey after visiting one of our offices, please take time to share your experience concerning your physician office visit.  These surveys are confidential and no health information about you is shared.  We are eager to improve for you and we are counting on your feedback to help make that happen.

## 2025-03-05 ENCOUNTER — OFFICE VISIT (OUTPATIENT)
Dept: ORTHOPEDIC SURGERY | Age: 40
End: 2025-03-05

## 2025-03-05 ENCOUNTER — OFFICE VISIT (OUTPATIENT)
Dept: BARIATRICS/WEIGHT MGMT | Age: 40
End: 2025-03-05

## 2025-03-05 VITALS — HEIGHT: 65 IN | BODY MASS INDEX: 46.32 KG/M2 | WEIGHT: 278 LBS | RESPIRATION RATE: 14 BRPM

## 2025-03-05 VITALS — HEIGHT: 65 IN | WEIGHT: 278.2 LBS | BODY MASS INDEX: 46.35 KG/M2

## 2025-03-05 DIAGNOSIS — M17.12 ARTHRITIS OF LEFT KNEE: ICD-10-CM

## 2025-03-05 DIAGNOSIS — M17.11 ARTHRITIS OF RIGHT KNEE: Primary | ICD-10-CM

## 2025-03-05 DIAGNOSIS — E66.01 MORBID OBESITY WITH BMI OF 45.0-49.9, ADULT: Primary | ICD-10-CM

## 2025-03-05 NOTE — PROGRESS NOTES
injection site was prepped with alcohol then the knee joint was then injected through the inferior lateral joint with the above listed medication.  A sterile bandage was placed over the injection site. The patient tolerated the procedure well without complication. The patient is scheduled for the third injection in one week.  Left Knee Aspiration / Injection Procedure:  Multiple treatment options were discussed.  Joint injection was recommended.  Details of the procedure, potential risks, and potential benefits were discussed.  Patient's questions were answered.  Patient elected to proceed with procedure.  Medication: Gelsyn 3 (2mL)  NDC #: [address]-4  Lot #:[id number]  Exp Date: 04/30/2027  Procedure:  Sterile technique was used as the skin over the injection site was prepped with alcohol then the knee joint was then injected through the inferior lateral joint with the above listed medication.  A sterile bandage was placed over the injection site. The patient tolerated the procedure well without complication. The patient is scheduled for the third injection in one week.

## 2025-03-05 NOTE — PROGRESS NOTES
Transportation Needs: Not on file   Physical Activity: Not on file   Stress: Not on file   Social Connections: Not on file   Intimate Partner Violence: Not on file   Housing Stability: Not on file         OBJECTIVE:  Physical Exam   Ht 1.651 m (5' 5\")   Wt 126.2 kg (278 lb 3.2 oz)   LMP  (LMP Unknown)   BMI 46.29 kg/m²        NUTRITION DIAGNOSIS: Overweight / Obesity   Problem: Increased adiposity compared to reference standard or established norms   Etiology: Excess intake compared to output over time   S/S: Ht: 5' 5\" Wt: 278 lb 3.2 oz BMI: 46.29    NUTRITION INTERVENTIONS:    Individualized treatment goals to address nutrition diagnosis:   Instructed on 800-1000 kcal diet for weight loss   Provided sample menus, healthy foods list, plate models and meal planning/tracking  handouts   Encouraged record keeping and physical activity as approved by physician    MONITORING/ EVALUATION/ PLAN:   Pt verbalized understanding of all materials covered   Pt asked pertinent questions throughout the session - expect compliance with nutrition guidelines presented   Provided pt with contact information should questions arise prior to next visit   Will f/u with pt Cherry Macedo, RD, LD

## 2025-03-05 NOTE — PATIENT INSTRUCTIONS
This office has referred you to a primary care physician (or a PCP). One of our many PCP offices will be reaching out to you shortly to set up a new patient appointment. If you do not here from a provider within 7 days of today's appointment please call [phone] to be scheduled with a new PCP. We are excited to help you stay healthy!     [place] 3 # 2  Rest both knees for 24-48 hours  Work on ROM and strengthening of knees and legs   May take NSAIDS or anti-inflammatories as needed  Weightbearing as tolerated  Follow up in one week    We are committed to providing you the best care possible.  If you receive a survey after visiting one of our offices, please take time to share your experience concerning your physician office visit.  These surveys are confidential and no health information about you is shared.  We are eager to improve for you and we are counting on your feedback to help make that happen

## 2025-03-12 ENCOUNTER — OFFICE VISIT (OUTPATIENT)
Dept: ORTHOPEDIC SURGERY | Age: 40
End: 2025-03-12

## 2025-03-12 VITALS — OXYGEN SATURATION: 100 % | HEART RATE: 73 BPM

## 2025-03-12 DIAGNOSIS — M17.11 ARTHRITIS OF RIGHT KNEE: Primary | ICD-10-CM

## 2025-03-12 DIAGNOSIS — M17.12 ARTHRITIS OF LEFT KNEE: ICD-10-CM

## 2025-03-12 RX ORDER — HYDROCODONE BITARTRATE AND ACETAMINOPHEN 7.5; 325 MG/1; MG/1
1 TABLET ORAL 2 TIMES DAILY
Qty: 6 TABLET | Refills: 0 | Status: SHIPPED | OUTPATIENT
Start: 2025-03-12 | End: 2025-03-15

## 2025-03-12 NOTE — PATIENT INSTRUCTIONS
[place] 3 # 3  Rest both knees for 24-48 hours  Work on ROM and strengthening of knees and legs   May take NSAIDS or anti-inflammatories as needed  Weightbearing as tolerated  Follow up in 6 weeks.     We are committed to providing you the best care possible.  If you receive a survey after visiting one of our offices, please take time to share your experience concerning your physician office visit.  These surveys are confidential and no health information about you is shared.  We are eager to improve for you and we are counting on your feedback to help make that happen.

## 2025-03-12 NOTE — PROGRESS NOTES
VISCO-SUPPLEMENTATION INJECTION (Number 3)    HISTORY OF PRESENT ILLNESS (HPI):   Allyson Pinto is a 39 y.o. year old female who is here for follow up for visco-supplementation injection number 3 for   1. Arthritis of right knee    2. Arthritis of left knee      She had increased pain with the injection of the left knee at the last visit that took about 3 to 4 days to get better.  She does however feel like she is improving.    PAST HISTORY:   Unless otherwise specified in this note, the past history is reviewed today with the patient and is as follows-    Allergies   Allergen Reactions    Morphine      Other reaction(s): Headache  Triggers migraine.   \"Triggers My Migraines\"  Other reaction(s): Headache    Tramadol      Other reaction(s): Headache  States triggers migraine headache  \"Triggers My Migraines\"  Other reaction(s): Headache       Current Outpatient Medications   Medication Sig Dispense Refill    tirzepatide-weight management (ZEPBOUND) 2.5 MG/0.5ML SOAJ subCUTAneous auto-injector pen Inject 2.5 mg into the skin every 7 days (Patient not taking: Reported on 3/5/2025) 2 mL 0    sulfaSALAzine (AZULFIDINE) 500 MG tablet Take 1 tablet twice daily for 2 weeks then, increase to  2 tablets twice daily if well tolerated (Patient not taking: Reported on 3/5/2025) 120 tablet 1    vitamin D (ERGOCALCIFEROL) 1.25 MG (20900 UT) CAPS capsule Take 1 capsule by mouth once a week 12 capsule 1    naproxen (NAPROSYN) 375 MG tablet Take 1 tablet by mouth 2 times daily as needed for Pain (Pain) (Patient not taking: Reported on 3/5/2025) 20 tablet 0    Multiple Vitamins-Calcium (ONE-A-DAY WOMENS FORMULA PO) Take by mouth (Patient not taking: Reported on 3/5/2025)      vitamin B-12 (CYANOCOBALAMIN) 1000 MCG tablet Take 1 tablet by mouth daily      meclizine (ANTIVERT) 25 MG tablet  (Patient not taking: Reported on 3/5/2025)      ondansetron (ZOFRAN-ODT) 4 MG disintegrating tablet Take 1 tablet by mouth 3 times daily as

## 2025-03-20 ENCOUNTER — OFFICE VISIT (OUTPATIENT)
Dept: BARIATRICS/WEIGHT MGMT | Age: 40
End: 2025-03-20
Payer: COMMERCIAL

## 2025-03-20 VITALS
WEIGHT: 274.4 LBS | DIASTOLIC BLOOD PRESSURE: 86 MMHG | OXYGEN SATURATION: 98 % | HEIGHT: 65 IN | BODY MASS INDEX: 45.72 KG/M2 | HEART RATE: 88 BPM | SYSTOLIC BLOOD PRESSURE: 132 MMHG

## 2025-03-20 DIAGNOSIS — E66.01 MORBID OBESITY WITH BMI OF 45.0-49.9, ADULT: Primary | ICD-10-CM

## 2025-03-20 PROCEDURE — 99214 OFFICE O/P EST MOD 30 MIN: CPT | Performed by: SURGERY

## 2025-03-20 PROCEDURE — 3074F SYST BP LT 130 MM HG: CPT | Performed by: SURGERY

## 2025-03-20 PROCEDURE — 3079F DIAST BP 80-89 MM HG: CPT | Performed by: SURGERY

## 2025-03-20 NOTE — PROGRESS NOTES
Drug use: No    Sexual activity: Yes     Partners: Male   Other Topics Concern    Not on file   Social History Narrative    Lives with parents, has a daughter     Social Drivers of Health     Financial Resource Strain: Not on file   Food Insecurity: Not on file   Transportation Needs: Not on file   Physical Activity: Not on file   Stress: Not on file   Social Connections: Not on file   Intimate Partner Violence: Not on file   Housing Stability: Not on file       Current Outpatient Medications   Medication Sig Dispense Refill    tirzepatide-weight management (ZEPBOUND) 5 MG/0.5ML SOAJ subCUTAneous auto-injector pen Inject 5 mg into the skin every 7 days 2 mL 0    tirzepatide-weight management (ZEPBOUND) 2.5 MG/0.5ML SOAJ subCUTAneous auto-injector pen Inject 2.5 mg into the skin every 7 days 2 mL 0    sulfaSALAzine (AZULFIDINE) 500 MG tablet Take 1 tablet twice daily for 2 weeks then, increase to  2 tablets twice daily if well tolerated 120 tablet 1    vitamin D (ERGOCALCIFEROL) 1.25 MG (96297 UT) CAPS capsule Take 1 capsule by mouth once a week 12 capsule 1    vitamin B-12 (CYANOCOBALAMIN) 1000 MCG tablet Take 1 tablet by mouth daily      naproxen (NAPROSYN) 375 MG tablet Take 1 tablet by mouth 2 times daily as needed for Pain (Pain) (Patient not taking: Reported on 3/5/2025) 20 tablet 0    Multiple Vitamins-Calcium (ONE-A-DAY WOMENS FORMULA PO) Take by mouth (Patient not taking: Reported on 3/5/2025)      meclizine (ANTIVERT) 25 MG tablet  (Patient not taking: Reported on 3/5/2025)      ondansetron (ZOFRAN-ODT) 4 MG disintegrating tablet Take 1 tablet by mouth 3 times daily as needed for Nausea or Vomiting (Patient not taking: Reported on 3/5/2025) 21 tablet 0    omeprazole (PRILOSEC OTC) 20 MG tablet Take 2 tablets by mouth 2 times daily (Patient not taking: Reported on 3/5/2025)      pantoprazole (PROTONIX) 20 MG tablet Take 1 tablet by mouth 2 times daily (Patient not taking: Reported on 3/5/2025) 60 tablet 3

## 2025-04-08 ENCOUNTER — OFFICE VISIT (OUTPATIENT)
Dept: BARIATRICS/WEIGHT MGMT | Age: 40
End: 2025-04-08
Payer: COMMERCIAL

## 2025-04-08 VITALS
BODY MASS INDEX: 44.97 KG/M2 | HEART RATE: 82 BPM | HEIGHT: 65 IN | OXYGEN SATURATION: 97 % | WEIGHT: 269.9 LBS | SYSTOLIC BLOOD PRESSURE: 120 MMHG | DIASTOLIC BLOOD PRESSURE: 78 MMHG

## 2025-04-08 DIAGNOSIS — E66.01 MORBID OBESITY WITH BMI OF 45.0-49.9, ADULT: Primary | ICD-10-CM

## 2025-04-08 PROCEDURE — 3078F DIAST BP <80 MM HG: CPT | Performed by: SURGERY

## 2025-04-08 PROCEDURE — 99214 OFFICE O/P EST MOD 30 MIN: CPT | Performed by: SURGERY

## 2025-04-08 PROCEDURE — 3074F SYST BP LT 130 MM HG: CPT | Performed by: SURGERY

## 2025-04-08 RX ORDER — TIRZEPATIDE 7.5 MG/.5ML
7.5 INJECTION, SOLUTION SUBCUTANEOUS
Qty: 2 ML | Refills: 0 | Status: SHIPPED | OUTPATIENT
Start: 2025-04-08

## 2025-04-08 NOTE — PROGRESS NOTES
Topics Concern    Not on file   Social History Narrative    Lives with parents, has a daughter     Social Drivers of Health     Financial Resource Strain: Not on file   Food Insecurity: Not on file   Transportation Needs: Not on file   Physical Activity: Not on file   Stress: Not on file   Social Connections: Not on file   Intimate Partner Violence: Not on file   Housing Stability: Not on file       Current Outpatient Medications   Medication Sig Dispense Refill    tirzepatide-weight management (ZEPBOUND) 5 MG/0.5ML SOAJ subCUTAneous auto-injector pen Inject 5 mg into the skin every 7 days 2 mL 0    tirzepatide-weight management (ZEPBOUND) 2.5 MG/0.5ML SOAJ subCUTAneous auto-injector pen Inject 2.5 mg into the skin every 7 days 2 mL 0    sulfaSALAzine (AZULFIDINE) 500 MG tablet Take 1 tablet twice daily for 2 weeks then, increase to  2 tablets twice daily if well tolerated 120 tablet 1    vitamin D (ERGOCALCIFEROL) 1.25 MG (94929 UT) CAPS capsule Take 1 capsule by mouth once a week 12 capsule 1    naproxen (NAPROSYN) 375 MG tablet Take 1 tablet by mouth 2 times daily as needed for Pain (Pain) (Patient not taking: Reported on 3/5/2025) 20 tablet 0    Multiple Vitamins-Calcium (ONE-A-DAY WOMENS FORMULA PO) Take by mouth (Patient not taking: Reported on 3/5/2025)      vitamin B-12 (CYANOCOBALAMIN) 1000 MCG tablet Take 1 tablet by mouth daily      meclizine (ANTIVERT) 25 MG tablet  (Patient not taking: Reported on 3/5/2025)      ondansetron (ZOFRAN-ODT) 4 MG disintegrating tablet Take 1 tablet by mouth 3 times daily as needed for Nausea or Vomiting (Patient not taking: Reported on 3/5/2025) 21 tablet 0    omeprazole (PRILOSEC OTC) 20 MG tablet Take 2 tablets by mouth 2 times daily (Patient not taking: Reported on 3/5/2025)      pantoprazole (PROTONIX) 20 MG tablet Take 1 tablet by mouth 2 times daily (Patient not taking: Reported on 3/5/2025) 60 tablet 3    loperamide (IMODIUM) 2 MG capsule Take 2 mg by mouth 4 times

## 2025-04-13 ENCOUNTER — HOSPITAL ENCOUNTER (EMERGENCY)
Age: 40
Discharge: HOME | End: 2025-04-13
Attending: EMERGENCY MEDICINE
Payer: COMMERCIAL

## 2025-04-13 ENCOUNTER — APPOINTMENT (OUTPATIENT)
Dept: GENERAL RADIOLOGY | Age: 40
End: 2025-04-13
Payer: COMMERCIAL

## 2025-04-13 VITALS
SYSTOLIC BLOOD PRESSURE: 109 MMHG | BODY MASS INDEX: 43.77 KG/M2 | HEART RATE: 72 BPM | DIASTOLIC BLOOD PRESSURE: 39 MMHG | RESPIRATION RATE: 14 BRPM | TEMPERATURE: 98.1 F | OXYGEN SATURATION: 100 % | WEIGHT: 263 LBS

## 2025-04-13 DIAGNOSIS — R07.9 CHEST PAIN, UNSPECIFIED TYPE: Primary | ICD-10-CM

## 2025-04-13 LAB
ALBUMIN SERPL-MCNC: 4.6 G/DL (ref 3.4–5)
ALBUMIN/GLOB SERPL: 1.1 {RATIO}
ALP SERPL-CCNC: 66 U/L (ref 40–129)
ALT SERPL-CCNC: 20 U/L (ref 10–40)
ANION GAP SERPL CALCULATED.3IONS-SCNC: 13 MMOL/L (ref 9–17)
AST SERPL-CCNC: 24 U/L (ref 15–37)
BILIRUB SERPL-MCNC: 0.4 MG/DL (ref 0–1)
BUN SERPL-MCNC: 15 MG/DL (ref 7–20)
CALCIUM SERPL-MCNC: 9.6 MG/DL (ref 8.3–10.6)
CHLORIDE SERPL-SCNC: 103 MMOL/L (ref 99–110)
CO2 SERPL-SCNC: 24 MMOL/L (ref 21–32)
CREAT SERPL-MCNC: 0.8 MG/DL (ref 0.6–1.1)
EKG ATRIAL RATE: 87 BPM
EKG DIAGNOSIS: NORMAL
EKG P AXIS: 41 DEGREES
EKG P-R INTERVAL: 154 MS
EKG Q-T INTERVAL: 364 MS
EKG QRS DURATION: 92 MS
EKG QTC CALCULATION (BAZETT): 438 MS
EKG R AXIS: 6 DEGREES
EKG T AXIS: 37 DEGREES
EKG VENTRICULAR RATE: 87 BPM
ERYTHROCYTE [DISTWIDTH] IN BLOOD BY AUTOMATED COUNT: 13.1 % (ref 11.7–14.9)
GFR, ESTIMATED: >90 ML/MIN/1.73M2
GLUCOSE SERPL-MCNC: 111 MG/DL (ref 74–99)
HCT VFR BLD AUTO: 43.1 % (ref 37–47)
HGB BLD-MCNC: 13.9 G/DL (ref 12.5–16)
LIPASE SERPL-CCNC: 29 U/L (ref 13–60)
MAGNESIUM SERPL-MCNC: 1.7 MG/DL (ref 1.8–2.4)
MCH RBC QN AUTO: 27.9 PG (ref 27–31)
MCHC RBC AUTO-ENTMCNC: 32.3 G/DL (ref 32–36)
MCV RBC AUTO: 86.4 FL (ref 78–100)
PLATELET # BLD AUTO: 211 K/UL (ref 140–440)
PMV BLD AUTO: 12.9 FL (ref 7.5–11.1)
POTASSIUM SERPL-SCNC: 3.8 MMOL/L (ref 3.5–5.1)
PROT SERPL-MCNC: 8.8 G/DL (ref 6.4–8.2)
RBC # BLD AUTO: 4.99 M/UL (ref 4.2–5.4)
SODIUM SERPL-SCNC: 139 MMOL/L (ref 136–145)
TROPONIN I SERPL HS-MCNC: <6 NG/L (ref 0–14)
TROPONIN I SERPL HS-MCNC: <6 NG/L (ref 0–14)
WBC OTHER # BLD: 8 K/UL (ref 4–10.5)

## 2025-04-13 PROCEDURE — 83735 ASSAY OF MAGNESIUM: CPT

## 2025-04-13 PROCEDURE — 99285 EMERGENCY DEPT VISIT HI MDM: CPT

## 2025-04-13 PROCEDURE — 71045 X-RAY EXAM CHEST 1 VIEW: CPT

## 2025-04-13 PROCEDURE — 93005 ELECTROCARDIOGRAM TRACING: CPT | Performed by: EMERGENCY MEDICINE

## 2025-04-13 PROCEDURE — 80053 COMPREHEN METABOLIC PANEL: CPT

## 2025-04-13 PROCEDURE — 6370000000 HC RX 637 (ALT 250 FOR IP): Performed by: EMERGENCY MEDICINE

## 2025-04-13 PROCEDURE — 84484 ASSAY OF TROPONIN QUANT: CPT

## 2025-04-13 PROCEDURE — 96374 THER/PROPH/DIAG INJ IV PUSH: CPT

## 2025-04-13 PROCEDURE — 83690 ASSAY OF LIPASE: CPT

## 2025-04-13 PROCEDURE — 85027 COMPLETE CBC AUTOMATED: CPT

## 2025-04-13 PROCEDURE — 93010 ELECTROCARDIOGRAM REPORT: CPT | Performed by: INTERNAL MEDICINE

## 2025-04-13 PROCEDURE — 6360000002 HC RX W HCPCS: Performed by: EMERGENCY MEDICINE

## 2025-04-13 RX ORDER — KETOROLAC TROMETHAMINE 30 MG/ML
30 INJECTION, SOLUTION INTRAMUSCULAR; INTRAVENOUS ONCE
Status: COMPLETED | OUTPATIENT
Start: 2025-04-13 | End: 2025-04-13

## 2025-04-13 RX ORDER — ACETAMINOPHEN 325 MG/1
650 TABLET ORAL ONCE
Status: COMPLETED | OUTPATIENT
Start: 2025-04-13 | End: 2025-04-13

## 2025-04-13 RX ADMIN — ACETAMINOPHEN 650 MG: 325 TABLET ORAL at 14:53

## 2025-04-13 RX ADMIN — KETOROLAC TROMETHAMINE 30 MG: 30 INJECTION, SOLUTION INTRAMUSCULAR; INTRAVENOUS at 14:53

## 2025-04-13 ASSESSMENT — PAIN DESCRIPTION - ORIENTATION: ORIENTATION: MID

## 2025-04-13 ASSESSMENT — PAIN DESCRIPTION - DESCRIPTORS: DESCRIPTORS: DISCOMFORT

## 2025-04-13 ASSESSMENT — PAIN SCALES - GENERAL
PAINLEVEL_OUTOF10: 2
PAINLEVEL_OUTOF10: 4
PAINLEVEL_OUTOF10: 3

## 2025-04-13 ASSESSMENT — HEART SCORE: ECG: NORMAL

## 2025-04-13 ASSESSMENT — PAIN DESCRIPTION - LOCATION: LOCATION: CHEST

## 2025-04-13 ASSESSMENT — PAIN - FUNCTIONAL ASSESSMENT: PAIN_FUNCTIONAL_ASSESSMENT: 0-10

## 2025-04-13 NOTE — DISCHARGE INSTRUCTIONS
Follow-up with primary care physician for reevaluation.  Call for an appointment  Follow-up with cardiology Dr. Mello for evaluation of chest pain.  Call for an appointment  Take Tylenol alternate with Motrin for any pain aches and fevers  Return to the emergency department immediately pain fever chills nausea vomiting dizzy lightheaded 20 worsening symptoms.

## 2025-04-13 NOTE — ED PROVIDER NOTES
Emergency Department Encounter    Patient: Allyson Pinto  MRN: [id number]  : 1985  Date of Evaluation: 2025  ED Provider:  Katie Badillo DO    Triage Chief Complaint:   Chest Pain (\"Over breast bone.\" Pain began Last Monday. Denies any further s/s)    [place]:  Allyson Pinto is a 39 y.o. female with history of obesity, depression, Crohn's disease, acid reflux, fatty liver, anxiety, hypertension, anemia, asthma, that presents to the emergency department complaining of chest pain.  Patient states she started having pain in the midsternal chest breastbone area last Monday a week ago.  Patient states pain has gotten progressively worse since then.  States the pain did radiate to her left ration shoulder shoulder blade.  Patient states she has been taking some Tylenol a couple times.  Patient states no falls injuries trauma no heavy lifting pulling or straining.  Patient states no shortness of breath no fever chills or cough.  She states she does have history of acid reflux ulcer gastritis.  Patient states no dysuria hematuria.  Patient here for evaluation.    ROS - see HPI, below listed is current ROS at time of my eval:  10 systems reviewed and negative except as above.     Past Medical History:   Diagnosis Date    Acid reflux     Acute deep vein thrombosis (DVT) of non-extremity vein 2018    \"in my neck\"\"after mediport was put in\"    Anemia     Anxiety     Asthma     NO PULMONOLOGIST AT THIS TIME    Blood clot due to device, implant, or graft 2018    had blood clots in neck due to med port    Bowen's disease     Cancer (HCC)     skin cancer shoulder 2019    Crohn's disease (HCC) Dx 2010    Remicade Infusions Every Six Weeks, Sees Dr. Burns At [facility] In [place], [place]    Depression     ECHO 2021    EF is estimated at 55-60%.  Mild left ventricular hypertrophy.  Mild mitral regurgitation is present.    2012    \"Passed Out And Fell On My Tailbone\"    Fatty

## 2025-04-14 ENCOUNTER — HOSPITAL ENCOUNTER (OUTPATIENT)
Age: 40
Discharge: HOME | End: 2025-04-14
Payer: COMMERCIAL

## 2025-04-14 DIAGNOSIS — R79.82 ELEVATED C-REACTIVE PROTEIN (CRP): ICD-10-CM

## 2025-04-14 DIAGNOSIS — Z79.899 HIGH RISK MEDICATION USE: ICD-10-CM

## 2025-04-14 LAB
ALBUMIN SERPL-MCNC: 4.3 G/DL (ref 3.4–5)
ALBUMIN/GLOB SERPL: 1.2 {RATIO} (ref 1.1–2.2)
ALP SERPL-CCNC: 68 U/L (ref 40–129)
ALT SERPL-CCNC: 17 U/L (ref 10–40)
ANION GAP SERPL CALCULATED.3IONS-SCNC: 13 MMOL/L (ref 9–17)
AST SERPL-CCNC: 23 U/L (ref 15–37)
BILIRUB SERPL-MCNC: 0.2 MG/DL (ref 0–1)
BUN SERPL-MCNC: 22 MG/DL (ref 7–20)
CALCIUM SERPL-MCNC: 9.4 MG/DL (ref 8.3–10.6)
CHLORIDE SERPL-SCNC: 103 MMOL/L (ref 99–110)
CO2 SERPL-SCNC: 23 MMOL/L (ref 21–32)
CREAT SERPL-MCNC: 0.8 MG/DL (ref 0.6–1.1)
CRP SERPL HS-MCNC: 7 MG/L (ref 0–5)
ERYTHROCYTE [DISTWIDTH] IN BLOOD BY AUTOMATED COUNT: 13 % (ref 11.7–14.9)
GFR, ESTIMATED: 74 ML/MIN/1.73M2
GLUCOSE SERPL-MCNC: 75 MG/DL (ref 74–99)
HCT VFR BLD AUTO: 41.3 % (ref 37–47)
HGB BLD-MCNC: 13.1 G/DL (ref 12.5–16)
MCH RBC QN AUTO: 28.3 PG (ref 27–31)
MCHC RBC AUTO-ENTMCNC: 31.7 G/DL (ref 32–36)
MCV RBC AUTO: 89.2 FL (ref 78–100)
PLATELET, FLUORESCENCE: 198 K/UL (ref 140–440)
PMV BLD AUTO: 13.1 FL (ref 7.5–11.1)
POTASSIUM SERPL-SCNC: 4.1 MMOL/L (ref 3.5–5.1)
PROT SERPL-MCNC: 7.9 G/DL (ref 6.4–8.2)
RBC # BLD AUTO: 4.63 M/UL (ref 4.2–5.4)
SODIUM SERPL-SCNC: 139 MMOL/L (ref 136–145)
WBC OTHER # BLD: 7.6 K/UL (ref 4–10.5)

## 2025-04-14 PROCEDURE — 80053 COMPREHEN METABOLIC PANEL: CPT

## 2025-04-14 PROCEDURE — 85027 COMPLETE CBC AUTOMATED: CPT

## 2025-04-14 PROCEDURE — 86140 C-REACTIVE PROTEIN: CPT

## 2025-04-19 NOTE — PROGRESS NOTES
ulcers  Respiratory:  Denies cough or shortness of breath   Cardiovascular:  Denies chest pain or edema   GI:  Denies abdominal pain, nausea, vomiting, bloody stools or diarrhea   :  Denies dysuria or hematuria  Musculoskeletal:  See HPI  Integument:  Denies rash   Neurologic:  Denies headache, focal weakness or sensory changes   Endocrine:  Denies polyuria or polydipsia   Lymphatic:  Denies swollen glands   Psychiatric:  Denies depression or anxiety       PROBLEM LIST    Patient Active Problem List   Diagnosis    Crohn's disease (HCC)    Anxiety    Multiple lung nodules    Crohn's colitis, unspecified complication (HCC)    Exacerbation of Crohn's disease with complication (HCC)    IBS (irritable bowel syndrome)    C. difficile colitis    Crohn's disease of small and large intestines with complication (HCC)    Neck swelling    Leukocytosis    Non-intractable vomiting with nausea    Infection of venous access port    Acute deep vein thrombosis (DVT) of non-extremity vein    Class 3 severe obesity due to excess calories without serious comorbidity with body mass index (BMI) of 50.0 to 59.9 in adult    Preeclampsia, third trimester    Pregnancy related condition    Status post  delivery    Bowen's disease of right shoulder    Patellofemoral arthritis of right knee    Infrapatellar bursitis of right knee    Knee mass, right    Acute respiratory failure    Pneumonia due to COVID-19 virus    Shortness of breath    Edema of lower extremity    Essential hypertension    Morbid obesity with BMI of 50.0-59.9, adult    Severe obstructive sleep apnea    Morbid obesity       MEDICATIONS    Current Outpatient Medications   Medication Sig Dispense Refill    Tirzepatide-Weight Management (ZEPBOUND) 7.5 MG/0.5ML SOLN subCUTAneous injection (VIAL) Inject 0.5 mLs into the skin every 7 days 2 mL 0    tirzepatide-weight management (ZEPBOUND) 5 MG/0.5ML SOAJ subCUTAneous auto-injector pen Inject 5 mg into the skin every 7 days 2

## 2025-04-23 ENCOUNTER — OFFICE VISIT (OUTPATIENT)
Age: 40
End: 2025-04-23
Payer: COMMERCIAL

## 2025-04-23 ENCOUNTER — OFFICE VISIT (OUTPATIENT)
Dept: ORTHOPEDIC SURGERY | Age: 40
End: 2025-04-23
Payer: COMMERCIAL

## 2025-04-23 VITALS
WEIGHT: 263 LBS | OXYGEN SATURATION: 100 % | HEIGHT: 65 IN | RESPIRATION RATE: 14 BRPM | HEART RATE: 90 BPM | BODY MASS INDEX: 43.82 KG/M2

## 2025-04-23 VITALS
WEIGHT: 264.8 LBS | HEART RATE: 75 BPM | OXYGEN SATURATION: 99 % | SYSTOLIC BLOOD PRESSURE: 122 MMHG | DIASTOLIC BLOOD PRESSURE: 84 MMHG | BODY MASS INDEX: 44.07 KG/M2

## 2025-04-23 DIAGNOSIS — M17.12 ARTHRITIS OF LEFT KNEE: Primary | ICD-10-CM

## 2025-04-23 DIAGNOSIS — M17.11 ARTHRITIS OF RIGHT KNEE: ICD-10-CM

## 2025-04-23 DIAGNOSIS — R79.82 ELEVATED C-REACTIVE PROTEIN (CRP): ICD-10-CM

## 2025-04-23 DIAGNOSIS — M07.60 ENTEROPATHIC ARTHRITIS: Primary | ICD-10-CM

## 2025-04-23 DIAGNOSIS — Z79.899 HIGH RISK MEDICATION USE: ICD-10-CM

## 2025-04-23 PROCEDURE — 99212 OFFICE O/P EST SF 10 MIN: CPT | Performed by: PHYSICIAN ASSISTANT

## 2025-04-23 PROCEDURE — 3079F DIAST BP 80-89 MM HG: CPT | Performed by: STUDENT IN AN ORGANIZED HEALTH CARE EDUCATION/TRAINING PROGRAM

## 2025-04-23 PROCEDURE — 3074F SYST BP LT 130 MM HG: CPT | Performed by: STUDENT IN AN ORGANIZED HEALTH CARE EDUCATION/TRAINING PROGRAM

## 2025-04-23 PROCEDURE — 99214 OFFICE O/P EST MOD 30 MIN: CPT | Performed by: STUDENT IN AN ORGANIZED HEALTH CARE EDUCATION/TRAINING PROGRAM

## 2025-04-23 RX ORDER — PREDNISONE 5 MG/1
TABLET ORAL
Qty: 30 TABLET | Refills: 0 | Status: SHIPPED | OUTPATIENT
Start: 2025-04-23

## 2025-04-23 ASSESSMENT — ENCOUNTER SYMPTOMS
EYES NEGATIVE: 1
GASTROINTESTINAL NEGATIVE: 1
RESPIRATORY NEGATIVE: 1

## 2025-04-23 NOTE — PATIENT INSTRUCTIONS
Continue weight-bearing as tolerated.  Continue range of motion exercises as instructed.  Ice and elevate as needed.  Tylenol or Motrin for pain.  Follow up as needed    We are committed to providing you the best care possible.  If you receive a survey after visiting one of our offices, please take time to share your experience concerning your physician office visit.  These surveys are confidential and no health information about you is shared.  We are eager to improve for you and we are counting on your feedback to help make that happen.

## 2025-04-23 NOTE — PROGRESS NOTES
Review of Systems   Constitutional: Negative.    HENT: Negative.     Eyes: Negative.    Respiratory: Negative.     Cardiovascular: Negative.    Gastrointestinal: Negative.    Genitourinary: Negative.    Musculoskeletal:  Positive for arthralgias and myalgias.   Skin: Negative.  Negative for rash and wound.   Neurological: Negative.    Psychiatric/Behavioral: Negative.           HPI:  Allyson Pinto is a 39 y.o. female that presents to the office today following up after bilateral viscosupplementation injections in her knee 6 weeks ago.  She does feel like that did help.  She has been getting a little bit of pain in the right knee on the lateral side of the knee however it is not as bad as what it was prior to the injections.    Past Medical History:   Diagnosis Date    Acid reflux     Acute deep vein thrombosis (DVT) of non-extremity vein 04/25/2018    \"in my neck\"\"after mediport was put in\"    Anemia     Anxiety     Asthma     NO PULMONOLOGIST AT THIS TIME    Blood clot due to device, implant, or graft 04/2018    had blood clots in neck due to med port    Bowen's disease     Cancer (HCC)     skin cancer shoulder 2019    Crohn's disease (HCC) Dx 2010    Remicade Infusions Every Six Weeks, Sees Dr. Burns At [facility] In [place], [place]    Depression     ECHO 07/28/2021    EF is estimated at 55-60%.  Mild left ventricular hypertrophy.  Mild mitral regurgitation is present.    Fall 2012    \"Passed Out And Fell On My Tailbone\"    Fatty liver     Gestational diabetes     \"2019- no medication - just diet controlled with pregnancy\"    Hx of blood clots     in neck    Hypertension     \"yrs ago was on b/p medication- off medication since 2018- only took medication for 2 months\"    Lower back pain     \"Sometimes\"    MRSA (methicillin resistant staph aureus) culture positive 04/2018    Multiple pulmonary nodules 06/2013    Subcentimeter; needs repeat imaging in 3-6 months    Obesity     Pancreatitis

## 2025-04-23 NOTE — PATIENT INSTRUCTIONS
We are committed to providing you the best care possible.    If you receive a survey after visiting one of our offices, please take time to share your experience concerning your physician office visit.  These surveys are confidential and no health information about you is shared.    We are eager to improve for you and we are counting on your feedback to help make that happen.     Patient Instructions  Increase Sulfasalazine to 2 tabs 2x daily   Okay to take prednisone 1-3 tabs daily as needed for moderate to severe pain   Get labs 1 week prior to your next appointment  RTC in 2 months

## 2025-05-06 ENCOUNTER — OFFICE VISIT (OUTPATIENT)
Dept: BARIATRICS/WEIGHT MGMT | Age: 40
End: 2025-05-06
Payer: COMMERCIAL

## 2025-05-06 VITALS
SYSTOLIC BLOOD PRESSURE: 102 MMHG | BODY MASS INDEX: 44.14 KG/M2 | HEIGHT: 65 IN | WEIGHT: 264.9 LBS | DIASTOLIC BLOOD PRESSURE: 76 MMHG | OXYGEN SATURATION: 97 % | HEART RATE: 69 BPM

## 2025-05-06 DIAGNOSIS — Z79.899 MEDICATION MANAGEMENT: ICD-10-CM

## 2025-05-06 DIAGNOSIS — E66.01 MORBID OBESITY DUE TO EXCESS CALORIES (HCC): Primary | ICD-10-CM

## 2025-05-06 PROCEDURE — 99214 OFFICE O/P EST MOD 30 MIN: CPT | Performed by: SURGERY

## 2025-05-06 PROCEDURE — 3074F SYST BP LT 130 MM HG: CPT | Performed by: SURGERY

## 2025-05-06 PROCEDURE — 3078F DIAST BP <80 MM HG: CPT | Performed by: SURGERY

## 2025-05-06 RX ORDER — TIRZEPATIDE 10 MG/.5ML
10 INJECTION, SOLUTION SUBCUTANEOUS
Qty: 2 ML | Refills: 0 | Status: SHIPPED | OUTPATIENT
Start: 2025-05-06

## 2025-05-06 NOTE — PROGRESS NOTES
Chief Complaint   Patient presents with    Weight Management     Medication  Visit - Zepbound   -S/P Sleeve, 22             SUBJECTIVE:    HPI: Patient is here with complaints of weight management.    Obesity with a BMI of Body mass index is 44.08 kg/m²..    Patient has failed to lose 5% of body weight for multiple years.    Any history of type 2 diabetes mellitus? Denies     Any current renal impairment? Denies    Any current hepatic impairment? Denies    Any history of pancreatitis? Denies    Any history of gastroparesis? Denies   (Saxenda has not been studied in patients with pre-existing gastroparesis).    Any history of personal or family medullary thyroid cancinoma (MTC) or multiple endocrine neoplasia type 2 (MEN 2)? Denies (black box warning).    Any current or history suicidal attempts or active suicidal ideation? Denies   (avoid in these patients)    Pregnant or breastfeeding? Denies hysterectomy    Current weight: 264.9    Weight loss of -5     pounds    Dietary measures: tracking calories, 900 -1000 lynette /d    Exercise regimen: walking on treadmill, resistance bands.    I have reviewed the patient's(pertinent information to this visit) medical history, family history(scanned in  the [employer] under \"patient questioner\"), social history and review of systems with the patient today in the office.          Past Surgical History:   Procedure Laterality Date    ABDOMEN SURGERY      bowel resection     ADENOIDECTOMY      APPENDECTOMY      Done During Colon Resection For Crohn's  Disease    BREAST SURGERY Left     \"Infected Lymph Node Removed\"     SECTION N/A 2019     SECTION performed by Chasity Childs MD at [facility] OR    CHOLECYSTECTOMY  2016    COLECTOMY  8-11    Crohn's Disease , Dr. Skinner, Appendectomy Also Done    COLONOSCOPY  2016    Polyps Removed In Past    COLONOSCOPY  2017    Hospitalized for c-diff    COLONOSCOPY  2018    normal,

## 2025-06-05 ENCOUNTER — OFFICE VISIT (OUTPATIENT)
Dept: BARIATRICS/WEIGHT MGMT | Age: 40
End: 2025-06-05

## 2025-06-05 VITALS
OXYGEN SATURATION: 93 % | HEIGHT: 65 IN | SYSTOLIC BLOOD PRESSURE: 82 MMHG | DIASTOLIC BLOOD PRESSURE: 58 MMHG | WEIGHT: 255.3 LBS | BODY MASS INDEX: 42.53 KG/M2 | HEART RATE: 68 BPM

## 2025-06-05 DIAGNOSIS — R42 DIZZINESS: ICD-10-CM

## 2025-06-05 DIAGNOSIS — E66.01 MORBID OBESITY WITH BMI OF 40.0-44.9, ADULT (HCC): ICD-10-CM

## 2025-06-05 DIAGNOSIS — Z79.899 MEDICATION MANAGEMENT: Primary | ICD-10-CM

## 2025-06-05 DIAGNOSIS — I95.9 HYPOTENSION, UNSPECIFIED HYPOTENSION TYPE: ICD-10-CM

## 2025-06-05 NOTE — PROGRESS NOTES
Sitting, BP Cuff Size: Large Adult)   Pulse 68   Ht 1.651 m (5' 5\")   Wt 115.8 kg (255 lb 4.8 oz)   LMP  (LMP Unknown)   SpO2 93%   BMI 42.48 kg/m²      Physical Exam  Vitals reviewed.   Constitutional:       General: She is not in acute distress.     Appearance: She is obese.   HENT:      Head: Normocephalic and atraumatic.      Right Ear: External ear normal.      Left Ear: External ear normal.   Eyes:      General:         Right eye: No discharge.         Left eye: No discharge.      Extraocular Movements: Extraocular movements intact.   Cardiovascular:      Rate and Rhythm: Normal rate.   Pulmonary:      Effort: Pulmonary effort is normal.   Abdominal:      Tenderness: There is no abdominal tenderness.   Musculoskeletal:         General: Normal range of motion.   Skin:     General: Skin is warm.   Neurological:      General: No focal deficit present.      Mental Status: She is alert.           ASSESSMENT and PLAN:  There are no diagnoses linked to this encounter.    - Dietary recommendations: 1000 lynette / d, 60 g protein / d    - 64 oz water intake daily    - Increase activity as tolerated. Pt encouraged to continue exercising. Minimum goal of 150 minutes per week with ideal goal of 300 min per week. Exercise regimen should include at least 2-3 sessions per week of strength training. These recommendations are current with the most recent ASMBS guidelines.    -Strength training at least twice weakly to help prevent muscle loss.     -BMI is over 30, or over 27 with weight-related co-morbidities / risk factors.    -Must be seen monthly in office in order to have dose escalation prescribed.     -Remainder of dosing escalation: 5 mg SC weekly for 28 days; 7.5 mg SC weekly for 28 days; 10 mg SC weekly for 28 days;  12.5 mg SC weekly for 28 days; and last dose escalation is 15 mg SC weekly there after.    -Discussed with pt in order to be successful, must use Zepbound as an adjunct to diet and exercise.     -Pt

## 2025-06-09 ENCOUNTER — OFFICE VISIT (OUTPATIENT)
Dept: CARDIOLOGY CLINIC | Age: 40
End: 2025-06-09
Payer: COMMERCIAL

## 2025-06-09 VITALS
HEIGHT: 65 IN | BODY MASS INDEX: 42.02 KG/M2 | SYSTOLIC BLOOD PRESSURE: 102 MMHG | DIASTOLIC BLOOD PRESSURE: 62 MMHG | WEIGHT: 252.2 LBS | HEART RATE: 76 BPM

## 2025-06-09 DIAGNOSIS — Z71.9 ENCOUNTER FOR CONSULTATION: Primary | ICD-10-CM

## 2025-06-09 DIAGNOSIS — R07.9 CHEST PAIN, UNSPECIFIED TYPE: ICD-10-CM

## 2025-06-09 DIAGNOSIS — R00.2 PALPITATIONS: ICD-10-CM

## 2025-06-09 DIAGNOSIS — R79.82 ELEVATED C-REACTIVE PROTEIN (CRP): ICD-10-CM

## 2025-06-09 DIAGNOSIS — M07.60 ENTEROPATHIC ARTHRITIS: ICD-10-CM

## 2025-06-09 PROCEDURE — 3078F DIAST BP <80 MM HG: CPT | Performed by: INTERNAL MEDICINE

## 2025-06-09 PROCEDURE — 93000 ELECTROCARDIOGRAM COMPLETE: CPT | Performed by: INTERNAL MEDICINE

## 2025-06-09 PROCEDURE — 3074F SYST BP LT 130 MM HG: CPT | Performed by: INTERNAL MEDICINE

## 2025-06-09 PROCEDURE — 99204 OFFICE O/P NEW MOD 45 MIN: CPT | Performed by: INTERNAL MEDICINE

## 2025-06-09 RX ORDER — SULFASALAZINE 500 MG/1
TABLET ORAL
Qty: 120 TABLET | Refills: 1 | Status: CANCELLED | OUTPATIENT
Start: 2025-06-09

## 2025-06-09 RX ORDER — MIDODRINE HYDROCHLORIDE 5 MG/1
5 TABLET ORAL 3 TIMES DAILY
Qty: 90 TABLET | Refills: 3 | Status: SHIPPED | OUTPATIENT
Start: 2025-06-09

## 2025-06-09 NOTE — PROGRESS NOTES
CARDIOLOGY CONSULT NOTE   Reason for consultation:  Dizziness    Referring physician:  No admitting provider for patient encounter.     Primary care physician: Chinyere Dolan APRN - NP      Dear   Thanks for the consult.    History of present illness:Allyson is a 39 y.o.year old who  presents with Patient feels dizziness which is getting worse, its intermittent,lasts for few seconds, for last 2-3 weeks, every other day, its associated with palpitations,it gets better with sitting with sudden movement,pateint is not sure if its from head movement or arm movement  She had gastric sleeve 3 yrs,.she was seen By Dr. Lee and her blood pressure was low in office and felt dizzy while sitting and standing  Chief Complaint   Patient presents with    New Patient     Pt denies other cardiac symptoms    Shortness of Breath    Dizziness    Palpitations     Blood pressure, cholesterol, blood glucose and weight are well controlled.    Past medical history:    has a past medical history of Acid reflux, Acute deep vein thrombosis (DVT) of non-extremity vein, Anemia, Anxiety, Asthma, Blood clot due to device, implant, or graft, Bowen's disease, Cancer (HCC), Crohn's disease (HCC), Depression, ECHO, Fall, Fatty liver, Gestational diabetes, Hx of blood clots, Hypertension, Lower back pain, MRSA (methicillin resistant staph aureus) culture positive, Multiple pulmonary nodules, Obesity, Pancreatitis, Patellofemoral arthritis of right knee, Shortness of breath on exertion, Teeth missing, VL DUP LOWER EXTREMITY VENOUS BILATERAL, Wears glasses, and Wears glasses.  Past surgical history:   has a past surgical history that includes laparoscopy (2013); [place] tooth extraction; Endoscopy, colon, diagnostic (Last Done 2014); colectomy (8-11); other surgical history (06/2011); Adenoidectomy (1995); Appendectomy (2011); Cholecystectomy (09/20/2016); Colonoscopy (09/06/2016); Colonoscopy (05/2017); Colonoscopy (03/06/2018); Tunneled venous port

## 2025-06-10 DIAGNOSIS — Z01.810 PRE-OPERATIVE CARDIOVASCULAR EXAMINATION: Primary | ICD-10-CM

## 2025-06-10 DIAGNOSIS — R42 DIZZINESS: ICD-10-CM

## 2025-06-10 DIAGNOSIS — R79.82 ELEVATED C-REACTIVE PROTEIN (CRP): ICD-10-CM

## 2025-06-10 DIAGNOSIS — M07.60 ENTEROPATHIC ARTHRITIS: ICD-10-CM

## 2025-06-10 RX ORDER — SULFASALAZINE 500 MG/1
1000 TABLET ORAL 2 TIMES DAILY
Qty: 120 TABLET | Refills: 1 | Status: SHIPPED | OUTPATIENT
Start: 2025-06-10 | End: 2025-06-12 | Stop reason: SDUPTHER

## 2025-06-12 ENCOUNTER — TELEPHONE (OUTPATIENT)
Dept: CARDIOLOGY CLINIC | Age: 40
End: 2025-06-12

## 2025-06-12 ENCOUNTER — TELEPHONE (OUTPATIENT)
Age: 40
End: 2025-06-12

## 2025-06-12 DIAGNOSIS — R79.82 ELEVATED C-REACTIVE PROTEIN (CRP): ICD-10-CM

## 2025-06-12 DIAGNOSIS — M07.60 ENTEROPATHIC ARTHRITIS: ICD-10-CM

## 2025-06-12 RX ORDER — SULFASALAZINE 500 MG/1
1000 TABLET ORAL 2 TIMES DAILY
Qty: 360 TABLET | Refills: 0 | Status: SHIPPED | OUTPATIENT
Start: 2025-06-12 | End: 2025-09-10

## 2025-06-12 NOTE — TELEPHONE ENCOUNTER
Patient called and stated that the insurance company is requiring a 90 day supply of the Sulfasalazine. Can this be sent in place of the 30 day? Please advise

## 2025-06-12 NOTE — TELEPHONE ENCOUNTER
Notified patient of procedure time,date 09/16/2025 2:30pm arrival time 1:30 pm voiced understanding

## 2025-06-22 NOTE — PROGRESS NOTES
RHEUMATOLOGY FOLLOW UP VISIT    2025      Patient Name: Allyson Pinto  : 1985  Medical Record: [id number]      CHIEF COMPLAINT  Inflammatory Arthritis    Pertinent Problems  GERD  S/p gastric sleeve 2022  Crohns disease s/p remicade x 10 years, humira x 1.5 years       HISTORY OF PRESENT ILLNESS    Allyson Pinto is a 39 y.o. female who established on 1/10/24. Symptoms onset began 2 years ago with knees, elbows and finger pain. There is swelling in her hands.   She was diagnosed with Crohns in  s/p small bowel resection in . She was treated with remicade x 10 years then stopped after covid infection. Then she was treated with humira for `1.5 years until her insurance stopped paying. Her Crohns symptoms significantly improved after gastric sleeve and she currently does not take any meds for it.     LCV: 25  CRP 6.8 H >> 7.0 >>5.1 H  Uric acid 7.1 H   Vit D 14.9 L   Hgb/ plt / wbc nl  RF neg   LFT nl  CCP neg   BEAU by IFA neg  HLA b27 negative  SSZ was started on 25 and increased on 25    Subjective:   Today, patient describes joint pain in bilateral knees, there is bilateral elbow pain that has not significantly improved despite CRP levels trending down  She denies any joint pain  Gripping is difficult, she experiences spasms  She is taking 2 tabs of SSZ bid and is tolerating it well  Joint stiffness in am lasting ~ 30 minutes   Relieving factors: prolonged sitting  Worsening factors: activity   Pain level today: 7-8/10  No recent hospitalizations or fever/infections  Functional status: 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  Overall there has been no significant improvement        Risk factors: No smoking, seldom etoh, obesity ++, no recreational drug use, Sister has lupus, MG and POTs.     Current rheum meds: SSZ 2 tabs bid    Past rheum meds:       REVIEW OF SYSTEMS

## 2025-06-23 ENCOUNTER — HOSPITAL ENCOUNTER (OUTPATIENT)
Age: 40
Discharge: HOME | End: 2025-06-23
Payer: COMMERCIAL

## 2025-06-23 DIAGNOSIS — R79.82 ELEVATED C-REACTIVE PROTEIN (CRP): ICD-10-CM

## 2025-06-23 DIAGNOSIS — Z79.899 HIGH RISK MEDICATION USE: ICD-10-CM

## 2025-06-23 DIAGNOSIS — Z01.810 PRE-OPERATIVE CARDIOVASCULAR EXAMINATION: ICD-10-CM

## 2025-06-23 LAB
ALBUMIN SERPL-MCNC: 4.3 G/DL (ref 3.4–5)
ALBUMIN/GLOB SERPL: 1.3 {RATIO} (ref 1.1–2.2)
ALP SERPL-CCNC: 52 U/L (ref 40–129)
ALT SERPL-CCNC: 19 U/L (ref 10–40)
ANION GAP SERPL CALCULATED.3IONS-SCNC: 14 MMOL/L (ref 9–17)
AST SERPL-CCNC: 22 U/L (ref 15–37)
BILIRUB DIRECT SERPL-MCNC: <0.2 MG/DL (ref 0–0.3)
BILIRUB INDIRECT SERPL-MCNC: NORMAL MG/DL (ref 0–0.7)
BILIRUB SERPL-MCNC: 0.3 MG/DL (ref 0–1)
BUN SERPL-MCNC: 17 MG/DL (ref 7–20)
CALCIUM SERPL-MCNC: 9.3 MG/DL (ref 8.3–10.6)
CHLORIDE SERPL-SCNC: 101 MMOL/L (ref 99–110)
CO2 SERPL-SCNC: 22 MMOL/L (ref 21–32)
CREAT SERPL-MCNC: 0.7 MG/DL (ref 0.6–1.1)
CRP SERPL HS-MCNC: 5.1 MG/L (ref 0–5)
GFR, ESTIMATED: 90 ML/MIN/1.73M2
GLUCOSE SERPL-MCNC: 82 MG/DL (ref 74–99)
MAGNESIUM SERPL-MCNC: 2.2 MG/DL (ref 1.8–2.4)
POTASSIUM SERPL-SCNC: 3.9 MMOL/L (ref 3.5–5.1)
PROT SERPL-MCNC: 7.6 G/DL (ref 6.4–8.2)
SODIUM SERPL-SCNC: 137 MMOL/L (ref 136–145)

## 2025-06-23 PROCEDURE — 83735 ASSAY OF MAGNESIUM: CPT

## 2025-06-23 PROCEDURE — 80053 COMPREHEN METABOLIC PANEL: CPT

## 2025-06-23 PROCEDURE — 82248 BILIRUBIN DIRECT: CPT

## 2025-06-23 PROCEDURE — 86140 C-REACTIVE PROTEIN: CPT

## 2025-06-25 ENCOUNTER — OFFICE VISIT (OUTPATIENT)
Age: 40
End: 2025-06-25
Payer: COMMERCIAL

## 2025-06-25 VITALS
BODY MASS INDEX: 41.84 KG/M2 | WEIGHT: 251.4 LBS | HEART RATE: 77 BPM | DIASTOLIC BLOOD PRESSURE: 72 MMHG | SYSTOLIC BLOOD PRESSURE: 102 MMHG | OXYGEN SATURATION: 98 %

## 2025-06-25 DIAGNOSIS — M07.60 ENTEROPATHIC ARTHRITIS: Primary | ICD-10-CM

## 2025-06-25 DIAGNOSIS — R79.82 ELEVATED C-REACTIVE PROTEIN (CRP): ICD-10-CM

## 2025-06-25 DIAGNOSIS — E55.9 VITAMIN D DEFICIENCY: ICD-10-CM

## 2025-06-25 DIAGNOSIS — Z79.899 HIGH RISK MEDICATION USE: ICD-10-CM

## 2025-06-25 PROCEDURE — 3074F SYST BP LT 130 MM HG: CPT | Performed by: STUDENT IN AN ORGANIZED HEALTH CARE EDUCATION/TRAINING PROGRAM

## 2025-06-25 PROCEDURE — 99215 OFFICE O/P EST HI 40 MIN: CPT | Performed by: STUDENT IN AN ORGANIZED HEALTH CARE EDUCATION/TRAINING PROGRAM

## 2025-06-25 PROCEDURE — 3078F DIAST BP <80 MM HG: CPT | Performed by: STUDENT IN AN ORGANIZED HEALTH CARE EDUCATION/TRAINING PROGRAM

## 2025-06-25 RX ORDER — METHOTREXATE 2.5 MG/1
15 TABLET ORAL WEEKLY
Qty: 78 TABLET | Refills: 0 | Status: SHIPPED | OUTPATIENT
Start: 2025-06-25 | End: 2025-09-24

## 2025-06-25 RX ORDER — FOLIC ACID 1 MG/1
1 TABLET ORAL DAILY
Qty: 90 TABLET | Refills: 0 | Status: SHIPPED | OUTPATIENT
Start: 2025-06-25

## 2025-06-25 RX ORDER — ERGOCALCIFEROL 1.25 MG/1
50000 CAPSULE, LIQUID FILLED ORAL WEEKLY
Qty: 12 CAPSULE | Refills: 0 | Status: SHIPPED | OUTPATIENT
Start: 2025-06-25

## 2025-06-25 NOTE — PATIENT INSTRUCTIONS
We are committed to providing you the best care possible.    If you receive a survey after visiting one of our offices, please take time to share your experience concerning your physician office visit.  These surveys are confidential and no health information about you is shared.    We are eager to improve for you and we are counting on your feedback to help make that happen.    Patient Instructions  Continue SSZ   Start methotraxate 6 pills every 7 days   Take folic acid one pill daily except on Methotrexate days  Continue weekly vit D   Get labs 1 week prior to your next appointment  RTC in 2 months

## 2025-07-07 ENCOUNTER — OFFICE VISIT (OUTPATIENT)
Dept: BARIATRICS/WEIGHT MGMT | Age: 40
End: 2025-07-07
Payer: COMMERCIAL

## 2025-07-07 VITALS
HEART RATE: 65 BPM | WEIGHT: 247.5 LBS | HEIGHT: 65 IN | RESPIRATION RATE: 18 BRPM | OXYGEN SATURATION: 98 % | SYSTOLIC BLOOD PRESSURE: 112 MMHG | DIASTOLIC BLOOD PRESSURE: 72 MMHG | BODY MASS INDEX: 41.23 KG/M2

## 2025-07-07 DIAGNOSIS — Z79.899 MEDICATION MANAGEMENT: ICD-10-CM

## 2025-07-07 DIAGNOSIS — Z90.3 HISTORY OF SLEEVE GASTRECTOMY: ICD-10-CM

## 2025-07-07 DIAGNOSIS — E66.01 MORBID OBESITY WITH BMI OF 40.0-44.9, ADULT (HCC): Primary | ICD-10-CM

## 2025-07-07 PROCEDURE — 99214 OFFICE O/P EST MOD 30 MIN: CPT | Performed by: NURSE PRACTITIONER

## 2025-07-07 PROCEDURE — 3078F DIAST BP <80 MM HG: CPT | Performed by: NURSE PRACTITIONER

## 2025-07-07 PROCEDURE — 3074F SYST BP LT 130 MM HG: CPT | Performed by: NURSE PRACTITIONER

## 2025-07-07 RX ORDER — SEMAGLUTIDE 0.25 MG/.5ML
INJECTION, SOLUTION SUBCUTANEOUS
Refills: 0 | OUTPATIENT
Start: 2025-07-07

## 2025-07-07 RX ORDER — TIRZEPATIDE 12.5 MG/.5ML
12.5 INJECTION, SOLUTION SUBCUTANEOUS WEEKLY
Qty: 0.5 ML | Refills: 1 | Status: SHIPPED | OUTPATIENT
Start: 2025-07-07

## 2025-07-07 NOTE — PROGRESS NOTES
41.19 kg/m²      Physical Exam  Constitutional:       Appearance: Normal appearance. She is obese.   HENT:      Head: Normocephalic.      Nose: Nose normal.      Mouth/Throat:      Pharynx: Oropharynx is clear.   Eyes:      Pupils: Pupils are equal, round, and reactive to light.   Cardiovascular:      Rate and Rhythm: Normal rate.      Pulses: Normal pulses.   Pulmonary:      Effort: Pulmonary effort is normal.   Musculoskeletal:         General: Normal range of motion.      Cervical back: Normal range of motion.   Skin:     General: Skin is warm and dry.      Capillary Refill: Capillary refill takes less than 2 seconds.   Neurological:      General: No focal deficit present.      Mental Status: She is alert and oriented to person, place, and time.   Psychiatric:         Mood and Affect: Mood normal.         Behavior: Behavior normal.           ASSESSMENT and PLAN:  1. Morbid obesity with BMI of 40.0-44.9, adult (HCC)  - Continue to track calories and follow bariatric diet plan  - Continue mindful eating  - Activity level per cardiology    2. History of sleeve gastrectomy  - Patient was encouraged to journal all food intake.   - Keep calorie level at approximately 800-1000 per discussion   - Protein intake is to be a minimum of 60-80 grams per day.   - Water drinking was encouraged with a goal of 64oz-128oz daily. Beverages are to be calorie free except for milk. Avoid soda.     3. Medication management  - Doing well with Zepbound overall   - Does continue to c/o random dizziness and lightheadedness \"spells\" that resolve on their own  - Patient is managing her protein and caloric intake well  - Patient states these episodes were occurring prior to GLP/ GIP medication; so it is unlikely a result from Zepbound  - Please keep follow up appt with cardiology  - Not a candidate for any stimulant weight loss medications at this time  - Weight loss this month of 7.8 pounds  - Denies any other concerns  - Given her history

## 2025-07-08 RX ORDER — SEMAGLUTIDE 0.25 MG/.5ML
INJECTION, SOLUTION SUBCUTANEOUS
Refills: 0 | OUTPATIENT
Start: 2025-07-08

## 2025-07-09 NOTE — PROGRESS NOTES
MRN: [id number]  Name: Allyson Pinto  : 1985    Insurance: Payor: [place] [facility] /  /  /      Phone #: [phone]  Provider: Primitivo Hernandez MD     Date of Visit: 2025 - 8:50 AM    Reason for visit: 1 mo results testing -  Recent Hospitalization Date:    Reason for Hospitalization:    Last EK2025  Type of Device:       Vitals BP HR O2% WT HT ORTHO BP LYING ORTHO BP SITTING ORTHO BP SITTING   Today's Findings           Patients work up- Check List     Testing Last Date Completed Date Expected  ([place] [facility]) Additional Notes    MA to document For provider to complete Either MA or Provider    Carotid Duplex  STAT 1 WK 6 MTH       THIS WK 2 WK 1 YEAR     Cardiac CTA  STAT 1 WK 6 MTH       THIS WK 2 WK 1 YEAR     Cardiac CT Calcium scoring  STAT 1 WK 6 MTH       THIS WK 2 WK 1 YEAR     CTA Chest, Abdomen & Pelvis  STAT 1 WK 6 MTH       THIS WK 2 WK 1 YEAR     CT Chest IV w/ Contrast  STAT 1 WK 6 MTH       THIS WK 2 WK 1 YEAR     CT Chest w/o Contrast  STAT 1 WK 6 MTH       THIS WK 2 WK 1 YEAR     CXR 2025 STAT 1 WK 6 MTH       THIS WK 2 WK 1 YEAR     ECHO 2025 Stress Complete Limited     MRI- Cardiac  STAT 1 WK 6 MTH       THIS WK 2 WK 1 YEAR     MUGA Scan  STAT 1 WK 6 MTH       THIS WK 2 WK 1 YEAR     Nuclear Stress  Lexiscan Cardiolite     PFT  STAT 1 WK 6 MTH       THIS WK 2 WK 1 YEAR     Treadmill Stress Test 2025 STAT 1 WK 6 MTH       THIS WK 2 WK 1 YEAR     Vascular Duplex 2025 Lower: Right Left Bilat       Upper: Right Left Bilat     Other Test Not Listed:    Monitors Last Date Completed Day's Request/Ordered     Holter 2025 Short term 24 hours 48 hours      Long term 3 days 7 days 14 days   Event   (1-30 days)      Procedures Last Date Performed Procedure Details Date Expected   Additional Notes    ASD Closure        Carotid Angio        Cardioversion        Heart Cath  R L R&L      Peripheral Angio  R L      PFO Closure        PTCA/PCI        KARINA        KARINA/Cardioversion

## 2025-07-17 ENCOUNTER — OFFICE VISIT (OUTPATIENT)
Dept: CARDIOLOGY CLINIC | Age: 40
End: 2025-07-17
Payer: COMMERCIAL

## 2025-07-17 VITALS
HEART RATE: 78 BPM | WEIGHT: 246 LBS | BODY MASS INDEX: 40.98 KG/M2 | HEIGHT: 65 IN | SYSTOLIC BLOOD PRESSURE: 126 MMHG | DIASTOLIC BLOOD PRESSURE: 72 MMHG

## 2025-07-17 DIAGNOSIS — R07.9 CHEST PAIN, UNSPECIFIED TYPE: ICD-10-CM

## 2025-07-17 DIAGNOSIS — R06.02 SOBOE (SHORTNESS OF BREATH ON EXERTION): ICD-10-CM

## 2025-07-17 DIAGNOSIS — R00.2 PALPITATIONS: Primary | ICD-10-CM

## 2025-07-17 DIAGNOSIS — R42 DIZZINESS: ICD-10-CM

## 2025-07-17 DIAGNOSIS — Z71.9 ENCOUNTER FOR CONSULTATION: ICD-10-CM

## 2025-07-17 PROCEDURE — 3078F DIAST BP <80 MM HG: CPT | Performed by: INTERNAL MEDICINE

## 2025-07-17 PROCEDURE — 99214 OFFICE O/P EST MOD 30 MIN: CPT | Performed by: INTERNAL MEDICINE

## 2025-07-17 PROCEDURE — 3074F SYST BP LT 130 MM HG: CPT | Performed by: INTERNAL MEDICINE

## 2025-07-17 NOTE — PROGRESS NOTES
Elizabeth Hernandez MD        OFFICE  FOLLOWUP NOTE    Chief complaints: patient is here for management of dizziness    Subjective: patient feels better, no chest pain, no shortness of breath, no dizziness, no palpitations    HPI Allyson is a 39 y.o.year old who  has a past medical history of Acid reflux, Acute deep vein thrombosis (DVT) of non-extremity vein, Anemia, Anxiety, Asthma, Blood clot due to device, implant, or graft, Bowen's disease, Cancer (HCC), Crohn's disease (HCC), Depression, ECHO, Fall, Fatty liver, Gestational diabetes, Hx of blood clots, Hypertension, Lower back pain, MRSA (methicillin resistant staph aureus) culture positive, Multiple pulmonary nodules, Obesity, Pancreatitis, Patellofemoral arthritis of right knee, Shortness of breath on exertion, Teeth missing, VL DUP LOWER EXTREMITY VENOUS BILATERAL, Wears glasses, and Wears glasses. and presents for management of  which are well controlled      Current Outpatient Medications   Medication Sig Dispense Refill    tirzepatide-weight management (ZEPBOUND) 12.5 MG/0.5ML SOAJ subCUTAneous auto-injector pen Inject 12.5 mg into the skin once a week 0.5 mL 1    vitamin D (ERGOCALCIFEROL) 1.25 MG (43180 UT) CAPS capsule Take 1 capsule by mouth once a week 12 capsule 0    folic acid (FOLVITE) 1 MG tablet Take 1 tablet by mouth daily 90 tablet 0    methotrexate (RHEUMATREX) 2.5 MG chemo tablet Take 6 tablets by mouth once a week 78 tablet 0    sulfaSALAzine (AZULFIDINE) 500 MG tablet Take 2 tablets by mouth 2 times daily 360 tablet 0    midodrine (PROAMATINE) 5 MG tablet Take 1 tablet by mouth 3 times daily 90 tablet 3    predniSONE (DELTASONE) 5 MG tablet Take 1-3 tabs daily only as needed for moderate to severe pain 30 tablet 0     No current facility-administered medications for this visit.     Allergies: Morphine and Tramadol  Past Medical History:   Diagnosis Date    Acid reflux     Acute deep vein thrombosis (DVT) of non-extremity vein

## 2025-07-17 NOTE — PROGRESS NOTES
CLINICAL STAFF DOCUMENTATION    Dr. Elizabeth Pinto  1985  [id number]    Have you had any Chest Pain recently? - No     Have you had any Shortness of Breath - No    Have you had any dizziness - Yes  If Yes DO ORTHOSTATIC BP including pulse  When do you feel dizzy? walking  Does the room spin? Yes    Have you had any palpitations recently? - No    Do you have any edema - swelling in No      Do you have a surgery or procedure scheduled in the near future - Yes  If Yes- DO EKG  Type of surgery Blepharoplasty  GIVE THIS INFORMATION TO THUY MARTIN AND CASSI SAMANIEGO    Do use tobacco products? - No  Do you drink alcohol? - No  Do you use any illicit drugs? - No  Caffeine? - Yes  How much caffeine? .3  cups       Check medication list thoroughly!!! AND RECONCILE OUTSIDE MEDICATIONS  If dose has changed change the entire order not just the MG  BE SURE TO ASK PATIENT IF THEY NEED MEDICATION REFILLS  Verify Pharmacy and update if incorrect    Add to every patient's \"wrap up\" the following dot phrase AFTERVISITCARDIOHEARTHOUSE and ensure we explain this to our patients

## 2025-07-24 ENCOUNTER — TELEPHONE (OUTPATIENT)
Dept: BARIATRICS/WEIGHT MGMT | Age: 40
End: 2025-07-24

## 2025-07-24 DIAGNOSIS — Z79.899 MEDICATION MANAGEMENT: Primary | ICD-10-CM

## 2025-07-24 RX ORDER — TIRZEPATIDE 12.5 MG/.5ML
12.5 INJECTION, SOLUTION SUBCUTANEOUS WEEKLY
Qty: 0.5 ML | Refills: 1 | Status: SHIPPED | OUTPATIENT
Start: 2025-07-24

## 2025-07-24 NOTE — TELEPHONE ENCOUNTER
Insurance changed. Patient no longer able to obtain Zepbound despite her success thus far with the medication.   Per insurance, it is acceptable to prescribe mounjaro for off label use considering it is the same medication as in zepbound.  Will use same dose.  Orders placed. Patient informed.

## 2025-08-06 ENCOUNTER — OFFICE VISIT (OUTPATIENT)
Dept: BARIATRICS/WEIGHT MGMT | Age: 40
End: 2025-08-06
Payer: COMMERCIAL

## 2025-08-06 VITALS
HEART RATE: 65 BPM | SYSTOLIC BLOOD PRESSURE: 128 MMHG | OXYGEN SATURATION: 98 % | HEIGHT: 65 IN | WEIGHT: 237.8 LBS | BODY MASS INDEX: 39.62 KG/M2 | DIASTOLIC BLOOD PRESSURE: 76 MMHG

## 2025-08-06 DIAGNOSIS — E66.9 OBESITY (BMI 30-39.9): Primary | ICD-10-CM

## 2025-08-06 DIAGNOSIS — Z90.3 HISTORY OF SLEEVE GASTRECTOMY: ICD-10-CM

## 2025-08-06 DIAGNOSIS — Z79.899 MEDICATION MANAGEMENT: ICD-10-CM

## 2025-08-06 PROCEDURE — 3078F DIAST BP <80 MM HG: CPT | Performed by: NURSE PRACTITIONER

## 2025-08-06 PROCEDURE — 3074F SYST BP LT 130 MM HG: CPT | Performed by: NURSE PRACTITIONER

## 2025-08-06 PROCEDURE — 99214 OFFICE O/P EST MOD 30 MIN: CPT | Performed by: NURSE PRACTITIONER

## 2025-08-06 RX ORDER — TIRZEPATIDE 12.5 MG/.5ML
12.5 INJECTION, SOLUTION SUBCUTANEOUS WEEKLY
Qty: 0.5 ML | Refills: 1 | Status: SHIPPED | OUTPATIENT
Start: 2025-08-06

## 2025-08-06 ASSESSMENT — PATIENT HEALTH QUESTIONNAIRE - PHQ9
2. FEELING DOWN, DEPRESSED OR HOPELESS: NOT AT ALL
SUM OF ALL RESPONSES TO PHQ QUESTIONS 1-9: 0
1. LITTLE INTEREST OR PLEASURE IN DOING THINGS: NOT AT ALL

## 2025-08-13 ENCOUNTER — TELEPHONE (OUTPATIENT)
Age: 40
End: 2025-08-13

## 2025-08-23 ENCOUNTER — HOSPITAL ENCOUNTER (OUTPATIENT)
Dept: LAB | Age: 40
Discharge: HOME | End: 2025-08-23
Payer: COMMERCIAL

## 2025-08-23 DIAGNOSIS — Z79.899 HIGH RISK MEDICATION USE: ICD-10-CM

## 2025-08-23 LAB
25(OH)D3 SERPL-MCNC: 39 NG/ML (ref 30–150)
ALP SERPL-CCNC: 57 U/L (ref 40–129)
ALT SERPL-CCNC: 20 U/L (ref 10–40)
AST SERPL-CCNC: 22 U/L (ref 15–37)
CALCIUM SERPL-MCNC: 8.9 MG/DL (ref 8.3–10.6)
CREAT SERPL-MCNC: 0.7 MG/DL (ref 0.6–1.1)
CRP SERPL HS-MCNC: <3 MG/L (ref 0–5)
ERYTHROCYTE [DISTWIDTH] IN BLOOD BY AUTOMATED COUNT: 13.2 % (ref 11.7–14.9)
GFR, ESTIMATED: >90 ML/MIN/1.73M2
HCT VFR BLD AUTO: 36.7 % (ref 37–47)
HGB BLD-MCNC: 11.9 G/DL (ref 12.5–16)
MCH RBC QN AUTO: 29.7 PG (ref 27–31)
MCHC RBC AUTO-ENTMCNC: 32.4 G/DL (ref 32–36)
MCV RBC AUTO: 91.5 FL (ref 78–100)
PLATELET # BLD AUTO: 211 K/UL (ref 140–440)
PMV BLD AUTO: 12.1 FL (ref 7.5–11.1)
RBC # BLD AUTO: 4.01 M/UL (ref 4.2–5.4)
WBC OTHER # BLD: 5.9 K/UL (ref 4–10.5)

## 2025-08-23 PROCEDURE — 84450 TRANSFERASE (AST) (SGOT): CPT

## 2025-08-23 PROCEDURE — 82310 ASSAY OF CALCIUM: CPT

## 2025-08-23 PROCEDURE — 82306 VITAMIN D 25 HYDROXY: CPT

## 2025-08-23 PROCEDURE — 82565 ASSAY OF CREATININE: CPT

## 2025-08-23 PROCEDURE — 84075 ASSAY ALKALINE PHOSPHATASE: CPT

## 2025-08-23 PROCEDURE — 84460 ALANINE AMINO (ALT) (SGPT): CPT

## 2025-08-23 PROCEDURE — 85027 COMPLETE CBC AUTOMATED: CPT

## 2025-08-23 PROCEDURE — 86140 C-REACTIVE PROTEIN: CPT

## (undated) DEVICE — PADDING CAST W6INXL4YD COT LO LINTING WYTEX

## (undated) DEVICE — DRAPE,U/ SHT,SPLIT,PLAS,STERIL: Brand: MEDLINE

## (undated) DEVICE — Device

## (undated) DEVICE — INTENDED FOR TISSUE SEPARATION, AND OTHER PROCEDURES THAT REQUIRE A SHARP SURGICAL BLADE TO PUNCTURE OR CUT.: Brand: BARD-PARKER ® STAINLESS STEEL BLADES

## (undated) DEVICE — GOWN,ECLIPSE,POLYRNF,BRTHSLV,L,30/CS: Brand: MEDLINE

## (undated) DEVICE — TOWEL,OR,DSP,ST,BLUE,STD,6/PK,12PK/CS: Brand: MEDLINE

## (undated) DEVICE — DRESSING PETRO W3XL3IN OIL EMUL N ADH GZ KNIT IMPREG CELOS

## (undated) DEVICE — BANDAGE,ELASTIC,ESMARK,STERILE,6"X9',LF: Brand: MEDLINE

## (undated) DEVICE — GLOVE SURG SZ 7 CRM LTX FREE POLYISOPRENE POLYMER BEAD ANTI

## (undated) DEVICE — SUREFORM 45: Brand: SUREFORM

## (undated) DEVICE — STAPLER 60 RELOAD BLUE: Brand: SUREFORM

## (undated) DEVICE — APPLICATOR MEDICATED 26 CC SOLUTION HI LT ORNG CHLORAPREP

## (undated) DEVICE — ENDOSCOPY KIT: Brand: MEDLINE INDUSTRIES, INC.

## (undated) DEVICE — CADIERE FORCEPS: Brand: ENDOWRIST

## (undated) DEVICE — LAP CHOLE PK

## (undated) DEVICE — ARM DRAPE

## (undated) DEVICE — SPONGE GZ W4XL8IN COT WVN 12 PLY

## (undated) DEVICE — NEEDLE HYPO 20GA L1.5IN YEL POLYPR HUB S STL REG BVL STR

## (undated) DEVICE — REDUCER: Brand: ENDOWRIST

## (undated) DEVICE — TAPE,CLOTH/SILK,CURAD,3"X10YD,LF,40/CS: Brand: CURAD

## (undated) DEVICE — SET TBNG DISP TIP FOR AHTO

## (undated) DEVICE — GLOVE SURG SZ 65 CRM LTX FREE POLYISOPRENE POLYMER BEAD ANTI

## (undated) DEVICE — MARKER SURG SKIN UTIL REGULAR/FINE 2 TIP W/ RUL AND 9 LBL

## (undated) DEVICE — BANDAGE,SELF ADHRNT,COFLEX,4"X5YD,STRL: Brand: COLABEL

## (undated) DEVICE — FORCEPS BX L240CM JAW DIA2.8MM L CAP W/ NDL MIC MESH TOOTH

## (undated) DEVICE — STAPLER 60: Brand: SUREFORM

## (undated) DEVICE — CANNULA SEAL

## (undated) DEVICE — VESSEL SEALER EXTEND: Brand: ENDOWRIST

## (undated) DEVICE — YANKAUER,FLEXIBLE HANDLE,REGLR CAPACITY: Brand: MEDLINE INDUSTRIES, INC.

## (undated) DEVICE — GLOVE SURG SZ 6 CRM LTX FREE POLYISOPRENE POLYMER BEAD ANTI

## (undated) DEVICE — GAUZE,SPONGE,4"X4",16PLY,XRAY,STRL,LF: Brand: MEDLINE

## (undated) DEVICE — GLOVE ORANGE PI 7   MSG9070

## (undated) DEVICE — SOLUTION PREP POVIDONE IOD FOR SKIN MUCOUS MEM PRIOR TO

## (undated) DEVICE — BLADELESS OBTURATOR: Brand: WECK VISTA

## (undated) DEVICE — STAPLER 60 RELOAD GREEN: Brand: SUREFORM

## (undated) DEVICE — THREE QUARTER SHEET: Brand: CONVERTORS

## (undated) DEVICE — PAD,ABDOMINAL,5"X9",ST,LF,25/BX: Brand: MEDLINE INDUSTRIES, INC.

## (undated) DEVICE — SUTURE SZ 0 27IN 5/8 CIR UR-6  TAPER PT VIOLET ABSRB VICRYL J603H

## (undated) DEVICE — COUNTER NDL 30 COUNT FOAM STRP SGL MAG

## (undated) DEVICE — Z DISCONTINUED NO SUB IDED TUBING ETCO2 AD L6.5FT NSL ORAL CVD PRNG NONFLARED TIP OVR

## (undated) DEVICE — SUTURE STRATAFIX SPRL PDS + VLT 3-0 15CM DISPOSABLE/SNGLE SXPP1B420

## (undated) DEVICE — ELECTRODE ES AD CRDLSS PT RET REM POLYHESIVE

## (undated) DEVICE — SUTURE COAT VCRL SZ 4-0 L18IN ABSRB UD L19MM PS-2 1/2 CIR J496G

## (undated) DEVICE — BANDAGE,GAUZE,BULKEE II,4.5"X4.1YD,STRL: Brand: MEDLINE

## (undated) DEVICE — SYRINGE MED 20ML STD CLR PLAS LUERLOCK TIP N CTRL DISP

## (undated) DEVICE — SUTURE VCRL SZ 2-0 L18IN ABSRB UD CT-1 L36MM 1/2 CIR J839D

## (undated) DEVICE — SYRINGE 20ML LL S/C 50

## (undated) DEVICE — GLOVE ORANGE PI 7 1/2   MSG9075

## (undated) DEVICE — LAPAROSCOPIC TROCAR SLEEVE/SINGLE USE: Brand: KII® OPTICAL ACCESS SYSTEM

## (undated) DEVICE — GOWN,SIRUS,POLYRNF,BRTHSLV,XLN/XL,20/CS: Brand: MEDLINE

## (undated) DEVICE — COLUMN DRAPE

## (undated) DEVICE — ZIMMER® STERILE DISPOSABLE TOURNIQUET CUFF WITH PLC, DUAL PORT, SINGLE BLADDER, 42 IN. (107 CM)

## (undated) DEVICE — GLOVE SURG SZ 65 L12IN FNGR THK79MIL GRN LTX FREE

## (undated) DEVICE — STERILE LATEX POWDER-FREE SURGICAL GLOVESWITH NITRILE COATING: Brand: PROTEXIS

## (undated) DEVICE — SEAL

## (undated) DEVICE — ZIMMER® STERILE DISPOSABLE TOURNIQUET CUFF WITH PLC, DUAL PORT, SINGLE BLADDER, 34 IN. (86 CM)

## (undated) DEVICE — 34" SINGLE PATIENT USE HOVERMATT BREATHABLE: Brand: SINGLE PATIENT USE HOVERMATT

## (undated) DEVICE — GLOVE SURG SZ 65 THK91MIL LTX FREE SYN POLYISOPRENE

## (undated) DEVICE — ADHESIVE SKIN CLSR 0.7ML TOP DERMBND ADV

## (undated) DEVICE — Z DISCONTINUED (USE MFG CAT MVABO)  TUBING GAS SAMPLING STD 6.5 FT FEMALE CONN SMRT CAPNOLINE

## (undated) DEVICE — TUBING, SUCTION, 9/32" X 10', STRAIGHT: Brand: MEDLINE

## (undated) DEVICE — EXCEL 10FT (3.05 M) INSUFFLATION TUBING SET WITH 0.1 MICRON FILTER: Brand: EXCEL

## (undated) DEVICE — GOWN,ECLIPSE,POLYRNF,BRTHSLV,XL,30/CS: Brand: MEDLINE

## (undated) DEVICE — LARGE SUTURE CUT NEEDLE DRIVER: Brand: ENDOWRIST

## (undated) DEVICE — GLOVE SURG SZ 7 L12IN FNGR THK79MIL GRN LTX FREE